# Patient Record
Sex: MALE | Race: WHITE | NOT HISPANIC OR LATINO | Employment: OTHER | URBAN - METROPOLITAN AREA
[De-identification: names, ages, dates, MRNs, and addresses within clinical notes are randomized per-mention and may not be internally consistent; named-entity substitution may affect disease eponyms.]

---

## 2017-01-02 DIAGNOSIS — M51.16 LUMBAR DISC DISEASE WITH RADICULOPATHY: ICD-10-CM

## 2017-01-02 RX ORDER — HYDROCODONE BITARTRATE AND ACETAMINOPHEN 10; 325 MG/1; MG/1
TABLET ORAL
Qty: 50 TABLET | Refills: 0 | Status: SHIPPED | OUTPATIENT
Start: 2017-01-02 | End: 2017-03-07 | Stop reason: SDUPTHER

## 2017-02-07 DIAGNOSIS — M51.16 LUMBAR DISC DISEASE WITH RADICULOPATHY: ICD-10-CM

## 2017-02-08 ENCOUNTER — TELEPHONE (OUTPATIENT)
Dept: FAMILY MEDICINE | Facility: CLINIC | Age: 67
End: 2017-02-08

## 2017-02-08 RX ORDER — HYDROCODONE BITARTRATE AND ACETAMINOPHEN 10; 325 MG/1; MG/1
1 TABLET ORAL EVERY 6 HOURS PRN
Qty: 50 TABLET | Refills: 0 | Status: SHIPPED | OUTPATIENT
Start: 2017-02-08 | End: 2017-02-21

## 2017-02-08 NOTE — TELEPHONE ENCOUNTER
----- Message from Manpreet Milan sent at 2017 12:44 PM CST -----  Contact: SELF  Wesley Bernal  MRN: [id number]  : 1950  PCP: Lalo Sweet  Home Phone      [phone]  Work Phone      Not on file.  Mobile          Not on file.  Home Phone      [phone]      MESSAGE: NEEDS REFILL ON [place]    PHONE: [phone]    PHARMACY: [facility]

## 2017-02-21 ENCOUNTER — OFFICE VISIT (OUTPATIENT)
Dept: FAMILY MEDICINE | Facility: CLINIC | Age: 67
End: 2017-02-21
Payer: MEDICARE

## 2017-02-21 VITALS
BODY MASS INDEX: 27.03 KG/M2 | RESPIRATION RATE: 18 BRPM | HEART RATE: 60 BPM | DIASTOLIC BLOOD PRESSURE: 60 MMHG | HEIGHT: 73 IN | WEIGHT: 203.94 LBS | SYSTOLIC BLOOD PRESSURE: 148 MMHG

## 2017-02-21 DIAGNOSIS — N18.30 CRI (CHRONIC RENAL INSUFFICIENCY), STAGE 3 (MODERATE): ICD-10-CM

## 2017-02-21 DIAGNOSIS — Z78.9 STATIN INTOLERANCE: ICD-10-CM

## 2017-02-21 DIAGNOSIS — E11.65 TYPE 2 DIABETES MELLITUS WITH HYPERGLYCEMIA, WITHOUT LONG-TERM CURRENT USE OF INSULIN: Primary | ICD-10-CM

## 2017-02-21 DIAGNOSIS — M51.16 LUMBAR DISC DISEASE WITH RADICULOPATHY: ICD-10-CM

## 2017-02-21 DIAGNOSIS — E78.5 HYPERLIPIDEMIA, UNSPECIFIED HYPERLIPIDEMIA TYPE: ICD-10-CM

## 2017-02-21 PROCEDURE — 3060F POS MICROALBUMINURIA REV: CPT | Mod: S$GLB,,, | Performed by: FAMILY MEDICINE

## 2017-02-21 PROCEDURE — 3044F HG A1C LEVEL LT 7.0%: CPT | Mod: S$GLB,,, | Performed by: FAMILY MEDICINE

## 2017-02-21 PROCEDURE — 2022F DILAT RTA XM EVC RTNOPTHY: CPT | Mod: S$GLB,,, | Performed by: FAMILY MEDICINE

## 2017-02-21 PROCEDURE — 3078F DIAST BP <80 MM HG: CPT | Mod: S$GLB,,, | Performed by: FAMILY MEDICINE

## 2017-02-21 PROCEDURE — 99214 OFFICE O/P EST MOD 30 MIN: CPT | Mod: S$GLB,,, | Performed by: FAMILY MEDICINE

## 2017-02-21 PROCEDURE — 99999 PR PBB SHADOW E&M-EST. PATIENT-LVL III: CPT | Mod: PBBFAC,,, | Performed by: FAMILY MEDICINE

## 2017-02-21 PROCEDURE — 99499 UNLISTED E&M SERVICE: CPT | Mod: S$GLB,,, | Performed by: FAMILY MEDICINE

## 2017-02-21 PROCEDURE — 3077F SYST BP >= 140 MM HG: CPT | Mod: S$GLB,,, | Performed by: FAMILY MEDICINE

## 2017-02-21 PROCEDURE — 1160F RVW MEDS BY RX/DR IN RCRD: CPT | Mod: S$GLB,,, | Performed by: FAMILY MEDICINE

## 2017-02-21 RX ORDER — OMEGA-3-ACID ETHYL ESTERS 1 G/1
2 CAPSULE, LIQUID FILLED ORAL 2 TIMES DAILY
Qty: 120 CAPSULE | Refills: 5 | Status: SHIPPED | OUTPATIENT
Start: 2017-02-21 | End: 2017-08-30 | Stop reason: SDUPTHER

## 2017-02-21 NOTE — PROGRESS NOTES
CC: Checkup for type 2 diabetes, hypertension    HPI: Wesley Bernal is a 66 y.o. male here for a checkup.  He developed severe myalgias since starting generic Lipitor.  He lost muscle mass.  Stopped it last week and now muscles are better.  His cholesterol increased so cardiology now has him on Zetia and Crestor 10 mg daily.  He had to stop Crestor due to muscle pain.  Patient's last blood work revealed a creatinine of 2.0.  A referral to nephrology was made.  He saw him last month and had complete  workup.  His metformin was stopped because of this.  Renal ultrasound showed chronic kidney disease.  He has not seen a nephrologist for over one year  He is now on Januvia.  He tolerates it well.  His blood sugars are controlled decently.  Patient also has a history of mitral valve replacement, atrial fib, hypertension.  He has a pacemaker and defibrillator in place.  This has not been a problem for him.  He is now seeing Dr. Leigh.  His cardiologist was in [place].  That was his brother-in-law.  Last year patient had bilateral stents placed in his legs and is doing better.  He denies signs or symptoms of claudications.  He also has a history of type 2 diabetes.  He lost over 100 pounds and only takes Januvia  for it.  He continues to lose weight but states he is not trying.  I think he is just following a good diet and the weight is coming off naturally.  He tolerates his blood pressure medication well and is not having side effects such as chronic cough or dizziness.    Clinically his back pain has been stable.  He takes 1 or 2 Norco's per day which helps.  Saw neurology and was started on Gabapentin and is doing better.    ROS:   Gen.:  No fever, chills, weight loss, weight gain  HEENT: No headaches, sinus congestion, sore throat, change in vision  CV: No chest pain  RESP: No shortness of breath, wheezing, cough  GI: No change in bowel habits, no diarrhea, no abdominal pain, no hematochezia  : No dysuria,  frequency  MSK: No muscle pain, joint pain, back pain  NEURO: No numbness, weakness  ENDO: No polyuria, polydipsia, heat or cold intolerance    PMH, PSH, ALLERGIES, SH, FH reviewed in today's note    PHYSICAL EXAM;   Vitals:    02/21/17 0842   BP: (!) 148/60   Pulse: 60   Resp: 18     APPEARANCE: Well nourished, well developed, in no acute distress.    HEAD: Normocephalic, atraumatic.  EYES: PERRL. EOMI.      EARS: TM's intact. Light reflex normal. No retraction or perforation.    NOSE: Mucosa pink. Airway clear.  MOUTH & THROAT: No tonsillar enlargement. No pharyngeal erythema or exudate. No stridor.  NECK: Supple.  Bilateral carotid endarterectomy scars  NODES: No cervical, axillary or inguinal lymph node enlargement.  CHEST: Lungs clear to auscultation.  CARDIOVASCULAR: Normal S1, S2. No rubs, murmurs or gallops.  ABDOMEN: Bowel sounds normal. Not distended. Soft. No tenderness or masses.  MUSCULOSKELETAL:  No CCE.  Very poor distal pulses  SKIN: No rashes or pigmented moles.  NEUROLOGIC:       Cranial Nerves: II-XII grossly intact.      Motor: 5/5 strength major flexors/extensors.      DTR's: Knees, Ankles 2+ and equal bilaterally; downgoing toes.      Sensory: Intact to light touch distally.      Gait & Posture: Normal gait and fine motion. No cerebellar signs.  MENTAL STATUS: Normal orientation to person, place and time, normal affect, normal flow thought, normal memory.    Protective Sensation (w/ 10 gram monofilament):  Right: Decreased  Left: Decreased    Visual Inspection:  Normal -  Bilateral    Pedal Pulses:   Right: Diminshed  Left: Diminshed    Posterior tibialis:   Right:Diminshed  Left: Diminshed    Lab Results   Component Value Date    HGBA1C 6.6 (H) 11/29/2016     Lab Results   Component Value Date    LDLCALC 168.4 (H) 11/29/2016     Lab Results   Component Value Date    CREATININE 2.0 (H) 11/29/2016     ASSESSMENT/PLAN:    Atrial fibrillation/Mitral valve replaced  - amiodarone (PACERONE) 200 MG  Tab; Take 1 tablet (200 mg total) by mouth once daily.  Dispense: 30 tablet; Refill: 5  - Keep appointment with cardiology     Type 2 diabetes mellitus  I have recommended the following steps for improving diabetic care and outcome to patient::   Referral to Diabetic Education department if necessary.  Diabetic diet discussed in detail, written exchange diet given, low cholesterol diet, weight control and daily exercise discussed.    All medications, side effects and compliance issues discussed.    Long term complications of diabetes discussed.  Home glucose monitoring emphasized. Fasting morning glucose goals should be less than 140.  2 hour postprandial goal should be less than 180.  Annual eye examinations at Ophthalmology discussed,   Glycohemoglobin and other lab monitoring discussed.  Medications for this patient will be:  - sitagliptin (JANUVIA) 100 MG Tab; Take 1 tablet (100 mg total) by mouth once daily.  Dispense: 30 tablet; Refill: 5    HTN (hypertension)/PVD  Two gram sodium diet.    Weight loss discussed.    Try to walk 2 miles per day.    Quit smoking.    Current medications will be:  - atenolol (TENORMIN) 25 MG tablet; Take 1 tablet (25 mg total) by mouth once daily.  Dispense: 30 tablet; Refill: 5  - furosemide (LASIX) 20 MG tablet; Take 1 tablet (20 mg total) by mouth 2 (two) times daily.  Dispense: 60 tablet; Refill: 5  - lisinopril 10 MG tablet; Take 1 tablet (10 mg total) by mouth twice daily.  Dispense: 30 tablet; Refill: 5              ASA 81 mg po daily  Patient told to take 20 mg of Lasix if he does not have edema.  He can increase to 40 mg daily when he does have some swelling.    CRI (chronic renal insufficiency), stage 3 (moderate)  Continue seeing Nephrology  Adjust Lasix according to edema    Hyperlipidemia-statin intolerance  Low fat diet.  Avoid sweets.  A 10 pound weight loss by the next visit as a  good goal.  Increase consumption of fruits and vegetables, fish and  chicken.  Use medications below:  Statin intolerant  Continue Lovaza  Continues Zetia    Type 2 diabetes mellitus with hyperglycemia, without long-term current use of insulin  -     Lipid panel; Future  -     ALT (SGPT); Future  -     Hemoglobin A1c; Future    Hyperlipidemia, unspecified hyperlipidemia type  -     omega-3 acid ethyl esters (LOVAZA) 1 gram capsule; Take 2 capsules (2 g total) by mouth 2 (two) times daily. Please use generic  Dispense: 120 capsule; Refill: 5    CRI (chronic renal insufficiency), stage 3 (moderate)  -     Basic metabolic panel; Future    Statin intolerance  -     Lipid panel; Future    Lumbar disc disease with radiculopathy  Next appointment will be in 6 months.

## 2017-02-21 NOTE — MR AVS SNAPSHOT
[facility]  [address]  [facility] [address]  Phone: [phone]  Fax: [phone]                  Wesley Bernal   2017 8:45 AM   Office Visit    Description:  Male : 1950   Provider:  Lalo Sweet MD   Department:  [facility]           Reason for Visit     3 month checkup           Diagnoses this Visit        Comments    Type 2 diabetes mellitus with hyperglycemia, without long-term current use of insulin    -  Primary     Hyperlipidemia, unspecified hyperlipidemia type         CRI (chronic renal insufficiency), stage 3 (moderate)         Statin intolerance         Lumbar disc disease with radiculopathy                To Do List           Future Appointments        Provider Department Dept Phone    2017 8:30 AM LAM SPANN [facility] [phone]    2017 8:45 AM Lalo Sweet MD [facility] [phone]      Goals (5 Years of Data)     None      Follow-Up and Disposition     Return in about 3 months (around 2017).       These Medications        Disp Refills Start End    omega-3 acid ethyl esters (LOVAZA) 1 gram capsule 120 capsule 5 2017 3/23/2017    Take 2 capsules (2 g total) by mouth 2 (two) times daily. Please use generic - Oral    Pharmacy: [facility], [address] Ph #: [phone]         [facility] On Call     [facility] On Call Nurse Care Line -  Assistance  Registered nurses in the [facility] provide clinical advisement, health education, appointment booking, and other advisory services.  Call for this free service at [phone].             Medications           Message regarding Medications     Verify the changes and/or additions to your medication regime listed below are the same as discussed with your clinician today.  If any of these changes or additions are incorrect, please notify your  healthcare provider.        START taking these NEW medications        Refills    omega-3 acid ethyl esters (LOVAZA) 1 gram capsule 5    Sig: Take 2 capsules (2 g total) by mouth 2 (two) times daily. Please use generic    Class: Normal    Route: Oral      STOP taking these medications     bisacodyl (DULCOLAX) 5 mg EC tablet Take 5 mg by mouth daily as needed for Constipation.    CRESTOR 10 mg tablet Take 1 tablet (10 mg total) by mouth once daily.           Verify that the below list of medications is an accurate representation of the medications you are currently taking.  If none reported, the list may be blank. If incorrect, please contact your healthcare provider. Carry this list with you in case of emergency.           Current Medications     alprazolam (XANAX) 0.25 MG tablet Take 1 tablet (0.25 mg total) by mouth 2 (two) times daily as needed.    amiodarone (PACERONE) 200 MG Tab Take 0.5 tablets (100 mg total) by mouth once daily.    aspirin (ECOTRIN) 81 MG EC tablet Take by mouth once daily.    atenolol (TENORMIN) 25 MG tablet Take 1 tablet (25 mg total) by mouth once daily.    ergocalciferol (ERGOCALCIFEROL) 50,000 unit Cap Take 1 capsule (50,000 Units total) by mouth every 7 days.    ezetimibe (ZETIA) 10 mg tablet Take 1 tablet (10 mg total) by mouth once daily.    furosemide (LASIX) 20 MG tablet TAKE ONE TABLET BY MOUTH TWICE DAILY    gabapentin (NEURONTIN) 100 MG capsule TAKE ONE CAPSULE BY MOUTH THREE TIMES DAILY    hydrocodone-acetaminophen 10-325mg (NORCO)  mg Tab TAKE ONE TABLET BY MOUTH EVERY 6 HOURS AS NEEDED    icosapent ethyl (VASCEPA) 1 gram Cap Take 2 g by mouth 2 (two) times daily.    pioglitazone (ACTOS) 15 MG tablet Take 1 tablet (15 mg total) by mouth once daily.    SITagliptan (JANUVIA) 100 MG Tab Take 1 tablet (100 mg total) by mouth once daily.    omega-3 acid ethyl esters (LOVAZA) 1 gram capsule Take 2 capsules (2 g total) by mouth 2 (two) times daily. Please use generic     "sildenafil (VIAGRA) 100 MG tablet Take 1 tablet (100 mg total) by mouth daily as needed for Erectile Dysfunction.           Clinical Reference Information           Your Vitals Were     BP Pulse Resp Height Weight BMI    148/60 (BP Location: Right arm, Patient Position: Sitting, BP Method: Manual) 60 18 6' 1" (1.854 m) 92.5 kg (203 lb 14.8 oz) 26.9 kg/m2      Blood Pressure          Most Recent Value    BP  (!)  148/60      Allergies as of 2/21/2017     Atorvastatin      Immunizations Administered on Date of Encounter - 2/21/2017     None      Orders Placed During Today's Visit     Future Labs/Procedures Expected by Expires    ALT (SGPT)  4/21/2017 5/21/2017    Basic metabolic panel  4/21/2017 5/21/2017    Hemoglobin A1c  4/21/2017 5/21/2017    Lipid panel  4/21/2017 5/21/2017      [facility] Sign-Up     Activating your [facility] account is as easy as 1-2-3!     1) Visit [email or web address], select Sign Up Now, enter this activation code and your date of birth, then select Next.  [email or web address]  Expires: 4/7/2017  7:52 AM      2) Create a username and password to use when you visit [facility] in the future and select a security question in case you lose your password and select Next.    3) Enter your e-mail address and click Sign Up!    Additional Information  If you have questions, please e-mail [email or web address] or call [phone] to talk to our [facility] staff. Remember, [facility] is NOT to be used for urgent needs. For medical emergencies, dial 911.         Language Assistance Services     ATTENTION: Language assistance services are available, free of charge. Please call [phone].      ATENCIÓN: Si habla aryan, tiene a lee disposición servicios gratuitos de asistencia lingüística. Llame al [phone].     CHÚ Ý: N?u b?n nói Ti?ng Vi?t, có các d?ch v? h? tr? ngôn ng? mi?n phí dành cho b?n. G?i s? [phone].         [facility] [place] complies with applicable Federal civil rights laws and " does not discriminate on the basis of race, color, national origin, age, disability, or sex.

## 2017-03-01 ENCOUNTER — TELEPHONE (OUTPATIENT)
Dept: FAMILY MEDICINE | Facility: CLINIC | Age: 67
End: 2017-03-01

## 2017-03-07 DIAGNOSIS — M51.16 LUMBAR DISC DISEASE WITH RADICULOPATHY: ICD-10-CM

## 2017-03-07 RX ORDER — HYDROCODONE BITARTRATE AND ACETAMINOPHEN 10; 325 MG/1; MG/1
1 TABLET ORAL EVERY 6 HOURS PRN
Qty: 50 TABLET | Refills: 0 | Status: SHIPPED | OUTPATIENT
Start: 2017-03-07 | End: 2017-04-04 | Stop reason: SDUPTHER

## 2017-04-04 DIAGNOSIS — M51.16 LUMBAR DISC DISEASE WITH RADICULOPATHY: ICD-10-CM

## 2017-04-04 NOTE — TELEPHONE ENCOUNTER
----- Message from Summer Sea sent at 2017  2:21 PM CDT -----  Contact: self  Wesley Bernal  MRN: [id number]  : 1950  PCP: Lalo Sweet  Home Phone      [phone]  Work Phone      Not on file.  Mobile          [phone]  Home Phone      [phone]      MESSAGE: needs refill on [place]    Pharmacy: [facility]    Phone: [phone]

## 2017-04-05 RX ORDER — HYDROCODONE BITARTRATE AND ACETAMINOPHEN 10; 325 MG/1; MG/1
1 TABLET ORAL EVERY 6 HOURS PRN
Qty: 50 TABLET | Refills: 0 | Status: SHIPPED | OUTPATIENT
Start: 2017-04-05 | End: 2017-05-04 | Stop reason: SDUPTHER

## 2017-05-03 DIAGNOSIS — F41.1 GENERALIZED ANXIETY DISORDER: ICD-10-CM

## 2017-05-03 DIAGNOSIS — M51.16 LUMBAR DISC DISEASE WITH RADICULOPATHY: ICD-10-CM

## 2017-05-04 ENCOUNTER — CLINICAL SUPPORT (OUTPATIENT)
Dept: FAMILY MEDICINE | Facility: CLINIC | Age: 67
End: 2017-05-04
Payer: MEDICARE

## 2017-05-04 DIAGNOSIS — N18.30 CRI (CHRONIC RENAL INSUFFICIENCY), STAGE 3 (MODERATE): ICD-10-CM

## 2017-05-04 DIAGNOSIS — Z78.9 STATIN INTOLERANCE: ICD-10-CM

## 2017-05-04 DIAGNOSIS — Z12.5 SCREENING FOR PROSTATE CANCER: Primary | ICD-10-CM

## 2017-05-04 DIAGNOSIS — M51.16 LUMBAR DISC DISEASE WITH RADICULOPATHY: ICD-10-CM

## 2017-05-04 DIAGNOSIS — E11.65 TYPE 2 DIABETES MELLITUS WITH HYPERGLYCEMIA, WITHOUT LONG-TERM CURRENT USE OF INSULIN: ICD-10-CM

## 2017-05-04 LAB
ALT SERPL W/O P-5'-P-CCNC: 14 U/L
ANION GAP SERPL CALC-SCNC: 11 MMOL/L
BUN SERPL-MCNC: 25 MG/DL
CALCIUM SERPL-MCNC: 8.8 MG/DL
CHLORIDE SERPL-SCNC: 104 MMOL/L
CHOLEST/HDLC SERPL: 6.7 {RATIO}
CO2 SERPL-SCNC: 26 MMOL/L
COMPLEXED PSA SERPL-MCNC: 2.1 NG/ML
CREAT SERPL-MCNC: 1.9 MG/DL
EST. GFR  (AFRICAN AMERICAN): 42 ML/MIN/1.73 M^2
EST. GFR  (NON AFRICAN AMERICAN): 36 ML/MIN/1.73 M^2
GLUCOSE SERPL-MCNC: 278 MG/DL
HDL/CHOLESTEROL RATIO: 15 %
HDLC SERPL-MCNC: 233 MG/DL
HDLC SERPL-MCNC: 35 MG/DL
LDLC SERPL CALC-MCNC: 172.6 MG/DL
NONHDLC SERPL-MCNC: 198 MG/DL
POTASSIUM SERPL-SCNC: 4.1 MMOL/L
SODIUM SERPL-SCNC: 141 MMOL/L
TRIGL SERPL-MCNC: 127 MG/DL

## 2017-05-04 PROCEDURE — 80048 BASIC METABOLIC PNL TOTAL CA: CPT

## 2017-05-04 PROCEDURE — 84460 ALANINE AMINO (ALT) (SGPT): CPT

## 2017-05-04 PROCEDURE — 83036 HEMOGLOBIN GLYCOSYLATED A1C: CPT

## 2017-05-04 PROCEDURE — 36415 COLL VENOUS BLD VENIPUNCTURE: CPT | Mod: ,,, | Performed by: FAMILY MEDICINE

## 2017-05-04 PROCEDURE — 84153 ASSAY OF PSA TOTAL: CPT

## 2017-05-04 PROCEDURE — 80061 LIPID PANEL: CPT

## 2017-05-04 RX ORDER — GABAPENTIN 100 MG/1
CAPSULE ORAL
Qty: 90 CAPSULE | Refills: 5 | Status: SHIPPED | OUTPATIENT
Start: 2017-05-04 | End: 2017-11-05 | Stop reason: SDUPTHER

## 2017-05-04 RX ORDER — ALPRAZOLAM 0.25 MG/1
TABLET ORAL
Qty: 60 TABLET | Refills: 5 | Status: SHIPPED | OUTPATIENT
Start: 2017-05-04 | End: 2017-11-05 | Stop reason: SDUPTHER

## 2017-05-04 NOTE — TELEPHONE ENCOUNTER
----- Message from Manpreet Milan sent at 2017 11:18 AM CDT -----  Contact: SELF  Wesley Bernal  MRN: [id number]  : 1950  PCP: Lalo Sweet  Home Phone      [phone]  Work Phone      Not on file.  Mobile          [phone]  Home Phone      [phone]      MESSAGE: NEEDS REFILL ON [place]    PHONE: [phone]    PHARMACY: [facility]

## 2017-05-05 LAB
ESTIMATED AVG GLUCOSE: 154 MG/DL
HBA1C MFR BLD HPLC: 7 %

## 2017-05-05 RX ORDER — HYDROCODONE BITARTRATE AND ACETAMINOPHEN 10; 325 MG/1; MG/1
1 TABLET ORAL EVERY 6 HOURS PRN
Qty: 50 TABLET | Refills: 0 | Status: SHIPPED | OUTPATIENT
Start: 2017-05-05 | End: 2017-05-29 | Stop reason: SDUPTHER

## 2017-05-10 ENCOUNTER — PATIENT OUTREACH (OUTPATIENT)
Dept: ADMINISTRATIVE | Facility: HOSPITAL | Age: 67
End: 2017-05-10

## 2017-05-10 NOTE — LETTER
May 10, 2017    Wesley Bernal  [address] [place] [address]             [facility]  [address]  [place] [address]  Phone: [phone] Dear  Mikedeonnaismael:    [facility] is committed to your overall health.  To help you get the most out of each of your visits, we will review your information to make sure you are up to date on all of your recommended tests and/or procedures.      Dr. Sweet has found that you may be due for a pneumonia vaccine, an abdominal aortic aneurysm screening, and annual diabetic foot exam, a colonoscopy, and a Hepatitis C screening.     If you have had any of the above done at another facility, please bring the records or information with you so that your record at [facility] will be complete.  If you would like to schedule any of these, please contact me.    If you are currently taking medication, please bring it with you to your appointment for review.    Also, if you have any type of Advanced Directives, please bring them with you to your office visit so we may scan them into your chart.        If you have any questions or concerns, please don't hesitate to call.    Sincerely,        Nathalie Cavanaugh LPN Clinical Care Coordinator  [facility]  [phone]

## 2017-05-29 ENCOUNTER — OFFICE VISIT (OUTPATIENT)
Dept: FAMILY MEDICINE | Facility: CLINIC | Age: 67
End: 2017-05-29
Payer: MEDICARE

## 2017-05-29 VITALS
DIASTOLIC BLOOD PRESSURE: 62 MMHG | WEIGHT: 209.38 LBS | HEART RATE: 72 BPM | RESPIRATION RATE: 17 BRPM | HEIGHT: 73 IN | BODY MASS INDEX: 27.75 KG/M2 | SYSTOLIC BLOOD PRESSURE: 126 MMHG

## 2017-05-29 DIAGNOSIS — E11.65 TYPE 2 DIABETES MELLITUS WITH HYPERGLYCEMIA, WITHOUT LONG-TERM CURRENT USE OF INSULIN: ICD-10-CM

## 2017-05-29 DIAGNOSIS — M51.16 LUMBAR DISC DISEASE WITH RADICULOPATHY: ICD-10-CM

## 2017-05-29 DIAGNOSIS — Z78.9 STATIN INTOLERANCE: ICD-10-CM

## 2017-05-29 DIAGNOSIS — N18.30 CRI (CHRONIC RENAL INSUFFICIENCY), STAGE 3 (MODERATE): ICD-10-CM

## 2017-05-29 DIAGNOSIS — E55.9 VITAMIN D DEFICIENCY DISEASE: Primary | ICD-10-CM

## 2017-05-29 DIAGNOSIS — E78.5 HYPERLIPIDEMIA, UNSPECIFIED HYPERLIPIDEMIA TYPE: ICD-10-CM

## 2017-05-29 PROCEDURE — 99214 OFFICE O/P EST MOD 30 MIN: CPT | Mod: S$GLB,,, | Performed by: FAMILY MEDICINE

## 2017-05-29 PROCEDURE — 99499 UNLISTED E&M SERVICE: CPT | Mod: S$GLB,,, | Performed by: FAMILY MEDICINE

## 2017-05-29 PROCEDURE — 3045F PR MOST RECENT HEMOGLOBIN A1C LEVEL 7.0-9.0%: CPT | Mod: S$GLB,,, | Performed by: FAMILY MEDICINE

## 2017-05-29 PROCEDURE — 99999 PR PBB SHADOW E&M-EST. PATIENT-LVL III: CPT | Mod: PBBFAC,,, | Performed by: FAMILY MEDICINE

## 2017-05-29 RX ORDER — ICOSAPENT ETHYL 1000 MG/1
2 CAPSULE ORAL 2 TIMES DAILY
Qty: 120 CAPSULE | Refills: 5 | Status: SHIPPED | OUTPATIENT
Start: 2017-05-29 | End: 2017-11-27 | Stop reason: SDUPTHER

## 2017-05-29 RX ORDER — ERGOCALCIFEROL 1.25 MG/1
50000 CAPSULE ORAL
Qty: 12 CAPSULE | Refills: 2 | Status: SHIPPED | OUTPATIENT
Start: 2017-05-29 | End: 2017-11-27 | Stop reason: SDUPTHER

## 2017-05-29 RX ORDER — PIOGLITAZONEHYDROCHLORIDE 15 MG/1
15 TABLET ORAL DAILY
Qty: 30 TABLET | Refills: 5 | Status: SHIPPED | OUTPATIENT
Start: 2017-05-29 | End: 2017-10-30 | Stop reason: SDUPTHER

## 2017-05-29 RX ORDER — HYDROCODONE BITARTRATE AND ACETAMINOPHEN 10; 325 MG/1; MG/1
1 TABLET ORAL EVERY 6 HOURS PRN
Qty: 50 TABLET | Refills: 0 | Status: SHIPPED | OUTPATIENT
Start: 2017-05-29 | End: 2017-06-30 | Stop reason: SDUPTHER

## 2017-05-29 RX ORDER — UBIDECARENONE 30 MG
100 CAPSULE ORAL DAILY
COMMUNITY
End: 2017-11-27

## 2017-05-29 NOTE — PROGRESS NOTES
CC: Checkup for type 2 diabetes, hypertension    HPI: Wesley Bernal is a 66 y.o. male here for a checkup.  He developed severe myalgias since starting generic Lipitor.  He lost muscle mass.  Stopped it last week and now muscles are better.  His cholesterol increased so cardiology now has him on Zetia and Crestor 10 mg daily.  He had to stop Crestor due to muscle pain.  Patient's last blood work revealed a creatinine of 2.0.  A referral to nephrology was made.  He saw him last month and had complete  workup.  His metformin was stopped because of this.  Renal ultrasound showed chronic kidney disease.  He has not seen a nephrologist for over one year  He is now on Januvia.  He tolerates it well.  His blood sugars are controlled decently.  Patient also has a history of mitral valve replacement, atrial fib, hypertension.  He has a pacemaker and defibrillator in place.  This has not been a problem for him.  He is now seeing Dr. Leigh.  His cardiologist was in [place].  That was his brother-in-law.  Last year patient had bilateral stents placed in his legs and is doing better.  He denies signs or symptoms of claudications.  He also has a history of type 2 diabetes.  He lost over 100 pounds and only takes Januvia  for it.  He continues to lose weight but states he is not trying.  I think he is just following a good diet and the weight is coming off naturally.  He tolerates his blood pressure medication well and is not having side effects such as chronic cough or dizziness.    Clinically his back pain has been stable.  He takes 1 or 2 Norco's per day which helps.  Saw neurology and was started on Gabapentin and is doing better.    ROS:   Gen.:  No fever, chills, weight loss, weight gain  HEENT: No headaches, sinus congestion, sore throat, change in vision  CV: No chest pain  RESP: No shortness of breath, wheezing, cough  GI: No change in bowel habits, no diarrhea, no abdominal pain, no hematochezia  : No dysuria,  frequency  MSK: No muscle pain, joint pain, back pain  NEURO: No numbness, weakness  ENDO: No polyuria, polydipsia, heat or cold intolerance    PMH, PSH, ALLERGIES, SH, FH reviewed in today's note    PHYSICAL EXAM;   Vitals:    05/29/17 1402   BP: 126/62   Pulse: 72   Resp: 17     APPEARANCE: Well nourished, well developed, in no acute distress.    HEAD: Normocephalic, atraumatic.  EYES: PERRL. EOMI.      EARS: TM's intact. Light reflex normal. No retraction or perforation.    NOSE: Mucosa pink. Airway clear.  MOUTH & THROAT: No tonsillar enlargement. No pharyngeal erythema or exudate. No stridor.  NECK: Supple.  Bilateral carotid endarterectomy scars  NODES: No cervical, axillary or inguinal lymph node enlargement.  CHEST: Lungs clear to auscultation.  CARDIOVASCULAR: Normal S1, S2. No rubs, murmurs or gallops.  ABDOMEN: Bowel sounds normal. Not distended. Soft. No tenderness or masses.  MUSCULOSKELETAL:  No CCE.  Very poor distal pulses  SKIN: No rashes or pigmented moles.  NEUROLOGIC:       Cranial Nerves: II-XII grossly intact.      Motor: 5/5 strength major flexors/extensors.      DTR's: Knees, Ankles 2+ and equal bilaterally; downgoing toes.      Sensory: Intact to light touch distally.      Gait & Posture: Normal gait and fine motion. No cerebellar signs.  MENTAL STATUS: Normal orientation to person, place and time, normal affect, normal flow thought, normal memory.    Protective Sensation (w/ 10 gram monofilament):  Right: Decreased  Left: Decreased    Visual Inspection:  Normal -  Bilateral    Pedal Pulses:   Right: Diminshed  Left: Diminshed    Posterior tibialis:   Right:Diminshed  Left: Diminshed    Lab Results   Component Value Date    HGBA1C 7.0 (H) 05/04/2017     Lab Results   Component Value Date    LDLCALC 172.6 (H) 05/04/2017     Lab Results   Component Value Date    CREATININE 1.9 (H) 05/04/2017     Lab Results   Component Value Date    PSA 2.1 05/04/2017    PSA 2.0 09/03/2014        ASSESSMENT/PLAN:    Atrial fibrillation/Mitral valve replaced  - amiodarone (PACERONE) 200 MG Tab; Take 1 tablet (200 mg total) by mouth once daily.  Dispense: 30 tablet; Refill: 5  - Keep appointment with cardiology     Type 2 diabetes mellitus  I have recommended the following steps for improving diabetic care and outcome to patient::   Referral to Diabetic Education department if necessary.  Diabetic diet discussed in detail, written exchange diet given, low cholesterol diet, weight control and daily exercise discussed.    All medications, side effects and compliance issues discussed.    Long term complications of diabetes discussed.  Home glucose monitoring emphasized. Fasting morning glucose goals should be less than 140.  2 hour postprandial goal should be less than 180.  Annual eye examinations at Ophthalmology discussed,   Glycohemoglobin and other lab monitoring discussed.  Medications for this patient will be:  - sitagliptin (JANUVIA) 100 MG Tab; Take 1 tablet (100 mg total) by mouth once daily.  Dispense: 30 tablet; Refill: 5              Actos 15 mg by mouth daily    HTN (hypertension)/PVD  Two gram sodium diet.    Weight loss discussed.    Try to walk 2 miles per day.    Quit smoking.    Current medications will be:  - atenolol (TENORMIN) 25 MG tablet; Take 1 tablet (25 mg total) by mouth once daily.  Dispense: 30 tablet; Refill: 5  - furosemide (LASIX) 20 MG tablet; Take 1 tablet (20 mg total) by mouth 2 (two) times daily.  Dispense: 60 tablet; Refill: 5  - lisinopril 10 MG tablet; Take 1 tablet (10 mg total) by mouth twice daily.  Dispense: 30 tablet; Refill: 5              ASA 81 mg po daily  Patient told to take 20 mg of Lasix if he does not have edema.  He can increase to 40 mg daily when he does have some swelling.    CRI (chronic renal insufficiency), stage 3 (moderate)  Continue seeing Nephrology  Adjust Lasix according to edema    Hyperlipidemia-statin intolerance  Low fat diet.   Avoid sweets.  A 10 pound weight loss by the next visit as a  good goal.  Increase consumption of fruits and vegetables, fish and chicken.  Use medications below:  Statin intolerant  Continue Lovaza  Continues Zetia    Vitamin D deficiency disease  -     Vitamin D; Future    Hyperlipidemia, unspecified hyperlipidemia type  -     icosapent ethyl (VASCEPA) 1 gram Cap; Take 2 g by mouth 2 (two) times daily.  Dispense: 120 capsule; Refill: 5  -     Lipid panel; Future    Lumbar disc disease with radiculopathy  -     ergocalciferol (ERGOCALCIFEROL) 50,000 unit Cap; Take 1 capsule (50,000 Units total) by mouth every 7 days.  Dispense: 12 capsule; Refill: 2  -     hydrocodone-acetaminophen 10-325mg (NORCO)  mg Tab; Take 1 tablet by mouth every 6 (six) hours as needed.  Dispense: 50 tablet; Refill: 0    Type 2 diabetes mellitus with hyperglycemia, without long-term current use of insulin  -     pioglitazone (ACTOS) 15 MG tablet; Take 1 tablet (15 mg total) by mouth once daily.  Dispense: 30 tablet; Refill: 5  -     SITagliptan (JANUVIA) 100 MG Tab; Take 1 tablet (100 mg total) by mouth once daily.  Dispense: 30 tablet; Refill: 5  -     ALT (SGPT); Future  -     Basic metabolic panel; Future  -     Hemoglobin A1c; Future    CRI (chronic renal insufficiency), stage 3 (moderate)    Statin intolerance    Next appointment will be in 6 months.

## 2017-05-31 DIAGNOSIS — I10 ESSENTIAL HYPERTENSION: ICD-10-CM

## 2017-05-31 DIAGNOSIS — E78.5 HYPERLIPIDEMIA, UNSPECIFIED HYPERLIPIDEMIA TYPE: ICD-10-CM

## 2017-05-31 RX ORDER — ATENOLOL 25 MG/1
TABLET ORAL
Qty: 30 TABLET | Refills: 5 | Status: SHIPPED | OUTPATIENT
Start: 2017-05-31 | End: 2017-11-26 | Stop reason: SDUPTHER

## 2017-05-31 RX ORDER — EZETIMIBE 10 MG
TABLET ORAL
Qty: 30 TABLET | Refills: 5 | Status: SHIPPED | OUTPATIENT
Start: 2017-05-31 | End: 2017-11-26 | Stop reason: SDUPTHER

## 2017-06-30 DIAGNOSIS — M51.16 LUMBAR DISC DISEASE WITH RADICULOPATHY: ICD-10-CM

## 2017-06-30 RX ORDER — HYDROCODONE BITARTRATE AND ACETAMINOPHEN 10; 325 MG/1; MG/1
TABLET ORAL
Qty: 50 TABLET | Refills: 0 | Status: SHIPPED | OUTPATIENT
Start: 2017-06-30 | End: 2017-08-02 | Stop reason: SDUPTHER

## 2017-08-02 DIAGNOSIS — M51.16 LUMBAR DISC DISEASE WITH RADICULOPATHY: ICD-10-CM

## 2017-08-02 DIAGNOSIS — E78.5 HYPERLIPIDEMIA, UNSPECIFIED HYPERLIPIDEMIA TYPE: ICD-10-CM

## 2017-08-02 RX ORDER — HYDROCODONE BITARTRATE AND ACETAMINOPHEN 10; 325 MG/1; MG/1
1 TABLET ORAL EVERY 6 HOURS PRN
Qty: 50 TABLET | Refills: 0 | Status: SHIPPED | OUTPATIENT
Start: 2017-08-02 | End: 2017-09-05 | Stop reason: SDUPTHER

## 2017-08-02 NOTE — TELEPHONE ENCOUNTER
----- Message from Dorcas Nice sent at 2017 11:02 AM CDT -----  Contact: Self  Wesley Bernal  MRN: [id number]  : 1950  PCP: Lalo Sweet  Home Phone      [phone]  Work Phone      Not on file.  Mobile          [phone]  Home Phone      [phone]    MESSAGE:  Would like Rx Hydrocodone refilled    Pharmacy:  [facility]    Phone:  [phone]

## 2017-08-07 DIAGNOSIS — M51.16 LUMBAR DISC DISEASE WITH RADICULOPATHY: ICD-10-CM

## 2017-08-07 RX ORDER — HYDROCODONE BITARTRATE AND ACETAMINOPHEN 10; 325 MG/1; MG/1
1 TABLET ORAL EVERY 6 HOURS PRN
Qty: 50 TABLET | Refills: 0 | OUTPATIENT
Start: 2017-08-07

## 2017-08-30 DIAGNOSIS — E78.5 HYPERLIPIDEMIA, UNSPECIFIED HYPERLIPIDEMIA TYPE: ICD-10-CM

## 2017-08-30 RX ORDER — OMEGA-3-ACID ETHYL ESTERS 1 G/1
CAPSULE, LIQUID FILLED ORAL
Qty: 120 CAPSULE | Refills: 5 | Status: SHIPPED | OUTPATIENT
Start: 2017-08-30 | End: 2018-05-28 | Stop reason: SDUPTHER

## 2017-09-05 DIAGNOSIS — M51.16 LUMBAR DISC DISEASE WITH RADICULOPATHY: ICD-10-CM

## 2017-09-06 RX ORDER — HYDROCODONE BITARTRATE AND ACETAMINOPHEN 10; 325 MG/1; MG/1
1 TABLET ORAL EVERY 6 HOURS PRN
Qty: 50 TABLET | Refills: 0 | Status: SHIPPED | OUTPATIENT
Start: 2017-09-06 | End: 2017-10-05 | Stop reason: SDUPTHER

## 2017-10-05 DIAGNOSIS — M51.16 LUMBAR DISC DISEASE WITH RADICULOPATHY: ICD-10-CM

## 2017-10-05 NOTE — TELEPHONE ENCOUNTER
----- Message from Simone Valadez sent at 10/5/2017  8:41 AM CDT -----  Contact: ZOË Bernal  MRN: [id number]  : 1950  PCP: Lalo Sweet  Home Phone      [phone]  Work Phone      Not on file.  Mobile          [phone]  Home Phone      [phone]      MESSAGE: requesting Rx for [place] -- uses [facility]    call [phone]    PCP: Micki

## 2017-10-09 ENCOUNTER — TELEPHONE (OUTPATIENT)
Dept: FAMILY MEDICINE | Facility: CLINIC | Age: 67
End: 2017-10-09

## 2017-10-09 RX ORDER — HYDROCODONE BITARTRATE AND ACETAMINOPHEN 10; 325 MG/1; MG/1
TABLET ORAL
Qty: 50 TABLET | Refills: 0 | Status: SHIPPED | OUTPATIENT
Start: 2017-10-09 | End: 2017-10-09 | Stop reason: SDUPTHER

## 2017-10-09 RX ORDER — HYDROCODONE BITARTRATE AND ACETAMINOPHEN 10; 325 MG/1; MG/1
1 TABLET ORAL EVERY 6 HOURS PRN
Qty: 60 TABLET | Refills: 0 | Status: SHIPPED | OUTPATIENT
Start: 2017-10-09 | End: 2017-11-27 | Stop reason: SDUPTHER

## 2017-10-09 NOTE — TELEPHONE ENCOUNTER
Spoke to wife and she said the pt is out of medication and is in pain. 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  Informed wife that I will send this to Dr. Jackson that is in clinic today and see if he will fill medication but can not guarantee . Verbal understanding

## 2017-10-09 NOTE — TELEPHONE ENCOUNTER
Prescription refill was sent in on Thursday to Dr. Rizvi for [place].  Dr. Rizvi was out of the office Thursday , Friday and is out today.  Pt is out of medication and is in pain. 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   Pts next appt with [facility] is 11/27/2017 for his 6m check up. Please advise,thank you     Lst refill : 9/6/2017

## 2017-10-09 NOTE — TELEPHONE ENCOUNTER
----- Message from Carla Guzman MA sent at 10/9/2017 10:03 AM CDT -----  Contact: self  Wesley Bernal  MRN: [id number]  : 1950  PCP: Lalo Sweet  Home Phone      [phone]  Work Phone      Not on file.  Mobile          [phone]  Home Phone      [phone]      MESSAGE: Patient called very upset about Norco Rx. He states that he called the Pharmacy and the Rx is not there. Please call back  Phone: [phone]

## 2017-10-30 DIAGNOSIS — E11.65 TYPE 2 DIABETES MELLITUS WITH HYPERGLYCEMIA, WITHOUT LONG-TERM CURRENT USE OF INSULIN: ICD-10-CM

## 2017-10-30 RX ORDER — PIOGLITAZONEHYDROCHLORIDE 15 MG/1
TABLET ORAL
Qty: 30 TABLET | Refills: 0 | Status: SHIPPED | OUTPATIENT
Start: 2017-10-30 | End: 2017-11-27 | Stop reason: SDUPTHER

## 2017-10-30 RX ORDER — SITAGLIPTIN 100 MG/1
TABLET, FILM COATED ORAL
Qty: 30 TABLET | Refills: 5 | Status: SHIPPED | OUTPATIENT
Start: 2017-10-30 | End: 2017-11-27 | Stop reason: SDUPTHER

## 2017-11-05 DIAGNOSIS — M51.16 LUMBAR DISC DISEASE WITH RADICULOPATHY: ICD-10-CM

## 2017-11-05 DIAGNOSIS — F41.1 GENERALIZED ANXIETY DISORDER: ICD-10-CM

## 2017-11-06 RX ORDER — GABAPENTIN 100 MG/1
CAPSULE ORAL
Qty: 90 CAPSULE | Refills: 5 | Status: SHIPPED | OUTPATIENT
Start: 2017-11-06 | End: 2017-11-27 | Stop reason: SDUPTHER

## 2017-11-06 RX ORDER — ALPRAZOLAM 0.25 MG/1
TABLET ORAL
Qty: 60 TABLET | Refills: 2 | Status: SHIPPED | OUTPATIENT
Start: 2017-11-06 | End: 2017-11-27 | Stop reason: SDUPTHER

## 2017-11-20 ENCOUNTER — CLINICAL SUPPORT (OUTPATIENT)
Dept: FAMILY MEDICINE | Facility: CLINIC | Age: 67
End: 2017-11-20
Payer: MEDICARE

## 2017-11-20 DIAGNOSIS — E78.5 HYPERLIPIDEMIA, UNSPECIFIED HYPERLIPIDEMIA TYPE: ICD-10-CM

## 2017-11-20 DIAGNOSIS — E11.65 TYPE 2 DIABETES MELLITUS WITH HYPERGLYCEMIA, WITHOUT LONG-TERM CURRENT USE OF INSULIN: ICD-10-CM

## 2017-11-20 DIAGNOSIS — E55.9 VITAMIN D DEFICIENCY DISEASE: ICD-10-CM

## 2017-11-20 LAB
ALT SERPL W/O P-5'-P-CCNC: 17 U/L
ANION GAP SERPL CALC-SCNC: 8 MMOL/L
BUN SERPL-MCNC: 31 MG/DL
CALCIUM SERPL-MCNC: 9.4 MG/DL
CHLORIDE SERPL-SCNC: 102 MMOL/L
CHOLEST SERPL-MCNC: 288 MG/DL
CHOLEST/HDLC SERPL: 8 {RATIO}
CO2 SERPL-SCNC: 29 MMOL/L
CREAT SERPL-MCNC: 2 MG/DL
EST. GFR  (AFRICAN AMERICAN): 39 ML/MIN/1.73 M^2
EST. GFR  (NON AFRICAN AMERICAN): 34 ML/MIN/1.73 M^2
GLUCOSE SERPL-MCNC: 202 MG/DL
HDLC SERPL-MCNC: 36 MG/DL
HDLC SERPL: 12.5 %
LDLC SERPL CALC-MCNC: 232 MG/DL
NONHDLC SERPL-MCNC: 252 MG/DL
POTASSIUM SERPL-SCNC: 4.5 MMOL/L
SODIUM SERPL-SCNC: 139 MMOL/L
TRIGL SERPL-MCNC: 100 MG/DL

## 2017-11-20 PROCEDURE — 80048 BASIC METABOLIC PNL TOTAL CA: CPT

## 2017-11-20 PROCEDURE — 82306 VITAMIN D 25 HYDROXY: CPT

## 2017-11-20 PROCEDURE — 80061 LIPID PANEL: CPT

## 2017-11-20 PROCEDURE — 84460 ALANINE AMINO (ALT) (SGPT): CPT

## 2017-11-20 PROCEDURE — 36415 COLL VENOUS BLD VENIPUNCTURE: CPT | Mod: S$GLB,,, | Performed by: FAMILY MEDICINE

## 2017-11-20 PROCEDURE — 99999 PR PBB SHADOW E&M-EST. PATIENT-LVL I: CPT | Mod: PBBFAC,,,

## 2017-11-20 PROCEDURE — 83036 HEMOGLOBIN GLYCOSYLATED A1C: CPT

## 2017-11-21 LAB
25(OH)D3+25(OH)D2 SERPL-MCNC: 29 NG/ML
ESTIMATED AVG GLUCOSE: 137 MG/DL
HBA1C MFR BLD HPLC: 6.4 %

## 2017-11-26 DIAGNOSIS — I10 ESSENTIAL HYPERTENSION: ICD-10-CM

## 2017-11-26 DIAGNOSIS — E78.5 HYPERLIPIDEMIA, UNSPECIFIED HYPERLIPIDEMIA TYPE: ICD-10-CM

## 2017-11-27 ENCOUNTER — OFFICE VISIT (OUTPATIENT)
Dept: FAMILY MEDICINE | Facility: CLINIC | Age: 67
End: 2017-11-27
Payer: MEDICARE

## 2017-11-27 VITALS
HEIGHT: 73 IN | HEART RATE: 60 BPM | BODY MASS INDEX: 27.19 KG/M2 | SYSTOLIC BLOOD PRESSURE: 98 MMHG | DIASTOLIC BLOOD PRESSURE: 64 MMHG | WEIGHT: 205.19 LBS

## 2017-11-27 DIAGNOSIS — E11.65 TYPE 2 DIABETES MELLITUS WITH HYPERGLYCEMIA, WITHOUT LONG-TERM CURRENT USE OF INSULIN: ICD-10-CM

## 2017-11-27 DIAGNOSIS — E55.9 VITAMIN D DEFICIENCY DISEASE: Primary | ICD-10-CM

## 2017-11-27 DIAGNOSIS — I10 ESSENTIAL HYPERTENSION: ICD-10-CM

## 2017-11-27 DIAGNOSIS — E78.5 HYPERLIPIDEMIA, UNSPECIFIED HYPERLIPIDEMIA TYPE: ICD-10-CM

## 2017-11-27 DIAGNOSIS — M51.16 LUMBAR DISC DISEASE WITH RADICULOPATHY: ICD-10-CM

## 2017-11-27 DIAGNOSIS — F41.1 GENERALIZED ANXIETY DISORDER: ICD-10-CM

## 2017-11-27 PROCEDURE — 99999 PR PBB SHADOW E&M-EST. PATIENT-LVL III: CPT | Mod: PBBFAC,,, | Performed by: FAMILY MEDICINE

## 2017-11-27 PROCEDURE — 90662 IIV NO PRSV INCREASED AG IM: CPT | Mod: S$GLB,,, | Performed by: FAMILY MEDICINE

## 2017-11-27 PROCEDURE — 99214 OFFICE O/P EST MOD 30 MIN: CPT | Mod: S$GLB,,, | Performed by: FAMILY MEDICINE

## 2017-11-27 PROCEDURE — G0008 ADMIN INFLUENZA VIRUS VAC: HCPCS | Mod: S$GLB,,, | Performed by: FAMILY MEDICINE

## 2017-11-27 PROCEDURE — 99499 UNLISTED E&M SERVICE: CPT | Mod: S$GLB,,, | Performed by: FAMILY MEDICINE

## 2017-11-27 RX ORDER — ERGOCALCIFEROL 1.25 MG/1
50000 CAPSULE ORAL
Qty: 12 CAPSULE | Refills: 3 | Status: SHIPPED | OUTPATIENT
Start: 2017-11-27 | End: 2018-12-03

## 2017-11-27 RX ORDER — ALPRAZOLAM 0.25 MG/1
0.25 TABLET ORAL 2 TIMES DAILY PRN
Qty: 60 TABLET | Refills: 2 | Status: SHIPPED | OUTPATIENT
Start: 2017-11-27 | End: 2018-05-06 | Stop reason: SDUPTHER

## 2017-11-27 RX ORDER — ATENOLOL 25 MG/1
TABLET ORAL
Qty: 30 TABLET | Refills: 5 | Status: SHIPPED | OUTPATIENT
Start: 2017-11-27 | End: 2017-11-27 | Stop reason: SDUPTHER

## 2017-11-27 RX ORDER — EZETIMIBE 10 MG/1
10 TABLET ORAL DAILY
COMMUNITY
End: 2017-11-27 | Stop reason: SDUPTHER

## 2017-11-27 RX ORDER — HYDROCODONE BITARTRATE AND ACETAMINOPHEN 10; 325 MG/1; MG/1
1 TABLET ORAL EVERY 6 HOURS PRN
Qty: 60 TABLET | Refills: 0 | Status: SHIPPED | OUTPATIENT
Start: 2017-11-27 | End: 2018-02-09 | Stop reason: SDUPTHER

## 2017-11-27 RX ORDER — GABAPENTIN 100 MG/1
100 CAPSULE ORAL 3 TIMES DAILY
Qty: 90 CAPSULE | Refills: 5 | Status: SHIPPED | OUTPATIENT
Start: 2017-11-27 | End: 2018-05-28 | Stop reason: SDUPTHER

## 2017-11-27 RX ORDER — EZETIMIBE 10 MG/1
10 TABLET ORAL DAILY
Qty: 30 TABLET | Refills: 5 | Status: SHIPPED | OUTPATIENT
Start: 2017-11-27 | End: 2018-05-28 | Stop reason: SDUPTHER

## 2017-11-27 RX ORDER — FUROSEMIDE 20 MG/1
TABLET ORAL
Qty: 60 TABLET | Refills: 5 | Status: SHIPPED | OUTPATIENT
Start: 2017-11-27 | End: 2018-05-28 | Stop reason: SDUPTHER

## 2017-11-27 RX ORDER — ICOSAPENT ETHYL 1000 MG/1
2 CAPSULE ORAL 2 TIMES DAILY
Qty: 120 CAPSULE | Refills: 5 | Status: SHIPPED | OUTPATIENT
Start: 2017-11-27 | End: 2019-06-10

## 2017-11-27 RX ORDER — ATENOLOL 25 MG/1
25 TABLET ORAL DAILY
Qty: 30 TABLET | Refills: 5 | Status: SHIPPED | OUTPATIENT
Start: 2017-11-27 | End: 2018-05-28 | Stop reason: SDUPTHER

## 2017-11-27 RX ORDER — EZETIMIBE 10 MG
TABLET ORAL
Qty: 30 TABLET | Refills: 5 | Status: SHIPPED | OUTPATIENT
Start: 2017-11-27 | End: 2017-11-27 | Stop reason: SDUPTHER

## 2017-11-27 RX ORDER — PIOGLITAZONEHYDROCHLORIDE 15 MG/1
15 TABLET ORAL DAILY
Qty: 30 TABLET | Refills: 5 | Status: SHIPPED | OUTPATIENT
Start: 2017-11-27 | End: 2018-05-28 | Stop reason: SDUPTHER

## 2017-11-27 NOTE — PROGRESS NOTES
CC: Checkup for type 2 diabetes, hypertension    HPI: Wesley Bernal is a 67 y.o. male here for a checkup.  Patient has a history of peripheral vascular disease and lumbar spinal stenosis.  Lately his pain in his back has been getting worse.  When he walks a short distance the pain the back intensifies and it radiates into his lower extremities.  He has to stop and bend over, the pain resolves, then he's able start walking again.  Patient has iliac artery and femoral artery stents.  He developed severe myalgias since starting generic Lipitor.  He lost muscle mass.  Stopped it last week and now muscles are better.  His cholesterol increased so cardiology now has him on Zetia and Crestor 10 mg daily.  He had to stop Crestor due to muscle pain.  Patient's last blood work revealed a creatinine of 2.0.  A referral to nephrology was made.  He saw him last month and had complete  workup.  His metformin was stopped because of this.  Renal ultrasound showed chronic kidney disease.  He has not seen a nephrologist for over one year  He is now on Januvia.  He tolerates it well.  His blood sugars are controlled decently.  Patient also has a history of mitral valve replacement, atrial fib, hypertension.  He has a pacemaker and defibrillator in place.  This has not been a problem for him.  He is now seeing Dr. Leigh.  His cardiologist was in [place].  That was his brother-in-law.  Last year patient had bilateral stents placed in his legs and is doing better.  He denies signs or symptoms of claudications.  He also has a history of type 2 diabetes.  He lost over 100 pounds and only takes Januvia  for it.  He continues to lose weight but states he is not trying.  I think he is just following a good diet and the weight is coming off naturally.  He tolerates his blood pressure medication well and is not having side effects such as chronic cough or dizziness.    Clinically his back pain has been stable.  He takes 1 or 2 Norco's  per day which helps.  Saw neurology and was started on Gabapentin and is doing better.    ROS:   Gen.:  No fever, chills, weight loss, weight gain  HEENT: No headaches, sinus congestion, sore throat, change in vision  CV: No chest pain  RESP: No shortness of breath, wheezing, cough  GI: No change in bowel habits, no diarrhea, no abdominal pain, no hematochezia  : No dysuria, frequency  MSK: No muscle pain, joint pain, back pain  NEURO: No numbness, weakness  ENDO: No polyuria, polydipsia, heat or cold intolerance    PMH, PSH, ALLERGIES, SH, FH reviewed in today's note    PHYSICAL EXAM;   Vitals:    11/27/17 0956   BP: 98/64   Pulse: 60     APPEARANCE: Well nourished, well developed, in no acute distress.    HEAD: Normocephalic, atraumatic.  EYES: PERRL. EOMI.      EARS: TM's intact. Light reflex normal. No retraction or perforation.    NOSE: Mucosa pink. Airway clear.  MOUTH & THROAT: No tonsillar enlargement. No pharyngeal erythema or exudate. No stridor.  NECK: Supple.  Bilateral carotid endarterectomy scars  NODES: No cervical, axillary or inguinal lymph node enlargement.  CHEST: Lungs clear to auscultation.  CARDIOVASCULAR: Normal S1, S2. No rubs, murmurs or gallops.  ABDOMEN: Bowel sounds normal. Not distended. Soft. No tenderness or masses.  Abdominal bruits are present  MUSCULOSKELETAL:  No CCE.  Very poor distal pulses in the left leg.  Palpable pulses in the right leg  SKIN: No rashes or pigmented moles.  NEUROLOGIC:       Cranial Nerves: II-XII grossly intact.      Motor: 5/5 strength major flexors/extensors.      DTR's: Knees, Ankles 2+ and equal bilaterally; downgoing toes.      Sensory: Intact to light touch distally.      Gait & Posture: Normal gait and fine motion. No cerebellar signs.  MENTAL STATUS: Normal orientation to person, place and time, normal affect, normal flow thought, normal memory.    Protective Sensation (w/ 10 gram monofilament):  Right: Decreased  Left: Decreased    Visual  Inspection:  Normal -  Bilateral    Pedal Pulses:   Right: Diminshed  Left: Diminshed    Posterior tibialis:   Right:Diminshed  Left: Diminshed    Lab Results   Component Value Date    HGBA1C 6.4 (H) 11/20/2017     Lab Results   Component Value Date    LDLCALC 232.0 (H) 11/20/2017     Lab Results   Component Value Date    CREATININE 2.0 (H) 11/20/2017     Lab Results   Component Value Date    PSA 2.1 05/04/2017    PSA 2.0 09/03/2014       ASSESSMENT/PLAN:    Atrial fibrillation/Mitral valve replaced  - amiodarone (PACERONE) 200 MG Tab; Take 1 tablet (200 mg total) by mouth once daily.  Dispense: 30 tablet; Refill: 5  - Keep appointment with cardiology     Spinal Stenosis of the Lumbar spine  Consider ortho consult  His lower extremity pain sounds like a mixture of PVD and spinal stenosis.  I'll wait and see what cardiology thinks.  He may need to consider lumbar surgery versus lumbar steroid injections.    PVD  Has abdominal stent  He will see Dr. Leigh for evaluation    Type 2 diabetes mellitus  I have recommended the following steps for improving diabetic care and outcome to patient::   Referral to Diabetic Education department if necessary.  Diabetic diet discussed in detail, written exchange diet given, low cholesterol diet, weight control and daily exercise discussed.    All medications, side effects and compliance issues discussed.    Long term complications of diabetes discussed.  Home glucose monitoring emphasized. Fasting morning glucose goals should be less than 140.  2 hour postprandial goal should be less than 180.  Annual eye examinations at Ophthalmology discussed,   Glycohemoglobin and other lab monitoring discussed.  Medications for this patient will be:  - sitagliptin (JANUVIA) 100 MG Tab; Take 1 tablet (100 mg total) by mouth once daily.  Dispense: 30 tablet; Refill: 5              Actos 15 mg by mouth daily    HTN (hypertension)/PVD  Two gram sodium diet.    Weight loss discussed.    Try to  walk 2 miles per day.    Quit smoking.    Current medications will be:  - atenolol (TENORMIN) 25 MG tablet; Take 1 tablet (25 mg total) by mouth once daily.  Dispense: 30 tablet; Refill: 5  - furosemide (LASIX) 20 MG tablet; Take 1 tablet (20 mg total) by mouth 2 (two) times daily.  Dispense: 60 tablet; Refill: 5  - lisinopril 10 MG tablet; Take 1 tablet (10 mg total) by mouth twice daily.  Dispense: 30 tablet; Refill: 5              ASA 81 mg po daily  Patient told to take 20 mg of Lasix if he does not have edema.  He can increase to 40 mg daily when he does have some swelling.    CRI (chronic renal insufficiency), stage 3 (moderate)  Continue seeing Nephrology  Adjust Lasix according to edema    Hyperlipidemia-statin intolerance  Low fat diet.  Avoid sweets.  A 10 pound weight loss by the next visit as a  good goal.  Increase consumption of fruits and vegetables, fish and chicken.  Use medications below:  Statin intolerant  Continue Lovaza  Continues Zetia  Consider using Repatha    Vitamin D deficiency disease  -     Vitamin D; Future    Type 2 diabetes mellitus with hyperglycemia, without long-term current use of insulin  -     pioglitazone (ACTOS) 15 MG tablet; Take 1 tablet (15 mg total) by mouth once daily.  Dispense: 30 tablet; Refill: 5  -     SITagliptin (JANUVIA) 100 MG Tab; Take 1 tablet (100 mg total) by mouth once daily.  Dispense: 30 tablet; Refill: 5  -     Hemoglobin A1c; Future    Lumbar disc disease with radiculopathy  -     hydrocodone-acetaminophen 10-325mg (NORCO)  mg Tab; Take 1 tablet by mouth every 6 (six) hours as needed.  Dispense: 60 tablet; Refill: 0  -     gabapentin (NEURONTIN) 100 MG capsule; Take 1 capsule (100 mg total) by mouth 3 (three) times daily.  Dispense: 90 capsule; Refill: 5  -     ergocalciferol (ERGOCALCIFEROL) 50,000 unit Cap; Take 1 capsule (50,000 Units total) by mouth every 7 days.  Dispense: 12 capsule; Refill: 3    Essential hypertension  -     atenolol  (TENORMIN) 25 MG tablet; Take 1 tablet (25 mg total) by mouth once daily.  Dispense: 30 tablet; Refill: 5  -     ALT (SGPT); Future  -     Basic metabolic panel; Future    Hyperlipidemia, unspecified hyperlipidemia type  -     ezetimibe (ZETIA) 10 mg tablet; Take 1 tablet (10 mg total) by mouth once daily.  Dispense: 30 tablet; Refill: 5  -     icosapent ethyl (VASCEPA) 1 gram Cap; Take 2 g by mouth 2 (two) times daily.  Dispense: 120 capsule; Refill: 5  -     Lipid panel; Future    Generalized anxiety disorder  -     ALPRAZolam (XANAX) 0.25 MG tablet; Take 1 tablet (0.25 mg total) by mouth 2 (two) times daily as needed.  Dispense: 60 tablet; Refill: 2    Next appointment will be in 6 months.

## 2018-01-02 DIAGNOSIS — I48.0 PAROXYSMAL ATRIAL FIBRILLATION: ICD-10-CM

## 2018-01-02 RX ORDER — AMIODARONE HYDROCHLORIDE 200 MG/1
100 TABLET ORAL DAILY
Qty: 30 TABLET | Refills: 5 | Status: SHIPPED | OUTPATIENT
Start: 2018-01-02 | End: 2018-05-28 | Stop reason: SDUPTHER

## 2018-01-02 NOTE — TELEPHONE ENCOUNTER
Medication refill on amiodarone (PACERONE) 200 MG Tab sent to [facility]. Last office visit on 11/27/2017. Please advise.

## 2018-01-15 DIAGNOSIS — M51.16 LUMBAR DISC DISEASE WITH RADICULOPATHY: ICD-10-CM

## 2018-01-15 RX ORDER — HYDROCODONE BITARTRATE AND ACETAMINOPHEN 10; 325 MG/1; MG/1
TABLET ORAL
Qty: 50 TABLET | Refills: 0 | Status: SHIPPED | OUTPATIENT
Start: 2018-01-15 | End: 2018-05-28 | Stop reason: SDUPTHER

## 2018-01-17 ENCOUNTER — TELEPHONE (OUTPATIENT)
Dept: ADMINISTRATIVE | Facility: HOSPITAL | Age: 68
End: 2018-01-17

## 2018-02-09 DIAGNOSIS — M51.16 LUMBAR DISC DISEASE WITH RADICULOPATHY: ICD-10-CM

## 2018-02-09 RX ORDER — HYDROCODONE BITARTRATE AND ACETAMINOPHEN 10; 325 MG/1; MG/1
1 TABLET ORAL EVERY 6 HOURS PRN
Qty: 60 TABLET | Refills: 0 | Status: SHIPPED | OUTPATIENT
Start: 2018-02-09 | End: 2018-02-28 | Stop reason: SDUPTHER

## 2018-02-28 DIAGNOSIS — M51.16 LUMBAR DISC DISEASE WITH RADICULOPATHY: ICD-10-CM

## 2018-02-28 RX ORDER — HYDROCODONE BITARTRATE AND ACETAMINOPHEN 10; 325 MG/1; MG/1
TABLET ORAL
Qty: 60 TABLET | Refills: 0 | Status: SHIPPED | OUTPATIENT
Start: 2018-02-28 | End: 2018-03-13 | Stop reason: SDUPTHER

## 2018-03-13 DIAGNOSIS — M51.16 LUMBAR DISC DISEASE WITH RADICULOPATHY: ICD-10-CM

## 2018-03-13 RX ORDER — HYDROCODONE BITARTRATE AND ACETAMINOPHEN 10; 325 MG/1; MG/1
TABLET ORAL
Qty: 60 TABLET | Refills: 0 | Status: SHIPPED | OUTPATIENT
Start: 2018-03-13 | End: 2018-04-18 | Stop reason: SDUPTHER

## 2018-04-18 DIAGNOSIS — M51.16 LUMBAR DISC DISEASE WITH RADICULOPATHY: ICD-10-CM

## 2018-04-18 RX ORDER — HYDROCODONE BITARTRATE AND ACETAMINOPHEN 10; 325 MG/1; MG/1
TABLET ORAL
Qty: 60 TABLET | Refills: 0 | Status: SHIPPED | OUTPATIENT
Start: 2018-04-18 | End: 2018-05-28 | Stop reason: SDUPTHER

## 2018-04-23 ENCOUNTER — OFFICE VISIT (OUTPATIENT)
Dept: FAMILY MEDICINE | Facility: CLINIC | Age: 68
End: 2018-04-23
Payer: MEDICARE

## 2018-04-23 VITALS
HEART RATE: 74 BPM | WEIGHT: 209 LBS | BODY MASS INDEX: 27.7 KG/M2 | SYSTOLIC BLOOD PRESSURE: 110 MMHG | RESPIRATION RATE: 18 BRPM | DIASTOLIC BLOOD PRESSURE: 70 MMHG | HEIGHT: 73 IN

## 2018-04-23 DIAGNOSIS — E78.5 HYPERLIPIDEMIA, UNSPECIFIED HYPERLIPIDEMIA TYPE: ICD-10-CM

## 2018-04-23 DIAGNOSIS — E11.65 TYPE 2 DIABETES MELLITUS WITH HYPERGLYCEMIA, WITHOUT LONG-TERM CURRENT USE OF INSULIN: ICD-10-CM

## 2018-04-23 DIAGNOSIS — Z95.2 MITRAL VALVE REPLACED: ICD-10-CM

## 2018-04-23 DIAGNOSIS — Z01.818 PREOPERATIVE EVALUATION TO RULE OUT SURGICAL CONTRAINDICATION: Primary | ICD-10-CM

## 2018-04-23 DIAGNOSIS — H26.9 CATARACT, UNSPECIFIED CATARACT TYPE, UNSPECIFIED LATERALITY: ICD-10-CM

## 2018-04-23 DIAGNOSIS — I10 ESSENTIAL HYPERTENSION: ICD-10-CM

## 2018-04-23 PROCEDURE — 3074F SYST BP LT 130 MM HG: CPT | Mod: CPTII,S$GLB,, | Performed by: FAMILY MEDICINE

## 2018-04-23 PROCEDURE — 99499 UNLISTED E&M SERVICE: CPT | Mod: S$GLB,,, | Performed by: FAMILY MEDICINE

## 2018-04-23 PROCEDURE — 99999 PR PBB SHADOW E&M-EST. PATIENT-LVL II: CPT | Mod: PBBFAC,,, | Performed by: FAMILY MEDICINE

## 2018-04-23 PROCEDURE — 99213 OFFICE O/P EST LOW 20 MIN: CPT | Mod: S$GLB,,, | Performed by: FAMILY MEDICINE

## 2018-04-23 PROCEDURE — 3044F HG A1C LEVEL LT 7.0%: CPT | Mod: CPTII,S$GLB,, | Performed by: FAMILY MEDICINE

## 2018-04-23 PROCEDURE — 3078F DIAST BP <80 MM HG: CPT | Mod: CPTII,S$GLB,, | Performed by: FAMILY MEDICINE

## 2018-04-23 NOTE — PROGRESS NOTES
Subjective:       Patient ID: Wesley Bernal is a 67 y.o. male.    Chief Complaint: Pre-op Exam (eye surgery Dr Farley- on 4/25/18)    Patient needs surgical clearance for cataract surgery.  He has no complaints today.      Review of Systems   Constitutional: Negative for activity change, chills, fatigue, fever and unexpected weight change.   HENT: Negative for sore throat and trouble swallowing.    Respiratory: Negative for cough, chest tightness and shortness of breath.    Cardiovascular: Negative for chest pain and leg swelling.   Gastrointestinal: Negative for abdominal pain.   Endocrine: Negative for cold intolerance and heat intolerance.   Genitourinary: Negative for difficulty urinating.   Musculoskeletal: Negative for back pain and joint swelling.   Skin: Negative for rash.   Neurological: Negative for numbness.   Hematological: Negative for adenopathy.   Psychiatric/Behavioral: Negative for decreased concentration.       Objective:      Vitals:    04/23/18 1548   BP: 110/70   Pulse: 74   Resp: 18     Physical Exam   Constitutional: He is oriented to person, place, and time. He appears well-developed and well-nourished.   HENT:   Head: Normocephalic and atraumatic.   Eyes: EOM are normal. Pupils are equal, round, and reactive to light.   Neck: Normal range of motion. Neck supple. No JVD present.   Cardiovascular: Normal rate, normal heart sounds and intact distal pulses.  Exam reveals no gallop and no friction rub.    No murmur heard.  Pulmonary/Chest: Effort normal and breath sounds normal.   Abdominal: Soft. There is no tenderness.   Musculoskeletal: He exhibits no edema or tenderness.   Neurological: He is alert and oriented to person, place, and time. No cranial nerve deficit.   Skin: No rash noted.   Psychiatric: He has a normal mood and affect. His behavior is normal. Judgment and thought content normal.       Lab Results   Component Value Date    HGBA1C 6.4 (H) 11/20/2017       Assessment:        1. Preoperative evaluation to rule out surgical contraindication    2. Cataract, unspecified cataract type, unspecified laterality    3. Type 2 diabetes mellitus with hyperglycemia, without long-term current use of insulin    4. Hyperlipidemia, unspecified hyperlipidemia type    5. Essential hypertension    6. Mitral valve replaced        Plan:   Wesley was seen today for pre-op exam.    Diagnoses and all orders for this visit:    Preoperative evaluation to rule out surgical contraindication    Cataract, unspecified cataract type, unspecified laterality    Type 2 diabetes mellitus with hyperglycemia, without long-term current use of insulin    Hyperlipidemia, unspecified hyperlipidemia type    Essential hypertension    Mitral valve replaced      Medically cleared for cataract surgery

## 2018-04-27 DIAGNOSIS — E11.9 TYPE 2 DIABETES MELLITUS WITHOUT COMPLICATION: ICD-10-CM

## 2018-05-06 DIAGNOSIS — F41.1 GENERALIZED ANXIETY DISORDER: ICD-10-CM

## 2018-05-07 RX ORDER — ALPRAZOLAM 0.25 MG/1
TABLET ORAL
Qty: 60 TABLET | Refills: 2 | Status: SHIPPED | OUTPATIENT
Start: 2018-05-07 | End: 2018-05-28 | Stop reason: SDUPTHER

## 2018-05-26 DIAGNOSIS — E78.5 HYPERLIPIDEMIA, UNSPECIFIED HYPERLIPIDEMIA TYPE: ICD-10-CM

## 2018-05-26 RX ORDER — OMEGA-3-ACID ETHYL ESTERS 1 G/1
CAPSULE, LIQUID FILLED ORAL
Qty: 120 CAPSULE | Status: CANCELLED | OUTPATIENT
Start: 2018-05-26

## 2018-05-28 ENCOUNTER — TELEPHONE (OUTPATIENT)
Dept: PHARMACY | Facility: CLINIC | Age: 68
End: 2018-05-28

## 2018-05-28 ENCOUNTER — OFFICE VISIT (OUTPATIENT)
Dept: FAMILY MEDICINE | Facility: CLINIC | Age: 68
End: 2018-05-28
Payer: MEDICARE

## 2018-05-28 VITALS
HEIGHT: 73 IN | RESPIRATION RATE: 18 BRPM | DIASTOLIC BLOOD PRESSURE: 50 MMHG | WEIGHT: 209.81 LBS | BODY MASS INDEX: 27.81 KG/M2 | SYSTOLIC BLOOD PRESSURE: 105 MMHG | HEART RATE: 60 BPM

## 2018-05-28 DIAGNOSIS — F41.1 GENERALIZED ANXIETY DISORDER: ICD-10-CM

## 2018-05-28 DIAGNOSIS — N18.30 CKD (CHRONIC KIDNEY DISEASE), STAGE III: ICD-10-CM

## 2018-05-28 DIAGNOSIS — E11.65 TYPE 2 DIABETES MELLITUS WITH HYPERGLYCEMIA, WITHOUT LONG-TERM CURRENT USE OF INSULIN: ICD-10-CM

## 2018-05-28 DIAGNOSIS — Z78.9 STATIN INTOLERANCE: ICD-10-CM

## 2018-05-28 DIAGNOSIS — I70.0 AORTIC ATHEROSCLEROSIS: ICD-10-CM

## 2018-05-28 DIAGNOSIS — E78.5 HYPERLIPIDEMIA, UNSPECIFIED HYPERLIPIDEMIA TYPE: Primary | ICD-10-CM

## 2018-05-28 DIAGNOSIS — I10 ESSENTIAL HYPERTENSION: ICD-10-CM

## 2018-05-28 DIAGNOSIS — I48.0 PAROXYSMAL ATRIAL FIBRILLATION: ICD-10-CM

## 2018-05-28 DIAGNOSIS — M51.16 LUMBAR DISC DISEASE WITH RADICULOPATHY: ICD-10-CM

## 2018-05-28 DIAGNOSIS — I25.10 ASCVD (ARTERIOSCLEROTIC CARDIOVASCULAR DISEASE): ICD-10-CM

## 2018-05-28 LAB
ALT SERPL W/O P-5'-P-CCNC: 13 U/L
ANION GAP SERPL CALC-SCNC: 11 MMOL/L
BUN SERPL-MCNC: 32 MG/DL
CALCIUM SERPL-MCNC: 9.2 MG/DL
CHLORIDE SERPL-SCNC: 105 MMOL/L
CHOLEST SERPL-MCNC: 261 MG/DL
CHOLEST/HDLC SERPL: 7.7 {RATIO}
CO2 SERPL-SCNC: 26 MMOL/L
CREAT SERPL-MCNC: 2 MG/DL
EST. GFR  (AFRICAN AMERICAN): 39 ML/MIN/1.73 M^2
EST. GFR  (NON AFRICAN AMERICAN): 34 ML/MIN/1.73 M^2
ESTIMATED AVG GLUCOSE: 140 MG/DL
GLUCOSE SERPL-MCNC: 166 MG/DL
HBA1C MFR BLD HPLC: 6.5 %
HDLC SERPL-MCNC: 34 MG/DL
HDLC SERPL: 13 %
LDLC SERPL CALC-MCNC: 201.8 MG/DL
NONHDLC SERPL-MCNC: 227 MG/DL
POTASSIUM SERPL-SCNC: 4.5 MMOL/L
SODIUM SERPL-SCNC: 142 MMOL/L
TRIGL SERPL-MCNC: 126 MG/DL

## 2018-05-28 PROCEDURE — 80061 LIPID PANEL: CPT

## 2018-05-28 PROCEDURE — 36415 COLL VENOUS BLD VENIPUNCTURE: CPT | Mod: S$GLB,,, | Performed by: FAMILY MEDICINE

## 2018-05-28 PROCEDURE — 3078F DIAST BP <80 MM HG: CPT | Mod: CPTII,S$GLB,, | Performed by: FAMILY MEDICINE

## 2018-05-28 PROCEDURE — 99214 OFFICE O/P EST MOD 30 MIN: CPT | Mod: S$GLB,,, | Performed by: FAMILY MEDICINE

## 2018-05-28 PROCEDURE — 99499 UNLISTED E&M SERVICE: CPT | Mod: S$GLB,,, | Performed by: FAMILY MEDICINE

## 2018-05-28 PROCEDURE — 3074F SYST BP LT 130 MM HG: CPT | Mod: CPTII,S$GLB,, | Performed by: FAMILY MEDICINE

## 2018-05-28 PROCEDURE — 99999 PR PBB SHADOW E&M-EST. PATIENT-LVL IV: CPT | Mod: PBBFAC,,, | Performed by: FAMILY MEDICINE

## 2018-05-28 PROCEDURE — 3044F HG A1C LEVEL LT 7.0%: CPT | Mod: CPTII,S$GLB,, | Performed by: FAMILY MEDICINE

## 2018-05-28 PROCEDURE — 83036 HEMOGLOBIN GLYCOSYLATED A1C: CPT

## 2018-05-28 PROCEDURE — 84460 ALANINE AMINO (ALT) (SGPT): CPT

## 2018-05-28 PROCEDURE — 80048 BASIC METABOLIC PNL TOTAL CA: CPT

## 2018-05-28 RX ORDER — FUROSEMIDE 20 MG/1
20 TABLET ORAL 2 TIMES DAILY
Qty: 60 TABLET | Refills: 5 | Status: SHIPPED | OUTPATIENT
Start: 2018-05-28 | End: 2018-12-03 | Stop reason: SDUPTHER

## 2018-05-28 RX ORDER — GABAPENTIN 100 MG/1
100 CAPSULE ORAL 3 TIMES DAILY
Qty: 90 CAPSULE | Refills: 5 | Status: SHIPPED | OUTPATIENT
Start: 2018-05-28 | End: 2018-11-01 | Stop reason: SDUPTHER

## 2018-05-28 RX ORDER — ATENOLOL 25 MG/1
25 TABLET ORAL DAILY
Qty: 30 TABLET | Refills: 5 | Status: SHIPPED | OUTPATIENT
Start: 2018-05-28 | End: 2018-12-03 | Stop reason: SDUPTHER

## 2018-05-28 RX ORDER — EZETIMIBE 10 MG/1
10 TABLET ORAL DAILY
Qty: 30 TABLET | Refills: 5 | Status: SHIPPED | OUTPATIENT
Start: 2018-05-28 | End: 2018-12-03 | Stop reason: SDUPTHER

## 2018-05-28 RX ORDER — PIOGLITAZONEHYDROCHLORIDE 15 MG/1
15 TABLET ORAL DAILY
Qty: 30 TABLET | Refills: 5 | Status: SHIPPED | OUTPATIENT
Start: 2018-05-28 | End: 2018-12-03 | Stop reason: SDUPTHER

## 2018-05-28 RX ORDER — HYDROCODONE BITARTRATE AND ACETAMINOPHEN 10; 325 MG/1; MG/1
1 TABLET ORAL EVERY 6 HOURS PRN
Qty: 50 TABLET | Refills: 0 | Status: SHIPPED | OUTPATIENT
Start: 2018-05-28 | End: 2018-07-02 | Stop reason: SDUPTHER

## 2018-05-28 RX ORDER — OMEGA-3-ACID ETHYL ESTERS 1 G/1
2 CAPSULE, LIQUID FILLED ORAL 2 TIMES DAILY
Qty: 120 CAPSULE | Refills: 5 | Status: SHIPPED | OUTPATIENT
Start: 2018-05-28 | End: 2018-11-21 | Stop reason: SDUPTHER

## 2018-05-28 RX ORDER — ALPRAZOLAM 0.25 MG/1
0.25 TABLET ORAL 2 TIMES DAILY PRN
Qty: 60 TABLET | Refills: 2 | Status: SHIPPED | OUTPATIENT
Start: 2018-05-28 | End: 2018-11-06 | Stop reason: SDUPTHER

## 2018-05-28 RX ORDER — AMIODARONE HYDROCHLORIDE 200 MG/1
100 TABLET ORAL DAILY
Qty: 30 TABLET | Refills: 5 | Status: SHIPPED | OUTPATIENT
Start: 2018-05-28 | End: 2018-12-03 | Stop reason: SDUPTHER

## 2018-05-28 NOTE — PROGRESS NOTES
CC: Checkup for type 2 diabetes, hypertension    HPI: Wesley Bernal is a 67 y.o. male here for a checkup.  Patient has a history of peripheral vascular disease and lumbar spinal stenosis.  Lately his pain in his back has been getting worse.  When he walks a short distance the pain the back intensifies and it radiates into his lower extremities.  He has to stop and bend over, the pain resolves, then he's able start walking again.  Patient has iliac artery and femoral artery stents.  He developed severe myalgias from Crestor and Lipitor.  He lost muscle mass.  Stopped it last week and now muscles are better.  His cholesterol increased so cardiology now has him on Zetia and Fish oil.  He had to stop Crestor due to muscle pain.  He needs Repatha.  He has cardiac stents, peripheral stents.  He probably has familial hypercholesterolemia.  Patient's last blood work revealed a creatinine of 2.0.  A referral to nephrology was made.  He saw him last month and had complete  workup.  His metformin was stopped because of this.  Renal ultrasound showed chronic kidney disease.  He has not seen a nephrologist for over one year  He is now on Januvia, Actos.  He tolerates it well.  His blood sugars are controlled decently.  Patient also has a history of mitral valve replacement, atrial fib, hypertension.  He has a pacemaker and defibrillator in place.  This has not been a problem for him.  He is now seeing Dr. Leigh.  His cardiologist was in [place].  That was his brother-in-law.  Last year patient had bilateral stents placed in his legs and is doing better.  He denies signs or symptoms of claudications.  He tolerates his blood pressure medication well and is not having side effects such as chronic cough or dizziness.    Clinically his back pain has been stable.  He takes 1 or 2 Norco's per day which helps.  Saw neurology and was started on Gabapentin and is doing better.    ROS:   Gen.:  No fever, chills, weight loss,  weight gain  HEENT: No headaches, sinus congestion, sore throat, change in vision  CV: No chest pain  RESP: No shortness of breath, wheezing, cough  GI: No change in bowel habits, no diarrhea, no abdominal pain, no hematochezia  : No dysuria, frequency  MSK: No muscle pain, joint pain, back pain  NEURO: No numbness, weakness  ENDO: No polyuria, polydipsia, heat or cold intolerance    PMH, PSH, ALLERGIES, SH, FH reviewed in today's note    PHYSICAL EXAM;   Vitals:    05/28/18 1036   BP: (!) 105/50   Pulse: 60   Resp:      APPEARANCE: Well nourished, well developed, in no acute distress.    HEAD: Normocephalic, atraumatic.  EYES: PERRL. EOMI.      EARS: TM's intact. Light reflex normal. No retraction or perforation.    NOSE: Mucosa pink. Airway clear.  MOUTH & THROAT: No tonsillar enlargement. No pharyngeal erythema or exudate. No stridor.  NECK: Supple.  Bilateral carotid endarterectomy scars  NODES: No cervical, axillary or inguinal lymph node enlargement.  CHEST: Lungs clear to auscultation.  CARDIOVASCULAR: Normal S1, S2. No rubs, murmurs or gallops.  ABDOMEN: Bowel sounds normal. Not distended. Soft. No tenderness or masses.  Abdominal bruits are present  MUSCULOSKELETAL:  No CCE.  Very poor distal pulses in the left leg.  Palpable pulses in the right leg  SKIN: No rashes or pigmented moles.  NEUROLOGIC:       Cranial Nerves: II-XII grossly intact.      Motor: 5/5 strength major flexors/extensors.      DTR's: Knees, Ankles 2+ and equal bilaterally; downgoing toes.      Sensory: Intact to light touch distally.      Gait & Posture: Normal gait and fine motion. No cerebellar signs.  MENTAL STATUS: Normal orientation to person, place and time, normal affect, normal flow thought, normal memory.    Protective Sensation (w/ 10 gram monofilament):  Right: Decreased  Left: Decreased    Visual Inspection:  Normal -  Bilateral    Pedal Pulses:   Right: Diminshed  Left: Diminshed    Posterior tibialis:    Right:Diminshed  Left: Diminshed    Lab Results   Component Value Date    HGBA1C 6.4 (H) 11/20/2017     Lab Results   Component Value Date    LDLCALC 232.0 (H) 11/20/2017     Lab Results   Component Value Date    CREATININE 2.0 (H) 11/20/2017     Lab Results   Component Value Date    PSA 2.1 05/04/2017    PSA 2.0 09/03/2014       ASSESSMENT/PLAN:    Atrial fibrillation/Mitral valve replaced  - amiodarone (PACERONE) 200 MG Tab; Take 1 tablet (200 mg total) by mouth once daily.  Dispense: 30 tablet; Refill: 5  - Keep appointment with cardiology     Spinal Stenosis of the Lumbar spine  Consider ortho consult  His lower extremity pain sounds like a mixture of PVD and spinal stenosis.  I'll wait and see what cardiology thinks.  He may need to consider lumbar surgery versus lumbar steroid injections.    PVD  Has abdominal, carotid, femoral artery stent  He sees Dr. Leigh for evaluation    Type 2 diabetes mellitus  I have recommended the following steps for improving diabetic care and outcome to patient::   Referral to Diabetic Education department if necessary.  Diabetic diet discussed in detail, written exchange diet given, low cholesterol diet, weight control and daily exercise discussed.    All medications, side effects and compliance issues discussed.    Long term complications of diabetes discussed.  Home glucose monitoring emphasized. Fasting morning glucose goals should be less than 140.  2 hour postprandial goal should be less than 180.  Annual eye examinations at Ophthalmology discussed,   Glycohemoglobin and other lab monitoring discussed.  Medications for this patient will be:  - sitagliptin (JANUVIA) 100 MG Tab; Take 1 tablet (100 mg total) by mouth once daily.  Dispense: 30 tablet; Refill: 5              Actos 15 mg by mouth daily    HTN (hypertension)/PVD  Two gram sodium diet.    Weight loss discussed.    Try to walk 2 miles per day.    Quit smoking.    Current medications will be:  - atenolol  (TENORMIN) 25 MG tablet; Take 1 tablet (25 mg total) by mouth once daily.  Dispense: 30 tablet; Refill: 5  - furosemide (LASIX) 20 MG tablet; Take 1 tablet (20 mg total) by mouth 2 (two) times daily.  Dispense: 60 tablet; Refill: 5  - lisinopril 10 MG tablet; Take 1 tablet (10 mg total) by mouth twice daily.  Dispense: 30 tablet; Refill: 5              ASA 81 mg po daily  Patient told to take 20 mg of Lasix if he does not have edema.  He can increase to 40 mg daily when he does have some swelling.    CRI (chronic renal insufficiency), stage 3 (moderate)  Continue seeing Nephrology  Adjust Lasix according to edema    Hyperlipidemia-statin intolerance/Suspect familial hypercholesterolemia.    Low fat diet.  Avoid sweets.  A 10 pound weight loss by the next visit as a  good goal.  Increase consumption of fruits and vegetables, fish and chicken.  Use medications below:  Statin intolerant  Continue Lovaza  Continues Zetia  He needs Repatha.  Rx sent to [facility] for help with authorization.    Hyperlipidemia, unspecified hyperlipidemia type  -     ezetimibe (ZETIA) 10 mg tablet; Take 1 tablet (10 mg total) by mouth once daily.  Dispense: 30 tablet; Refill: 5  -     omega-3 acid ethyl esters (LOVAZA) 1 gram capsule; Take 2 capsules (2 g total) by mouth 2 (two) times daily.  Dispense: 120 capsule; Refill: 5  -     evolocumab (REPATHA PUSHTRONEX) 420 mg/3.5 mL Injt; Inject 420 mg into the skin every 30 days.  Dispense: 1 Cartridge; Refill: 5  -     ALT (SGPT); Future  -     Basic metabolic panel; Future  -     Lipid panel; Future    Generalized anxiety disorder  -     ALPRAZolam (XANAX) 0.25 MG tablet; Take 1 tablet (0.25 mg total) by mouth 2 (two) times daily as needed.  Dispense: 60 tablet; Refill: 2    Paroxysmal atrial fibrillation  -     amiodarone (PACERONE) 200 MG Tab; Take 0.5 tablets (100 mg total) by mouth once daily.  Dispense: 30 tablet; Refill: 5    Essential hypertension  -     atenolol (TENORMIN) 25 MG  tablet; Take 1 tablet (25 mg total) by mouth once daily.  Dispense: 30 tablet; Refill: 5  -     furosemide (LASIX) 20 MG tablet; Take 1 tablet (20 mg total) by mouth 2 (two) times daily.  Dispense: 60 tablet; Refill: 5    Lumbar disc disease with radiculopathy  -     gabapentin (NEURONTIN) 100 MG capsule; Take 1 capsule (100 mg total) by mouth 3 (three) times daily.  Dispense: 90 capsule; Refill: 5  -     HYDROcodone-acetaminophen (NORCO)  mg per tablet; Take 1 tablet by mouth every 6 (six) hours as needed.  Dispense: 50 tablet; Refill: 0    Type 2 diabetes mellitus with hyperglycemia, without long-term current use of insulin  -     pioglitazone (ACTOS) 15 MG tablet; Take 1 tablet (15 mg total) by mouth once daily.  Dispense: 30 tablet; Refill: 5  -     SITagliptin (JANUVIA) 100 MG Tab; Take 1 tablet (100 mg total) by mouth once daily.  Dispense: 30 tablet; Refill: 5  -     Hemoglobin A1c; Future    Statin intolerance  -     evolocumab (REPATHA PUSHTRONEX) 420 mg/3.5 mL Injt; Inject 420 mg into the skin every 30 days.  Dispense: 1 Cartridge; Refill: 5    Aortic atherosclerosis    CKD (chronic kidney disease), stage III    ASCVD (arteriosclerotic cardiovascular disease)  -     evolocumab (REPATHA PUSHTRONEX) 420 mg/3.5 mL Injt; Inject 420 mg into the skin every 30 days.  Dispense: 1 Cartridge; Refill: 5    Next appointment will be in 6 months.

## 2018-05-29 NOTE — TELEPHONE ENCOUNTER
Documentation Only:    Faxed Prior Authorization form and supporting documentation for Repatha to insurance on 05/29/2018.

## 2018-05-30 NOTE — TELEPHONE ENCOUNTER
Documentation Only:    Prior Authorization for Repatha has been approved for 6 months.    Approval dates:  05/30/2018 through 11/30/2018    Patient co-pay:  $8.35

## 2018-05-31 ENCOUNTER — TELEPHONE (OUTPATIENT)
Dept: PHARMACY | Facility: CLINIC | Age: 68
End: 2018-05-31

## 2018-05-31 NOTE — TELEPHONE ENCOUNTER
Efrain Coto initial consultation scheduled for 6/1/18 @10am.  Patient and his wife would like to review instructional video first.  Repatha video and instructions for use sent to patient via [facility] [employer].  $8.35 copay in 004.

## 2018-06-01 NOTE — TELEPHONE ENCOUNTER
Initial Repatha Pushtronex 420mg/3.5ml consult completed on 18 . Repatha Pushtronex will be shipped on 18 to arrive at patient's home on 18 via [employer]. $ 8.35 copay. Patient will start Repatha Pushtronex on  18. Address confirmed. Confirmed 2 patient identifiers - name and . Therapy Appropriate.    --Injection experience: None.  Patient's wife, Tierra, administers same medication to her friend monthly and will be assisting patient with his injection.  Patient declined injection training and stated online video demonstration is suffice. He was advised to call [facility] is any issues arise.    Counseled patient on Repatha Pushtronex:   - Inject 420mg (1 cartridge) into the skin every 30 days.   - Monthly RX will come with gauze, bandaids, and alcohol swabs.  - Throw away the used on-body infusor in a sharps container.   - Patient will use sharps container; once full, per [place] law, she/ he may lock the sharps container and place in her trash. She/ he can then contact the Pharmacy and we will replace the sharps at no additional charge.    Patient was counseled on possible side effects:  - Injection site reaction: redness, soreness, itching, bruising, which should resolve within 3-5 days.  - flu-like symptoms    DDI: none. Medication list and allergies reviewed.  Continue Zetia and Lovaza.     Patient did not have any further questions. She was advised to keep a calendar to stay compliant. Patient will start Repatha Pushtronex on 18. Consultation included: indication; goals of treatment; administration; storage and handling; side effects; how to handle side effects; the importance of compliance; how to handle missed doses; the importance of laboratory monitoring; the importance of keeping all follow up appointments.  Patient understands to report any medication changes to [facility] and provider. All questions answered and addressed to patients satisfaction. I will f/u with her in 1 week from start, [facility] to  contact patient in 3 weeks for refills.

## 2018-06-11 DIAGNOSIS — I10 ESSENTIAL HYPERTENSION: ICD-10-CM

## 2018-06-11 RX ORDER — FUROSEMIDE 20 MG/1
TABLET ORAL
Qty: 60 TABLET | Refills: 5 | Status: SHIPPED | OUTPATIENT
Start: 2018-06-11 | End: 2018-12-03 | Stop reason: SDUPTHER

## 2018-06-14 NOTE — TELEPHONE ENCOUNTER
Initial clinical follow-up conducted for Repatha Pushtronex.  Spoke with patient.  Name and  confirmed.  Confirmed patient start date on 18.  Wife administered Repatha on patient's right arm with no difficulties.  Patient reports experiencing sore throat that is resolving without any medications since beginning therapy.  He is happy with the injection experience and looks forward to continuing treatment.  [facility] will continue to reach out to patient monthly to arrange refills.  Advised to call [facility] and provider if any issues arise.

## 2018-06-19 ENCOUNTER — PES CALL (OUTPATIENT)
Dept: ADMINISTRATIVE | Facility: CLINIC | Age: 68
End: 2018-06-19

## 2018-06-25 ENCOUNTER — TELEPHONE (OUTPATIENT)
Dept: PHARMACY | Facility: CLINIC | Age: 68
End: 2018-06-25

## 2018-07-02 DIAGNOSIS — M51.16 LUMBAR DISC DISEASE WITH RADICULOPATHY: ICD-10-CM

## 2018-07-02 RX ORDER — HYDROCODONE BITARTRATE AND ACETAMINOPHEN 10; 325 MG/1; MG/1
TABLET ORAL
Qty: 50 TABLET | Refills: 0 | Status: SHIPPED | OUTPATIENT
Start: 2018-07-02 | End: 2018-07-31 | Stop reason: SDUPTHER

## 2018-07-20 DIAGNOSIS — N18.9 CHRONIC KIDNEY DISEASE, UNSPECIFIED CKD STAGE: ICD-10-CM

## 2018-07-30 ENCOUNTER — TELEPHONE (OUTPATIENT)
Dept: PHARMACY | Facility: CLINIC | Age: 68
End: 2018-07-30

## 2018-07-31 DIAGNOSIS — M51.16 LUMBAR DISC DISEASE WITH RADICULOPATHY: ICD-10-CM

## 2018-07-31 NOTE — TELEPHONE ENCOUNTER
Is this a refill or new RX.Refill  RX name and strength:Norco  Last office visit:05/28/2018  Is this a 30-day or 90-day RX:30-day  Pharmacy name and location:[facility]    Comments:

## 2018-08-01 RX ORDER — HYDROCODONE BITARTRATE AND ACETAMINOPHEN 10; 325 MG/1; MG/1
1 TABLET ORAL EVERY 6 HOURS PRN
Qty: 50 TABLET | Refills: 0 | Status: SHIPPED | OUTPATIENT
Start: 2018-08-01 | End: 2018-08-29 | Stop reason: SDUPTHER

## 2018-08-28 NOTE — TELEPHONE ENCOUNTER
Patient reports that last dose (8/6/18) of Repatha PUSHTRONEX was not fully received. He has a nurse that administers the dose and everything went as usual, but after a minute or two of dose being infused, the device malfunctions and it stopped working. The nurse believes that he only received about 25% of his dose from looking at what remained in the cartridge. I advised him that it would be too late for him to make up any doses at this time, and it would be difficult to advise since we are not certain how much medication he received. He is to continue monthly injections, next dose due on 9/6/18, and call [facility] should any other questions/issues arise. He was offered  # to file for a replacement but he does not want a replacement. Routing MD to inform. FE

## 2018-08-29 DIAGNOSIS — M51.16 LUMBAR DISC DISEASE WITH RADICULOPATHY: ICD-10-CM

## 2018-08-29 RX ORDER — HYDROCODONE BITARTRATE AND ACETAMINOPHEN 10; 325 MG/1; MG/1
1 TABLET ORAL EVERY 6 HOURS PRN
Qty: 50 TABLET | Refills: 0 | Status: SHIPPED | OUTPATIENT
Start: 2018-08-29 | End: 2018-09-27 | Stop reason: SDUPTHER

## 2018-08-29 NOTE — TELEPHONE ENCOUNTER
Is this a refill or new RX.Refill  RX name and strength:Norco  mg  Last office visit:05/28/2018  Is this a 30-day or 90-day RX:30-day  Pharmacy name and location:[facility]    Comments:

## 2018-09-20 ENCOUNTER — TELEPHONE (OUTPATIENT)
Dept: PHARMACY | Facility: CLINIC | Age: 68
End: 2018-09-20

## 2018-09-20 NOTE — TELEPHONE ENCOUNTER
Repatha PUSHTRONEX follow up and refill confirmed. We will ship Repatha refill on 18 via fedex to arrive on 18. $0.00 copay- 004. Confirmed 2 patient identifiers - name and .      Patient reports that wife administers Repatha monthly without any difficulties, injects on the 1st of every month.  He did not wish to bother with replacement when device malfunctioned last month.  He has zero doses on hand, next injection due 10/1/18.  No side effects noted.  No missed doses, no new medications, no new allergies or health conditions reported at this time.   Patient counseled on importance of maintaining adherence and follow up appointments which is scheduled in Nov.    Patient questions whether the sunburn feeling on arms and legs are from Repatha.  This is not a reaction from Repatha.  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  Advised to continue using sunblock and moisturizers for his skin.  All questions answered and addressed to patients satisfaction. Pt verbalized understanding.

## 2018-09-27 DIAGNOSIS — M51.16 LUMBAR DISC DISEASE WITH RADICULOPATHY: ICD-10-CM

## 2018-09-27 RX ORDER — HYDROCODONE BITARTRATE AND ACETAMINOPHEN 10; 325 MG/1; MG/1
TABLET ORAL
Qty: 50 TABLET | Refills: 0 | Status: SHIPPED | OUTPATIENT
Start: 2018-09-27 | End: 2018-11-01 | Stop reason: SDUPTHER

## 2018-10-15 ENCOUNTER — TELEPHONE (OUTPATIENT)
Dept: PHARMACY | Facility: CLINIC | Age: 68
End: 2018-10-15

## 2018-10-23 NOTE — TELEPHONE ENCOUNTER
Incoming call for Repatha PUSHTRONEX refill. Confirmed two patient identifiers - name + . Patient denies any side effects or missed doses. He has NO doses on hand; next dose due on 18 - takes on the  of each month now. He confirms no changes to his allergies, health conditions, medications and insurance. He states that all is going well and no difficulties with injections. [facility] to ship out Repatha Pushtronex refill on 10/29/18 to arrive at his home on 10/30/18 via [employer]. Address confirmed, $0 copay. Sharps container needed. All questions answered to patient's satisfaction, [facility] will continue to reach out to patient monthly for refills. TTN

## 2018-11-01 DIAGNOSIS — E11.65 TYPE 2 DIABETES MELLITUS WITH HYPERGLYCEMIA, WITHOUT LONG-TERM CURRENT USE OF INSULIN: ICD-10-CM

## 2018-11-01 DIAGNOSIS — M51.16 LUMBAR DISC DISEASE WITH RADICULOPATHY: ICD-10-CM

## 2018-11-01 RX ORDER — GABAPENTIN 100 MG/1
100 CAPSULE ORAL 3 TIMES DAILY
Qty: 90 CAPSULE | Refills: 5 | Status: SHIPPED | OUTPATIENT
Start: 2018-11-01 | End: 2019-05-17 | Stop reason: SDUPTHER

## 2018-11-01 RX ORDER — HYDROCODONE BITARTRATE AND ACETAMINOPHEN 10; 325 MG/1; MG/1
1 TABLET ORAL EVERY 6 HOURS PRN
Qty: 50 TABLET | Refills: 0 | Status: SHIPPED | OUTPATIENT
Start: 2018-11-01 | End: 2018-12-03 | Stop reason: SDUPTHER

## 2018-11-06 DIAGNOSIS — F41.1 GENERALIZED ANXIETY DISORDER: ICD-10-CM

## 2018-11-06 RX ORDER — ALPRAZOLAM 0.25 MG/1
TABLET ORAL
Qty: 60 TABLET | Refills: 5 | Status: SHIPPED | OUTPATIENT
Start: 2018-11-06 | End: 2019-04-26 | Stop reason: SDUPTHER

## 2018-11-19 ENCOUNTER — TELEPHONE (OUTPATIENT)
Dept: PHARMACY | Facility: CLINIC | Age: 68
End: 2018-11-19

## 2018-11-19 DIAGNOSIS — E78.5 HYPERLIPIDEMIA, UNSPECIFIED HYPERLIPIDEMIA TYPE: ICD-10-CM

## 2018-11-19 DIAGNOSIS — I25.10 ASCVD (ARTERIOSCLEROTIC CARDIOVASCULAR DISEASE): ICD-10-CM

## 2018-11-19 DIAGNOSIS — Z78.9 STATIN INTOLERANCE: ICD-10-CM

## 2018-11-21 DIAGNOSIS — E78.5 HYPERLIPIDEMIA, UNSPECIFIED HYPERLIPIDEMIA TYPE: ICD-10-CM

## 2018-11-21 RX ORDER — OMEGA-3-ACID ETHYL ESTERS 1 G/1
CAPSULE, LIQUID FILLED ORAL
Qty: 120 CAPSULE | Refills: 5 | Status: SHIPPED | OUTPATIENT
Start: 2018-11-21 | End: 2019-05-17 | Stop reason: SDUPTHER

## 2018-11-21 NOTE — TELEPHONE ENCOUNTER
Repatha PUSHTRONEX refill confirmed. We will ship Repatha refill on 18 via fedex to arrive on 18. $0.00 copay- 004. Confirmed 2 patient identifiers - name and .      Patient self injects Repatha on the 1st of every month.  He has zero doses on hand.  No side effects noted except mild stinging during injection which resolves when complete.  No missed doses, no new medications, no new allergies or health conditions reported at this time. All questions answered and addressed to patients satisfaction. Pt verbalized understanding.

## 2018-12-03 ENCOUNTER — OFFICE VISIT (OUTPATIENT)
Dept: FAMILY MEDICINE | Facility: CLINIC | Age: 68
End: 2018-12-03
Payer: MEDICARE

## 2018-12-03 VITALS
WEIGHT: 211.19 LBS | SYSTOLIC BLOOD PRESSURE: 112 MMHG | HEART RATE: 84 BPM | RESPIRATION RATE: 18 BRPM | BODY MASS INDEX: 27.99 KG/M2 | DIASTOLIC BLOOD PRESSURE: 70 MMHG | HEIGHT: 73 IN

## 2018-12-03 DIAGNOSIS — E78.5 HYPERLIPIDEMIA, UNSPECIFIED HYPERLIPIDEMIA TYPE: ICD-10-CM

## 2018-12-03 DIAGNOSIS — Z23 IMMUNIZATION DUE: ICD-10-CM

## 2018-12-03 DIAGNOSIS — I48.0 PAROXYSMAL ATRIAL FIBRILLATION: ICD-10-CM

## 2018-12-03 DIAGNOSIS — M51.16 LUMBAR DISC DISEASE WITH RADICULOPATHY: ICD-10-CM

## 2018-12-03 DIAGNOSIS — G25.9 EXTRAPYRAMIDAL AND MOVEMENT DISORDER: ICD-10-CM

## 2018-12-03 DIAGNOSIS — R25.1 TREMOR: Primary | ICD-10-CM

## 2018-12-03 DIAGNOSIS — I10 ESSENTIAL HYPERTENSION: ICD-10-CM

## 2018-12-03 DIAGNOSIS — E11.65 TYPE 2 DIABETES MELLITUS WITH HYPERGLYCEMIA, WITHOUT LONG-TERM CURRENT USE OF INSULIN: ICD-10-CM

## 2018-12-03 LAB
ALT SERPL W/O P-5'-P-CCNC: 12 U/L
ANION GAP SERPL CALC-SCNC: 9 MMOL/L
BUN SERPL-MCNC: 29 MG/DL
CALCIUM SERPL-MCNC: 8.9 MG/DL
CHLORIDE SERPL-SCNC: 106 MMOL/L
CHOLEST SERPL-MCNC: 125 MG/DL
CHOLEST/HDLC SERPL: 3.5 {RATIO}
CO2 SERPL-SCNC: 29 MMOL/L
CREAT SERPL-MCNC: 1.8 MG/DL
EST. GFR  (AFRICAN AMERICAN): 44 ML/MIN/1.73 M^2
EST. GFR  (NON AFRICAN AMERICAN): 38 ML/MIN/1.73 M^2
GLUCOSE SERPL-MCNC: 122 MG/DL
HDLC SERPL-MCNC: 36 MG/DL
HDLC SERPL: 28.8 %
LDLC SERPL CALC-MCNC: 77.6 MG/DL
NONHDLC SERPL-MCNC: 89 MG/DL
POTASSIUM SERPL-SCNC: 4.8 MMOL/L
SODIUM SERPL-SCNC: 144 MMOL/L
TRIGL SERPL-MCNC: 57 MG/DL
TSH SERPL DL<=0.005 MIU/L-ACNC: 1.55 UIU/ML

## 2018-12-03 PROCEDURE — 82607 VITAMIN B-12: CPT | Mod: HCWC

## 2018-12-03 PROCEDURE — 99999 PR PBB SHADOW E&M-EST. PATIENT-LVL IV: CPT | Mod: PBBFAC,HCWC,, | Performed by: FAMILY MEDICINE

## 2018-12-03 PROCEDURE — 99499 UNLISTED E&M SERVICE: CPT | Mod: S$GLB,,, | Performed by: FAMILY MEDICINE

## 2018-12-03 PROCEDURE — 90662 IIV NO PRSV INCREASED AG IM: CPT | Mod: HCWC,S$GLB,, | Performed by: FAMILY MEDICINE

## 2018-12-03 PROCEDURE — 80061 LIPID PANEL: CPT | Mod: HCWC

## 2018-12-03 PROCEDURE — 99214 OFFICE O/P EST MOD 30 MIN: CPT | Mod: 25,HCWC,S$GLB, | Performed by: FAMILY MEDICINE

## 2018-12-03 PROCEDURE — 80048 BASIC METABOLIC PNL TOTAL CA: CPT | Mod: HCWC

## 2018-12-03 PROCEDURE — G0008 ADMIN INFLUENZA VIRUS VAC: HCPCS | Mod: HCWC,S$GLB,, | Performed by: FAMILY MEDICINE

## 2018-12-03 PROCEDURE — 36415 COLL VENOUS BLD VENIPUNCTURE: CPT | Mod: HCWC,S$GLB,, | Performed by: FAMILY MEDICINE

## 2018-12-03 PROCEDURE — 3074F SYST BP LT 130 MM HG: CPT | Mod: CPTII,HCWC,S$GLB, | Performed by: FAMILY MEDICINE

## 2018-12-03 PROCEDURE — 84460 ALANINE AMINO (ALT) (SGPT): CPT | Mod: HCWC

## 2018-12-03 PROCEDURE — 90670 PCV13 VACCINE IM: CPT | Mod: HCWC,S$GLB,, | Performed by: FAMILY MEDICINE

## 2018-12-03 PROCEDURE — 3078F DIAST BP <80 MM HG: CPT | Mod: CPTII,HCWC,S$GLB, | Performed by: FAMILY MEDICINE

## 2018-12-03 PROCEDURE — G0009 ADMIN PNEUMOCOCCAL VACCINE: HCPCS | Mod: HCWC,S$GLB,, | Performed by: FAMILY MEDICINE

## 2018-12-03 PROCEDURE — 3044F HG A1C LEVEL LT 7.0%: CPT | Mod: CPTII,HCWC,S$GLB, | Performed by: FAMILY MEDICINE

## 2018-12-03 PROCEDURE — 83036 HEMOGLOBIN GLYCOSYLATED A1C: CPT | Mod: HCWC

## 2018-12-03 PROCEDURE — 84443 ASSAY THYROID STIM HORMONE: CPT | Mod: HCWC

## 2018-12-03 PROCEDURE — 1101F PT FALLS ASSESS-DOCD LE1/YR: CPT | Mod: CPTII,HCWC,S$GLB, | Performed by: FAMILY MEDICINE

## 2018-12-03 RX ORDER — PIOGLITAZONEHYDROCHLORIDE 15 MG/1
15 TABLET ORAL DAILY
Qty: 30 TABLET | Refills: 5 | Status: SHIPPED | OUTPATIENT
Start: 2018-12-03 | End: 2019-05-23 | Stop reason: SDUPTHER

## 2018-12-03 RX ORDER — FUROSEMIDE 20 MG/1
20 TABLET ORAL 2 TIMES DAILY
Qty: 60 TABLET | Refills: 5 | Status: SHIPPED | OUTPATIENT
Start: 2018-12-03 | End: 2019-06-10 | Stop reason: SDUPTHER

## 2018-12-03 RX ORDER — AMIODARONE HYDROCHLORIDE 200 MG/1
100 TABLET ORAL DAILY
Qty: 30 TABLET | Refills: 5 | Status: SHIPPED | OUTPATIENT
Start: 2018-12-03 | End: 2019-06-10 | Stop reason: SDUPTHER

## 2018-12-03 RX ORDER — EZETIMIBE 10 MG/1
10 TABLET ORAL DAILY
Qty: 30 TABLET | Refills: 5 | Status: SHIPPED | OUTPATIENT
Start: 2018-12-03 | End: 2019-06-10 | Stop reason: SDUPTHER

## 2018-12-03 RX ORDER — HYDROCODONE BITARTRATE AND ACETAMINOPHEN 10; 325 MG/1; MG/1
1 TABLET ORAL EVERY 6 HOURS PRN
Qty: 50 TABLET | Refills: 0 | Status: SHIPPED | OUTPATIENT
Start: 2018-12-03 | End: 2018-12-27 | Stop reason: SDUPTHER

## 2018-12-03 RX ORDER — DOCUSATE SODIUM 100 MG/1
200 CAPSULE, LIQUID FILLED ORAL EVERY MORNING
COMMUNITY

## 2018-12-03 RX ORDER — ATENOLOL 25 MG/1
25 TABLET ORAL DAILY
Qty: 30 TABLET | Refills: 5 | Status: SHIPPED | OUTPATIENT
Start: 2018-12-03 | End: 2019-06-10 | Stop reason: SDUPTHER

## 2018-12-03 NOTE — PROGRESS NOTES
CC: Checkup for type 2 diabetes, hypertension    HPI: Wesley Bernal is a 68 y.o. male here for a checkup.  His most recent concern is his voice becoming softer and gravelly, and his tremor in his hands is getting worse.  His wife also thinks he is shuffling more when he walks.  Patient has a history of peripheral vascular disease and lumbar spinal stenosis.  Lately his pain in his back has been getting worse.  When he walks a short distance the pain the back intensifies and it radiates into his lower extremities.  He has to stop and bend over, the pain resolves, then he's able start walking again.  Patient has iliac artery and femoral artery stents.  He developed severe myalgias from Crestor and Lipitor.  He lost muscle mass.  Stopped it last week and now muscles are better.  His cholesterol increased so cardiology now has him on Zetia and Fish oil.  He had to stop Crestor due to muscle pain.  He he is now taking and tolerating Repatha.  He has cardiac stents, peripheral stents.  He probably has familial hypercholesterolemia.  Patient's last blood work revealed a creatinine of 2.0.  A referral to nephrology was made.  He saw him last month and had complete  workup.  His metformin was stopped because of this.  Renal ultrasound showed chronic kidney disease.  He has not seen a nephrologist for over one year  He is now on Januvia, Actos.  He tolerates it well.  His blood sugars are controlled decently.  Patient also has a history of mitral valve replacement, atrial fib, hypertension.  He has a pacemaker and defibrillator in place.  This has not been a problem for him.  He is now seeing Dr. Leigh.  His cardiologist was in [place].  That was his brother-in-law.  Last year patient had bilateral stents placed in his legs and is doing better.  He denies signs or symptoms of claudications.  He tolerates his blood pressure medication well and is not having side effects such as chronic cough or dizziness.     Clinically his back pain has been stable.  He takes 1 or 2 Norco's per day which helps.  Saw neurology and was started on Gabapentin and is doing better.    ROS:   Gen.:  No fever, chills, weight loss, weight gain  HEENT: No headaches, sinus congestion, sore throat, change in vision.  Voice getting shakey  CV: No chest pain  RESP: No shortness of breath, wheezing, cough  GI: No change in bowel habits, no diarrhea, no abdominal pain, no hematochezia  : No dysuria, frequency  MSK: No muscle pain, joint pain, back pain  NEURO: No numbness, weakness.  Worsening tremor.   ENDO: No polyuria, polydipsia, heat or cold intolerance    PMH, PSH, ALLERGIES, SH, FH reviewed in today's note    PHYSICAL EXAM;   Vitals:    12/03/18 1002   BP: 112/70   Pulse: 84   Resp: 18     APPEARANCE: Well nourished, well developed, in no acute distress.    HEAD: Normocephalic, atraumatic.  EYES: PERRL. EOMI.      EARS: TM's intact. Light reflex normal. No retraction or perforation.    NOSE: Mucosa pink. Airway clear.  MOUTH & THROAT: No tonsillar enlargement. No pharyngeal erythema or exudate. No stridor.  NECK: Supple.  Bilateral carotid endarterectomy scars  NODES: No cervical, axillary or inguinal lymph node enlargement.  CHEST: Lungs clear to auscultation.  CARDIOVASCULAR: Normal S1, S2. No rubs, murmurs or gallops.  ABDOMEN: Bowel sounds normal. Not distended. Soft. No tenderness or masses.  Abdominal bruits are present  MUSCULOSKELETAL:  No CCE.  Very poor distal pulses in the left leg.  Palpable pulses in the right leg  SKIN: No rashes or pigmented moles.  NEUROLOGIC:       Cranial Nerves: II-XII grossly intact.      Motor: 5/5 strength major flexors/extensors.  Cogwheel rigidity.        DTR's: Knees, Ankles 2+ and equal bilaterally; downgoing toes.      Sensory: Intact to light touch distally.      Gait & Posture: Shuffling gait.  Pill rolling tremor  MENTAL STATUS: Normal orientation to person, place and time, normal affect, normal  flow thought, normal memory.    Protective Sensation (w/ 10 gram monofilament):  Right: Intact  Left: Intact    Visual Inspection:  Normal -  Bilateral    Pedal Pulses:   Right: Present  Left: Present    Posterior tibialis:   Right:Present  Left: Present    Lab Results   Component Value Date    HGBA1C 6.5 (H) 05/28/2018     Lab Results   Component Value Date    LDLCALC 201.8 (H) 05/28/2018     Lab Results   Component Value Date    CREATININE 2.0 (H) 05/28/2018     Lab Results   Component Value Date    PSA 2.1 05/04/2017    PSA 2.0 09/03/2014       ASSESSMENT/PLAN:    Atrial fibrillation/Mitral valve replaced  - amiodarone (PACERONE) 200 MG Tab; Take 1 tablet (200 mg total) by mouth once daily.  Dispense: 30 tablet; Refill: 5  - Keep appointment with cardiology     Spinal Stenosis of the Lumbar spine  Consider ortho consult  His lower extremity pain sounds like a mixture of PVD and spinal stenosis.  I'll wait and see what cardiology thinks.  He may need to consider lumbar surgery versus lumbar steroid injections.    PVD  Has abdominal, carotid, femoral artery stent  He sees Dr. Leigh for evaluation    Type 2 diabetes mellitus  I have recommended the following steps for improving diabetic care and outcome to patient::   Referral to Diabetic Education department if necessary.  Diabetic diet discussed in detail, written exchange diet given, low cholesterol diet, weight control and daily exercise discussed.    All medications, side effects and compliance issues discussed.    Long term complications of diabetes discussed.  Home glucose monitoring emphasized. Fasting morning glucose goals should be less than 140.  2 hour postprandial goal should be less than 180.  Annual eye examinations at Ophthalmology discussed,   Glycohemoglobin and other lab monitoring discussed.  Medications for this patient will be:  - sitagliptin (JANUVIA) 100 MG Tab; Take 1 tablet (100 mg total) by mouth once daily.  Dispense: 30 tablet;  Refill: 5              Actos 15 mg by mouth daily    HTN (hypertension)/PVD  Two gram sodium diet.    Weight loss discussed.    Try to walk 2 miles per day.    Quit smoking.    Current medications will be:  - atenolol (TENORMIN) 25 MG tablet; Take 1 tablet (25 mg total) by mouth once daily.  Dispense: 30 tablet; Refill: 5  - furosemide (LASIX) 20 MG tablet; Take 1 tablet (20 mg total) by mouth 2 (two) times daily.  Dispense: 60 tablet; Refill: 5  - lisinopril 10 MG tablet; Take 1 tablet (10 mg total) by mouth twice daily.  Dispense: 30 tablet; Refill: 5              ASA 81 mg po daily  Patient told to take 20 mg of Lasix if he does not have edema.  He can increase to 40 mg daily when he does have some swelling.    CRI (chronic renal insufficiency), stage 3 (moderate)  Continue seeing Nephrology  Adjust Lasix according to edema    Hyperlipidemia-statin intolerance/Suspect familial hypercholesterolemia.    Low fat diet.  Avoid sweets.  A 10 pound weight loss by the next visit as a  good goal.  Increase consumption of fruits and vegetables, fish and chicken.  Use medications below:  Statin intolerant  Continue Lovaza  Continues Zetia  Continue Repatha.      Tremor  -     Ambulatory referral to Neurology  -     Vitamin B12; Future  This is probably going to be Parkinson's disease.    Extrapyramidal and movement disorder   -     Vitamin B12; Future    Next appointment will be in 6 months.

## 2018-12-04 ENCOUNTER — PATIENT MESSAGE (OUTPATIENT)
Dept: FAMILY MEDICINE | Facility: CLINIC | Age: 68
End: 2018-12-04

## 2018-12-04 LAB
ESTIMATED AVG GLUCOSE: 143 MG/DL
HBA1C MFR BLD HPLC: 6.6 %
VIT B12 SERPL-MCNC: 245 PG/ML

## 2018-12-07 DIAGNOSIS — Z12.11 COLON CANCER SCREENING: ICD-10-CM

## 2018-12-27 DIAGNOSIS — M51.16 LUMBAR DISC DISEASE WITH RADICULOPATHY: ICD-10-CM

## 2018-12-27 RX ORDER — HYDROCODONE BITARTRATE AND ACETAMINOPHEN 10; 325 MG/1; MG/1
1 TABLET ORAL EVERY 6 HOURS PRN
Qty: 50 TABLET | Refills: 0 | Status: SHIPPED | OUTPATIENT
Start: 2018-12-27 | End: 2019-01-30 | Stop reason: SDUPTHER

## 2018-12-27 NOTE — TELEPHONE ENCOUNTER
Is this a refill or new RX.Refill  RX name and strength:Norco  mg  Last office visit:12/03/2018  Is this a 30-day or 90-day RX:30-day  Pharmacy name and location:[facility]    Comments:

## 2019-01-18 ENCOUNTER — TELEPHONE (OUTPATIENT)
Dept: PHARMACY | Facility: CLINIC | Age: 69
End: 2019-01-18

## 2019-01-30 DIAGNOSIS — M51.16 LUMBAR DISC DISEASE WITH RADICULOPATHY: ICD-10-CM

## 2019-02-01 RX ORDER — HYDROCODONE BITARTRATE AND ACETAMINOPHEN 10; 325 MG/1; MG/1
1 TABLET ORAL EVERY 6 HOURS PRN
Qty: 50 TABLET | Refills: 0 | Status: SHIPPED | OUTPATIENT
Start: 2019-02-01 | End: 2019-02-28 | Stop reason: SDUPTHER

## 2019-02-19 ENCOUNTER — TELEPHONE (OUTPATIENT)
Dept: PHARMACY | Facility: CLINIC | Age: 69
End: 2019-02-19

## 2019-02-19 NOTE — TELEPHONE ENCOUNTER
Patient reports that last Repatha dose 2/1/19 had malfunctioned.  Right after the both tabs of adhesive was pulled off, the red light turned on and started beeping.  Patient did not receive his February dose.  Patient did not report malfunctioned device; he did not want to bother with replacement device.  Malfunctioned device was already disposed of in the sharps container.  He was counseled on importance of adherence and to report malfunctioned devices to [facility] or [facility].  [facility] will reach out to patient when refill is approved.  MD informed of missed dose.

## 2019-02-21 ENCOUNTER — TELEPHONE (OUTPATIENT)
Dept: FAMILY MEDICINE | Facility: CLINIC | Age: 69
End: 2019-02-21

## 2019-02-21 ENCOUNTER — TELEPHONE (OUTPATIENT)
Dept: PHARMACY | Facility: CLINIC | Age: 69
End: 2019-02-21

## 2019-02-21 NOTE — TELEPHONE ENCOUNTER
----- Message from Digna Villalobos PharmD sent at 2/19/2019  1:22 PM CST -----  Regarding: Repatha missed dose  Hi Dr. Sweet and Staff,    Just a FYI - Mr. Bernal missed Feb 1st dose of Repatha Pushtronex due to device malfunctioning.  He was counseled on importance of adherence and to report malfunctioned devices to pharmacy.    Thanks,  Digna Villalobos PharmD, [facility]   Specialty Clinical Pharmacist  [facility]  [phone]

## 2019-02-28 DIAGNOSIS — M51.16 LUMBAR DISC DISEASE WITH RADICULOPATHY: ICD-10-CM

## 2019-02-28 RX ORDER — HYDROCODONE BITARTRATE AND ACETAMINOPHEN 10; 325 MG/1; MG/1
1 TABLET ORAL EVERY 6 HOURS PRN
Qty: 50 TABLET | Refills: 0 | Status: SHIPPED | OUTPATIENT
Start: 2019-02-28 | End: 2019-04-01 | Stop reason: SDUPTHER

## 2019-03-12 NOTE — TELEPHONE ENCOUNTER
Repatha PUSHTRONEX follow up attempted.  His last Repatha dose should be 3/1/19 - F/U to make sure device did not malfunction.  Patient did not receive February's dose due to faulty device.  No answer.  M for call back.

## 2019-03-18 ENCOUNTER — TELEPHONE (OUTPATIENT)
Dept: PHARMACY | Facility: CLINIC | Age: 69
End: 2019-03-18

## 2019-03-21 NOTE — TELEPHONE ENCOUNTER
Efrain PUSHTRONEX follow up and refill. Confirmed two patient identifiers - name + . Patient denies any side effects or missed doses. He states that last dose went well, no malfunctioning of the device. His next dose is due on 19 and he confirms he has 0 doses on hand. He confirms no changes to allergies, health conditions, medications or insurance. He declines further injection training  and denies any visits to ER/urgent care. He reports improvement in lipids; he sees his PCP every 6 months. He mentions calling [facility] back but unsure if he LVM correctly - never received. Advised that [facility] calls every other day 3-4 times to ensure we get him on the phone. He was advised to try to make an effort to call [facility] if </= 5 days prior to next dose. Pt verbalized understanding and had no further questions. [facility] to ship out Repatha on 3/27/19 to arrive at patient's home on 3/28/19 via [employer]. Address confirmed, $8.50 copay (CC[facility]), SHARPS needed. TTN

## 2019-04-01 DIAGNOSIS — M51.16 LUMBAR DISC DISEASE WITH RADICULOPATHY: ICD-10-CM

## 2019-04-01 RX ORDER — HYDROCODONE BITARTRATE AND ACETAMINOPHEN 10; 325 MG/1; MG/1
TABLET ORAL
Qty: 50 TABLET | Refills: 0 | Status: SHIPPED | OUTPATIENT
Start: 2019-04-01 | End: 2019-04-20 | Stop reason: SDUPTHER

## 2019-04-10 DIAGNOSIS — N18.9 CHRONIC KIDNEY DISEASE, UNSPECIFIED CKD STAGE: ICD-10-CM

## 2019-04-18 ENCOUNTER — TELEPHONE (OUTPATIENT)
Dept: PHARMACY | Facility: CLINIC | Age: 69
End: 2019-04-18

## 2019-04-20 DIAGNOSIS — M51.16 LUMBAR DISC DISEASE WITH RADICULOPATHY: ICD-10-CM

## 2019-04-22 RX ORDER — HYDROCODONE BITARTRATE AND ACETAMINOPHEN 10; 325 MG/1; MG/1
TABLET ORAL
Qty: 50 TABLET | Refills: 0 | Status: SHIPPED | OUTPATIENT
Start: 2019-04-22 | End: 2019-05-29 | Stop reason: SDUPTHER

## 2019-04-26 DIAGNOSIS — F41.1 GENERALIZED ANXIETY DISORDER: ICD-10-CM

## 2019-04-26 RX ORDER — ALPRAZOLAM 0.25 MG/1
TABLET ORAL
Qty: 60 TABLET | Refills: 2 | Status: SHIPPED | OUTPATIENT
Start: 2019-04-26 | End: 2019-06-10 | Stop reason: SDUPTHER

## 2019-05-10 DIAGNOSIS — E11.9 TYPE 2 DIABETES MELLITUS WITHOUT COMPLICATION, UNSPECIFIED WHETHER LONG TERM INSULIN USE: ICD-10-CM

## 2019-05-17 DIAGNOSIS — E11.65 TYPE 2 DIABETES MELLITUS WITH HYPERGLYCEMIA, WITHOUT LONG-TERM CURRENT USE OF INSULIN: ICD-10-CM

## 2019-05-17 DIAGNOSIS — M51.16 LUMBAR DISC DISEASE WITH RADICULOPATHY: ICD-10-CM

## 2019-05-17 DIAGNOSIS — E78.5 HYPERLIPIDEMIA, UNSPECIFIED HYPERLIPIDEMIA TYPE: ICD-10-CM

## 2019-05-17 RX ORDER — SITAGLIPTIN 100 MG/1
TABLET, FILM COATED ORAL
Qty: 30 TABLET | Refills: 5 | Status: SHIPPED | OUTPATIENT
Start: 2019-05-17 | End: 2019-11-15 | Stop reason: SDUPTHER

## 2019-05-17 RX ORDER — OMEGA-3-ACID ETHYL ESTERS 1 G/1
CAPSULE, LIQUID FILLED ORAL
Qty: 120 CAPSULE | Refills: 5 | Status: SHIPPED | OUTPATIENT
Start: 2019-05-17 | End: 2019-11-15 | Stop reason: SDUPTHER

## 2019-05-17 RX ORDER — GABAPENTIN 100 MG/1
CAPSULE ORAL
Qty: 90 CAPSULE | Refills: 5 | Status: SHIPPED | OUTPATIENT
Start: 2019-05-17 | End: 2019-11-15 | Stop reason: SDUPTHER

## 2019-05-20 ENCOUNTER — PATIENT OUTREACH (OUTPATIENT)
Dept: ADMINISTRATIVE | Facility: HOSPITAL | Age: 69
End: 2019-05-20

## 2019-05-20 ENCOUNTER — TELEPHONE (OUTPATIENT)
Dept: PHARMACY | Facility: CLINIC | Age: 69
End: 2019-05-20

## 2019-05-20 DIAGNOSIS — E78.5 HYPERLIPIDEMIA, UNSPECIFIED HYPERLIPIDEMIA TYPE: ICD-10-CM

## 2019-05-20 DIAGNOSIS — Z11.59 NEED FOR HEPATITIS C SCREENING TEST: Primary | ICD-10-CM

## 2019-05-20 DIAGNOSIS — I25.10 ASCVD (ARTERIOSCLEROTIC CARDIOVASCULAR DISEASE): ICD-10-CM

## 2019-05-20 DIAGNOSIS — Z78.9 STATIN INTOLERANCE: ICD-10-CM

## 2019-05-20 NOTE — TELEPHONE ENCOUNTER
Refill on Repatha. Patient confirmed he needed the refill. Unable to complete the refill  due to needing a new rx. [facility]O was sent a request. Will follow up next business day. To ship out 5/27 to arrive 5/28 for injection on 6/1.

## 2019-05-22 DIAGNOSIS — I25.10 ASCVD (ARTERIOSCLEROTIC CARDIOVASCULAR DISEASE): ICD-10-CM

## 2019-05-22 DIAGNOSIS — Z78.9 STATIN INTOLERANCE: ICD-10-CM

## 2019-05-22 DIAGNOSIS — E78.5 HYPERLIPIDEMIA, UNSPECIFIED HYPERLIPIDEMIA TYPE: ICD-10-CM

## 2019-05-22 NOTE — TELEPHONE ENCOUNTER
----- Message from Carla Smith sent at 2019  1:20 PM CDT -----  Contact: [facility] pharmacy   Wesley Bernal  MRN: [id number]  : 1950  PCP: Lalo Sweet  Home Phone      [phone]  Work Phone      Not on file.  Mobile          [phone]  Home Phone      [phone]      MESSAGE:     calling to check on status of refill request sent over Monday for evolocumab (REPATHA PUSHTRONEX) 420 mg/3.5 mL Injt.     [phone]

## 2019-05-23 DIAGNOSIS — E11.65 TYPE 2 DIABETES MELLITUS WITH HYPERGLYCEMIA, WITHOUT LONG-TERM CURRENT USE OF INSULIN: ICD-10-CM

## 2019-05-23 RX ORDER — PIOGLITAZONEHYDROCHLORIDE 15 MG/1
15 TABLET ORAL DAILY
Qty: 30 TABLET | Refills: 5 | Status: SHIPPED | OUTPATIENT
Start: 2019-05-23 | End: 2019-06-10 | Stop reason: SDUPTHER

## 2019-05-29 DIAGNOSIS — M51.16 LUMBAR DISC DISEASE WITH RADICULOPATHY: ICD-10-CM

## 2019-05-29 RX ORDER — HYDROCODONE BITARTRATE AND ACETAMINOPHEN 10; 325 MG/1; MG/1
TABLET ORAL
Qty: 50 TABLET | Refills: 0 | Status: SHIPPED | OUTPATIENT
Start: 2019-05-29 | End: 2019-07-01 | Stop reason: SDUPTHER

## 2019-06-03 ENCOUNTER — PES CALL (OUTPATIENT)
Dept: ADMINISTRATIVE | Facility: CLINIC | Age: 69
End: 2019-06-03

## 2019-06-10 ENCOUNTER — OFFICE VISIT (OUTPATIENT)
Dept: FAMILY MEDICINE | Facility: CLINIC | Age: 69
End: 2019-06-10
Payer: MEDICARE

## 2019-06-10 VITALS
WEIGHT: 201.63 LBS | DIASTOLIC BLOOD PRESSURE: 66 MMHG | HEIGHT: 73 IN | SYSTOLIC BLOOD PRESSURE: 132 MMHG | BODY MASS INDEX: 26.72 KG/M2 | RESPIRATION RATE: 16 BRPM | HEART RATE: 50 BPM

## 2019-06-10 DIAGNOSIS — E78.5 HYPERLIPIDEMIA, UNSPECIFIED HYPERLIPIDEMIA TYPE: ICD-10-CM

## 2019-06-10 DIAGNOSIS — R19.09 LEFT GROIN MASS: Primary | ICD-10-CM

## 2019-06-10 DIAGNOSIS — I10 ESSENTIAL HYPERTENSION: ICD-10-CM

## 2019-06-10 DIAGNOSIS — R25.1 TREMOR: ICD-10-CM

## 2019-06-10 DIAGNOSIS — N18.30 CHRONIC RENAL IMPAIRMENT, STAGE 3 (MODERATE): ICD-10-CM

## 2019-06-10 DIAGNOSIS — M51.16 LUMBAR DISC DISEASE WITH RADICULOPATHY: ICD-10-CM

## 2019-06-10 DIAGNOSIS — Z78.9 STATIN INTOLERANCE: ICD-10-CM

## 2019-06-10 DIAGNOSIS — I48.0 PAROXYSMAL ATRIAL FIBRILLATION: ICD-10-CM

## 2019-06-10 DIAGNOSIS — E11.65 TYPE 2 DIABETES MELLITUS WITH HYPERGLYCEMIA, WITHOUT LONG-TERM CURRENT USE OF INSULIN: ICD-10-CM

## 2019-06-10 DIAGNOSIS — Z11.59 NEED FOR HEPATITIS C SCREENING TEST: ICD-10-CM

## 2019-06-10 DIAGNOSIS — F41.1 GENERALIZED ANXIETY DISORDER: ICD-10-CM

## 2019-06-10 LAB
ALBUMIN SERPL BCP-MCNC: 4 G/DL (ref 3.5–5.2)
ALP SERPL-CCNC: 79 U/L (ref 55–135)
ALT SERPL W/O P-5'-P-CCNC: 28 U/L (ref 10–44)
ANION GAP SERPL CALC-SCNC: 12 MMOL/L (ref 8–16)
AST SERPL-CCNC: 19 U/L (ref 10–40)
BASOPHILS # BLD AUTO: 0.06 K/UL (ref 0–0.2)
BASOPHILS NFR BLD: 0.6 % (ref 0–1.9)
BILIRUB SERPL-MCNC: 0.4 MG/DL (ref 0.1–1)
BUN SERPL-MCNC: 16 MG/DL (ref 8–23)
CALCIUM SERPL-MCNC: 9.6 MG/DL (ref 8.7–10.5)
CHLORIDE SERPL-SCNC: 102 MMOL/L (ref 95–110)
CHOLEST SERPL-MCNC: 315 MG/DL (ref 120–199)
CHOLEST/HDLC SERPL: 8.3 {RATIO} (ref 2–5)
CO2 SERPL-SCNC: 22 MMOL/L (ref 23–29)
CREAT SERPL-MCNC: 1.1 MG/DL (ref 0.5–1.4)
DIFFERENTIAL METHOD: ABNORMAL
EOSINOPHIL # BLD AUTO: 0.4 K/UL (ref 0–0.5)
EOSINOPHIL NFR BLD: 3.6 % (ref 0–8)
ERYTHROCYTE [DISTWIDTH] IN BLOOD BY AUTOMATED COUNT: 13.4 % (ref 11.5–14.5)
EST. GFR  (AFRICAN AMERICAN): >60 ML/MIN/1.73 M^2
EST. GFR  (NON AFRICAN AMERICAN): >60 ML/MIN/1.73 M^2
GLUCOSE SERPL-MCNC: 274 MG/DL (ref 70–110)
HCT VFR BLD AUTO: 37.9 % (ref 40–54)
HDLC SERPL-MCNC: 38 MG/DL (ref 40–75)
HDLC SERPL: 12.1 % (ref 20–50)
HGB BLD-MCNC: 12.2 G/DL (ref 14–18)
LDLC SERPL CALC-MCNC: ABNORMAL MG/DL (ref 63–159)
LYMPHOCYTES # BLD AUTO: 2.4 K/UL (ref 1–4.8)
LYMPHOCYTES NFR BLD: 24.7 % (ref 18–48)
MCH RBC QN AUTO: 31.7 PG (ref 27–31)
MCHC RBC AUTO-ENTMCNC: 32.2 G/DL (ref 32–36)
MCV RBC AUTO: 98 FL (ref 82–98)
MONOCYTES # BLD AUTO: 0.8 K/UL (ref 0.3–1)
MONOCYTES NFR BLD: 8.2 % (ref 4–15)
NEUTROPHILS # BLD AUTO: 6.1 K/UL (ref 1.8–7.7)
NEUTROPHILS NFR BLD: 62.9 % (ref 38–73)
NONHDLC SERPL-MCNC: 277 MG/DL
PLATELET # BLD AUTO: 200 K/UL (ref 150–350)
PMV BLD AUTO: 10.3 FL (ref 9.2–12.9)
POTASSIUM SERPL-SCNC: 3.9 MMOL/L (ref 3.5–5.1)
PROT SERPL-MCNC: 7.8 G/DL (ref 6–8.4)
RBC # BLD AUTO: 3.85 M/UL (ref 4.6–6.2)
SODIUM SERPL-SCNC: 136 MMOL/L (ref 136–145)
TRIGL SERPL-MCNC: 609 MG/DL (ref 30–150)
WBC # BLD AUTO: 9.75 K/UL (ref 3.9–12.7)

## 2019-06-10 PROCEDURE — 99999 PR PBB SHADOW E&M-EST. PATIENT-LVL IV: ICD-10-PCS | Mod: PBBFAC,HCNC,, | Performed by: FAMILY MEDICINE

## 2019-06-10 PROCEDURE — 99999 PR PBB SHADOW E&M-EST. PATIENT-LVL IV: CPT | Mod: PBBFAC,HCNC,, | Performed by: FAMILY MEDICINE

## 2019-06-10 PROCEDURE — 80061 LIPID PANEL: CPT | Mod: HCNC

## 2019-06-10 PROCEDURE — 99499 RISK ADDL DX/OHS AUDIT: ICD-10-PCS | Mod: HCNC,S$GLB,, | Performed by: FAMILY MEDICINE

## 2019-06-10 PROCEDURE — 3044F PR MOST RECENT HEMOGLOBIN A1C LEVEL <7.0%: ICD-10-PCS | Mod: HCNC,CPTII,S$GLB, | Performed by: FAMILY MEDICINE

## 2019-06-10 PROCEDURE — 80053 COMPREHEN METABOLIC PANEL: CPT | Mod: HCNC

## 2019-06-10 PROCEDURE — 86803 HEPATITIS C AB TEST: CPT | Mod: HCNC

## 2019-06-10 PROCEDURE — 3075F SYST BP GE 130 - 139MM HG: CPT | Mod: HCNC,CPTII,S$GLB, | Performed by: FAMILY MEDICINE

## 2019-06-10 PROCEDURE — 85025 COMPLETE CBC W/AUTO DIFF WBC: CPT | Mod: HCNC

## 2019-06-10 PROCEDURE — 3078F PR MOST RECENT DIASTOLIC BLOOD PRESSURE < 80 MM HG: ICD-10-PCS | Mod: HCNC,CPTII,S$GLB, | Performed by: FAMILY MEDICINE

## 2019-06-10 PROCEDURE — 99214 PR OFFICE/OUTPT VISIT, EST, LEVL IV, 30-39 MIN: ICD-10-PCS | Mod: HCNC,S$GLB,, | Performed by: FAMILY MEDICINE

## 2019-06-10 PROCEDURE — 99499 UNLISTED E&M SERVICE: CPT | Mod: HCNC,S$GLB,, | Performed by: FAMILY MEDICINE

## 2019-06-10 PROCEDURE — 36415 COLL VENOUS BLD VENIPUNCTURE: CPT | Mod: HCNC,S$GLB,, | Performed by: FAMILY MEDICINE

## 2019-06-10 PROCEDURE — 1101F PR PT FALLS ASSESS DOC 0-1 FALLS W/OUT INJ PAST YR: ICD-10-PCS | Mod: HCNC,CPTII,S$GLB, | Performed by: FAMILY MEDICINE

## 2019-06-10 PROCEDURE — 1101F PT FALLS ASSESS-DOCD LE1/YR: CPT | Mod: HCNC,CPTII,S$GLB, | Performed by: FAMILY MEDICINE

## 2019-06-10 PROCEDURE — 83036 HEMOGLOBIN GLYCOSYLATED A1C: CPT | Mod: HCNC

## 2019-06-10 PROCEDURE — 3044F HG A1C LEVEL LT 7.0%: CPT | Mod: HCNC,CPTII,S$GLB, | Performed by: FAMILY MEDICINE

## 2019-06-10 PROCEDURE — 3075F PR MOST RECENT SYSTOLIC BLOOD PRESS GE 130-139MM HG: ICD-10-PCS | Mod: HCNC,CPTII,S$GLB, | Performed by: FAMILY MEDICINE

## 2019-06-10 PROCEDURE — 36415 PR COLLECTION VENOUS BLOOD,VENIPUNCTURE: ICD-10-PCS | Mod: HCNC,S$GLB,, | Performed by: FAMILY MEDICINE

## 2019-06-10 PROCEDURE — 99214 OFFICE O/P EST MOD 30 MIN: CPT | Mod: HCNC,S$GLB,, | Performed by: FAMILY MEDICINE

## 2019-06-10 PROCEDURE — 3078F DIAST BP <80 MM HG: CPT | Mod: HCNC,CPTII,S$GLB, | Performed by: FAMILY MEDICINE

## 2019-06-10 RX ORDER — FUROSEMIDE 20 MG/1
20 TABLET ORAL 2 TIMES DAILY
Qty: 60 TABLET | Refills: 5 | Status: SHIPPED | OUTPATIENT
Start: 2019-06-10 | End: 2020-01-10

## 2019-06-10 RX ORDER — PIOGLITAZONEHYDROCHLORIDE 15 MG/1
15 TABLET ORAL DAILY
Qty: 30 TABLET | Refills: 5 | Status: SHIPPED | OUTPATIENT
Start: 2019-06-10 | End: 2020-05-11

## 2019-06-10 RX ORDER — AMIODARONE HYDROCHLORIDE 200 MG/1
100 TABLET ORAL DAILY
Qty: 30 TABLET | Refills: 5 | Status: SHIPPED | OUTPATIENT
Start: 2019-06-10 | End: 2020-05-11

## 2019-06-10 RX ORDER — ATENOLOL 25 MG/1
25 TABLET ORAL DAILY
Qty: 30 TABLET | Refills: 5 | Status: SHIPPED | OUTPATIENT
Start: 2019-06-10 | End: 2020-01-10

## 2019-06-10 RX ORDER — ALPRAZOLAM 0.25 MG/1
0.25 TABLET ORAL 2 TIMES DAILY PRN
Qty: 60 TABLET | Refills: 2 | Status: SHIPPED | OUTPATIENT
Start: 2019-06-10 | End: 2019-11-05 | Stop reason: SDUPTHER

## 2019-06-10 RX ORDER — EZETIMIBE 10 MG/1
10 TABLET ORAL DAILY
Qty: 30 TABLET | Refills: 5 | Status: SHIPPED | OUTPATIENT
Start: 2019-06-10 | End: 2019-12-14 | Stop reason: SDUPTHER

## 2019-06-10 NOTE — PROGRESS NOTES
CC: Checkup for type 2 diabetes, hypertension    HPI: Wesley Bernal is a 68 y.o. male here for a checkup.  His most recent concern is his voice becoming softer and gravelly, and his tremor in his hands is getting worse.  His wife also thinks he is shuffling more when he walks.  His tremor is worse.   Has an enlarged lump in left groin.  No night sweats, weight loss.    Patient has a history of peripheral vascular disease and lumbar spinal stenosis.  Lately his pain in his back has been getting worse.  When he walks a short distance the pain the back intensifies and it radiates into his lower extremities.  He has to stop and bend over, the pain resolves, then he's able start walking again.  Patient has iliac artery and femoral artery stents.  He developed severe myalgias from Crestor and Lipitor.  He lost muscle mass.  Stopped it last week and now muscles are better.  His cholesterol increased so cardiology now has him on Zetia and Fish oil.  He had to stop Crestor due to muscle pain.  He he is now taking and tolerating Repatha.  He has cardiac stents, peripheral stents.  He probably has familial hypercholesterolemia.  Patient has a history of CRI 3, but creatinine is much better.   A referral to nephrology was made.  He saw him last month and had complete  workup.  His metformin was stopped because of this.  Renal ultrasound showed chronic kidney disease.  He no longer sees nephrology.  He is now on Januvia, Actos.  He tolerates it well.  His blood sugars are controlled decently.  Patient also has a history of mitral valve replacement, atrial fib, hypertension.  He has a pacemaker and defibrillator in place.  This has not been a problem for him.  He is now seeing Dr. Leigh.  His cardiologist was in [place].  That was his brother-in-law.  Last year patient had bilateral stents placed in his legs and is doing better.  He denies signs or symptoms of claudications.  He tolerates his blood pressure medication  well and is not having side effects such as chronic cough or dizziness.    Clinically his back pain has been stable.  He takes 1 or 2 Norco's per day which helps.  Saw neurology and was started on Gabapentin and is doing better.    ROS:   Gen.:  No fever, chills, weight loss, weight gain  HEENT: No headaches, sinus congestion, sore throat, change in vision.  Voice getting shakey  CV: No chest pain  RESP: No shortness of breath, wheezing, cough  GI: No change in bowel habits, no diarrhea, no abdominal pain, no hematochezia  : No dysuria, frequency  MSK: Significant muscle pain, joint pain, back pain  NEURO: No numbness, weakness.  Worsening tremor, shuffling gait from back pain  ENDO: No polyuria, polydipsia, heat or cold intolerance    PMH, PSH, ALLERGIES, SH, FH reviewed in today's note    PHYSICAL EXAM;   Vitals:    06/10/19 1431   BP: 132/66   Pulse: (!) 50   Resp: 16     APPEARANCE: Well nourished, well developed, in no acute distress.    HEAD: Normocephalic, atraumatic.  EYES: PERRL. EOMI.      EARS: TM's intact. Light reflex normal. No retraction or perforation.    NOSE: Mucosa pink. Airway clear.  MOUTH & THROAT: No tonsillar enlargement. No pharyngeal erythema or exudate. No stridor.  NECK: Supple.  Bilateral carotid endarterectomy scars  NODES: No cervical, axillary or inguinal lymph node enlargement.  CHEST: Lungs clear to auscultation.  CARDIOVASCULAR: Normal S1, S2. No rubs, murmurs or gallops.  ABDOMEN: Bowel sounds normal. Not distended. Soft. No tenderness or masses.  Abdominal bruits are present  :  Left groin with 3x2 cm smooth mobile mass.  ?LN?  MUSCULOSKELETAL:  No CCE.  Very poor distal pulses in the left leg.  Palpable pulses in the right leg  SKIN: No rashes or pigmented moles.  NEUROLOGIC:       Cranial Nerves: II-XII grossly intact.      Motor: 5/5 strength major flexors/extensors.  Cogwheel rigidity.        DTR's: Knees, Ankles 2+ and equal bilaterally; downgoing toes.      Sensory:  Intact to light touch distally.      Gait & Posture: Shuffling gait.  Pill rolling tremor?  No cogwheeling  MENTAL STATUS: Normal orientation to person, place and time, normal affect, normal flow thought, normal memory.    Protective Sensation (w/ 10 gram monofilament):  Right: Intact  Left: Intact    Visual Inspection:  Normal -  Bilateral    Pedal Pulses:   Right: Present  Left: Present    Posterior tibialis:   Right:Present  Left: Present    Lab Results   Component Value Date    HGBA1C 6.5 (H) 06/10/2019     Lab Results   Component Value Date    LDLCALC Invalid, Trig>400.0 06/10/2019     Lab Results   Component Value Date    CREATININE 1.1 06/10/2019     Lab Results   Component Value Date    PSA 2.1 05/04/2017    PSA 2.0 09/03/2014     Lab Results   Component Value Date    WBC 9.75 06/10/2019    HGB 12.2 (L) 06/10/2019    HCT 37.9 (L) 06/10/2019    MCV 98 06/10/2019     06/10/2019         ASSESSMENT/PLAN:    Atrial fibrillation/Mitral valve replaced  - amiodarone (PACERONE) 200 MG Tab; Take 1 tablet (200 mg total) by mouth once daily.  Dispense: 30 tablet; Refill: 5  - Keep appointment with cardiology     Spinal Stenosis of the Lumbar spine  Consider ortho consult  His lower extremity pain sounds like a mixture of PVD and spinal stenosis.  I'll wait and see what cardiology thinks.  He may need to consider lumbar surgery versus lumbar steroid injections.  Continue [place]    PVD  Has abdominal, carotid, femoral artery stent  He sees Dr. Leigh for evaluation    Type 2 diabetes mellitus  I have recommended the following steps for improving diabetic care and outcome to patient::   Referral to Diabetic Education department if necessary.  Diabetic diet discussed in detail, written exchange diet given, low cholesterol diet, weight control and daily exercise discussed.    All medications, side effects and compliance issues discussed.    Long term complications of diabetes discussed.  Home glucose monitoring  emphasized. Fasting morning glucose goals should be less than 140.  2 hour postprandial goal should be less than 180.  Annual eye examinations at Ophthalmology discussed,   Glycohemoglobin and other lab monitoring discussed.  Medications for this patient will be:  - sitagliptin (JANUVIA) 100 MG Tab; Take 1 tablet (100 mg total) by mouth once daily.  Dispense: 30 tablet; Refill: 5              Actos 15 mg by mouth daily    HTN (hypertension)/PVD  Two gram sodium diet.    Weight loss discussed.    Try to walk 2 miles per day.    Quit smoking.    Current medications will be:  - atenolol (TENORMIN) 25 MG tablet; Take 1 tablet (25 mg total) by mouth once daily.  Dispense: 30 tablet; Refill: 5  - furosemide (LASIX) 20 MG tablet; Take 1 tablet (20 mg total) by mouth 2 (two) times daily.  Dispense: 60 tablet; Refill: 5  - lisinopril 10 MG tablet; Take 1 tablet (10 mg total) by mouth twice daily.  Dispense: 30 tablet; Refill: 5              ASA 81 mg po daily  Patient told to take 20 mg of Lasix if he does not have edema.  He can increase to 40 mg daily when he does have some swelling.    CRI (chronic renal insufficiency), stage 3 (moderate)  Currently has normal renal fxn.  Monitor renal fxn every 6 months.  Adjust Lasix according to edema    Hyperlipidemia-statin intolerance/Suspect familial hypercholesterolemia.    Low fat diet.  Avoid sweets.  A 10 pound weight loss by the next visit as a  good goal.  Increase consumption of fruits and vegetables, fish and chicken.  Use medications below:  Statin intolerant  Continue Lovaza  Continues Zetia  Continue Repatha.      Tremor  -     Ambulatory referral to Neurology.  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  His dad had this.    Next appointment will be in 6 months.

## 2019-06-11 LAB
ESTIMATED AVG GLUCOSE: 140 MG/DL (ref 68–131)
HBA1C MFR BLD HPLC: 6.5 % (ref 4–5.6)
HCV AB SERPL QL IA: NEGATIVE

## 2019-06-12 ENCOUNTER — PATIENT MESSAGE (OUTPATIENT)
Dept: FAMILY MEDICINE | Facility: CLINIC | Age: 69
End: 2019-06-12

## 2019-06-13 ENCOUNTER — HOSPITAL ENCOUNTER (OUTPATIENT)
Dept: PREADMISSION TESTING | Facility: HOSPITAL | Age: 69
Discharge: HOME | End: 2019-06-13
Attending: SURGERY
Payer: MEDICARE

## 2019-06-13 ENCOUNTER — TELEPHONE (OUTPATIENT)
Dept: FAMILY MEDICINE | Facility: CLINIC | Age: 69
End: 2019-06-13

## 2019-06-13 VITALS — BODY MASS INDEX: 26.37 KG/M2 | WEIGHT: 199 LBS | HEIGHT: 73 IN

## 2019-06-13 NOTE — DISCHARGE INSTRUCTIONS
Outpatient procedure instructions    Prep Review  Nothing to eat or drink after midnight unless your doctor tells you differently.    Bring your medication in the original containers.   Take medications as instructed by your doctor.    Wear something comfortable that is easy for you to take off and put on.   Do not wear any makeup, jewelry, or body piercings. Leave valuables at home or let your family member keep them for you. Do not bring them to the Surgery area.     Date/Day of Procedure: Monday 6/17/19  Arrival time: 1pm    Arrival time: [facility] will call you between 1 p.m. and 5 p.m. the workday before the procedure to give you an arrival time.   Report to the Emergency Room if asked to arrive at the hospital before 7:00 a.m.   Report to Patient Registration if asked to arrive after 7:00 a.m.   It is not necessary to report earlier than the time you are told.   Ignore any automated/computer generated calls telling to what time to report to the hospital.   Plan to be at the hospital for about 4 hours, however, it could be longer.       Diabetics  If you are diabetic do not take your diabetes medication the morning of the procedure unless otherwise instructed by you doctor.

## 2019-06-13 NOTE — TELEPHONE ENCOUNTER
Pt here for pre-admit. Would like a call back regarding cholesterol. He had missed one dose of repatha but it was 3 months ago. Also pt has some unintentional weight loss. Please advise.

## 2019-06-14 NOTE — TELEPHONE ENCOUNTER
These issues have nothing to do with his preop for surgery.  He needs to get the groin mass biopsy.  Tell him to take the rib path the as directed.  The weight loss may be from the groin mass.  Proceed with biopsy.

## 2019-06-17 ENCOUNTER — HOSPITAL ENCOUNTER (OUTPATIENT)
Facility: HOSPITAL | Age: 69
Discharge: HOME | End: 2019-06-17
Attending: SURGERY | Admitting: SURGERY
Payer: MEDICARE

## 2019-06-17 ENCOUNTER — OFFICE VISIT (OUTPATIENT)
Dept: NEUROLOGY | Facility: CLINIC | Age: 69
End: 2019-06-17
Payer: MEDICARE

## 2019-06-17 VITALS
SYSTOLIC BLOOD PRESSURE: 149 MMHG | OXYGEN SATURATION: 95 % | TEMPERATURE: 97 F | HEART RATE: 50 BPM | DIASTOLIC BLOOD PRESSURE: 65 MMHG | RESPIRATION RATE: 18 BRPM

## 2019-06-17 VITALS
SYSTOLIC BLOOD PRESSURE: 138 MMHG | WEIGHT: 207.44 LBS | BODY MASS INDEX: 27.49 KG/M2 | HEIGHT: 73 IN | DIASTOLIC BLOOD PRESSURE: 62 MMHG | HEART RATE: 56 BPM | RESPIRATION RATE: 16 BRPM

## 2019-06-17 DIAGNOSIS — R19.09 LEFT GROIN MASS: Primary | ICD-10-CM

## 2019-06-17 DIAGNOSIS — M51.16 LUMBAR DISC DISEASE WITH RADICULOPATHY: ICD-10-CM

## 2019-06-17 DIAGNOSIS — G25.0 BENIGN ESSENTIAL TREMOR: Primary | ICD-10-CM

## 2019-06-17 DIAGNOSIS — I48.0 PAROXYSMAL ATRIAL FIBRILLATION: ICD-10-CM

## 2019-06-17 PROCEDURE — 88333 PATH CONSLTJ SURG CYTO XM 1: CPT | Mod: 26,HCNC,, | Performed by: PATHOLOGY

## 2019-06-17 PROCEDURE — 88305 TISSUE EXAM BY PATHOLOGIST: CPT | Mod: 26,HCNC,, | Performed by: PATHOLOGY

## 2019-06-17 PROCEDURE — 36000707: Mod: HCNC | Performed by: SURGERY

## 2019-06-17 PROCEDURE — 88189 FLOWCYTOMETRY/READ 16 & >: CPT | Mod: HCNC,,, | Performed by: PATHOLOGY

## 2019-06-17 PROCEDURE — 88305 TISSUE EXAM BY PATHOLOGIST: CPT | Mod: HCNC | Performed by: PATHOLOGY

## 2019-06-17 PROCEDURE — 88184 FLOWCYTOMETRY/ TC 1 MARKER: CPT | Mod: HCNC | Performed by: PATHOLOGY

## 2019-06-17 PROCEDURE — 1101F PT FALLS ASSESS-DOCD LE1/YR: CPT | Mod: HCNC,CPTII,S$GLB, | Performed by: PSYCHIATRY & NEUROLOGY

## 2019-06-17 PROCEDURE — 99999 PR PBB SHADOW E&M-EST. PATIENT-LVL III: ICD-10-PCS | Mod: PBBFAC,HCNC,, | Performed by: PSYCHIATRY & NEUROLOGY

## 2019-06-17 PROCEDURE — 3075F PR MOST RECENT SYSTOLIC BLOOD PRESS GE 130-139MM HG: ICD-10-PCS | Mod: HCNC,CPTII,S$GLB, | Performed by: PSYCHIATRY & NEUROLOGY

## 2019-06-17 PROCEDURE — 88305 TISSUE SPECIMEN TO PATHOLOGY - SURGERY: ICD-10-PCS | Mod: 26,HCNC,, | Performed by: PATHOLOGY

## 2019-06-17 PROCEDURE — 3075F SYST BP GE 130 - 139MM HG: CPT | Mod: HCNC,CPTII,S$GLB, | Performed by: PSYCHIATRY & NEUROLOGY

## 2019-06-17 PROCEDURE — 3078F DIAST BP <80 MM HG: CPT | Mod: HCNC,CPTII,S$GLB, | Performed by: PSYCHIATRY & NEUROLOGY

## 2019-06-17 PROCEDURE — 25000003 PHARM REV CODE 250: Mod: HCNC | Performed by: SURGERY

## 2019-06-17 PROCEDURE — 38531 PR BIOPSY/EXCISION, INGUINOFEMORAL NODE(S), OPEN: ICD-10-PCS | Mod: HCNC,LT,, | Performed by: SURGERY

## 2019-06-17 PROCEDURE — 3078F PR MOST RECENT DIASTOLIC BLOOD PRESSURE < 80 MM HG: ICD-10-PCS | Mod: HCNC,CPTII,S$GLB, | Performed by: PSYCHIATRY & NEUROLOGY

## 2019-06-17 PROCEDURE — 88185 FLOWCYTOMETRY/TC ADD-ON: CPT | Mod: 59,HCNC | Performed by: PATHOLOGY

## 2019-06-17 PROCEDURE — 1101F PR PT FALLS ASSESS DOC 0-1 FALLS W/OUT INJ PAST YR: ICD-10-PCS | Mod: HCNC,CPTII,S$GLB, | Performed by: PSYCHIATRY & NEUROLOGY

## 2019-06-17 PROCEDURE — 99204 OFFICE O/P NEW MOD 45 MIN: CPT | Mod: HCNC,S$GLB,, | Performed by: PSYCHIATRY & NEUROLOGY

## 2019-06-17 PROCEDURE — 99999 PR PBB SHADOW E&M-EST. PATIENT-LVL III: CPT | Mod: PBBFAC,HCNC,, | Performed by: PSYCHIATRY & NEUROLOGY

## 2019-06-17 PROCEDURE — 38531 OPEN BX/EXC INGUINOFEM NODES: CPT | Mod: HCNC,LT,, | Performed by: SURGERY

## 2019-06-17 PROCEDURE — 36000706: Mod: HCNC | Performed by: SURGERY

## 2019-06-17 PROCEDURE — 88333 TISSUE SPECIMEN TO PATHOLOGY - SURGERY: ICD-10-PCS | Mod: 26,HCNC,, | Performed by: PATHOLOGY

## 2019-06-17 PROCEDURE — 88189 PR  FLOWCYTOMETRY/READ, 16 & > MARKERS: ICD-10-PCS | Mod: HCNC,,, | Performed by: PATHOLOGY

## 2019-06-17 PROCEDURE — 99204 PR OFFICE/OUTPT VISIT, NEW, LEVL IV, 45-59 MIN: ICD-10-PCS | Mod: HCNC,S$GLB,, | Performed by: PSYCHIATRY & NEUROLOGY

## 2019-06-17 RX ORDER — PRIMIDONE 50 MG/1
TABLET ORAL
Qty: 60 TABLET | Refills: 5 | Status: SHIPPED | OUTPATIENT
Start: 2019-06-17 | End: 2019-12-14 | Stop reason: SDUPTHER

## 2019-06-17 RX ORDER — BUPIVACAINE HYDROCHLORIDE AND EPINEPHRINE 5; 5 MG/ML; UG/ML
INJECTION, SOLUTION EPIDURAL; INTRACAUDAL; PERINEURAL
Status: DISCONTINUED | OUTPATIENT
Start: 2019-06-17 | End: 2019-06-17 | Stop reason: HOSPADM

## 2019-06-17 NOTE — LETTER
June 17, 2019      Lalo Sweet MD  [address]  [facility] [address]           [facility]  [address] [facility] [address]  Phone: [phone]  Fax: [phone]          Patient: Wesley Bernal   MR Number: [id number]   YOB: 1950   Date of Visit: 6/17/2019       Dear Dr. Lalo Sweet:    Thank you for referring Wesley Bernal to me for evaluation. Attached you will find relevant portions of my assessment and plan of care.    If you have questions, please do not hesitate to call me. I look forward to following Wesley Bernal along with you.    Sincerely,    Walter Baker MD    Enclosure  CC:  No Recipients    If you would like to receive this communication electronically, please contact [email or web address] or [phone] to request more information on [employer] Link access.    For providers and/or their staff who would like to refer a patient to [facility], please contact us through our one-stop-shop provider referral line, [facility], at [phone].    If you feel you have received this communication in error or would no longer like to receive these types of communications, please e-mail [email or web address]

## 2019-06-17 NOTE — PLAN OF CARE
Called to surgery pre-op area because patient wants to fill out medical decision making form. Form presented and read to patient. He states he would like Tierra Jacobson to be his 1st and only choice. Form filled out, signed by patient and 2 witnesses. Form given to YARIEL Calhoun to place in chart.      Jessica Boggs RN, BSN  [facility]   Case Management/Utilization Review  [phone] (Phone)  [phone] (Fax)

## 2019-06-17 NOTE — DISCHARGE INSTRUCTIONS
Discharge Instructions: Caring for Your Wound  Taking proper care of your wound will help it heal. Your doctor or nurse may show you specifically how to clean and dress the wound and how to tell if the wound is healing normally. This sheet will help you remember those guidelines when you are at home.  Getting Ready  · Put pets and children in another room, away from your work area.  · Wash your hands before touching any of your supplies:  ¨ Turn on the water.  ¨ Wet your hands and wrists.  ¨ Use liquid soap from a pump dispenser. Work up a lather.  ¨ Scrub your hands thoroughly.  ¨ Rinse your hands with your fingers pointing toward the drain.  ¨ Dry your hands with a clean cloth or paper towel. Use this towel to turn off the faucet.  ¨ Remember, once you have washed your hands, dont touch anything other than your supplies. Wash your hands again if you touch anything, such as furniture or your clothes.  · Clean your work area:  ¨ Clean washable surfaces with soap and water, and dry with a clean cloth or paper towel.  ¨ Wipe surfaces that are not washable (such as fabric or wood) so that they are free of dust. Spread a clean cloth or paper towel over your work surface.  ¨ Move away from the clean surface, if you need to cough or sneeze.  · Gather your bandage supplies:  ¨ Gauze dressing or other bandage material  ¨ Medical tape  ¨ Disposable gloves  · Wash your hands again.        Dressing the Wound  · Remove the old dressing:  ¨ Put on disposable gloves if youre dressing a wound for someone else or if your wound is infected.  ¨ Pull gently toward the wound to loosen the tape.  ¨ One layer at a time, gently remove the dressing.  ¨ If you have a drain or tube, be careful not to pull on it.  ¨ Look at the dressing. Make sure that you are seeing a decreasing amount of blood, and that the blood is turning into a clear, larry fluid.  ¨ Look for loose sutures.  ¨ Put the dressing in a plastic bag. Then remove your  gloves.  · Inspect the wound. Look for signs that it isn't healing normally. A wound that isnt healing properly may be dark in color or have white streaks.  · Dress the wound:  ¨ Wash your hands again.  ¨ Clean and dress the wound as you were shown by your doctor or nurse.  ¨ If youre dressing a wound for someone else or if your wound is infected, put on a new pair of disposable gloves .  ¨ If you have a drain or tube, be careful not to pull on it.  · Discard any used materials or trash in a plastic bag before placing in a trash can.  Follow-Up  Make a follow-up appointment as directed by our staff.  When to Call Your Doctor  Call your doctor right away if you have any of the following:  · Shaking chills or fever above 100.4°F (38°C)  · Bleeding that soaks the dressing  · Sutures that are pulling away from the wound or pulling apart  · Pink fluid weeping from the wound  · Increased drainage from the wound or drainage that is yellow, yellow-green, or smelly  · Increased swelling, pain, or redness in the skin around the wound  · A change in the color or size of the wound  · Increased fatigue  · Loss of appetite   © 7739-8878 [employer], [address] [place] [address], [place] [address]. All rights reserved. This information is not intended as a substitute for professional medical care. Always follow your healthcare professional's instructions.

## 2019-06-17 NOTE — OP NOTE
[facility]  General Surgery  Operative Note    SUMMARY     Date of Procedure: 6/17/2019     Procedure: Procedure(s) (LRB):  BIOPSY, LYMPH NODE (Left)       Surgeon(s) and Role:     * Adelfo Yin MD - Primary    Assisting Surgeon: None    Pre-Operative Diagnosis: Left groin mass [R19.09]    Post-Operative Diagnosis: Post-Op Diagnosis Codes:     * Left groin mass [R19.09]    Anesthesia: Local    Technical Procedures Used:  Excisional lymph node biopsy left groin    Description of the Findings of the Procedure:  Patient brought to the operating room to the left groin was prepped and draped in a sterile fashion.  The mass was palpated and then marked.  Area was then infiltrated with lidocaine.  Incision was made with a scalpel weight and carried to the subcutaneous tissue.  Continued dissection down to the mass with electrocautery.  The lymph node was completely excised using Metzenbaum scissors.  The wound was then copiously irrigated and hemostasis obtained with electrocautery.  The deep layers were closed with interrupted Vicryl suture.  The skin was then closed Monocryl and Dermabond patient tolerated procedure was discharged home    Significant Surgical Tasks Conducted by the Assistant(s), if Applicable:     Complications: No    Estimated Blood Loss (EBL): * No values recorded between 6/17/2019 12:50 PM and 6/17/2019  1:08 PM *           Implants: * No implants in log *    Specimens:   Specimen (12h ago, onward)    Start     Ordered    Signed and Held  Specimen to Pathology - Surgery  Once     Comments:  Pre-op: left groin massPost-op: sameSpecimen: left groin lymph nodeProcedure: biopsy left groin lymph node Physician: KRISHAN Yin      Signed and Held                  Condition: Good    Disposition: PACU - hemodynamically stable.    Attestation: I was present and scrubbed for the entire procedure.

## 2019-06-17 NOTE — PROGRESS NOTES
Consult from Dr Sweet    HPI: Wesley Bernal is a 68 y.o. male here for evaluation of tremor.  He saw me for this remotely. He states his hands still have shaking and he notes voice tremor.  This seems to be worsening a bit in the past few years.  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     Note his family history of tremor in his dad  Takes gabapentin for pain from DM and known lumbar spinal stenosis  Note he saw me in 2015 regarding his lumbar back pain and numb feet. He failed to follow up after that point for EMG. I felt he had benign essential tremor at that time.  I had also placed him on gabapentin at that time.   He has declined seeing a back surgeon.   He does feel gabapentin helps with pain.   PCP give pain pill for back pain which also improves with rest    Pending groin lymph node excision        Review of Systems   Constitutional: Negative for fever.   HENT: Negative for nosebleeds.    Eyes: Negative for double vision.   Respiratory: Negative for hemoptysis.    Cardiovascular: Negative for leg swelling.   Gastrointestinal: Positive for blood in stool.        Patient has scar from prior CA and has some bleeding from this rarely   Genitourinary: Negative for hematuria.   Musculoskeletal: Positive for back pain.   Skin: Negative for rash.   Neurological: Positive for tremors.   Psychiatric/Behavioral: The patient does not have insomnia.          I have reviewed all of this patient's past medical and surgical histories as well as family and social histories and active allergies and medications as documented in the electronic medical record.        Exam:  Gen Appearance, well developed/nourished in no apparent distress  CV: 2+ distal pulses with no edema or swelling  Neuro:  MS: Awake, alert, oriented to place, person, time, situation. Sustains attention. Recent/remote memory intact, Language is full to spontaneous speech/repetition/naming/comprehension. Fund of  Knowledge is full  CN: Optic discs are flat with normal vasculature, PERRL, Extraoccular movements and visual fields are full. Normal facial sensation and strength, Hearing symmetric, Tongue and Palate are midline and strong. Shoulder Shrug symmetric and strong.  Motor: Normal bulk, tone, no abnormal movements at rest and tremor is noted moderately with outstretched hands. 5/5 strength bilateral upper/lower extremities with 1+ reflexes and bilateral plantar response  Sensory: symmetric to light touch, pain, temp, and vibration, Romberg negative  Cerebellar: Finger-nose,Heal-shin, Rapid alternating movements intact  Gait: Normal stance, no ataxia- but arthralgic gait for which he uses a cane      Labs: 2019 CMP, CBC reviewed. TSH normal 12/2018  Imaging: CT L spine 2015: Moderate spinal stenosis.     Assessment/Plan: Wesley Bernal is a 68 y.o. male with benign essential tremor, long standing but worsening lately  I recommend:   1. No neuroimaging is needed as symptoms are long standing and consistent with benign essential tremor as I noted in 2015.   -Note his family history of tremor in his dad  -Desires treatment: Primidone per orders Unless sedation, mood changes or other side effects  -Note his use of atenolol for rate with his afib, which may also help his tremor  2. Note: He takes gabapentin for pain from DM and known lumbar spinal stenosis (moderate by 2015 MRI L spine). Note I saw him for this in 2015, but he failed to comply with follow up and EMG at that time. He has relief with gabapentin and does not desire spine surgery consultation    RTC 6 months  CC:Dr Sweet

## 2019-06-18 ENCOUNTER — TELEPHONE (OUTPATIENT)
Dept: PHARMACY | Facility: CLINIC | Age: 69
End: 2019-06-18

## 2019-06-19 DIAGNOSIS — C85.90 LYMPHOMA, UNSPECIFIED BODY REGION, UNSPECIFIED LYMPHOMA TYPE: Primary | ICD-10-CM

## 2019-06-19 LAB
FLOW CYTOMETRY ANTIBODIES ANALYZED - LYMPH NODE: NORMAL
FLOW CYTOMETRY COMMENT - LYMPH NODE: NORMAL
FLOW CYTOMETRY INTERPRETATION - LYMPH NODE: NORMAL

## 2019-06-20 ENCOUNTER — TELEPHONE (OUTPATIENT)
Dept: FAMILY MEDICINE | Facility: CLINIC | Age: 69
End: 2019-06-20

## 2019-06-20 NOTE — TELEPHONE ENCOUNTER
Spoke with Bambi with Dr Yin office, appt scheduled for tomorrow at 11. She will notify pt of appt

## 2019-06-20 NOTE — TELEPHONE ENCOUNTER
----- Message from Simone Valadez sent at 2019  1:10 PM CDT -----  Contact: Bambi @ Dr Adelfo Yin's office  Wesley Bernal  MRN: [id number]  : 1950  PCP: Lalo Sweet  Home Phone      [phone]  Work Phone      Not on file.  Mobile          [phone]  Home Phone      [phone]      MESSAGE: Biopsy was done -- path report received -- Dr Yin states appt needed ASAP with Dr May -- please call Bambi with appt time & date    Call Bambi @ [phone]    PCP: Micki

## 2019-06-21 ENCOUNTER — TELEPHONE (OUTPATIENT)
Dept: SURGERY | Facility: CLINIC | Age: 69
End: 2019-06-21

## 2019-06-21 ENCOUNTER — OFFICE VISIT (OUTPATIENT)
Dept: FAMILY MEDICINE | Facility: CLINIC | Age: 69
End: 2019-06-21
Payer: MEDICARE

## 2019-06-21 VITALS
DIASTOLIC BLOOD PRESSURE: 54 MMHG | HEART RATE: 54 BPM | SYSTOLIC BLOOD PRESSURE: 110 MMHG | RESPIRATION RATE: 16 BRPM | BODY MASS INDEX: 26.64 KG/M2 | HEIGHT: 73 IN | WEIGHT: 201 LBS

## 2019-06-21 DIAGNOSIS — C77.5 METASTATIC CANCER TO INTRAPELVIC LYMPH NODES: ICD-10-CM

## 2019-06-21 DIAGNOSIS — C21.0 SQUAMOUS CELL CANCER, ANUS: Primary | ICD-10-CM

## 2019-06-21 PROCEDURE — 99999 PR PBB SHADOW E&M-EST. PATIENT-LVL IV: CPT | Mod: PBBFAC,HCNC,, | Performed by: FAMILY MEDICINE

## 2019-06-21 PROCEDURE — 3078F PR MOST RECENT DIASTOLIC BLOOD PRESSURE < 80 MM HG: ICD-10-PCS | Mod: HCNC,CPTII,S$GLB, | Performed by: FAMILY MEDICINE

## 2019-06-21 PROCEDURE — 99213 PR OFFICE/OUTPT VISIT, EST, LEVL III, 20-29 MIN: ICD-10-PCS | Mod: HCNC,S$GLB,, | Performed by: FAMILY MEDICINE

## 2019-06-21 PROCEDURE — 99999 PR PBB SHADOW E&M-EST. PATIENT-LVL IV: ICD-10-PCS | Mod: PBBFAC,HCNC,, | Performed by: FAMILY MEDICINE

## 2019-06-21 PROCEDURE — 99499 UNLISTED E&M SERVICE: CPT | Mod: S$GLB,,, | Performed by: FAMILY MEDICINE

## 2019-06-21 PROCEDURE — 1100F PTFALLS ASSESS-DOCD GE2>/YR: CPT | Mod: HCNC,CPTII,S$GLB, | Performed by: FAMILY MEDICINE

## 2019-06-21 PROCEDURE — 3288F PR FALLS RISK ASSESSMENT DOCUMENTED: ICD-10-PCS | Mod: HCNC,CPTII,S$GLB, | Performed by: FAMILY MEDICINE

## 2019-06-21 PROCEDURE — 99213 OFFICE O/P EST LOW 20 MIN: CPT | Mod: HCNC,S$GLB,, | Performed by: FAMILY MEDICINE

## 2019-06-21 PROCEDURE — 99499 RISK ADDL DX/OHS AUDIT: ICD-10-PCS | Mod: S$GLB,,, | Performed by: FAMILY MEDICINE

## 2019-06-21 PROCEDURE — 3078F DIAST BP <80 MM HG: CPT | Mod: HCNC,CPTII,S$GLB, | Performed by: FAMILY MEDICINE

## 2019-06-21 PROCEDURE — 3074F PR MOST RECENT SYSTOLIC BLOOD PRESSURE < 130 MM HG: ICD-10-PCS | Mod: HCNC,CPTII,S$GLB, | Performed by: FAMILY MEDICINE

## 2019-06-21 PROCEDURE — 3288F FALL RISK ASSESSMENT DOCD: CPT | Mod: HCNC,CPTII,S$GLB, | Performed by: FAMILY MEDICINE

## 2019-06-21 PROCEDURE — 1100F PR PT FALLS ASSESS DOC 2+ FALLS/FALL W/INJURY/YR: ICD-10-PCS | Mod: HCNC,CPTII,S$GLB, | Performed by: FAMILY MEDICINE

## 2019-06-21 PROCEDURE — 3074F SYST BP LT 130 MM HG: CPT | Mod: HCNC,CPTII,S$GLB, | Performed by: FAMILY MEDICINE

## 2019-06-21 NOTE — PROGRESS NOTES
Subjective:       Patient ID: Wesley Bernal is a 68 y.o. male.    Chief Complaint: discuss test results    Patient is here for follow-up for the biopsy of the left inguinal node.  Unfortunately the test result was positive for metastatic squamous cell carcinoma.  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 Toure    Review of Systems   Constitutional: Negative for activity change, chills, fatigue, fever and unexpected weight change.   HENT: Negative for sore throat and trouble swallowing.    Respiratory: Negative for cough, chest tightness and shortness of breath.    Cardiovascular: Negative for chest pain and leg swelling.   Gastrointestinal: Negative for abdominal pain.   Endocrine: Negative for cold intolerance and heat intolerance.   Genitourinary: Negative for difficulty urinating.   Musculoskeletal: Negative for back pain and joint swelling.   Skin: Positive for rash.   Neurological: Negative for numbness.   Hematological: Negative for adenopathy.   Psychiatric/Behavioral: Negative for decreased concentration.       Objective:      Vitals:    06/21/19 1104   BP: (!) 110/54   Pulse: (!) 54   Resp: 16     Physical Exam   Constitutional: He appears well-nourished.   Genitourinary:             Assessment:       1. Squamous cell cancer, anus    2. 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  This should be done by the Oncology Department in [place].  He may need further biopsies in the perianal area.  I will refer him back to Dr. Toure

## 2019-06-21 NOTE — TELEPHONE ENCOUNTER
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  I tried to book it as a NP and EP but I can't seem to get him in.  He has seen Dr Toure previously but its been more than 3 years .    Pt was seen by his [facility] family doctorLalo Sweet MD   And he was who recommended the appt.

## 2019-06-25 ENCOUNTER — PES CALL (OUTPATIENT)
Dept: ADMINISTRATIVE | Facility: CLINIC | Age: 69
End: 2019-06-25

## 2019-06-25 ENCOUNTER — TELEPHONE (OUTPATIENT)
Dept: FAMILY MEDICINE | Facility: CLINIC | Age: 69
End: 2019-06-25

## 2019-06-25 NOTE — TELEPHONE ENCOUNTER
Spoke with Tierra--she was calling about referral to oncology and appt. She stated that she spoke with them and they did not receive a referral. Spoke with Shawna/Dr May's nurse and she said she faxed all of the info to them on Friday 6/21/19. Tierra will call them again and let us know if there is anything further we need to do.

## 2019-06-25 NOTE — TELEPHONE ENCOUNTER
----- Message from Li Ludwig sent at 2019  3:43 PM CDT -----  Contact: Arin from [facility]  Wesley Bernal  MRN: [id number]  : 1950  PCP: Lalo Sweet  Home Phone      [phone]  Work Phone      Not on file.  Mobile          [phone]  Home Phone      [phone]      MESSAGE:   Arin called again. She received the referral, but they did not get office notes. Please resend.    Arin,  [phone].    Fax: [phone]

## 2019-06-25 NOTE — TELEPHONE ENCOUNTER
----- Message from Simone Valadez sent at 2019 10:04 AM CDT -----  Contact: ZOË Prietosa Indira Bernal  MRN: [id number]  : 1950  PCP: Lalo Sweet  Home Phone      [phone]  Work Phone      Not on file.  Mobile          [phone]  Home Phone      [phone]      MESSAGE: spoke with Oncology in [place] -- they need referral before treatment can be started -- requesting to speak with Pat    Call Tierra @ [phone]    PCP: Micki

## 2019-06-27 ENCOUNTER — OFFICE VISIT (OUTPATIENT)
Dept: SURGERY | Facility: CLINIC | Age: 69
End: 2019-06-27
Payer: MEDICARE

## 2019-06-27 VITALS
RESPIRATION RATE: 16 BRPM | HEART RATE: 50 BPM | SYSTOLIC BLOOD PRESSURE: 128 MMHG | HEIGHT: 73 IN | BODY MASS INDEX: 26.8 KG/M2 | DIASTOLIC BLOOD PRESSURE: 62 MMHG | WEIGHT: 202.19 LBS

## 2019-06-27 DIAGNOSIS — C21.0 ANAL CANCER: Primary | ICD-10-CM

## 2019-06-27 PROCEDURE — 99213 OFFICE O/P EST LOW 20 MIN: CPT | Mod: HCNC,S$GLB,, | Performed by: COLON & RECTAL SURGERY

## 2019-06-27 PROCEDURE — 99499 RISK ADDL DX/OHS AUDIT: ICD-10-PCS | Mod: S$GLB,,, | Performed by: COLON & RECTAL SURGERY

## 2019-06-27 PROCEDURE — 1101F PT FALLS ASSESS-DOCD LE1/YR: CPT | Mod: HCNC,CPTII,S$GLB, | Performed by: COLON & RECTAL SURGERY

## 2019-06-27 PROCEDURE — 99999 PR PBB SHADOW E&M-EST. PATIENT-LVL III: CPT | Mod: PBBFAC,HCNC,, | Performed by: COLON & RECTAL SURGERY

## 2019-06-27 PROCEDURE — 99999 PR PBB SHADOW E&M-EST. PATIENT-LVL III: ICD-10-PCS | Mod: PBBFAC,HCNC,, | Performed by: COLON & RECTAL SURGERY

## 2019-06-27 PROCEDURE — 3074F SYST BP LT 130 MM HG: CPT | Mod: HCNC,CPTII,S$GLB, | Performed by: COLON & RECTAL SURGERY

## 2019-06-27 PROCEDURE — 3074F PR MOST RECENT SYSTOLIC BLOOD PRESSURE < 130 MM HG: ICD-10-PCS | Mod: HCNC,CPTII,S$GLB, | Performed by: COLON & RECTAL SURGERY

## 2019-06-27 PROCEDURE — 3078F PR MOST RECENT DIASTOLIC BLOOD PRESSURE < 80 MM HG: ICD-10-PCS | Mod: HCNC,CPTII,S$GLB, | Performed by: COLON & RECTAL SURGERY

## 2019-06-27 PROCEDURE — 99213 PR OFFICE/OUTPT VISIT, EST, LEVL III, 20-29 MIN: ICD-10-PCS | Mod: HCNC,S$GLB,, | Performed by: COLON & RECTAL SURGERY

## 2019-06-27 PROCEDURE — 99499 UNLISTED E&M SERVICE: CPT | Mod: S$GLB,,, | Performed by: COLON & RECTAL SURGERY

## 2019-06-27 PROCEDURE — 1101F PR PT FALLS ASSESS DOC 0-1 FALLS W/OUT INJ PAST YR: ICD-10-PCS | Mod: HCNC,CPTII,S$GLB, | Performed by: COLON & RECTAL SURGERY

## 2019-06-27 PROCEDURE — 3078F DIAST BP <80 MM HG: CPT | Mod: HCNC,CPTII,S$GLB, | Performed by: COLON & RECTAL SURGERY

## 2019-06-27 NOTE — PROGRESS NOTES
Subjective:       Patient ID: Wesley Bernal is a 68 y.o. male.    Chief Complaint: Consult (last seen 2016)    HPI  67 yo M initially seen by me Aug 2015 for evaluation of what was thought to be hemorrhoids.  He has an extensive PHM including CAD (prior CABG & AVR), AFIb, CKD (stage III), DM, Hyperlipemia, extensive PVD (carotid stent, AAA stent, brachiocephalic vein stent), PPM/AICD placement.     He had a hemorrhoidectomy in his 20's, performed in an office setting.  Over a period of several months in mid-2015, he had worsening pain, especially after BM's and with sitting - had to sit propped to his right side,and he has noticed increased oozing/bleeding. He also has had an unintentional 20 lb weight loss in prior 6 months and felt very weak and tired.  He was treated presumptively for hemorrhoidal disease.     On my initial examination, he had a large left anal margin mass suspicious for cancer.     CT C/A/P 8/20/15 - no metastatic disease.     He was taken to OR for excision anal margin mass August 26, 2015.     Pathology:   FINAL PATHOLOGIC DIAGNOSIS  SKIN, PERIANAL, EXCISION:  Squamous cell carcinoma, well differentiated (T1)  Extensive high-grade dysplasia amounting to carcinoma-in-situ  Margins negative for invasive carcinoma  Margins positive for high-grade dysplasia (medial, lateral, superior and inferior)  (Note - Invasive tumor is 1.5cm x 0.5cm (single largest linear measurement on the slide); however almost the entire specimen of 4.5 x 2.3 cm shows high-grade dysplasia)     His case was discussed at [facility] and surveillance was recommended.     He had been seeing me fairly regularly for surveillance but was last seen by me in September of 2016 and was doing fairly well at that time.  Plan was to continue to return for regular surveillance examinations, however he was lost to follow-up and had not seen me since then.    Unfortunately he developed a enlarged lymph node in the left groin, which was  "biopsied by Dr. Yin earlier this month and found to be metastatic squamous cell carcinoma.    He presents today for further evaluation.  He has been referred to Oncology Center at [facility] but does not yet have an appt.  He c/o moderate anal discomfort, mainly with sitting, and continuous "weeping" of fluid from the skin around the anus.    Review of patient's allergies indicates:   Allergen Reactions    Atorvastatin Other (See Comments)     Pain in joints, Creatinine level increased    Statins-hmg-coa reductase inhibitors        Past Medical History:   Diagnosis Date    Anxiety     Atrial fibrillation     Diabetes mellitus type II     Heart disease     Hyperlipidemia     Hypertension        Past Surgical History:   Procedure Laterality Date    ABDOMINAL AORTA STENT      BIOPSY, LYMPH NODE (GROIN) Left 6/17/2019    [facility]    BIOPSY-MASS - biopsy of anal mass N/A 8/26/2015    Performed by [facility]o Toure MD at [facility] OR [address] [place] [address]    BRACHIOCEPHALIC VEIN ANGIOPLASTY / STENTING      CARDIAC DEFIBRILLATOR PLACEMENT      1/2011    CARDIAC PACEMAKER PLACEMENT      1-2011    CARDIAC SURGERY      open heart    CARDIAC VALVE SURGERY      pig valve replacement -    CAROTID STENT      CYSTOSCOPY WITH LEFT RETROGRADE PYELOGRAM AND CHECK RESIDUAL N/A 3/31/2015    [facility] by Oliverio Gomez [facility]    EXAM UNDER ANESTHESIA N/A 8/26/2015    Performed by [facility]o Toure MD at [facility] OR [address] [place] [address]       Current Outpatient Medications   Medication Sig Dispense Refill    ALPRAZolam (XANAX) 0.25 MG tablet Take 1 tablet (0.25 mg total) by mouth 2 (two) times daily as needed. 60 tablet 2    amiodarone (PACERONE) 200 MG Tab Take 0.5 tablets (100 mg total) by mouth once daily. 30 tablet 5    aspirin (ECOTRIN) 81 MG EC tablet Take by mouth once daily.      atenolol (TENORMIN) 25 MG tablet Take 1 tablet (25 mg total) by mouth once daily. 30 tablet 5    docusate sodium (COLACE) 100 MG " capsule Take 100 mg by mouth 2 (two) times daily as needed for Constipation.      evolocumab (REPATHA PUSHTRONEX) 420 mg/3.5 mL Injt Inject 420 mg (1 cartridge) into the skin every 30 days. 3.5 mL 5    ezetimibe (ZETIA) 10 mg tablet Take 1 tablet (10 mg total) by mouth once daily. 30 tablet 5    furosemide (LASIX) 20 MG tablet Take 1 tablet (20 mg total) by mouth 2 (two) times daily. 60 tablet 5    gabapentin (NEURONTIN) 100 MG capsule TAKE ONE CAPSULE BY MOUTH THREE TIMES DAILY 90 capsule 5    JANUVIA 100 mg Tab TAKE ONE TABLET BY MOUTH ONCE A DAY 30 tablet 5    omega-3 acid ethyl esters (LOVAZA) 1 gram capsule TAKE TWO CAPSULES BY MOUTH TWICE DAILY 120 capsule 5    pioglitazone (ACTOS) 15 MG tablet Take 1 tablet (15 mg total) by mouth once daily. 30 tablet 5    primidone (MYSOLINE) 50 MG Tab Take 1/2 tablet in the evening for 2 weeks, then 1/2 BID for 2 weeks, then 1/2 in the am and 1 in evening for 2 weeks, then one BID 60 tablet 5    HYDROcodone-acetaminophen (NORCO)  mg per tablet Take 1 tablet by mouth every 6 (six) hours as needed. 50 tablet 0     No current facility-administered medications for this visit.        Family History   Problem Relation Age of Onset    Diabetes Mother     Heart disease Father     Cancer Father         melanoma, prostate    Heart disease Paternal Grandfather        Social History     Socioeconomic History    Marital status:      Spouse name: Not on file    Number of children: Not on file    Years of education: Not on file    Highest education level: Not on file   Occupational History    Not on file   Social Needs    Financial resource strain: Not on file    Food insecurity:     Worry: Not on file     Inability: Not on file    Transportation needs:     Medical: Not on file     Non-medical: Not on file   Tobacco Use    Smoking status: Former Smoker     Packs/day: 1.00     Types: Cigarettes, Cigars     Last attempt to quit: 4/10/2015     Years since  quittin.2    Smokeless tobacco: Never Used   Substance and Sexual Activity    Alcohol use: No     Alcohol/week: 0.0 oz    Drug use: No    Sexual activity: Yes     Partners: Female   Lifestyle    Physical activity:     Days per week: Not on file     Minutes per session: Not on file    Stress: Not on file   Relationships    Social connections:     Talks on phone: Not on file     Gets together: Not on file     Attends Taoist service: Not on file     Active member of club or organization: Not on file     Attends meetings of clubs or organizations: Not on file     Relationship status: Not on file   Other Topics Concern    Not on file   Social History Narrative    Not on file       Review of Systems   Constitutional: Negative for chills and fever.   HENT: Negative for congestion and sinus pressure.    Eyes: Negative for visual disturbance.   Respiratory: Negative for cough and shortness of breath.    Cardiovascular: Negative for chest pain and palpitations.   Gastrointestinal: Positive for rectal pain. Negative for abdominal distention, abdominal pain, anal bleeding, blood in stool, constipation, diarrhea, nausea and vomiting.   Endocrine: Negative for cold intolerance and heat intolerance.   Genitourinary: Negative for dysuria and frequency.   Musculoskeletal: Negative for arthralgias and back pain.   Skin: Negative for rash.   Allergic/Immunologic: Negative for immunocompromised state.   Neurological: Negative for dizziness, light-headedness and headaches.   Psychiatric/Behavioral: Negative for confusion. The patient is nervous/anxious.        Objective:      Physical Exam   Constitutional: He is oriented to person, place, and time. He appears well-developed and well-nourished.   HENT:   Head: Normocephalic and atraumatic.   Eyes: Conjunctivae are normal.   Pulmonary/Chest: Effort normal. No respiratory distress.   Abdominal: Soft. He exhibits no distension and no mass. There is no tenderness. There is  no rebound and no guarding.   Genitourinary:   Genitourinary Comments: Perineum - circumferentially excoriated anal margin skin with contiguous involvement - no obvious mass lesion.  ROSALINO/Anoscopy - not tolerated   Neurological: He is alert and oriented to person, place, and time.   Skin: Skin is warm and dry. No erythema.         Lab Results   Component Value Date    WBC 9.75 06/10/2019    HGB 12.2 (L) 06/10/2019    HCT 37.9 (L) 06/10/2019    MCV 98 06/10/2019     06/10/2019     BMP  Lab Results   Component Value Date     06/10/2019    K 3.9 06/10/2019     06/10/2019    CO2 22 (L) 06/10/2019    BUN 16 06/10/2019    CREATININE 1.1 06/10/2019    CALCIUM 9.6 06/10/2019    ANIONGAP 12 06/10/2019    ESTGFRAFRICA >60 06/10/2019    EGFRNONAA >60 06/10/2019     CMP  Sodium   Date Value Ref Range Status   06/10/2019 136 136 - 145 mmol/L Final     Potassium   Date Value Ref Range Status   06/10/2019 3.9 3.5 - 5.1 mmol/L Final     Chloride   Date Value Ref Range Status   06/10/2019 102 95 - 110 mmol/L Final     CO2   Date Value Ref Range Status   06/10/2019 22 (L) 23 - 29 mmol/L Final     Glucose   Date Value Ref Range Status   06/10/2019 274 (H) 70 - 110 mg/dL Final     BUN, Bld   Date Value Ref Range Status   06/10/2019 16 8 - 23 mg/dL Final     Creatinine   Date Value Ref Range Status   06/10/2019 1.1 0.5 - 1.4 mg/dL Final     Calcium   Date Value Ref Range Status   06/10/2019 9.6 8.7 - 10.5 mg/dL Final     Total Protein   Date Value Ref Range Status   06/10/2019 7.8 6.0 - 8.4 g/dL Final     Albumin   Date Value Ref Range Status   06/10/2019 4.0 3.5 - 5.2 g/dL Final     Total Bilirubin   Date Value Ref Range Status   06/10/2019 0.4 0.1 - 1.0 mg/dL Final     Comment:     For infants and newborns, interpretation of results should be based  on gestational age, weight and in agreement with clinical  observations.  Premature Infant recommended reference ranges:  Up to 24 hours.............<8.0 mg/dL  Up to 48  hours............<12.0 mg/dL  3-5 days..................<15.0 mg/dL  6-29 days.................<15.0 mg/dL       Alkaline Phosphatase   Date Value Ref Range Status   06/10/2019 79 55 - 135 U/L Final     AST   Date Value Ref Range Status   06/10/2019 19 10 - 40 U/L Final     ALT   Date Value Ref Range Status   06/10/2019 28 10 - 44 U/L Final     Anion Gap   Date Value Ref Range Status   06/10/2019 12 8 - 16 mmol/L Final     eGFR if    Date Value Ref Range Status   06/10/2019 >60 >60 mL/min/1.73 m^2 Final     eGFR if non    Date Value Ref Range Status   06/10/2019 >60 >60 mL/min/1.73 m^2 Final     Comment:     Calculation used to obtain the estimated glomerular filtration  rate (eGFR) is the CKD-EPI equation.        No results found for: CEA        Assessment:       1. Anal cancer        Plan:   He needs to schedule an appointment with the Oncology Center at [facility] to discuss combined modality chemoradiotherapy for his squamous cell anal cancer that has metastasized to inguinal lymph nodes.  He will also need complete staging including a CT of the chest, abdomen, and pelvis or a CT/PET scan.    I asked him to call and make an appointment to be seen by me approximately 4-6 weeks following completion of radiation therapy at which point I will likely schedule him for examination under anesthesia so that I can do a thorough exam and determine his response to therapy.    Adelfo Toure MD, FACS, FASCRS  Senior Staff Surgeon  Department of Colon & Rectal Surgery     This note was created using voice recognition software, and may contain some unrecognized transcriptional errors.

## 2019-06-27 NOTE — LETTER
July 3, 2019      Lalo Sweet MD  [address]  [facility] [address]           [facility]  [address] [facility] [address]  Phone: [phone]          Patient: Wesley Bernal   MR Number: [id number]   YOB: 1950   Date of Visit: 6/27/2019       Dear Dr. Lalo Sweet:    Thank you for referring Wesley Bernal to me for evaluation. Attached you will find relevant portions of my assessment and plan of care.    If you have questions, please do not hesitate to call me. I look forward to following Wesley Bernal along with you.    Sincerely,    Adelfo Toure MD    Enclosure  CC:  Carmel Arnett MD    If you would like to receive this communication electronically, please contact [email or web address] or [phone] to request more information on [employer] Link access.    For providers and/or their staff who would like to refer a patient to [facility], please contact us through our one-stop-shop provider referral line, [facility], at [phone].    If you feel you have received this communication in error or would no longer like to receive these types of communications, please e-mail [email or web address]

## 2019-07-01 DIAGNOSIS — M51.16 LUMBAR DISC DISEASE WITH RADICULOPATHY: ICD-10-CM

## 2019-07-01 NOTE — TELEPHONE ENCOUNTER
----- Message from Palma Cole sent at 7/1/2019 12:59 PM CDT -----    MESSAGE:   Pharmacy requesting a refill.   Is this a refill or new RX:  refill  RX name and strength: HYDROcodone-acetaminophen (NORCO)  mg per tablet  Last fill date:05/29/19  Last office visit: 06/21/19  Is this a 30-day or 90-day RX:  60  [facility]  Comments:

## 2019-07-02 RX ORDER — HYDROCODONE BITARTRATE AND ACETAMINOPHEN 10; 325 MG/1; MG/1
1 TABLET ORAL EVERY 6 HOURS PRN
Qty: 50 TABLET | Refills: 0 | Status: SHIPPED | OUTPATIENT
Start: 2019-07-02 | End: 2019-07-31 | Stop reason: SDUPTHER

## 2019-07-15 ENCOUNTER — TELEPHONE (OUTPATIENT)
Dept: SURGERY | Facility: CLINIC | Age: 69
End: 2019-07-15

## 2019-07-15 ENCOUNTER — TELEPHONE (OUTPATIENT)
Dept: FAMILY MEDICINE | Facility: CLINIC | Age: 69
End: 2019-07-15

## 2019-07-15 NOTE — TELEPHONE ENCOUNTER
----- Message from Li Ludwig sent at 7/15/2019 11:33 AM CDT -----  Contact: Tierra JOSE CARLOS  Wesley Bernal  MRN: [id number]  : 1950  PCP: Lalo Sweet  Home Phone      [phone]  Work Phone      Not on file.  Mobile          [phone]  Home Phone      [phone]      MESSAGE:   Patient is wondering if Dr connolly does colonoscopy still. Please advise.    Tierra [phone]

## 2019-07-15 NOTE — TELEPHONE ENCOUNTER
----- Message from Evelia Beltre sent at 7/15/2019  1:04 PM CDT -----  Contact: patient wife  Please call above patient wife [phone] need to speak with the nurse about scheduling a procedure waiting on a call back thanks

## 2019-07-16 DIAGNOSIS — C21.0 ANAL CANCER: Primary | ICD-10-CM

## 2019-07-22 ENCOUNTER — TELEPHONE (OUTPATIENT)
Dept: PREADMISSION TESTING | Facility: HOSPITAL | Age: 69
End: 2019-07-22

## 2019-07-22 NOTE — TELEPHONE ENCOUNTER
We are not able to do colonoscopy at [facility] due to heart history. Pt would like to know who dr connolly recommends at [place]. I will get him set up there.   Thanks -MR

## 2019-07-23 ENCOUNTER — TELEPHONE (OUTPATIENT)
Dept: PHARMACY | Facility: CLINIC | Age: 69
End: 2019-07-23

## 2019-07-31 DIAGNOSIS — M51.16 LUMBAR DISC DISEASE WITH RADICULOPATHY: ICD-10-CM

## 2019-07-31 RX ORDER — HYDROCODONE BITARTRATE AND ACETAMINOPHEN 10; 325 MG/1; MG/1
1 TABLET ORAL EVERY 6 HOURS PRN
Qty: 50 TABLET | Refills: 0 | Status: SHIPPED | OUTPATIENT
Start: 2019-07-31 | End: 2019-08-28 | Stop reason: SDUPTHER

## 2019-07-31 NOTE — TELEPHONE ENCOUNTER
----- Message from Li Ludwig sent at 7/31/2019 10:32 AM CDT -----  Contact: Pharmacy Fax Request  Pharmacy requesting prescription for patient.    Is this a refill or new RX:  refill  RX name and strength: norco  Last office visit: 6/21/19  Last fill date:7/2/19  Is this a 30-day or 90-day RX:  30  [facility]

## 2019-08-28 DIAGNOSIS — M51.16 LUMBAR DISC DISEASE WITH RADICULOPATHY: ICD-10-CM

## 2019-08-28 RX ORDER — HYDROCODONE BITARTRATE AND ACETAMINOPHEN 10; 325 MG/1; MG/1
1 TABLET ORAL EVERY 6 HOURS PRN
Qty: 50 TABLET | Refills: 0 | Status: SHIPPED | OUTPATIENT
Start: 2019-08-28 | End: 2019-09-25 | Stop reason: SDUPTHER

## 2019-08-28 NOTE — TELEPHONE ENCOUNTER
----- Message from Carla Smith sent at 2019  2:55 PM CDT -----  Contact: fax- [facility]  Wesley Bernal  MRN: [id number]  : 1950  PCP: Lalo Sweet  Home Phone      [phone]  Work Phone      Not on file.  Mobile          [phone]  Home Phone      [phone]    MESSAGE:   Pharmacy is requesting a refill or new prescription by fax..  Is this a refill or new RX:  refill  RX name and strength: HYDROcodone-acetaminophen (NORCO)  mg per tablet  Last office visit: 2019  Last fill date: 2019  Is this a 30-day or 90-day RX:  Qty 50  [facility]

## 2019-09-25 ENCOUNTER — TELEPHONE (OUTPATIENT)
Dept: PHARMACY | Facility: CLINIC | Age: 69
End: 2019-09-25

## 2019-09-25 DIAGNOSIS — M51.16 LUMBAR DISC DISEASE WITH RADICULOPATHY: ICD-10-CM

## 2019-09-25 RX ORDER — HYDROCODONE BITARTRATE AND ACETAMINOPHEN 10; 325 MG/1; MG/1
1 TABLET ORAL EVERY 6 HOURS PRN
Qty: 50 TABLET | Refills: 0 | Status: SHIPPED | OUTPATIENT
Start: 2019-09-25 | End: 2019-11-01 | Stop reason: SDUPTHER

## 2019-09-25 NOTE — TELEPHONE ENCOUNTER
RX name and strength: HYDROcodone-acetaminophen (NORCO)  mg per tablet  Last office visit: 6/21/19  Last fill date: 08/28/19  Is this a 30-day or 90-day RX:  Qty 50  [facility]

## 2019-09-25 NOTE — TELEPHONE ENCOUNTER
----- Message from Carla Smith sent at 2019  2:09 PM CDT -----  Contact: fax - [facility]  Wesley Bernal  MRN: [id number]  : 1950  PCP: Lalo Sweet  Home Phone      [phone]  Work Phone      Not on file.  Mobile          [phone]  Home Phone      [phone]      MESSAGE:   Pharmacy is requesting a refill or new prescription by fax..  Is this a refill or new RX:  refill  RX name and strength: HYDROcodone-acetaminophen (NORCO)  mg per tablet  Last office visit: 19  Last fill date: 19  Is this a 30-day or 90-day RX:  Qty 50  [facility]

## 2019-09-26 DIAGNOSIS — E11.9 TYPE 2 DIABETES MELLITUS WITHOUT COMPLICATION: ICD-10-CM

## 2019-10-24 ENCOUNTER — TELEPHONE (OUTPATIENT)
Dept: PHARMACY | Facility: CLINIC | Age: 69
End: 2019-10-24

## 2019-11-01 DIAGNOSIS — M51.16 LUMBAR DISC DISEASE WITH RADICULOPATHY: ICD-10-CM

## 2019-11-01 RX ORDER — HYDROCODONE BITARTRATE AND ACETAMINOPHEN 10; 325 MG/1; MG/1
1 TABLET ORAL EVERY 6 HOURS PRN
Qty: 50 TABLET | Refills: 0 | Status: SHIPPED | OUTPATIENT
Start: 2019-11-01 | End: 2019-12-03 | Stop reason: SDUPTHER

## 2019-11-01 NOTE — TELEPHONE ENCOUNTER
----- Message from Cindy Cuadra sent at 2019  1:02 PM CDT -----  Contact: Self  Wesley Bernal  MRN: [id number]  : 1950  PCP: Lalo Sweet  Home Phone      [phone]  Work Phone      Not on file.  Mobile          [phone]  Home Phone      [phone]      MESSAGE:   Pt requesting refill or new Rx.   Is this a refill or new RX:  Refill  RX name and strength: HYDROcodone-acetaminophen (NORCO)  mg per tablet and ALPRAZolam (XANAX) 0.25 MG tablet  Last office visit: 19  Is this a 30-day or 90-day RX:  30 and 30  Ph[facility] and location:  [facility]   Comments:  Pt out of both medications    Phone:  [phone]

## 2019-11-05 ENCOUNTER — IMMUNIZATION (OUTPATIENT)
Dept: FAMILY MEDICINE | Facility: CLINIC | Age: 69
End: 2019-11-05
Payer: MEDICARE

## 2019-11-05 DIAGNOSIS — F41.1 GENERALIZED ANXIETY DISORDER: ICD-10-CM

## 2019-11-05 PROCEDURE — G0008 ADMIN INFLUENZA VIRUS VAC: HCPCS | Mod: HCNC,S$GLB,, | Performed by: FAMILY MEDICINE

## 2019-11-05 PROCEDURE — G0008 FLU VACCINE - HIGH DOSE (65+) PRESERVATIVE FREE IM: ICD-10-PCS | Mod: HCNC,S$GLB,, | Performed by: FAMILY MEDICINE

## 2019-11-05 PROCEDURE — 90662 IIV NO PRSV INCREASED AG IM: CPT | Mod: HCNC,S$GLB,, | Performed by: FAMILY MEDICINE

## 2019-11-05 PROCEDURE — 90662 FLU VACCINE - HIGH DOSE (65+) PRESERVATIVE FREE IM: ICD-10-PCS | Mod: HCNC,S$GLB,, | Performed by: FAMILY MEDICINE

## 2019-11-06 RX ORDER — ALPRAZOLAM 0.25 MG/1
0.25 TABLET ORAL 2 TIMES DAILY PRN
Qty: 60 TABLET | Refills: 2 | Status: SHIPPED | OUTPATIENT
Start: 2019-11-06 | End: 2020-01-27

## 2019-11-15 DIAGNOSIS — E11.65 TYPE 2 DIABETES MELLITUS WITH HYPERGLYCEMIA, WITHOUT LONG-TERM CURRENT USE OF INSULIN: ICD-10-CM

## 2019-11-15 DIAGNOSIS — E78.5 HYPERLIPIDEMIA, UNSPECIFIED HYPERLIPIDEMIA TYPE: ICD-10-CM

## 2019-11-15 DIAGNOSIS — M51.16 LUMBAR DISC DISEASE WITH RADICULOPATHY: ICD-10-CM

## 2019-11-15 RX ORDER — SITAGLIPTIN 100 MG/1
TABLET, FILM COATED ORAL
Qty: 30 TABLET | Refills: 5 | Status: SHIPPED | OUTPATIENT
Start: 2019-11-15 | End: 2020-06-16 | Stop reason: SDUPTHER

## 2019-11-15 RX ORDER — GABAPENTIN 100 MG/1
CAPSULE ORAL
Qty: 90 CAPSULE | Refills: 5 | Status: SHIPPED | OUTPATIENT
Start: 2019-11-15 | End: 2020-04-30 | Stop reason: SDUPTHER

## 2019-11-15 RX ORDER — OMEGA-3-ACID ETHYL ESTERS 1 G/1
CAPSULE, LIQUID FILLED ORAL
Qty: 120 CAPSULE | Refills: 5 | Status: SHIPPED | OUTPATIENT
Start: 2019-11-15 | End: 2020-06-17 | Stop reason: SDUPTHER

## 2019-11-25 ENCOUNTER — TELEPHONE (OUTPATIENT)
Dept: PHARMACY | Facility: CLINIC | Age: 69
End: 2019-11-25

## 2019-11-25 DIAGNOSIS — I25.10 ASCVD (ARTERIOSCLEROTIC CARDIOVASCULAR DISEASE): ICD-10-CM

## 2019-11-25 DIAGNOSIS — E78.5 HYPERLIPIDEMIA, UNSPECIFIED HYPERLIPIDEMIA TYPE: ICD-10-CM

## 2019-11-25 DIAGNOSIS — Z78.9 STATIN INTOLERANCE: ICD-10-CM

## 2019-11-26 DIAGNOSIS — E78.5 HYPERLIPIDEMIA, UNSPECIFIED HYPERLIPIDEMIA TYPE: ICD-10-CM

## 2019-11-26 DIAGNOSIS — Z78.9 STATIN INTOLERANCE: ICD-10-CM

## 2019-11-26 DIAGNOSIS — I25.10 ASCVD (ARTERIOSCLEROTIC CARDIOVASCULAR DISEASE): ICD-10-CM

## 2019-12-03 DIAGNOSIS — M51.16 LUMBAR DISC DISEASE WITH RADICULOPATHY: ICD-10-CM

## 2019-12-03 RX ORDER — HYDROCODONE BITARTRATE AND ACETAMINOPHEN 10; 325 MG/1; MG/1
1 TABLET ORAL EVERY 6 HOURS PRN
Qty: 50 TABLET | Refills: 0 | Status: SHIPPED | OUTPATIENT
Start: 2019-12-03 | End: 2020-02-10 | Stop reason: SDUPTHER

## 2019-12-03 NOTE — TELEPHONE ENCOUNTER
RX name and strength: HYDROcodone-acetaminophen (NORCO)  mg per tablet  Last office visit: 7/15  Is this a 30-day or 90-day RX:  30-day  Pharm[facility] name and location:  [facility]  Comments:      Norco last printed on 11/1/2019

## 2019-12-03 NOTE — TELEPHONE ENCOUNTER
----- Message from Jessica aMchado sent at 12/3/2019  8:32 AM CST -----  Contact: Paradise CAMP  Wesley Bernal  MRN: [id number]  : 1950  PCP: Lalo Sweet  Home Phone      [phone]  Work Phone      Not on file.  Mobile          [phone]  Home Phone      [phone]      MESSAGE:   Pt requesting refill or new Rx.   Is this a refill or new RX:  refill  RX name and strength: HYDROcodone-acetaminophen (NORCO)  mg per tablet  Last office visit: 7/15  Is this a 30-day or 90-day RX:  30-day  Pharm[facility] name and location:  [facility]  Comments:      Phone:  [phone]

## 2019-12-14 DIAGNOSIS — E78.5 HYPERLIPIDEMIA, UNSPECIFIED HYPERLIPIDEMIA TYPE: ICD-10-CM

## 2019-12-16 ENCOUNTER — PATIENT OUTREACH (OUTPATIENT)
Dept: ADMINISTRATIVE | Facility: OTHER | Age: 69
End: 2019-12-16

## 2019-12-16 DIAGNOSIS — E11.9 TYPE 2 DIABETES MELLITUS WITHOUT COMPLICATION, WITHOUT LONG-TERM CURRENT USE OF INSULIN: ICD-10-CM

## 2019-12-16 DIAGNOSIS — E11.65 TYPE 2 DIABETES MELLITUS WITH HYPERGLYCEMIA, WITHOUT LONG-TERM CURRENT USE OF INSULIN: Primary | ICD-10-CM

## 2019-12-16 RX ORDER — EZETIMIBE 10 MG/1
TABLET ORAL
Qty: 30 TABLET | Refills: 5 | Status: SHIPPED | OUTPATIENT
Start: 2019-12-16 | End: 2020-05-11

## 2019-12-16 RX ORDER — PRIMIDONE 50 MG/1
50 TABLET ORAL 2 TIMES DAILY
Qty: 60 TABLET | Refills: 0 | Status: SHIPPED | OUTPATIENT
Start: 2019-12-16 | End: 2019-12-17 | Stop reason: SDUPTHER

## 2019-12-17 ENCOUNTER — OFFICE VISIT (OUTPATIENT)
Dept: NEUROLOGY | Facility: CLINIC | Age: 69
End: 2019-12-17
Payer: MEDICARE

## 2019-12-17 VITALS
HEIGHT: 73 IN | WEIGHT: 202.38 LBS | SYSTOLIC BLOOD PRESSURE: 120 MMHG | BODY MASS INDEX: 26.82 KG/M2 | DIASTOLIC BLOOD PRESSURE: 64 MMHG | RESPIRATION RATE: 16 BRPM | HEART RATE: 50 BPM | OXYGEN SATURATION: 98 %

## 2019-12-17 DIAGNOSIS — I48.0 PAROXYSMAL ATRIAL FIBRILLATION: ICD-10-CM

## 2019-12-17 DIAGNOSIS — G25.0 BENIGN ESSENTIAL TREMOR: Primary | ICD-10-CM

## 2019-12-17 DIAGNOSIS — M51.16 LUMBAR DISC DISEASE WITH RADICULOPATHY: ICD-10-CM

## 2019-12-17 PROCEDURE — 3078F DIAST BP <80 MM HG: CPT | Mod: HCNC,CPTII,S$GLB, | Performed by: PSYCHIATRY & NEUROLOGY

## 2019-12-17 PROCEDURE — 1101F PT FALLS ASSESS-DOCD LE1/YR: CPT | Mod: HCNC,CPTII,S$GLB, | Performed by: PSYCHIATRY & NEUROLOGY

## 2019-12-17 PROCEDURE — 99214 OFFICE O/P EST MOD 30 MIN: CPT | Mod: HCNC,S$GLB,, | Performed by: PSYCHIATRY & NEUROLOGY

## 2019-12-17 PROCEDURE — 1101F PR PT FALLS ASSESS DOC 0-1 FALLS W/OUT INJ PAST YR: ICD-10-PCS | Mod: HCNC,CPTII,S$GLB, | Performed by: PSYCHIATRY & NEUROLOGY

## 2019-12-17 PROCEDURE — 99214 PR OFFICE/OUTPT VISIT, EST, LEVL IV, 30-39 MIN: ICD-10-PCS | Mod: HCNC,S$GLB,, | Performed by: PSYCHIATRY & NEUROLOGY

## 2019-12-17 PROCEDURE — 3074F PR MOST RECENT SYSTOLIC BLOOD PRESSURE < 130 MM HG: ICD-10-PCS | Mod: HCNC,CPTII,S$GLB, | Performed by: PSYCHIATRY & NEUROLOGY

## 2019-12-17 PROCEDURE — 1159F PR MEDICATION LIST DOCUMENTED IN MEDICAL RECORD: ICD-10-PCS | Mod: HCNC,S$GLB,, | Performed by: PSYCHIATRY & NEUROLOGY

## 2019-12-17 PROCEDURE — 3078F PR MOST RECENT DIASTOLIC BLOOD PRESSURE < 80 MM HG: ICD-10-PCS | Mod: HCNC,CPTII,S$GLB, | Performed by: PSYCHIATRY & NEUROLOGY

## 2019-12-17 PROCEDURE — 99999 PR PBB SHADOW E&M-EST. PATIENT-LVL III: CPT | Mod: PBBFAC,HCNC,, | Performed by: PSYCHIATRY & NEUROLOGY

## 2019-12-17 PROCEDURE — 1159F MED LIST DOCD IN RCRD: CPT | Mod: HCNC,S$GLB,, | Performed by: PSYCHIATRY & NEUROLOGY

## 2019-12-17 PROCEDURE — 3074F SYST BP LT 130 MM HG: CPT | Mod: HCNC,CPTII,S$GLB, | Performed by: PSYCHIATRY & NEUROLOGY

## 2019-12-17 PROCEDURE — 99999 PR PBB SHADOW E&M-EST. PATIENT-LVL III: ICD-10-PCS | Mod: PBBFAC,HCNC,, | Performed by: PSYCHIATRY & NEUROLOGY

## 2019-12-17 RX ORDER — ONDANSETRON 8 MG/1
TABLET, ORALLY DISINTEGRATING ORAL
Refills: 4 | COMMUNITY
Start: 2019-11-19 | End: 2020-07-17

## 2019-12-17 RX ORDER — DIPHENOXYLATE HYDROCHLORIDE AND ATROPINE SULFATE 2.5; .025 MG/1; MG/1
TABLET ORAL
Refills: 0 | COMMUNITY
Start: 2019-10-09 | End: 2020-07-17

## 2019-12-17 RX ORDER — PRIMIDONE 50 MG/1
50 TABLET ORAL 2 TIMES DAILY
Qty: 60 TABLET | Refills: 11 | Status: SHIPPED | OUTPATIENT
Start: 2019-12-17 | End: 2020-10-29

## 2019-12-17 RX ORDER — ISOSORBIDE MONONITRATE 60 MG/1
60 TABLET, EXTENDED RELEASE ORAL DAILY
COMMUNITY
Start: 2019-12-16

## 2019-12-17 RX ORDER — SILVER SULFADIAZINE 10 G/1000G
CREAM TOPICAL
Refills: 0 | COMMUNITY
Start: 2019-09-25 | End: 2020-07-17

## 2019-12-17 NOTE — PROGRESS NOTES
HPI: Wesley Bernal is a 69 y.o. male with tremor      The patient is currently undergoing treatment for metastatic anal cancer- just finished chemo and radiation.      He started primidone for tremor since the last visit and this is very helpful- tremor is improved and he can play his guitar better. 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     Lumbar disc disease stable    Not on blood thinner for afib    He does not smoke      Review of Systems   Constitutional: Negative for fever.   HENT: Negative for nosebleeds.    Eyes: Negative for double vision.   Respiratory: Negative for hemoptysis.    Cardiovascular: Negative for leg swelling.   Gastrointestinal: Positive for blood in stool.        Patient has scar from prior CA and has some bleeding from this rarely   Genitourinary: Negative for hematuria.   Musculoskeletal: Positive for back pain.   Skin: Negative for rash.   Neurological: Positive for tremors.   Psychiatric/Behavioral: The patient does not have insomnia.          I have reviewed all of this patient's past medical and surgical histories as well as family and social histories and active allergies and medications as documented in the electronic medical record.        Exam:  Gen Appearance, well developed/nourished in no apparent distress  CV: 2+ distal pulses with no edema or swelling  Neuro:  MS: Awake, alert, oriented to place, person, time, situation. Sustains attention. Recent/remote memory intact, Language is full to spontaneous speech/repetition/naming/comprehension. Fund of Knowledge is full  CN: Optic discs are flat with normal vasculature, PERRL, Extraoccular movements and visual fields are full. Normal facial sensation and strength, Hearing symmetric, Tongue and Palate are midline and strong. Shoulder Shrug symmetric and strong.  Motor: Normal bulk, tone, no abnormal movements at rest and tremor is noted mildly (improved from moderately) with outstretched hands. 5/5  strength bilateral upper/lower extremities with 1+ reflexes and bilateral plantar response  Sensory: symmetric to light touch, pain, temp, and vibration, Romberg negative  Cerebellar: Finger-nose,Heal-shin, Rapid alternating movements intact  Gait: Normal stance, no ataxia- but arthralgic gait for which he uses a cane      Labs: 2019 CMP, CBC reviewed. TSH normal 12/2018  Imaging: CT L spine 2015: Moderate spinal stenosis.     Assessment/Plan: Wesley Bernal is a 69 y.o. male with benign essential tremor  I recommend:   1. No neuroimaging is needed as symptoms are long standing and consistent with benign essential tremor as I noted in 2015 and he is improved.   -Note his family history of tremor in his dad  -Desires treatment: Primidone helps well. Continue  -Note his use of atenolol for rate with his afib, which may also help his tremor  2. Note: He takes gabapentin for pain from DM and known lumbar spinal stenosis (moderate by 2015 MRI L spine). Note I saw him for this in 2015, but he failed to comply with follow up and EMG at that time. He has relief with gabapentin and does not desire spine surgery consultation  3. He is being treated for metastatic anal cancer currently    RTC 1 year

## 2019-12-26 ENCOUNTER — TELEPHONE (OUTPATIENT)
Dept: PHARMACY | Facility: CLINIC | Age: 69
End: 2019-12-26

## 2019-12-30 ENCOUNTER — OFFICE VISIT (OUTPATIENT)
Dept: FAMILY MEDICINE | Facility: CLINIC | Age: 69
End: 2019-12-30
Payer: MEDICARE

## 2019-12-30 VITALS
DIASTOLIC BLOOD PRESSURE: 62 MMHG | SYSTOLIC BLOOD PRESSURE: 112 MMHG | RESPIRATION RATE: 16 BRPM | HEIGHT: 73 IN | HEART RATE: 60 BPM | WEIGHT: 202.38 LBS | BODY MASS INDEX: 26.82 KG/M2

## 2019-12-30 DIAGNOSIS — I70.0 AORTIC ATHEROSCLEROSIS: ICD-10-CM

## 2019-12-30 DIAGNOSIS — C21.0 SQUAMOUS CELL CANCER, ANUS: ICD-10-CM

## 2019-12-30 DIAGNOSIS — C77.5 METASTATIC CANCER TO INTRAPELVIC LYMPH NODES: ICD-10-CM

## 2019-12-30 DIAGNOSIS — N18.30 CHRONIC RENAL IMPAIRMENT, STAGE 3 (MODERATE): ICD-10-CM

## 2019-12-30 DIAGNOSIS — I10 ESSENTIAL HYPERTENSION: ICD-10-CM

## 2019-12-30 DIAGNOSIS — E78.5 HYPERLIPIDEMIA, UNSPECIFIED HYPERLIPIDEMIA TYPE: ICD-10-CM

## 2019-12-30 DIAGNOSIS — E11.65 TYPE 2 DIABETES MELLITUS WITH HYPERGLYCEMIA, WITHOUT LONG-TERM CURRENT USE OF INSULIN: Primary | ICD-10-CM

## 2019-12-30 PROCEDURE — 1101F PT FALLS ASSESS-DOCD LE1/YR: CPT | Mod: HCNC,CPTII,S$GLB, | Performed by: FAMILY MEDICINE

## 2019-12-30 PROCEDURE — 3078F PR MOST RECENT DIASTOLIC BLOOD PRESSURE < 80 MM HG: ICD-10-PCS | Mod: HCNC,CPTII,S$GLB, | Performed by: FAMILY MEDICINE

## 2019-12-30 PROCEDURE — 1126F PR PAIN SEVERITY QUANTIFIED, NO PAIN PRESENT: ICD-10-PCS | Mod: HCNC,S$GLB,, | Performed by: FAMILY MEDICINE

## 2019-12-30 PROCEDURE — 3074F SYST BP LT 130 MM HG: CPT | Mod: HCNC,CPTII,S$GLB, | Performed by: FAMILY MEDICINE

## 2019-12-30 PROCEDURE — 3044F HG A1C LEVEL LT 7.0%: CPT | Mod: HCNC,CPTII,S$GLB, | Performed by: FAMILY MEDICINE

## 2019-12-30 PROCEDURE — 99999 PR PBB SHADOW E&M-EST. PATIENT-LVL III: ICD-10-PCS | Mod: PBBFAC,HCNC,, | Performed by: FAMILY MEDICINE

## 2019-12-30 PROCEDURE — 1126F AMNT PAIN NOTED NONE PRSNT: CPT | Mod: HCNC,S$GLB,, | Performed by: FAMILY MEDICINE

## 2019-12-30 PROCEDURE — 1159F MED LIST DOCD IN RCRD: CPT | Mod: HCNC,S$GLB,, | Performed by: FAMILY MEDICINE

## 2019-12-30 PROCEDURE — 99499 RISK ADDL DX/OHS AUDIT: ICD-10-PCS | Mod: S$GLB,,, | Performed by: FAMILY MEDICINE

## 2019-12-30 PROCEDURE — 3044F PR MOST RECENT HEMOGLOBIN A1C LEVEL <7.0%: ICD-10-PCS | Mod: HCNC,CPTII,S$GLB, | Performed by: FAMILY MEDICINE

## 2019-12-30 PROCEDURE — 99499 UNLISTED E&M SERVICE: CPT | Mod: S$GLB,,, | Performed by: FAMILY MEDICINE

## 2019-12-30 PROCEDURE — 1101F PR PT FALLS ASSESS DOC 0-1 FALLS W/OUT INJ PAST YR: ICD-10-PCS | Mod: HCNC,CPTII,S$GLB, | Performed by: FAMILY MEDICINE

## 2019-12-30 PROCEDURE — 36415 PR COLLECTION VENOUS BLOOD,VENIPUNCTURE: ICD-10-PCS | Mod: HCNC,S$GLB,, | Performed by: FAMILY MEDICINE

## 2019-12-30 PROCEDURE — 99214 OFFICE O/P EST MOD 30 MIN: CPT | Mod: HCNC,S$GLB,, | Performed by: FAMILY MEDICINE

## 2019-12-30 PROCEDURE — 3078F DIAST BP <80 MM HG: CPT | Mod: HCNC,CPTII,S$GLB, | Performed by: FAMILY MEDICINE

## 2019-12-30 PROCEDURE — 99214 PR OFFICE/OUTPT VISIT, EST, LEVL IV, 30-39 MIN: ICD-10-PCS | Mod: HCNC,S$GLB,, | Performed by: FAMILY MEDICINE

## 2019-12-30 PROCEDURE — 36415 COLL VENOUS BLD VENIPUNCTURE: CPT | Mod: HCNC,S$GLB,, | Performed by: FAMILY MEDICINE

## 2019-12-30 PROCEDURE — 1159F PR MEDICATION LIST DOCUMENTED IN MEDICAL RECORD: ICD-10-PCS | Mod: HCNC,S$GLB,, | Performed by: FAMILY MEDICINE

## 2019-12-30 PROCEDURE — 99999 PR PBB SHADOW E&M-EST. PATIENT-LVL III: CPT | Mod: PBBFAC,HCNC,, | Performed by: FAMILY MEDICINE

## 2019-12-30 PROCEDURE — 83036 HEMOGLOBIN GLYCOSYLATED A1C: CPT | Mod: HCNC

## 2019-12-30 PROCEDURE — 3074F PR MOST RECENT SYSTOLIC BLOOD PRESSURE < 130 MM HG: ICD-10-PCS | Mod: HCNC,CPTII,S$GLB, | Performed by: FAMILY MEDICINE

## 2019-12-30 NOTE — PROGRESS NOTES
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 male here for a checkup.  Completed chemo and RXT for metestatic anal cancer.  Still fatigued but slowly getting better.  He has familial intention tremor.  His tremor is better on Primadone.  Seen by Dr. Chaidez.  Patient has a history of peripheral vascular disease and lumbar spinal stenosis.  Lately his pain in his back has been getting worse.  When he walks a short distance the pain the back intensifies and it radiates into his lower extremities.  He has to stop and bend over, the pain resolves, then he's able start walking again.  Patient has iliac artery and femoral artery stents.  He developed severe myalgias from Crestor and Lipitor.  He lost muscle mass.   His cholesterol increased so cardiology now has him on Zetia and Fish oil.  He is now taking and tolerating Repatha.  He has cardiac stents, peripheral stents.  He probably has familial hypercholesterolemia.  He also sees Cardiology for paroxysmal atrial fibrillation.  He is taking amiodarone and aspirin.  Patient has a history of CRI 3, but creatinine is much better.   Renal ultrasound showed chronic kidney disease.  He no longer sees nephrology.  He is now on Januvia, Actos.  He tolerates it well.  His blood sugars are controlled decently.  Patient also has a history of mitral valve replacement, atrial fib, hypertension.  He has a pacemaker and defibrillator in place.  This has not been a problem for him.  He is now seeing Dr. Leigh.  His cardiologist was in [place].  That was his brother-in-law.  2018r patient had bilateral stents placed in his legs and is doing better.  He denies signs or symptoms of claudications.  He tolerates his blood pressure medication well and is not having side effects such as chronic cough or dizziness.     Clinically his back pain has been stable.  He takes 1 or 2 Norco's per day which helps.  Saw neurology and was started on Gabapentin and is doing better.    ROS:   Gen.:  No fever, chills, weight loss, weight gain.   Feels weak since finishing chemo and RXT.  Not as strong  HEENT: No headaches, sinus congestion, sore throat, change in vision.  Voice getting shakey  CV: No chest pain  RESP: No shortness of breath, wheezing, cough  GI: No change in bowel habits, no diarrhea, no abdominal pain, no hematochezia  : No dysuria, frequency  MSK: Significant muscle pain, joint pain, back pain  NEURO: No numbness, weakness.  Worsening tremor, shuffling gait from back pain  ENDO: No polyuria, polydipsia, heat or cold intolerance    PMH, PSH, ALLERGIES, SH, FH reviewed in today's note    PHYSICAL EXAM;   Vitals:    12/30/19 1547   BP: 112/62   Pulse: 60   Resp: 16     APPEARANCE: Well nourished, well developed, in no acute distress.    HEAD: Normocephalic, atraumatic.  EYES: PERRL. EOMI.      EARS: TM's intact. Light reflex normal. No retraction or perforation.    NOSE: Mucosa pink. Airway clear.  MOUTH & THROAT: No tonsillar enlargement. No pharyngeal erythema or exudate. No stridor.  NECK: Supple.  Bilateral carotid endarterectomy scars  NODES: No cervical, axillary or inguinal lymph node enlargement.  CHEST: Lungs clear to auscultation.  CARDIOVASCULAR: Normal S1, S2. No rubs, murmurs or gallops.  ABDOMEN: Bowel sounds normal. Not distended. Soft. No tenderness or masses.  Abdominal bruits are present  MUSCULOSKELETAL:  No CCE.  Very poor distal pulses in the left leg.  Palpable pulses in the right leg  SKIN: No rashes or pigmented moles.  NEUROLOGIC:       Cranial Nerves: II-XII grossly intact.      Motor: 5/5 strength major flexors/extensors.  Cogwheel rigidity.        DTR's: Knees, Ankles 2+ and equal bilaterally; downgoing toes.      Sensory: Intact to light touch distally.      Gait & Posture: Shuffling gait.  Pill  rolling tremor?  No cogwheeling  MENTAL STATUS: Normal orientation to person, place and time, normal affect, normal flow thought, normal memory.    Protective Sensation (w/ 10 gram monofilament):  Right: decreased  Left: decreased    Visual Inspection:  Normal -  Bilateral    Pedal Pulses:   Right: Present  Left: Present    Posterior tibialis:   Right:Present  Left: Present    Lab Results   Component Value Date    HGBA1C 5.9 (H) 12/30/2019     Lab Results   Component Value Date    LDLCALC Invalid, Trig>400.0 06/10/2019     Lab Results   Component Value Date    CREATININE 1.1 06/10/2019     Lab Results   Component Value Date    PSA 2.1 05/04/2017    PSA 2.0 09/03/2014     Lab Results   Component Value Date    WBC 9.75 06/10/2019    HGB 12.2 (L) 06/10/2019    HCT 37.9 (L) 06/10/2019    MCV 98 06/10/2019     06/10/2019     ASSESSMENT/PLAN:    Atrial fibrillation/Mitral valve replaced  - amiodarone (PACERONE) 200 MG Tab; Take 1 tablet (200 mg total) by mouth once daily.  Dispense: 30 tablet; Refill: 5  - Keep appointment with cardiology     Spinal Stenosis of the Lumbar spine  Consider ortho consult  His lower extremity pain sounds like a mixture of PVD and spinal stenosis.  I'll wait and see what cardiology thinks.  He may need to consider lumbar surgery versus lumbar steroid injections.  Continue [place]    PVD  Has abdominal, carotid, femoral artery stent  He sees Dr. Leigh for evaluation    Type 2 diabetes mellitus  I have recommended the following steps for improving diabetic care and outcome to patient::   Referral to Diabetic Education department if necessary.  Diabetic diet discussed in detail, written exchange diet given, low cholesterol diet, weight control and daily exercise discussed.    All medications, side effects and compliance issues discussed.    Long term complications of diabetes discussed.  Home glucose monitoring emphasized. Fasting morning glucose goals should be less than 140.  2 hour  postprandial goal should be less than 180.  Annual eye examinations at Ophthalmology discussed,   Glycohemoglobin and other lab monitoring discussed.  Medications for this patient will be:  - sitagliptin (JANUVIA) 100 MG Tab; Take 1 tablet (100 mg total) by mouth once daily.  Dispense: 30 tablet; Refill: 5              Actos 15 mg by mouth daily    HTN (hypertension)/PVD  Two gram sodium diet.    Weight loss discussed.    Try to walk 2 miles per day.    Quit smoking.    Current medications will be:  - atenolol (TENORMIN) 25 MG tablet; Take 1 tablet (25 mg total) by mouth once daily.  Dispense: 30 tablet; Refill: 5  - furosemide (LASIX) 20 MG tablet; Take 1 tablet (20 mg total) by mouth 2 (two) times daily.  Dispense: 60 tablet; Refill: 5  - lisinopril 10 MG tablet; Take 1 tablet (10 mg total) by mouth twice daily.  Dispense: 30 tablet; Refill: 5              ASA 81 mg po daily  Patient told to take 20 mg of Lasix if he does not have edema.  He can increase to 40 mg daily when he does have some swelling.    Resolved CRI (chronic renal insufficiency), stage 3 (moderate) - GFR > 60  Currently has normal renal fxn.  Monitor renal fxn every 6 months.  Adjust Lasix according to edema    Hyperlipidemia-statin intolerance/Suspect familial hypercholesterolemia.    Low fat diet.  Avoid sweets.  A 10 pound weight loss by the next visit as a  good goal.  Increase consumption of fruits and vegetables, fish and chicken.  Use medications below:  Statin intolerant  Continue Lovaza  Continues Zetia  Continue Repatha.      Tremor  -     Ambulatory referral to Neurology.    -     familial tremor.  His dad had this.    Aortic atherosclerosis  Keep appointment with Cardiology  Treat blood pressure, lipids aggressively    Next appointment will be in 6 months.

## 2019-12-31 LAB
ESTIMATED AVG GLUCOSE: 123 MG/DL (ref 68–131)
HBA1C MFR BLD HPLC: 5.9 % (ref 4–5.6)

## 2020-01-02 ENCOUNTER — PATIENT MESSAGE (OUTPATIENT)
Dept: RADIOLOGY | Facility: CLINIC | Age: 70
End: 2020-01-02

## 2020-01-10 DIAGNOSIS — I10 ESSENTIAL HYPERTENSION: ICD-10-CM

## 2020-01-10 RX ORDER — ATENOLOL 25 MG/1
TABLET ORAL
Qty: 30 TABLET | Refills: 5 | Status: SHIPPED | OUTPATIENT
Start: 2020-01-10 | End: 2020-05-18 | Stop reason: SDUPTHER

## 2020-01-10 RX ORDER — FUROSEMIDE 20 MG/1
20 TABLET ORAL DAILY
Qty: 30 TABLET | Refills: 5 | Status: SHIPPED | OUTPATIENT
Start: 2020-01-10 | End: 2020-05-18 | Stop reason: SDUPTHER

## 2020-01-27 DIAGNOSIS — F41.1 GENERALIZED ANXIETY DISORDER: ICD-10-CM

## 2020-01-27 RX ORDER — ALPRAZOLAM 0.25 MG/1
TABLET ORAL
Qty: 60 TABLET | Refills: 5 | Status: SHIPPED | OUTPATIENT
Start: 2020-01-27 | End: 2020-07-06 | Stop reason: SDUPTHER

## 2020-01-28 ENCOUNTER — TELEPHONE (OUTPATIENT)
Dept: PHARMACY | Facility: CLINIC | Age: 70
End: 2020-01-28

## 2020-02-10 DIAGNOSIS — M51.16 LUMBAR DISC DISEASE WITH RADICULOPATHY: ICD-10-CM

## 2020-02-10 RX ORDER — HYDROCODONE BITARTRATE AND ACETAMINOPHEN 10; 325 MG/1; MG/1
1 TABLET ORAL EVERY 12 HOURS PRN
Qty: 50 TABLET | Refills: 0 | Status: SHIPPED | OUTPATIENT
Start: 2020-02-10 | End: 2020-03-06

## 2020-02-27 ENCOUNTER — TELEPHONE (OUTPATIENT)
Dept: PHARMACY | Facility: CLINIC | Age: 70
End: 2020-02-27

## 2020-03-06 DIAGNOSIS — M51.16 LUMBAR DISC DISEASE WITH RADICULOPATHY: ICD-10-CM

## 2020-03-06 RX ORDER — HYDROCODONE BITARTRATE AND ACETAMINOPHEN 10; 325 MG/1; MG/1
TABLET ORAL
Qty: 50 TABLET | Refills: 0 | Status: SHIPPED | OUTPATIENT
Start: 2020-03-06 | End: 2020-04-01 | Stop reason: SDUPTHER

## 2020-03-23 ENCOUNTER — TELEPHONE (OUTPATIENT)
Dept: PHARMACY | Facility: CLINIC | Age: 70
End: 2020-03-23

## 2020-03-30 NOTE — TELEPHONE ENCOUNTER
Patient called for refill readiness on Repatha. No answer - lvm for call back. $8.95 copay - 004.    Desmond Mo, Pharm.D.  Pharmacy Resident, PGY-1   [facility]

## 2020-04-01 DIAGNOSIS — M51.16 LUMBAR DISC DISEASE WITH RADICULOPATHY: ICD-10-CM

## 2020-04-01 NOTE — TELEPHONE ENCOUNTER
----- Message from Cindy Cuadra sent at 2020 11:35 AM CDT -----  Contact: Fax  Wesley Bernal  MRN: [id number]  : 1950  PCP: Lalo Sweet  Home Phone      [phone]  Work Phone      Not on file.  Mobile          [phone]  Home Phone      [phone]      MESSAGE:   Pt requesting refill or new Rx.   Is this a refill or new RX: new  RX name and strength: HYDROcodone-acetaminophen (NORCO)  mg per tablet  Last office visit: 19  Is this a 30-day or 90-day RX: n./a  [facility]  Comments:      Phone:  [phone]

## 2020-04-03 RX ORDER — HYDROCODONE BITARTRATE AND ACETAMINOPHEN 10; 325 MG/1; MG/1
TABLET ORAL
Qty: 50 TABLET | Refills: 0 | Status: SHIPPED | OUTPATIENT
Start: 2020-04-03 | End: 2020-05-07

## 2020-04-06 NOTE — TELEPHONE ENCOUNTER
Refill readiness for Repatha confirmed with patient for hyperlipidemia; name/ confirmed; no missed doses; no new medications; no new allergies; no side effects noted; address confirmed for  shipment and  delivery. $8.95 copay. [facility]CO[facility] 3362. Patient states he has 0 doses remaining and is due to inject the first week of the month. Patient will inject . Advised patient to reach out to [facility] if he does not hear from us a week before he's due. Would like patient to inject exactly every 30 days (on the  of each month) and patient verbalized understanding.     Desmond Mo, Pharm.D.  Pharmacy Resident, PGY-1   [facility]

## 2020-04-30 ENCOUNTER — TELEPHONE (OUTPATIENT)
Dept: FAMILY MEDICINE | Facility: CLINIC | Age: 70
End: 2020-04-30

## 2020-04-30 ENCOUNTER — TELEPHONE (OUTPATIENT)
Dept: PHARMACY | Facility: CLINIC | Age: 70
End: 2020-04-30

## 2020-04-30 DIAGNOSIS — M51.16 LUMBAR DISC DISEASE WITH RADICULOPATHY: ICD-10-CM

## 2020-04-30 RX ORDER — GABAPENTIN 100 MG/1
100 CAPSULE ORAL 3 TIMES DAILY
Qty: 90 CAPSULE | Refills: 5 | Status: ON HOLD | OUTPATIENT
Start: 2020-04-30 | End: 2020-09-20 | Stop reason: SDUPTHER

## 2020-04-30 NOTE — TELEPHONE ENCOUNTER
----- Message from Cindy Cuadra sent at 2020  2:45 PM CDT -----  Contact: shahid with [facility]  Wesley Bernal  MRN: [id number]  : 1950  PCP: Lalo Sweet  Home Phone      [phone]  Work Phone      Not on file.  Mobile          [phone]  Home Phone      [phone]      MESSAGE:   Pt requesting refill or new Rx.   Is this a refill or new RX:  refill  RX name and strength: gabapentin (NEURONTIN) 100 MG capsule  Last office visit: 19  Is this a 30-day or 90-day RX:  30  Pharmacy name and location:  [facility]  Comments:      Phone:  [phone]

## 2020-04-30 NOTE — TELEPHONE ENCOUNTER
----- Message from Simone Valadez sent at 2020  2:50 PM CDT -----  Contact: ZOË Bernal  MRN: [id number]  : 1950  PCP: Lalo Sweet  Home Phone      [phone]  Work Phone      Not on file.  Mobile          [phone]  Home Phone      [phone]      MESSAGE:   Pt requesting refill or new Rx.   Is this a refill or new RX:  refill  RX name and strength: Gabapentin 100 mg  Last office visit: 19  Is this a 30-day or 90-day RX:  30 day  Pharmacy name and location:  [facility]  Comments:      Phone:  [phone]    PCP: Micki

## 2020-05-05 ENCOUNTER — TELEPHONE (OUTPATIENT)
Dept: PHARMACY | Facility: CLINIC | Age: 70
End: 2020-05-05

## 2020-05-05 DIAGNOSIS — M51.16 LUMBAR DISC DISEASE WITH RADICULOPATHY: ICD-10-CM

## 2020-05-05 NOTE — TELEPHONE ENCOUNTER
Refill call regarding Repatha from [facility]. Patient reached and informed of copay of $8.95 @004. Patients next dose is scheduled for 5/8 shipping out 5/6 for 5/7 arrival with patients consent.

## 2020-05-07 DIAGNOSIS — M51.16 LUMBAR DISC DISEASE WITH RADICULOPATHY: ICD-10-CM

## 2020-05-07 RX ORDER — HYDROCODONE BITARTRATE AND ACETAMINOPHEN 10; 325 MG/1; MG/1
TABLET ORAL
Qty: 50 TABLET | Refills: 0 | Status: SHIPPED | OUTPATIENT
Start: 2020-05-07 | End: 2020-05-07 | Stop reason: SDUPTHER

## 2020-05-08 RX ORDER — HYDROCODONE BITARTRATE AND ACETAMINOPHEN 10; 325 MG/1; MG/1
TABLET ORAL
Qty: 50 TABLET | Refills: 0 | Status: SHIPPED | OUTPATIENT
Start: 2020-05-08 | End: 2020-06-26 | Stop reason: SDUPTHER

## 2020-05-10 DIAGNOSIS — E11.65 TYPE 2 DIABETES MELLITUS WITH HYPERGLYCEMIA, WITHOUT LONG-TERM CURRENT USE OF INSULIN: ICD-10-CM

## 2020-05-10 DIAGNOSIS — I48.0 PAROXYSMAL ATRIAL FIBRILLATION: ICD-10-CM

## 2020-05-10 DIAGNOSIS — E78.5 HYPERLIPIDEMIA, UNSPECIFIED HYPERLIPIDEMIA TYPE: ICD-10-CM

## 2020-05-11 RX ORDER — EZETIMIBE 10 MG/1
TABLET ORAL
Qty: 30 TABLET | Refills: 5 | Status: SHIPPED | OUTPATIENT
Start: 2020-05-11 | End: 2020-11-06 | Stop reason: SDUPTHER

## 2020-05-11 RX ORDER — AMIODARONE HYDROCHLORIDE 200 MG/1
TABLET ORAL
Qty: 30 TABLET | Refills: 5 | Status: SHIPPED | OUTPATIENT
Start: 2020-05-11 | End: 2020-11-06 | Stop reason: SDUPTHER

## 2020-05-11 RX ORDER — PIOGLITAZONEHYDROCHLORIDE 15 MG/1
TABLET ORAL
Qty: 30 TABLET | Refills: 5 | Status: SHIPPED | OUTPATIENT
Start: 2020-05-11 | End: 2020-11-06 | Stop reason: SDUPTHER

## 2020-05-18 DIAGNOSIS — I10 ESSENTIAL HYPERTENSION: ICD-10-CM

## 2020-05-18 NOTE — TELEPHONE ENCOUNTER
----- Message from Jessica Machado sent at 2020  3:55 PM CDT -----  Contact: FAX  Wesley Bernal  MRN: [id number]  : 1950  PCP: Lalo Sweet  Home Phone      [phone]  Work Phone      Not on file.  Mobile          [phone]  Home Phone      [phone]      MESSAGE:   Pt requesting refill or new Rx.   Is this a refill or new RX:  refill  RX name and strength:   furosemide (LASIX) 20 MG tablet  atenolol (TENORMIN) 25 MG tablet  Last office visit:  2019  Is this a 30-day or 90-day RX:  30-Day  Pharmacy name and location:  [facility]  Comments:

## 2020-05-19 RX ORDER — ATENOLOL 25 MG/1
25 TABLET ORAL DAILY
Qty: 30 TABLET | Refills: 5 | Status: SHIPPED | OUTPATIENT
Start: 2020-05-19 | End: 2020-12-02 | Stop reason: SDUPTHER

## 2020-05-19 RX ORDER — FUROSEMIDE 20 MG/1
20 TABLET ORAL DAILY
Qty: 30 TABLET | Refills: 5 | Status: SHIPPED | OUTPATIENT
Start: 2020-05-19 | End: 2020-07-17

## 2020-05-21 NOTE — TELEPHONE ENCOUNTER
----- Message from Palma Cole sent at 2019 10:52 AM CDT -----  Contact: [facility]  Wesley Bernal  MRN: [id number]  : 1950  PCP: Lalo Sweet  Home Phone      [phone]  Work Phone      Not on file.  Mobile          [phone]  Home Phone      [phone]      MESSAGE:   Arin from [facility] [place] is calling because the patient is calling them saying they were suppose to get a referral from us for him. I don't see a referral sent over there. She asking for a call back to see if they were suppose to receive one or not.    Phone: [phone]     Split-Thickness Skin Graft Text: The defect edges were debeveled with a #15 scalpel blade.  Given the location of the defect, shape of the defect and the proximity to free margins a split thickness skin graft was deemed most appropriate.  Using a sterile surgical marker, the primary defect shape was transferred to the donor site. The split thickness graft was then harvested.  The skin graft was then placed in the primary defect and oriented appropriately.

## 2020-05-28 DIAGNOSIS — Z78.9 STATIN INTOLERANCE: ICD-10-CM

## 2020-05-28 DIAGNOSIS — I25.10 ASCVD (ARTERIOSCLEROTIC CARDIOVASCULAR DISEASE): ICD-10-CM

## 2020-05-28 DIAGNOSIS — E78.5 HYPERLIPIDEMIA, UNSPECIFIED HYPERLIPIDEMIA TYPE: ICD-10-CM

## 2020-05-29 ENCOUNTER — TELEPHONE (OUTPATIENT)
Dept: PHARMACY | Facility: CLINIC | Age: 70
End: 2020-05-29

## 2020-05-29 NOTE — TELEPHONE ENCOUNTER
Refill call attempt #1 regarding Repatha at [facility]. Copay $8.95. No answer, francisco and abby messsage

## 2020-06-04 ENCOUNTER — TELEPHONE (OUTPATIENT)
Dept: PHARMACY | Facility: CLINIC | Age: 70
End: 2020-06-04

## 2020-06-05 DIAGNOSIS — E11.9 TYPE 2 DIABETES MELLITUS WITHOUT COMPLICATION, UNSPECIFIED WHETHER LONG TERM INSULIN USE: ICD-10-CM

## 2020-06-09 ENCOUNTER — NURSE TRIAGE (OUTPATIENT)
Dept: ADMINISTRATIVE | Facility: CLINIC | Age: 70
End: 2020-06-09

## 2020-06-09 ENCOUNTER — HOSPITAL ENCOUNTER (EMERGENCY)
Facility: HOSPITAL | Age: 70
Discharge: HOME | End: 2020-06-09
Attending: SURGERY
Payer: MEDICARE

## 2020-06-09 VITALS
DIASTOLIC BLOOD PRESSURE: 74 MMHG | SYSTOLIC BLOOD PRESSURE: 127 MMHG | TEMPERATURE: 96 F | RESPIRATION RATE: 18 BRPM | HEART RATE: 81 BPM | WEIGHT: 171.44 LBS | BODY MASS INDEX: 22.61 KG/M2 | OXYGEN SATURATION: 99 %

## 2020-06-09 DIAGNOSIS — R19.7 DIARRHEA, UNSPECIFIED TYPE: Primary | ICD-10-CM

## 2020-06-09 DIAGNOSIS — I48.91 ATRIAL FIBRILLATION: ICD-10-CM

## 2020-06-09 LAB
ALBUMIN SERPL BCP-MCNC: 3.6 G/DL (ref 3.5–5.2)
ALP SERPL-CCNC: 78 U/L (ref 55–135)
ALT SERPL W/O P-5'-P-CCNC: 15 U/L (ref 10–44)
ANION GAP SERPL CALC-SCNC: 10 MMOL/L (ref 8–16)
AST SERPL-CCNC: 14 U/L (ref 10–40)
BASOPHILS # BLD AUTO: 0.03 K/UL (ref 0–0.2)
BASOPHILS NFR BLD: 0.5 % (ref 0–1.9)
BILIRUB SERPL-MCNC: 0.6 MG/DL (ref 0.1–1)
BNP SERPL-MCNC: 647 PG/ML (ref 0–99)
BUN SERPL-MCNC: 18 MG/DL (ref 8–23)
CALCIUM SERPL-MCNC: 9 MG/DL (ref 8.7–10.5)
CHLORIDE SERPL-SCNC: 103 MMOL/L (ref 95–110)
CK MB SERPL-MCNC: 1.1 NG/ML (ref 0.1–6.5)
CK MB SERPL-RTO: 3.4 % (ref 0–5)
CK SERPL-CCNC: 32 U/L (ref 20–200)
CO2 SERPL-SCNC: 24 MMOL/L (ref 23–29)
CREAT SERPL-MCNC: 1.6 MG/DL (ref 0.5–1.4)
DIFFERENTIAL METHOD: ABNORMAL
EOSINOPHIL # BLD AUTO: 0.1 K/UL (ref 0–0.5)
EOSINOPHIL NFR BLD: 1.3 % (ref 0–8)
ERYTHROCYTE [DISTWIDTH] IN BLOOD BY AUTOMATED COUNT: 13.2 % (ref 11.5–14.5)
EST. GFR  (AFRICAN AMERICAN): 50 ML/MIN/1.73 M^2
EST. GFR  (NON AFRICAN AMERICAN): 44 ML/MIN/1.73 M^2
GLUCOSE SERPL-MCNC: 250 MG/DL (ref 70–110)
HCT VFR BLD AUTO: 38.2 % (ref 40–54)
HGB BLD-MCNC: 12.7 G/DL (ref 14–18)
IMM GRANULOCYTES # BLD AUTO: 0.02 K/UL (ref 0–0.04)
IMM GRANULOCYTES NFR BLD AUTO: 0.4 % (ref 0–0.5)
LIPASE SERPL-CCNC: 105 U/L (ref 4–60)
LYMPHOCYTES # BLD AUTO: 0.5 K/UL (ref 1–4.8)
LYMPHOCYTES NFR BLD: 8.1 % (ref 18–48)
MCH RBC QN AUTO: 31.5 PG (ref 27–31)
MCHC RBC AUTO-ENTMCNC: 33.2 G/DL (ref 32–36)
MCV RBC AUTO: 95 FL (ref 82–98)
MONOCYTES # BLD AUTO: 0.3 K/UL (ref 0.3–1)
MONOCYTES NFR BLD: 5.8 % (ref 4–15)
NEUTROPHILS # BLD AUTO: 4.7 K/UL (ref 1.8–7.7)
NEUTROPHILS NFR BLD: 83.9 % (ref 38–73)
NRBC BLD-RTO: 0 /100 WBC
PLATELET # BLD AUTO: 164 K/UL (ref 150–350)
PMV BLD AUTO: 8.9 FL (ref 9.2–12.9)
POTASSIUM SERPL-SCNC: 4.2 MMOL/L (ref 3.5–5.1)
PROT SERPL-MCNC: 7 G/DL (ref 6–8.4)
RBC # BLD AUTO: 4.03 M/UL (ref 4.6–6.2)
SODIUM SERPL-SCNC: 137 MMOL/L (ref 136–145)
TROPONIN I SERPL DL<=0.01 NG/ML-MCNC: 0.07 NG/ML (ref 0–0.03)
WBC # BLD AUTO: 5.56 K/UL (ref 3.9–12.7)

## 2020-06-09 PROCEDURE — 93010 EKG 12-LEAD: ICD-10-PCS | Mod: HCNC,,, | Performed by: INTERNAL MEDICINE

## 2020-06-09 PROCEDURE — 93005 ELECTROCARDIOGRAM TRACING: CPT | Mod: HCNC

## 2020-06-09 PROCEDURE — 82550 ASSAY OF CK (CPK): CPT | Mod: HCNC

## 2020-06-09 PROCEDURE — 99284 EMERGENCY DEPT VISIT MOD MDM: CPT | Mod: 25,HCNC

## 2020-06-09 PROCEDURE — 84484 ASSAY OF TROPONIN QUANT: CPT | Mod: HCNC

## 2020-06-09 PROCEDURE — 36415 COLL VENOUS BLD VENIPUNCTURE: CPT | Mod: HCNC

## 2020-06-09 PROCEDURE — 80053 COMPREHEN METABOLIC PANEL: CPT | Mod: HCNC

## 2020-06-09 PROCEDURE — 25000003 PHARM REV CODE 250: Mod: HCNC | Performed by: SURGERY

## 2020-06-09 PROCEDURE — 93010 ELECTROCARDIOGRAM REPORT: CPT | Mod: HCNC,,, | Performed by: INTERNAL MEDICINE

## 2020-06-09 PROCEDURE — 82553 CREATINE MB FRACTION: CPT | Mod: HCNC

## 2020-06-09 PROCEDURE — 85025 COMPLETE CBC W/AUTO DIFF WBC: CPT | Mod: HCNC

## 2020-06-09 PROCEDURE — 83880 ASSAY OF NATRIURETIC PEPTIDE: CPT | Mod: HCNC

## 2020-06-09 PROCEDURE — 96360 HYDRATION IV INFUSION INIT: CPT | Mod: HCNC

## 2020-06-09 PROCEDURE — 83690 ASSAY OF LIPASE: CPT | Mod: HCNC

## 2020-06-09 RX ORDER — SODIUM CHLORIDE 9 MG/ML
1000 INJECTION, SOLUTION INTRAVENOUS
Status: COMPLETED | OUTPATIENT
Start: 2020-06-09 | End: 2020-06-09

## 2020-06-09 RX ORDER — DIPHENOXYLATE HYDROCHLORIDE AND ATROPINE SULFATE 2.5; .025 MG/1; MG/1
1 TABLET ORAL 4 TIMES DAILY PRN
Qty: 15 TABLET | Refills: 0 | Status: SHIPPED | OUTPATIENT
Start: 2020-06-09 | End: 2020-07-17

## 2020-06-09 RX ORDER — DIPHENOXYLATE HYDROCHLORIDE AND ATROPINE SULFATE 2.5; .025 MG/1; MG/1
1 TABLET ORAL
Status: COMPLETED | OUTPATIENT
Start: 2020-06-09 | End: 2020-06-09

## 2020-06-09 RX ADMIN — DIPHENOXYLATE HYDROCHLORIDE AND ATROPINE SULFATE 1 TABLET: 2.5; .025 TABLET ORAL at 10:06

## 2020-06-09 RX ADMIN — SODIUM CHLORIDE 1000 ML: 0.9 INJECTION, SOLUTION INTRAVENOUS at 10:06

## 2020-06-09 NOTE — ED PROVIDER NOTES
Encounter Date: 2020       History     Chief Complaint   Patient presents with    Diarrhea     Patient is 69-year-old white male with watery diarrhea multiple times for the last 2 days.  He is having no abdominal pain.  Denies nausea and vomiting.  History is pertinent for the fact that he has had anal carcinoma and has been treated with chemotherapy and radiation therapy.  No prior history of diarrhea before this episode started 2 days ago.  No blood noted in the diarrhea.        Review of patient's allergies indicates:   Allergen Reactions    Atorvastatin Other (See Comments)     Pain in joints, Creatinine level increased    Statins-hmg-coa reductase inhibitors      Past Medical History:   Diagnosis Date    Anxiety     Atrial fibrillation     Cancer     Diabetes mellitus type II     Heart disease     Hyperlipidemia     Hypertension      Past Surgical History:   Procedure Laterality Date    ABDOMINAL AORTA STENT      BRACHIOCEPHALIC VEIN ANGIOPLASTY / STENTING      CARDIAC DEFIBRILLATOR PLACEMENT      2011    CARDIAC PACEMAKER PLACEMENT          CARDIAC SURGERY      open heart    CARDIAC VALVE SURGERY      pig valve replacement -    CAROTID STENT      LYMPH NODE BIOPSY Left 2019    Procedure: BIOPSY, LYMPH NODE (GROIN);  Surgeon: Adelfo Yin MD;  Location: [facility] OR;  Service: General;  Laterality: Left;     Family History   Problem Relation Age of Onset    Diabetes Mother     Heart disease Father     Cancer Father         melanoma, prostate    Heart disease Paternal Grandfather      Social History     Tobacco Use    Smoking status: Former Smoker     Packs/day: 1.00     Types: Cigarettes, Cigars     Last attempt to quit: 4/10/2015     Years since quittin.1    Smokeless tobacco: Never Used   Substance Use Topics    Alcohol use: No     Alcohol/week: 0.0 standard drinks    Drug use: No     Review of Systems   Constitutional: Negative for fever.   HENT: Negative for sore  throat.    Respiratory: Negative for shortness of breath.    Cardiovascular: Negative for chest pain.   Gastrointestinal: Positive for diarrhea. Negative for nausea.   Genitourinary: Negative for dysuria.   Musculoskeletal: Negative for back pain.   Skin: Negative for rash.   Neurological: Negative for weakness.   Hematological: Does not bruise/bleed easily.       Physical Exam     Initial Vitals [06/09/20 0953]   BP Pulse Resp Temp SpO2   127/74 81 18 96.1 °F (35.6 °C) 99 %      MAP       --         Physical Exam    Nursing note and vitals reviewed.  Constitutional: He appears well-developed and well-nourished.   HENT:   Head: Normocephalic and atraumatic.   Eyes: EOM are normal. Pupils are equal, round, and reactive to light.   Neck: Normal range of motion. Neck supple.   Cardiovascular: Normal rate and regular rhythm.   Pulmonary/Chest: Breath sounds normal. No respiratory distress. He has no wheezes. He has no rales.   Abdominal: Soft. He exhibits no distension. There is no tenderness.   Musculoskeletal: Normal range of motion.   Neurological: He is alert and oriented to person, place, and time.   Skin: Skin is warm and dry.   Psychiatric: He has a normal mood and affect. Thought content normal.         ED Course   Procedures  Labs Reviewed - No data to display       Imaging Results    None           Seen by Cardiology, cleared.  Will Follow-up with Oncology for possible Radiation entertis                               Clinical Impression:       ICD-10-CM ICD-9-CM   1. Atrial fibrillation I48.91 427.31    2.    Diarrhea      Disposition:   Disposition: Discharged  Condition: Stable                        Ric Sanchez Jr., MD  06/09/20 7677

## 2020-06-09 NOTE — TELEPHONE ENCOUNTER
Pt's SO states pt has diarrhea for approx 3 days. SO denies emesis. SO advised per protocol to ED now and SO verbalizes understanding.     Reason for Disposition   SEVERE diarrhea (e.g., 7 or more times / day more than normal) and age > 60 years    Additional Information   Negative: Shock suspected (e.g., cold/pale/clammy skin, too weak to stand, low BP, rapid pulse)   Negative: Difficult to awaken or acting confused (e.g., disoriented, slurred speech)   Negative: Sounds like a life-threatening emergency to the triager   Negative: SEVERE abdominal pain (e.g., excruciating) and present > 1 hour   Negative: SEVERE abdominal pain and age > 60   Negative: Bloody, black, or tarry bowel movements    Protocols used: DIARRHEA-A-OH

## 2020-06-09 NOTE — ED TRIAGE NOTES
69 y.o. male presents to ER ED 05/ED 05   Chief Complaint   Patient presents with    Diarrhea   .   C/o diarrhea for two days

## 2020-06-09 NOTE — CONSULTS
[facility]  Cardiology  Consult Note    Patient Name: Wesley Bernal  MRN: [id number]  Admission Date: 6/9/2020  Hospital Length of Stay: 0 days  Code Status: No Order   Attending Provider: Ric Sanchez Jr., MD   Consulting Provider: MARÍA Jones  Primary Care Physician: Lalo Sweet MD  Principal Problem:<principal problem not specified>    Patient information was obtained from patient, past medical records and ER records.     Inpatient consult to Cardiology-[facility]  Consult performed by: Fausto Leigh MD  Consult ordered by: Ric Sanchez Jr., MD        Subjective:     Chief Complaint:  Diarrhea      HPI: 69 year old w/m with Hx of remote CABG, bioprosthetic MVR, ICMO S/P AICD, anal CA last chemo/radiation 9/2019. Presents to ED with complaints of diarrhea x 2 days with associated generalized weakness. He has received IV fluids and reports that he is feeling better. He denies having any chest pain. SOB or palpitations and has had no recent exertional symptoms. A troponin was drawn which is slightly elevated. [facility] asked to evaluate.     Past Medical History:   Diagnosis Date    Anxiety     Atrial fibrillation     Cancer     Diabetes mellitus type II     Heart disease     Hyperlipidemia     Hypertension        Past Surgical History:   Procedure Laterality Date    ABDOMINAL AORTA STENT      BRACHIOCEPHALIC VEIN ANGIOPLASTY / STENTING      CARDIAC DEFIBRILLATOR PLACEMENT      1/2011    CARDIAC PACEMAKER PLACEMENT      1-2011    CARDIAC SURGERY      open heart    CARDIAC VALVE SURGERY      pig valve replacement -    CAROTID STENT      LYMPH NODE BIOPSY Left 6/17/2019    Procedure: BIOPSY, LYMPH NODE (GROIN);  Surgeon: Adelfo Yin MD;  Location: [facility] OR;  Service: General;  Laterality: Left;       Review of patient's allergies indicates:   Allergen Reactions    Atorvastatin Other (See Comments)     Pain in joints, Creatinine level increased    Statins-hmg-coa  reductase inhibitors        No current facility-administered medications on file prior to encounter.      Current Outpatient Medications on File Prior to Encounter   Medication Sig    ALPRAZolam (XANAX) 0.25 MG tablet TAKE ONE TABLET BY MOUTH TWICE DAILY AS NEEDED    amiodarone (PACERONE) 200 MG Tab TAKE 1/2 TABLET BY MOUTH ONCE A DAY    aspirin (ECOTRIN) 81 MG EC tablet Take by mouth once daily.    atenoloL (TENORMIN) 25 MG tablet Take 1 tablet (25 mg total) by mouth once daily.    diphenoxylate-atropine 2.5-0.025 mg (LOMOTIL) 2.5-0.025 mg per tablet TAKE 1 OR 2 TABLETS BY MOUTH EVERY 6 HOURS AS NEEDED FOR DIARRHEA    docusate sodium (COLACE) 100 MG capsule Take 100 mg by mouth 2 (two) times daily as needed for Constipation.    evolocumab (REPATHA PUSHTRONEX) 420 mg/3.5 mL Injt Inject 420 mg (1 cartridge) into the skin every 30 days.    ezetimibe (ZETIA) 10 mg tablet TAKE ONE TABLET BY MOUTH ONCE A DAY    furosemide (LASIX) 20 MG tablet Take 1 tablet (20 mg total) by mouth once daily.    gabapentin (NEURONTIN) 100 MG capsule Take 1 capsule (100 mg total) by mouth 3 (three) times daily.    HYDROcodone-acetaminophen (NORCO)  mg per tablet GREATER THAN 7 DAY QUANTITY MEDICALLY NECESSARY    isosorbide mononitrate (IMDUR) 60 MG 24 hr tablet Take 60 mg by mouth once daily.     JANUVIA 100 mg Tab TAKE ONE TABLET BY MOUTH ONCE A DAY    omega-3 acid ethyl esters (LOVAZA) 1 gram capsule TAKE TWO CAPSULES BY MOUTH TWICE DAILY    ondansetron (ZOFRAN-ODT) 8 MG TbDL DISSOLVE ONE TABLET BY MOUTH EVERY 6 HOURS AS NEEDED    pioglitazone (ACTOS) 15 MG tablet TAKE ONE TABLET BY MOUTH ONCE A DAY    primidone (MYSOLINE) 50 MG Tab Take 1 tablet (50 mg total) by mouth 2 (two) times daily.    silver sulfADIAZINE 1% (SILVADENE) 1 % cream APPLY TO THE AFFECTED AREA TOPICALLY THREE TIMES DAILY TO FOUR TIMES DAILY AS DIRECTED     Family History     Problem Relation (Age of Onset)    Cancer Father    Diabetes Mother     Heart disease Father, Paternal Grandfather        Tobacco Use    Smoking status: Former Smoker     Packs/day: 1.00     Types: Cigarettes, Cigars     Last attempt to quit: 4/10/2015     Years since quittin.1    Smokeless tobacco: Never Used   Substance and Sexual Activity    Alcohol use: No     Alcohol/week: 0.0 standard drinks    Drug use: No    Sexual activity: Yes     Partners: Female     ROS   Constitutional: Negative   Eyes: Negative    Respiratory: Negative    Cardiovascular: Negative   Gastrointestinal: diarrhea   Genitourinary: Negative   Musculoskeletal: Negative   Skin: Negative .   Neurological: Negative   Objective:     Vital Signs (Most Recent):  Temp: 96.1 °F (35.6 °C) (20)  Pulse: 81 (20)  Resp: 18 (20)  BP: 127/74 (20)  SpO2: 99 % (20) Vital Signs (24h Range):  Temp:  [96.1 °F (35.6 °C)] 96.1 °F (35.6 °C)  Pulse:  [81] 81  Resp:  [18] 18  SpO2:  [99 %] 99 %  BP: (127)/(74) 127/74     Weight: 77.7 kg (171 lb 6.5 oz)  Body mass index is 22.61 kg/m².    SpO2: 99 %       No intake or output data in the 24 hours ending 20 1126    Lines/Drains/Airways     Peripheral Intravenous Line                 Peripheral IV - Single Lumen 20 1016 18 G Left Forearm less than 1 day                Physical Exam  General appearance: alert, appears stated age and cooperative  Head: Normocephalic, without obvious abnormality, atraumatic  Eyes: conjunctivae/corneas clear. PERRL  Neck: no carotid bruit, no JVD and supple, symmetrical, trachea midline  Lungs: clear to auscultation bilaterally, normal respiratory effort  Chest Wall: no tenderness  Heart: regular rate and rhythm, S1, S2 normal, systolic 2/6 murmur, click, rub or gallop  Abdomen: soft, non-tender; bowel sounds normal; no masses,  no organomegaly  Extremities: Extremities normal, atraumatic, no cyanosis, clubbing, or edema  Pulses: Dorsalis Pedis R: 2+ (normal)/L: 2+ (normal)  Skin: Skin  color, texture, turgor normal. No rashes or lesions  Neurologic: Normal mood and affect  Alert and oriented X 3  Significant Labs:   ABG: No results for input(s): PH, PCO2, HCO3, POCSATURATED, BE in the last 48 hours., Blood Culture: No results for input(s): LABBLOO in the last 48 hours., BMP:   Recent Labs   Lab 06/09/20  1011   *      K 4.2      CO2 24   BUN 18   CREATININE 1.6*   CALCIUM 9.0   , CMP   Recent Labs   Lab 06/09/20  1011      K 4.2      CO2 24   *   BUN 18   CREATININE 1.6*   CALCIUM 9.0   PROT 7.0   ALBUMIN 3.6   BILITOT 0.6   ALKPHOS 78   AST 14   ALT 15   ANIONGAP 10   ESTGFRAFRICA 50*   EGFRNONAA 44*   , CBC   Recent Labs   Lab 06/09/20  1011   WBC 5.56   HGB 12.7*   HCT 38.2*      , INR No results for input(s): INR, PROTIME in the last 48 hours., Lipid Panel No results for input(s): CHOL, HDL, LDLCALC, TRIG, CHOLHDL in the last 48 hours.,   Pathology Results  (Last 10 years)    None      , Troponin   Recent Labs   Lab 06/09/20  1011   TROPONINI 0.074*    and All pertinent lab results from the last 24 hours have been reviewed.    Significant Imaging: Cardiac Cath: , CT scan: CT ABDOMEN PELVIS WITH CONTRAST: No results found for this visit on 06/09/20., CT ABDOMEN PELVIS WITHOUT CONTRAST: No results found for this visit on 06/09/20. and , Echocardiogram: 2D echo with color flow doppler: No results found for this or any previous visit., Transthoracic echo (TTE) complete (Cupid Only): No results found for this or any previous visit. and , EKG: , Stress Test: , X-Ray: CXR: X-Ray Chest 1 View (CXR): No results found for this visit on 06/09/20., X-Ray Chest PA and Lateral (CXR): No results found for this visit on 06/09/20. and , KUB: X-Ray Abdomen AP 1 View (KUB): No results found for this visit on 06/09/20. and  and  and   Assessment and Plan:     There are no hospital problems to display for this patient.      VTE Risk Mitigation (From admission, onward)     None        No current facility-administered medications for this encounter.      Current Outpatient Medications   Medication Sig    ALPRAZolam (XANAX) 0.25 MG tablet TAKE ONE TABLET BY MOUTH TWICE DAILY AS NEEDED    amiodarone (PACERONE) 200 MG Tab TAKE 1/2 TABLET BY MOUTH ONCE A DAY    aspirin (ECOTRIN) 81 MG EC tablet Take by mouth once daily.    atenoloL (TENORMIN) 25 MG tablet Take 1 tablet (25 mg total) by mouth once daily.    diphenoxylate-atropine 2.5-0.025 mg (LOMOTIL) 2.5-0.025 mg per tablet TAKE 1 OR 2 TABLETS BY MOUTH EVERY 6 HOURS AS NEEDED FOR DIARRHEA    docusate sodium (COLACE) 100 MG capsule Take 100 mg by mouth 2 (two) times daily as needed for Constipation.    evolocumab (REPATHA PUSHTRONEX) 420 mg/3.5 mL Injt Inject 420 mg (1 cartridge) into the skin every 30 days.    ezetimibe (ZETIA) 10 mg tablet TAKE ONE TABLET BY MOUTH ONCE A DAY    furosemide (LASIX) 20 MG tablet Take 1 tablet (20 mg total) by mouth once daily.    gabapentin (NEURONTIN) 100 MG capsule Take 1 capsule (100 mg total) by mouth 3 (three) times daily.    HYDROcodone-acetaminophen (NORCO)  mg per tablet GREATER THAN 7 DAY QUANTITY MEDICALLY NECESSARY    isosorbide mononitrate (IMDUR) 60 MG 24 hr tablet Take 60 mg by mouth once daily.     JANUVIA 100 mg Tab TAKE ONE TABLET BY MOUTH ONCE A DAY    omega-3 acid ethyl esters (LOVAZA) 1 gram capsule TAKE TWO CAPSULES BY MOUTH TWICE DAILY    ondansetron (ZOFRAN-ODT) 8 MG TbDL DISSOLVE ONE TABLET BY MOUTH EVERY 6 HOURS AS NEEDED    pioglitazone (ACTOS) 15 MG tablet TAKE ONE TABLET BY MOUTH ONCE A DAY    primidone (MYSOLINE) 50 MG Tab Take 1 tablet (50 mg total) by mouth 2 (two) times daily.    silver sulfADIAZINE 1% (SILVADENE) 1 % cream APPLY TO THE AFFECTED AREA TOPICALLY THREE TIMES DAILY TO FOUR TIMES DAILY AS DIRECTED     69 year old w/m presents to ED with Diarrhea x 2 days s/p chemo and radiation therapy for metastatic Squamous cell anal carcinoma  diagnosed July 2019.     Slightly elvated troponin 0.07. EKG with no significant ischemic changes from previous.     CAD status post four-vessel CABG 1991.,  One-vessel CABG to first diagonal 2012.  SVG circumflex and RCA occluded, MARTINEZ LAD patent.  Tissue mitral valve replacement, ischemic cardiomyopathy post AICD left ventricular ejection fraction 40% mild MR August 2019  Extensive inferior lateral mid anterior scars left ventricular ejection fraction 29% August 2019.  Hypertension now well controlled.  Paroxysmal A. fib controlled.  diabetic neuropathy and nephropathy.  Creatinine 1.9 August 2019  PAD status post PCI.  NEPTALI 0.58 left 0.92 right August 2019  Moderate bilateral internal carotid stenosis August 2019 status post endarterectomy  Dyslipidemia LDL 76 August 2019    PLAN:Pt has diarrhea, with recent chemo and DXT for anal cancer ,exacerbated with diet.  No symptom or sign to suggest ACS at this time.  Hydrate. Watch diet  May DC home from CV standpoint.      MARÍA Jones for Dr. Leigh  Cardiology   [facility]    I attest that I have personally seen and examined this patient. I have reviewed and discussed the management in detail as outlined above.

## 2020-06-16 DIAGNOSIS — E11.65 TYPE 2 DIABETES MELLITUS WITH HYPERGLYCEMIA, WITHOUT LONG-TERM CURRENT USE OF INSULIN: ICD-10-CM

## 2020-06-16 NOTE — TELEPHONE ENCOUNTER
----- Message from Jessica Machado sent at 2020  3:51 PM CDT -----  Regarding: refill  Contact: ARMENGARFIELD Bernal  MRN: [id number]  : 1950  PCP: Lalo Sweet  Home Phone      [phone]  Work Phone      Not on file.  Mobile          [phone]  Home Phone      [phone]      MESSAGE:   Pt requesting refill or new Rx.   Is this a refill or new RX:  refill  RX name and strength: JANUVIA 100 mg Tab  Last office visit: 2019  Is this a 30-day or 90-day RX:  30-day  Pharmacy name and location:  [facility]  Comments:

## 2020-06-26 DIAGNOSIS — M51.16 LUMBAR DISC DISEASE WITH RADICULOPATHY: ICD-10-CM

## 2020-06-26 NOTE — TELEPHONE ENCOUNTER
LOV: 12/30/2019  Upcoming OV: 06/30/2020    Patient is requesting a refill for:    1.) hydrocodone  Last fill: 05/08/2020    Pharmacy: [facility]

## 2020-06-26 NOTE — TELEPHONE ENCOUNTER
----- Message from Cindy Cuadra sent at 2020 11:58 AM CDT -----  Contact: fax  Wesley Bernal  MRN: [id number]  : 1950  PCP: Lalo Sweet  Home Phone      [phone]  Work Phone      Not on file.  Mobile          [phone]  Home Phone      [phone]      MESSAGE:   Pt requesting refill or new Rx.   Is this a refill or new RX:  new  RX name and strength:HYDROcodone-acetaminophen (NORCO)  mg per tablet  Last office visit: 19  Is this a 30-day or 90-day RX:  na  Pharm[facility] and location: [facility]  Comments:      Phone: [phone]

## 2020-06-30 DIAGNOSIS — M51.16 LUMBAR DISC DISEASE WITH RADICULOPATHY: ICD-10-CM

## 2020-06-30 RX ORDER — HYDROCODONE BITARTRATE AND ACETAMINOPHEN 10; 325 MG/1; MG/1
1 TABLET ORAL EVERY 6 HOURS PRN
Qty: 50 TABLET | Refills: 0 | Status: SHIPPED | OUTPATIENT
Start: 2020-06-30 | End: 2020-07-21 | Stop reason: SDUPTHER

## 2020-06-30 RX ORDER — HYDROCODONE BITARTRATE AND ACETAMINOPHEN 10; 325 MG/1; MG/1
TABLET ORAL
Qty: 50 TABLET | Refills: 0 | Status: SHIPPED | OUTPATIENT
Start: 2020-06-30 | End: 2020-06-30 | Stop reason: SDUPTHER

## 2020-06-30 NOTE — TELEPHONE ENCOUNTER
----- Message from Cindy Cuadra sent at 2020  8:14 AM CDT -----  [facility] [place] [facility]  Wesley SYLVIA Bernal  MRN: [id number]  : 1950  PCP: Lalo Sweet  Home Phone      [phone]  Work Phone      Not on file.  Mobile          [phone]  Home Phone      [phone]      MESSAGE:   The prescription for HYDROcodone-acetaminophen (NORCO)  mg per tablet doesn't have any directions on it and they are needing the directions.  Phone: [phone]

## 2020-06-30 NOTE — TELEPHONE ENCOUNTER
HYDROcodone-acetaminophen (NORCO)  mg per tablet (Discontinued) 50 tablet 0 12/3/2019 2/10/2020 No   Sig - Route: Take 1 tablet by mouth every 6 (six) hours as needed (GREATER THAN 7 DAY QUANTITY MEDICALLY NECESSARY). - Oral     This was from previous rx

## 2020-07-06 DIAGNOSIS — F41.1 GENERALIZED ANXIETY DISORDER: ICD-10-CM

## 2020-07-06 NOTE — TELEPHONE ENCOUNTER
----- Message from Palma Cole sent at 7/6/2020 11:08 AM CDT -----    MESSAGE:   Pharmacy requesting a refill.   Is this a refill or new RX:  refill  RX name and strength: ALPRAZolam (XANAX) 0.25 MG tablet  Last fill date:01/27/20  Last office visit: 12/30/19  Is this a 30-day or 90-day RX:  30  Pharmacy name and location:  [facility]  Comments:

## 2020-07-07 RX ORDER — ALPRAZOLAM 0.25 MG/1
0.25 TABLET ORAL 2 TIMES DAILY PRN
Qty: 60 TABLET | Refills: 5 | Status: SHIPPED | OUTPATIENT
Start: 2020-07-07 | End: 2021-01-27 | Stop reason: SDUPTHER

## 2020-07-17 PROBLEM — Z45.02 IMPLANTABLE CARDIOVERTER-DEFIBRILLATOR (ICD) AT END OF BATTERY LIFE: Status: ACTIVE | Noted: 2020-07-17

## 2020-07-21 ENCOUNTER — TELEPHONE (OUTPATIENT)
Dept: PHARMACY | Facility: CLINIC | Age: 70
End: 2020-07-21

## 2020-07-21 DIAGNOSIS — M51.16 LUMBAR DISC DISEASE WITH RADICULOPATHY: ICD-10-CM

## 2020-07-21 NOTE — TELEPHONE ENCOUNTER
----- Message from Cindy Cuadra sent at 2020  2:15 PM CDT -----  Contact: fax  Wesley Bernal  MRN: [id number]  : 1950  PCP: Lalo Sweet  Home Phone      [phone]  Work Phone      Not on file.  Mobile          [phone]  Home Phone      [phone]      MESSAGE:   Pt requesting refill or new Rx.   Is this a refill or new RX:  refill  RX name and strength: HYDROcodone-acetaminophen (NORCO)  mg per tablet  Last office visit: 19  Is this a 30-day or 90-day RX:  n/a  Pharmacy name and location:  [facility]  Comments:      Phone:  [phone]

## 2020-07-21 NOTE — TELEPHONE ENCOUNTER
RX name and strength: HYDROcodone-acetaminophen (NORCO)  mg per tablet  Last office visit: 06/21/19  Is this a 30-day or 90-day RX:  n/a  Pharmacy name and location:  [facility]    NORCO LAST PRINTED ON 6/30/2020

## 2020-07-22 RX ORDER — HYDROCODONE BITARTRATE AND ACETAMINOPHEN 10; 325 MG/1; MG/1
1 TABLET ORAL EVERY 6 HOURS PRN
Qty: 50 TABLET | Refills: 0 | Status: SHIPPED | OUTPATIENT
Start: 2020-07-22 | End: 2020-09-03 | Stop reason: SDUPTHER

## 2020-07-29 PROBLEM — I25.5 ISCHEMIC CARDIOMYOPATHY: Status: ACTIVE | Noted: 2020-07-29

## 2020-07-29 PROBLEM — I50.22 CHF (CONGESTIVE HEART FAILURE), NYHA CLASS II, CHRONIC, SYSTOLIC: Status: ACTIVE | Noted: 2020-07-29

## 2020-08-17 ENCOUNTER — OFFICE VISIT (OUTPATIENT)
Dept: FAMILY MEDICINE | Facility: CLINIC | Age: 70
End: 2020-08-17
Payer: MEDICARE

## 2020-08-17 VITALS
TEMPERATURE: 99 F | RESPIRATION RATE: 18 BRPM | HEIGHT: 73 IN | DIASTOLIC BLOOD PRESSURE: 60 MMHG | WEIGHT: 190.5 LBS | SYSTOLIC BLOOD PRESSURE: 132 MMHG | HEART RATE: 54 BPM | BODY MASS INDEX: 25.25 KG/M2

## 2020-08-17 DIAGNOSIS — I50.22 CHF (CONGESTIVE HEART FAILURE), NYHA CLASS II, CHRONIC, SYSTOLIC: ICD-10-CM

## 2020-08-17 DIAGNOSIS — M51.16 LUMBAR DISC DISEASE WITH RADICULOPATHY: ICD-10-CM

## 2020-08-17 DIAGNOSIS — E11.65 TYPE 2 DIABETES MELLITUS WITH HYPERGLYCEMIA, WITHOUT LONG-TERM CURRENT USE OF INSULIN: ICD-10-CM

## 2020-08-17 DIAGNOSIS — Z78.9 STATIN INTOLERANCE: ICD-10-CM

## 2020-08-17 DIAGNOSIS — F41.9 ANXIETY DISORDER, UNSPECIFIED TYPE: ICD-10-CM

## 2020-08-17 DIAGNOSIS — R26.89 SHUFFLING GAIT: Primary | ICD-10-CM

## 2020-08-17 DIAGNOSIS — I48.0 PAROXYSMAL ATRIAL FIBRILLATION: ICD-10-CM

## 2020-08-17 DIAGNOSIS — R63.4 WEIGHT LOSS: ICD-10-CM

## 2020-08-17 PROCEDURE — 99999 PR PBB SHADOW E&M-EST. PATIENT-LVL V: ICD-10-PCS | Mod: PBBFAC,HCNC,, | Performed by: FAMILY MEDICINE

## 2020-08-17 PROCEDURE — 3075F SYST BP GE 130 - 139MM HG: CPT | Mod: HCNC,CPTII,S$GLB, | Performed by: FAMILY MEDICINE

## 2020-08-17 PROCEDURE — 1101F PR PT FALLS ASSESS DOC 0-1 FALLS W/OUT INJ PAST YR: ICD-10-PCS | Mod: HCNC,CPTII,S$GLB, | Performed by: FAMILY MEDICINE

## 2020-08-17 PROCEDURE — 3078F DIAST BP <80 MM HG: CPT | Mod: HCNC,CPTII,S$GLB, | Performed by: FAMILY MEDICINE

## 2020-08-17 PROCEDURE — 99499 RISK ADDL DX/OHS AUDIT: ICD-10-PCS | Mod: HCNC,S$GLB,, | Performed by: FAMILY MEDICINE

## 2020-08-17 PROCEDURE — 1126F PR PAIN SEVERITY QUANTIFIED, NO PAIN PRESENT: ICD-10-PCS | Mod: HCNC,S$GLB,, | Performed by: FAMILY MEDICINE

## 2020-08-17 PROCEDURE — 1101F PT FALLS ASSESS-DOCD LE1/YR: CPT | Mod: HCNC,CPTII,S$GLB, | Performed by: FAMILY MEDICINE

## 2020-08-17 PROCEDURE — 3078F PR MOST RECENT DIASTOLIC BLOOD PRESSURE < 80 MM HG: ICD-10-PCS | Mod: HCNC,CPTII,S$GLB, | Performed by: FAMILY MEDICINE

## 2020-08-17 PROCEDURE — 1159F PR MEDICATION LIST DOCUMENTED IN MEDICAL RECORD: ICD-10-PCS | Mod: HCNC,S$GLB,, | Performed by: FAMILY MEDICINE

## 2020-08-17 PROCEDURE — 99214 OFFICE O/P EST MOD 30 MIN: CPT | Mod: HCNC,S$GLB,, | Performed by: FAMILY MEDICINE

## 2020-08-17 PROCEDURE — 3075F PR MOST RECENT SYSTOLIC BLOOD PRESS GE 130-139MM HG: ICD-10-PCS | Mod: HCNC,CPTII,S$GLB, | Performed by: FAMILY MEDICINE

## 2020-08-17 PROCEDURE — 3008F PR BODY MASS INDEX (BMI) DOCUMENTED: ICD-10-PCS | Mod: HCNC,CPTII,S$GLB, | Performed by: FAMILY MEDICINE

## 2020-08-17 PROCEDURE — 3044F PR MOST RECENT HEMOGLOBIN A1C LEVEL <7.0%: ICD-10-PCS | Mod: HCNC,CPTII,S$GLB, | Performed by: FAMILY MEDICINE

## 2020-08-17 PROCEDURE — 1159F MED LIST DOCD IN RCRD: CPT | Mod: HCNC,S$GLB,, | Performed by: FAMILY MEDICINE

## 2020-08-17 PROCEDURE — 1126F AMNT PAIN NOTED NONE PRSNT: CPT | Mod: HCNC,S$GLB,, | Performed by: FAMILY MEDICINE

## 2020-08-17 PROCEDURE — 99214 PR OFFICE/OUTPT VISIT, EST, LEVL IV, 30-39 MIN: ICD-10-PCS | Mod: HCNC,S$GLB,, | Performed by: FAMILY MEDICINE

## 2020-08-17 PROCEDURE — 3044F HG A1C LEVEL LT 7.0%: CPT | Mod: HCNC,CPTII,S$GLB, | Performed by: FAMILY MEDICINE

## 2020-08-17 PROCEDURE — 99499 UNLISTED E&M SERVICE: CPT | Mod: HCNC,S$GLB,, | Performed by: FAMILY MEDICINE

## 2020-08-17 PROCEDURE — 3008F BODY MASS INDEX DOCD: CPT | Mod: HCNC,CPTII,S$GLB, | Performed by: FAMILY MEDICINE

## 2020-08-17 PROCEDURE — 99999 PR PBB SHADOW E&M-EST. PATIENT-LVL V: CPT | Mod: PBBFAC,HCNC,, | Performed by: FAMILY MEDICINE

## 2020-08-17 RX ORDER — FUROSEMIDE 20 MG/1
20 TABLET ORAL DAILY
Status: ON HOLD | COMMUNITY
Start: 2020-08-15 | End: 2023-01-01 | Stop reason: SDUPTHER

## 2020-08-17 NOTE — PROGRESS NOTES
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 male here for a checkup.    Completed chemo and RXT for metestatic anal cancer.  Still fatigued but slowly getting better.  Had ct abd and chest recently in [place].  No cancer seen.  He has familial intention tremor.  Gait is getting worse.  Starting to shuffle.  His tremor is better on Primadone.  Seen by Dr. Chaidez.  He's been loosing weight.  Patient has a history of peripheral vascular disease and lumbar spinal stenosis.  Lately his pain in his back has been getting worse.  When he walks a short distance the pain the back intensifies and it radiates into his lower extremities.  He has to stop and bend over, the pain resolves, then he's able start walking again.  Patient has iliac artery and femoral artery stents.  He developed severe myalgias from Crestor and Lipitor.  He lost muscle mass.   His cholesterol increased so cardiology now has him on Zetia and Fish oil.  He is now taking and tolerating Repatha.  He has cardiac stents, peripheral stents.  He probably has familial hypercholesterolemia.  He also sees Cardiology for paroxysmal atrial fibrillation.  He is taking amiodarone and aspirin.  Patient has a history of CRI 3, but creatinine is much better.   Renal ultrasound showed chronic kidney disease.  He no longer sees nephrology.  He is now on Januvia, Actos.  He tolerates it well.  His blood sugars are controlled decently.  Patient also has a history of mitral valve replacement, atrial fib, hypertension.  He has a pacemaker and defibrillator in place.  This has not been a problem for him.  He is now seeing Dr. Leigh.  His cardiologist was in [place].  That was his brother-in-law.  2018r patient had bilateral stents placed in his legs and is doing better.  He denies signs or symptoms of  claudications.  He tolerates his blood pressure medication well and is not having side effects such as chronic cough or dizziness.    Clinically his back pain has been stable.  He takes 1 or 2 Norco's per day which helps.  Saw neurology and was started on Gabapentin and is doing better.    ROS:   Gen.:  No fever, chills.  + weight loss   Fatigued.  Not interested in doing things  HEENT: No headaches, sinus congestion, sore throat, change in vision.  Voice getting shakey  CV: No chest pain  RESP: No shortness of breath, wheezing, cough  GI: No change in bowel habits, no diarrhea, no abdominal pain, no hematochezia  : No dysuria, frequency  MSK: Significant muscle pain, joint pain, back pain  NEURO: No numbness, weakness.  Worsening tremor, shuffling gait from back pain  ENDO: No polyuria, polydipsia, heat or cold intolerance    PHYSICAL EXAM;   Vitals:    08/17/20 1056   BP: 132/60   Pulse: (!) 54   Resp: 18   Temp: 98.6 °F (37 °C)     APPEARANCE: Well nourished, well developed, in no acute distress.    HEAD: Normocephalic, atraumatic.  EYES: PERRL. EOMI.      EARS: TM's intact. Light reflex normal. No retraction or perforation.    NOSE: Mucosa pink. Airway clear.  MOUTH & THROAT: No tonsillar enlargement. No pharyngeal erythema or exudate. No stridor.  NECK: Supple.  Bilateral carotid endarterectomy scars  NODES: No cervical, axillary or inguinal lymph node enlargement.  CHEST: Lungs clear to auscultation.  CARDIOVASCULAR: Normal S1, S2. No rubs, murmurs or gallops.  ABDOMEN: Bowel sounds normal. Not distended. Soft. No tenderness or masses.  Abdominal bruits are present  MUSCULOSKELETAL:  No CCE.  Very poor distal pulses in the left leg.  Palpable pulses in the right leg  SKIN: No rashes or pigmented moles.  NEUROLOGIC:       Cranial Nerves: II-XII grossly intact.      Motor: 5/5 strength major flexors/extensors.  Cogwheel rigidity.        DTR's: Knees, Ankles 2+ and equal bilaterally; downgoing toes.       Sensory: Intact to light touch distally.      Gait & Posture: Shuffling gait.  Pill rolling tremor?  Mild cogwheeling?  MENTAL STATUS: Normal orientation to person, place and time, normal affect, normal flow thought, normal memory.    Protective Sensation (w/ 10 gram monofilament):  Right: decreased  Left: decreased    Visual Inspection:  Normal -  Bilateral    Pedal Pulses:   Right: Present  Left: Present    Posterior tibialis:   Right:Present  Left: Present    Lab Results   Component Value Date    HGBA1C 5.9 (H) 12/30/2019     Lab Results   Component Value Date    LDLCALC Invalid, Trig>400.0 06/10/2019     Lab Results   Component Value Date    CREATININE 1.6 (H) 06/09/2020     Lab Results   Component Value Date    PSA 2.1 05/04/2017    PSA 2.0 09/03/2014     Lab Results   Component Value Date    WBC 5.56 06/09/2020    HGB 12.7 (L) 06/09/2020    HCT 38.2 (L) 06/09/2020    MCV 95 06/09/2020     06/09/2020     ASSESSMENT/PLAN:    Atrial fibrillation/Mitral valve replaced  - amiodarone (PACERONE) 200 MG Tab; Take 1 tablet (200 mg total) by mouth once daily.    - Keep appointment with cardiology     Spinal Stenosis of the Lumbar spine  Consider ortho consult  His lower extremity pain sounds like a mixture of PVD and spinal stenosis.  I'll wait and see what cardiology thinks.  He may need to consider lumbar surgery versus lumbar steroid injections.  Continue [place]    PVD  Has abdominal, carotid, femoral artery stent  He sees Dr. Leigh for evaluation    Type 2 diabetes mellitus  I have recommended the following steps for improving diabetic care and outcome to patient::   Referral to Diabetic Education department if necessary.  Diabetic diet discussed in detail, written exchange diet given, low cholesterol diet, weight control and daily exercise discussed.    All medications, side effects and compliance issues discussed.    Long term complications of diabetes discussed.  Home glucose monitoring emphasized.  Fasting morning glucose goals should be less than 140.  2 hour postprandial goal should be less than 180.  Annual eye examinations at Ophthalmology discussed,   Glycohemoglobin and other lab monitoring discussed.  Medications for this patient will be:  - sitagliptin (JANUVIA) 100 MG Tab; Take 1 tablet (100 mg total) by mouth once daily.                Actos 15 mg by mouth daily    HTN (hypertension)/PVD  Two gram sodium diet.    Weight loss discussed.    Try to walk 2 miles per day.    Quit smoking.    Current medications will be:  - atenolol (TENORMIN) 25 MG tablet; Take 1 tablet (25 mg total) by mouth once daily.    - furosemide (LASIX) 20 MG tablet; Take 1 tablet (20 mg total) by mouth 2 (two) times daily.   - lisinopril 10 MG tablet; Take 1 tablet (10 mg total) by mouth twice daily.  Dispense: 30 tablet; Refill: 5              ASA 81 mg po daily              Patient told to take 20 mg of Lasix if he does not have edema.  He can increase to 40 mg daily when he does have some swelling.    Resolved CRI (chronic renal insufficiency), stage 3 (moderate) - GFR > 60  Currently has normal renal fxn.  Monitor renal fxn every 6 months.  Adjust Lasix according to edema    Hyperlipidemia-statin intolerance/Suspect familial hypercholesterolemia.    Low fat diet.  Avoid sweets.  A 10 pound weight loss by the next visit as a  good goal.  Increase consumption of fruits and vegetables, fish and chicken.  Use medications below:  Statin intolerant  Continue Lovaza  Continues Zetia  Continue Repatha.      Tremor/now with shuffling gait/anhedonia  -     Ambulatory referral to Neurology.    -     familial tremor.  His dad had this.  Consider physical therapy after Neuro eval    Aortic atherosclerosis  Keep appointment with Cardiology  Treat blood pressure, lipids aggressively    Shuffling gait  -     Ambulatory referral/consult to Neurology; Future; Expected date: 08/24/2020    Type 2 diabetes mellitus with hyperglycemia, without  long-term current use of insulin  -     Hemoglobin A1C; Future; Expected date: 08/17/2020    Anxiety disorder, unspecified type    Statin intolerance    Lumbar disc disease with radiculopathy    CHF (congestive heart failure), NYHA class II, chronic, systolic    Paroxysmal atrial fibrillation    Weight loss  -     TSH; Future; Expected date: 08/17/2020        Next appointment will be in 6 months.

## 2020-08-18 ENCOUNTER — TELEPHONE (OUTPATIENT)
Dept: PHARMACY | Facility: CLINIC | Age: 70
End: 2020-08-18

## 2020-08-18 NOTE — PROGRESS NOTES
Patient, Wesley Bernal (MRN #[id number]), presented with a recent Estimated Glumerular Filtration Rate (EGFR) between 30 and 45 consistent with the definition of chronic kidney disease stage 3 - moderate (ICD10 - N18.3).    eGFR if non    Date Value Ref Range Status   06/09/2020 44 (A) >60 mL/min/1.73 m^2 Final     Comment:     Calculation used to obtain the estimated glomerular filtration  rate (eGFR) is the CKD-EPI equation.          The patient's chronic kidney disease stage 3 was monitored, evaluated, addressed and/or treated. This addendum to the medical record is made on 08/17/2020.

## 2020-08-19 ENCOUNTER — PATIENT OUTREACH (OUTPATIENT)
Dept: ADMINISTRATIVE | Facility: OTHER | Age: 70
End: 2020-08-19

## 2020-08-19 NOTE — PROGRESS NOTES
Health Maintenance Due   Topic Date Due    TETANUS VACCINE  11/10/1969    Sign Pain Contract  11/10/1969    Complete Opioid Risk Tool  11/10/1969    High Dose Statin  11/10/1972    Shingles Vaccine (1 of 2) 11/10/2001    Colorectal Cancer Screening  11/10/2001    Abdominal Aortic Aneurysm Screening  11/10/2016    Urine Microalbumin  02/21/2018    Pneumococcal Vaccine (65+ High/Highest Risk) (2 of 2 - PPSV23) 01/28/2019    Eye Exam  03/16/2019    Foot Exam  12/03/2019     Updates were requested from care everywhere.  Chart was reviewed for overdue Proactive [facility] Encounters ([facility]) topics (CRS, Breast Cancer Screening, Eye exam)  Health Maintenance has been updated.  LINKS immunization registry triggered.  Immunizations were reconciled.  Pt has open case request for colonoscopy. FIT kit not ordered.

## 2020-09-01 ENCOUNTER — CLINICAL SUPPORT (OUTPATIENT)
Dept: FAMILY MEDICINE | Facility: CLINIC | Age: 70
End: 2020-09-01
Payer: MEDICARE

## 2020-09-01 DIAGNOSIS — R63.4 WEIGHT LOSS: ICD-10-CM

## 2020-09-01 DIAGNOSIS — E11.9 TYPE 2 DIABETES MELLITUS WITHOUT COMPLICATION, UNSPECIFIED WHETHER LONG TERM INSULIN USE: ICD-10-CM

## 2020-09-01 DIAGNOSIS — E11.9 TYPE 2 DIABETES MELLITUS WITHOUT COMPLICATION: ICD-10-CM

## 2020-09-01 DIAGNOSIS — E11.65 TYPE 2 DIABETES MELLITUS WITH HYPERGLYCEMIA, WITHOUT LONG-TERM CURRENT USE OF INSULIN: ICD-10-CM

## 2020-09-01 LAB
ALBUMIN/CREAT UR: 85.4 UG/MG (ref 0–30)
CREAT UR-MCNC: 187.4 MG/DL (ref 23–375)
MICROALBUMIN UR DL<=1MG/L-MCNC: 160 UG/ML
TSH SERPL DL<=0.005 MIU/L-ACNC: 2.64 UIU/ML (ref 0.4–4)

## 2020-09-01 PROCEDURE — 82043 UR ALBUMIN QUANTITATIVE: CPT | Mod: HCNC

## 2020-09-01 PROCEDURE — 36415 COLL VENOUS BLD VENIPUNCTURE: CPT | Mod: HCNC,S$GLB,, | Performed by: FAMILY MEDICINE

## 2020-09-01 PROCEDURE — 36415 PR COLLECTION VENOUS BLOOD,VENIPUNCTURE: ICD-10-PCS | Mod: HCNC,S$GLB,, | Performed by: FAMILY MEDICINE

## 2020-09-01 PROCEDURE — 83036 HEMOGLOBIN GLYCOSYLATED A1C: CPT | Mod: HCNC

## 2020-09-01 PROCEDURE — 84443 ASSAY THYROID STIM HORMONE: CPT | Mod: HCNC

## 2020-09-02 LAB
ESTIMATED AVG GLUCOSE: 131 MG/DL (ref 68–131)
HBA1C MFR BLD HPLC: 6.2 % (ref 4–5.6)

## 2020-09-03 DIAGNOSIS — M51.16 LUMBAR DISC DISEASE WITH RADICULOPATHY: ICD-10-CM

## 2020-09-03 NOTE — TELEPHONE ENCOUNTER
----- Message from Cindy Cuadra sent at 9/3/2020  1:27 PM CDT -----  Contact: fax  Wesley Bernal  MRN: [id number]  : 1950  PCP: Lalo Sweet  Home Phone      [phone]  Work Phone      Not on file.  Mobile          [phone]  Home Phone      [phone]      MESSAGE:   Pt requesting refill or new Rx.   Is this a refill or new RX: new  RX name and strength: HYDROcodone-acetaminophen (NORCO)  mg per tablet  Last office visit: 2020  Is this a 30-day or 90-day RX:  n/a  [facility] and location:  [facility]  Comments:      Phone:  [phone]

## 2020-09-03 NOTE — TELEPHONE ENCOUNTER
RX name and strength: HYDROcodone-acetaminophen (NORCO)  mg per tablet  Last office visit: 08/17/2020  Is this a 30-day or 90-day RX:  n/a  Pharmacy name and location:  [facility]      norco last printed on 7/22/2020

## 2020-09-04 RX ORDER — HYDROCODONE BITARTRATE AND ACETAMINOPHEN 10; 325 MG/1; MG/1
1 TABLET ORAL EVERY 6 HOURS PRN
Qty: 50 TABLET | Refills: 0 | Status: ON HOLD | OUTPATIENT
Start: 2020-09-04 | End: 2020-09-20 | Stop reason: SDUPTHER

## 2020-09-15 ENCOUNTER — HOSPITAL ENCOUNTER (EMERGENCY)
Facility: HOSPITAL | Age: 70
Discharge: HOME | End: 2020-09-15
Attending: SURGERY
Payer: MEDICARE

## 2020-09-15 VITALS
HEART RATE: 53 BPM | OXYGEN SATURATION: 98 % | RESPIRATION RATE: 20 BRPM | DIASTOLIC BLOOD PRESSURE: 67 MMHG | SYSTOLIC BLOOD PRESSURE: 147 MMHG

## 2020-09-15 DIAGNOSIS — L03.211 FACIAL CELLULITIS: Primary | ICD-10-CM

## 2020-09-15 LAB
ALBUMIN SERPL BCP-MCNC: 3.6 G/DL (ref 3.5–5.2)
ALP SERPL-CCNC: 68 U/L (ref 55–135)
ALT SERPL W/O P-5'-P-CCNC: 11 U/L (ref 10–44)
ANION GAP SERPL CALC-SCNC: 10 MMOL/L (ref 8–16)
AST SERPL-CCNC: 13 U/L (ref 10–40)
BASOPHILS # BLD AUTO: 0.03 K/UL (ref 0–0.2)
BASOPHILS NFR BLD: 0.6 % (ref 0–1.9)
BILIRUB SERPL-MCNC: 0.5 MG/DL (ref 0.1–1)
BUN SERPL-MCNC: 22 MG/DL (ref 8–23)
CALCIUM SERPL-MCNC: 8.9 MG/DL (ref 8.7–10.5)
CHLORIDE SERPL-SCNC: 103 MMOL/L (ref 95–110)
CO2 SERPL-SCNC: 24 MMOL/L (ref 23–29)
CREAT SERPL-MCNC: 1.6 MG/DL (ref 0.5–1.4)
DIFFERENTIAL METHOD: ABNORMAL
EOSINOPHIL # BLD AUTO: 0.1 K/UL (ref 0–0.5)
EOSINOPHIL NFR BLD: 1.5 % (ref 0–8)
ERYTHROCYTE [DISTWIDTH] IN BLOOD BY AUTOMATED COUNT: 13.3 % (ref 11.5–14.5)
EST. GFR  (AFRICAN AMERICAN): 50 ML/MIN/1.73 M^2
EST. GFR  (NON AFRICAN AMERICAN): 44 ML/MIN/1.73 M^2
GLUCOSE SERPL-MCNC: 127 MG/DL (ref 70–110)
HCT VFR BLD AUTO: 32.7 % (ref 40–54)
HGB BLD-MCNC: 10.8 G/DL (ref 14–18)
IMM GRANULOCYTES # BLD AUTO: 0.02 K/UL (ref 0–0.04)
IMM GRANULOCYTES NFR BLD AUTO: 0.4 % (ref 0–0.5)
LYMPHOCYTES # BLD AUTO: 0.4 K/UL (ref 1–4.8)
LYMPHOCYTES NFR BLD: 8.3 % (ref 18–48)
MCH RBC QN AUTO: 32.1 PG (ref 27–31)
MCHC RBC AUTO-ENTMCNC: 33 G/DL (ref 32–36)
MCV RBC AUTO: 97 FL (ref 82–98)
MONOCYTES # BLD AUTO: 0.5 K/UL (ref 0.3–1)
MONOCYTES NFR BLD: 11.5 % (ref 4–15)
NEUTROPHILS # BLD AUTO: 3.6 K/UL (ref 1.8–7.7)
NEUTROPHILS NFR BLD: 77.7 % (ref 38–73)
NRBC BLD-RTO: 0 /100 WBC
PLATELET # BLD AUTO: 127 K/UL (ref 150–350)
PMV BLD AUTO: 9 FL (ref 9.2–12.9)
POTASSIUM SERPL-SCNC: 4.8 MMOL/L (ref 3.5–5.1)
PROT SERPL-MCNC: 6.9 G/DL (ref 6–8.4)
RBC # BLD AUTO: 3.36 M/UL (ref 4.6–6.2)
SODIUM SERPL-SCNC: 137 MMOL/L (ref 136–145)
WBC # BLD AUTO: 4.69 K/UL (ref 3.9–12.7)

## 2020-09-15 PROCEDURE — 96365 THER/PROPH/DIAG IV INF INIT: CPT | Mod: HCNC

## 2020-09-15 PROCEDURE — 85025 COMPLETE CBC W/AUTO DIFF WBC: CPT | Mod: HCNC

## 2020-09-15 PROCEDURE — 63600175 PHARM REV CODE 636 W HCPCS: Mod: HCNC | Performed by: SURGERY

## 2020-09-15 PROCEDURE — 96361 HYDRATE IV INFUSION ADD-ON: CPT | Mod: HCNC

## 2020-09-15 PROCEDURE — 99284 EMERGENCY DEPT VISIT MOD MDM: CPT | Mod: 25,HCNC

## 2020-09-15 PROCEDURE — 25000003 PHARM REV CODE 250: Mod: HCNC | Performed by: SURGERY

## 2020-09-15 PROCEDURE — 96375 TX/PRO/DX INJ NEW DRUG ADDON: CPT | Mod: HCNC

## 2020-09-15 PROCEDURE — 80053 COMPREHEN METABOLIC PANEL: CPT | Mod: HCNC

## 2020-09-15 RX ORDER — SULFAMETHOXAZOLE AND TRIMETHOPRIM 800; 160 MG/1; MG/1
1 TABLET ORAL
Status: COMPLETED | OUTPATIENT
Start: 2020-09-15 | End: 2020-09-15

## 2020-09-15 RX ORDER — SODIUM CHLORIDE 9 MG/ML
1000 INJECTION, SOLUTION INTRAVENOUS
Status: COMPLETED | OUTPATIENT
Start: 2020-09-15 | End: 2020-09-15

## 2020-09-15 RX ORDER — SULFAMETHOXAZOLE AND TRIMETHOPRIM 800; 160 MG/1; MG/1
1 TABLET ORAL 2 TIMES DAILY
Qty: 14 TABLET | Refills: 0 | Status: ON HOLD | OUTPATIENT
Start: 2020-09-15 | End: 2020-09-20 | Stop reason: HOSPADM

## 2020-09-15 RX ORDER — METHYLPREDNISOLONE SOD SUCC 125 MG
125 VIAL (EA) INJECTION
Status: COMPLETED | OUTPATIENT
Start: 2020-09-15 | End: 2020-09-15

## 2020-09-15 RX ADMIN — METHYLPREDNISOLONE SODIUM SUCCINATE 125 MG: 125 INJECTION, POWDER, FOR SOLUTION INTRAMUSCULAR; INTRAVENOUS at 11:09

## 2020-09-15 RX ADMIN — CEFTRIAXONE 2 G: 2 INJECTION, SOLUTION INTRAVENOUS at 11:09

## 2020-09-15 RX ADMIN — SODIUM CHLORIDE 1000 ML: 0.9 INJECTION, SOLUTION INTRAVENOUS at 12:09

## 2020-09-15 RX ADMIN — SULFAMETHOXAZOLE AND TRIMETHOPRIM 1 TABLET: 800; 160 TABLET ORAL at 12:09

## 2020-09-15 NOTE — ED TRIAGE NOTES
Pt reports last night at 1 am he woke up to use the restroom and noticed his left eye was swollen and he had bumps and a rash to left side of his forehead and scalp. Pt reports his eye hurts. During triage patient became weak and reported he felt like he was about to pass out. Pt brought to room via wheelchair and helped in bed to lay down.

## 2020-09-15 NOTE — ED PROVIDER NOTES
Encounter Date: 9/15/2020       History     Chief Complaint   Patient presents with    Rash    Facial Swelling     left eye swelling.     Eye Pain     left eye pain .     Patient is 69-year-old white male who awoke with swelling and rash involving the left side of his face, to include the forehead and the eliane orbital area.  Eyelid has swollen to the point that it is almost closed.  No visual changes are reported.  No drainage from the eye reported.        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    Cardiomyopathy     Carotid artery disease     Coronary artery disease     Diabetes mellitus type II     Encounter for blood transfusion     Heart disease     Hyperlipidemia     Hypertension     PAD (peripheral artery disease)      Past Surgical History:   Procedure Laterality Date    ABDOMINAL AORTA STENT      BRACHIOCEPHALIC VEIN ANGIOPLASTY / STENTING      CARDIAC DEFIBRILLATOR PLACEMENT      1/2011    CARDIAC PACEMAKER PLACEMENT      1-2011    CARDIAC SURGERY      open heart    CARDIAC VALVE SURGERY      pig valve replacement -    CAROTID STENT      LASIK SX Bilateral     LYMPH NODE BIOPSY Left 6/17/2019    Procedure: BIOPSY, LYMPH NODE (GROIN);  Surgeon: Adelfo Yin MD;  Location: [facility] OR;  Service: General;  Laterality: Left;    REPLACEMENT OF IMPLANTABLE CARDIOVERTER-DEFIBRILLATOR (ICD) GENERATOR N/A 7/29/2020    Procedure: REPLACEMENT, ICD GENERATOR;  Surgeon: Marko Hartley MD;  Location: [facility] CATH;  Service: Cardiology;  Laterality: N/A;     Family History   Problem Relation Age of Onset    Diabetes Mother     Heart disease Father     Cancer Father         melanoma, prostate    Heart disease  Paternal Grandfather      Social History     Tobacco Use    Smoking status: Former Smoker     Packs/day: 1.00     Types: Cigarettes, Cigars     Quit date: 4/10/2015     Years since quittin.4    Smokeless tobacco: Never Used   Substance Use Topics    Alcohol use: No     Alcohol/week: 0.0 standard drinks    Drug use: No     Review of Systems   Constitutional: Negative for fever.   HENT: Negative for sore throat.    Respiratory: Negative for shortness of breath.    Cardiovascular: Negative for chest pain.   Gastrointestinal: Negative for nausea.   Genitourinary: Negative for dysuria.   Musculoskeletal: Negative for back pain.   Skin: Positive for color change. Negative for rash.   Neurological: Negative for weakness.   Hematological: Does not bruise/bleed easily.       Physical Exam     Initial Vitals   BP Pulse Resp Temp SpO2   09/15/20 1054 09/15/20 1053 09/15/20 1053 09/15/20 1047 09/15/20 1053   (!) 205/82 (!) 51 20 (P) 97.8 °F (36.6 °C) 99 %      MAP       --                Physical Exam    Nursing note and vitals reviewed.  Constitutional: He appears well-developed and well-nourished.   HENT:   Head: Normocephalic and atraumatic.   Eyes: EOM are normal. Pupils are equal, round, and reactive to light.   Neck: Normal range of motion. Neck supple.   Cardiovascular: Normal rate.   Pulmonary/Chest: Breath sounds normal. No respiratory distress. He has no wheezes.   Abdominal: Soft. He exhibits no distension. There is no abdominal tenderness.   Musculoskeletal: Normal range of motion.   Neurological: He is alert and oriented to person, place, and time. GCS score is 15. GCS eye subscore is 4. GCS verbal subscore is 5. GCS motor subscore is 6.   Skin: Skin is warm and dry. Rash noted. There is erythema.   Erythema involving the left side of the face, to include the left periorbital area.  EOM intact in the left eye.   Psychiatric: He has a normal mood and affect. Thought content normal.         ED Course    Procedures  Labs Reviewed - No data to display       Imaging Results    None                                      Clinical Impression:       ICD-10-CM ICD-9-CM   1. Facial cellulitis  L03.211 682.0         Disposition:   Disposition: Discharged  Condition: Stable                          Ric Sanchez Jr., MD  09/15/20 [id number]

## 2020-09-17 ENCOUNTER — HOSPITAL ENCOUNTER (EMERGENCY)
Facility: HOSPITAL | Age: 70
Discharge: ANOTHER HOSPITAL | End: 2020-09-18
Attending: SURGERY | Admitting: HOSPITALIST
Payer: MEDICARE

## 2020-09-17 DIAGNOSIS — B02.32 HERPES ZOSTER IRITIS: Primary | ICD-10-CM

## 2020-09-17 DIAGNOSIS — H16.002 CORNEAL ULCER (INCLUDING HERPETIC), LEFT: ICD-10-CM

## 2020-09-17 DIAGNOSIS — R22.0 FACIAL SWELLING: ICD-10-CM

## 2020-09-17 DIAGNOSIS — R79.89 ELEVATED TROPONIN: ICD-10-CM

## 2020-09-17 DIAGNOSIS — I50.9 CONGESTIVE HEART FAILURE, UNSPECIFIED HF CHRONICITY, UNSPECIFIED HEART FAILURE TYPE: ICD-10-CM

## 2020-09-17 DIAGNOSIS — B02.9 SHINGLES OUTBREAK: ICD-10-CM

## 2020-09-17 LAB
ALBUMIN SERPL BCP-MCNC: 3.9 G/DL (ref 3.5–5.2)
ALP SERPL-CCNC: 74 U/L (ref 55–135)
ALT SERPL W/O P-5'-P-CCNC: 14 U/L (ref 10–44)
ANION GAP SERPL CALC-SCNC: 13 MMOL/L (ref 8–16)
AST SERPL-CCNC: 12 U/L (ref 10–40)
BACTERIA #/AREA URNS HPF: ABNORMAL /HPF
BASOPHILS # BLD AUTO: ABNORMAL K/UL (ref 0–0.2)
BASOPHILS NFR BLD: 1 % (ref 0–1.9)
BILIRUB SERPL-MCNC: 0.5 MG/DL (ref 0.1–1)
BILIRUB UR QL STRIP: NEGATIVE
BNP SERPL-MCNC: 1098 PG/ML (ref 0–99)
BUN SERPL-MCNC: 19 MG/DL (ref 8–23)
CALCIUM SERPL-MCNC: 9.3 MG/DL (ref 8.7–10.5)
CHLORIDE SERPL-SCNC: 99 MMOL/L (ref 95–110)
CK MB SERPL-MCNC: 1.3 NG/ML (ref 0.1–6.5)
CK MB SERPL-RTO: 4.2 % (ref 0–5)
CK SERPL-CCNC: 31 U/L (ref 20–200)
CK SERPL-CCNC: 31 U/L (ref 20–200)
CLARITY UR: CLEAR
CO2 SERPL-SCNC: 22 MMOL/L (ref 23–29)
COLOR UR: YELLOW
CREAT SERPL-MCNC: 1.5 MG/DL (ref 0.5–1.4)
DIFFERENTIAL METHOD: ABNORMAL
EOSINOPHIL # BLD AUTO: ABNORMAL K/UL (ref 0–0.5)
EOSINOPHIL NFR BLD: 0 % (ref 0–8)
ERYTHROCYTE [DISTWIDTH] IN BLOOD BY AUTOMATED COUNT: 13.2 % (ref 11.5–14.5)
EST. GFR  (AFRICAN AMERICAN): 55 ML/MIN/1.73 M^2
EST. GFR  (NON AFRICAN AMERICAN): 47 ML/MIN/1.73 M^2
GLUCOSE SERPL-MCNC: 195 MG/DL (ref 70–110)
GLUCOSE UR QL STRIP: ABNORMAL
HCT VFR BLD AUTO: 37.5 % (ref 40–54)
HGB BLD-MCNC: 12.8 G/DL (ref 14–18)
HGB UR QL STRIP: ABNORMAL
HYALINE CASTS #/AREA URNS LPF: 0 /LPF
IMM GRANULOCYTES # BLD AUTO: ABNORMAL K/UL (ref 0–0.04)
IMM GRANULOCYTES NFR BLD AUTO: ABNORMAL % (ref 0–0.5)
KETONES UR QL STRIP: NEGATIVE
LACTATE SERPL-SCNC: 1.6 MMOL/L (ref 0.5–2.2)
LEUKOCYTE ESTERASE UR QL STRIP: NEGATIVE
LYMPHOCYTES # BLD AUTO: ABNORMAL K/UL (ref 1–4.8)
LYMPHOCYTES NFR BLD: 12 % (ref 18–48)
MCH RBC QN AUTO: 31.8 PG (ref 27–31)
MCHC RBC AUTO-ENTMCNC: 34.1 G/DL (ref 32–36)
MCV RBC AUTO: 93 FL (ref 82–98)
MICROSCOPIC COMMENT: ABNORMAL
MONOCYTES # BLD AUTO: ABNORMAL K/UL (ref 0.3–1)
MONOCYTES NFR BLD: 9 % (ref 4–15)
NEUTROPHILS NFR BLD: 76 % (ref 38–73)
NEUTS BAND NFR BLD MANUAL: 2 %
NITRITE UR QL STRIP: NEGATIVE
NRBC BLD-RTO: 0 /100 WBC
PH UR STRIP: 7 [PH] (ref 5–8)
PLATELET # BLD AUTO: 147 K/UL (ref 150–350)
PLATELET BLD QL SMEAR: ABNORMAL
PMV BLD AUTO: 9.2 FL (ref 9.2–12.9)
POTASSIUM SERPL-SCNC: 4.2 MMOL/L (ref 3.5–5.1)
PROT SERPL-MCNC: 7.6 G/DL (ref 6–8.4)
PROT UR QL STRIP: ABNORMAL
RBC # BLD AUTO: 4.02 M/UL (ref 4.6–6.2)
RBC #/AREA URNS HPF: 10 /HPF (ref 0–4)
SARS-COV-2 RDRP RESP QL NAA+PROBE: NEGATIVE
SODIUM SERPL-SCNC: 134 MMOL/L (ref 136–145)
SP GR UR STRIP: 1.01 (ref 1–1.03)
SQUAMOUS #/AREA URNS HPF: 1 /HPF
TROPONIN I SERPL DL<=0.01 NG/ML-MCNC: 0.07 NG/ML (ref 0–0.03)
URN SPEC COLLECT METH UR: ABNORMAL
UROBILINOGEN UR STRIP-ACNC: NEGATIVE EU/DL
WBC # BLD AUTO: 5.16 K/UL (ref 3.9–12.7)
WBC #/AREA URNS HPF: 0 /HPF (ref 0–5)

## 2020-09-17 PROCEDURE — 82550 ASSAY OF CK (CPK): CPT | Mod: HCNC

## 2020-09-17 PROCEDURE — U0002 COVID-19 LAB TEST NON-CDC: HCPCS | Mod: HCNC

## 2020-09-17 PROCEDURE — 96361 HYDRATE IV INFUSION ADD-ON: CPT | Mod: HCNC

## 2020-09-17 PROCEDURE — 84484 ASSAY OF TROPONIN QUANT: CPT | Mod: HCNC

## 2020-09-17 PROCEDURE — 93010 ELECTROCARDIOGRAM REPORT: CPT | Mod: HCNC,,, | Performed by: INTERNAL MEDICINE

## 2020-09-17 PROCEDURE — 85027 COMPLETE CBC AUTOMATED: CPT | Mod: HCNC

## 2020-09-17 PROCEDURE — 83605 ASSAY OF LACTIC ACID: CPT | Mod: HCNC

## 2020-09-17 PROCEDURE — 99285 EMERGENCY DEPT VISIT HI MDM: CPT | Mod: 25,HCNC

## 2020-09-17 PROCEDURE — 83880 ASSAY OF NATRIURETIC PEPTIDE: CPT | Mod: HCNC

## 2020-09-17 PROCEDURE — 25000003 PHARM REV CODE 250: Mod: HCNC | Performed by: SURGERY

## 2020-09-17 PROCEDURE — 80053 COMPREHEN METABOLIC PANEL: CPT | Mod: HCNC

## 2020-09-17 PROCEDURE — 85007 BL SMEAR W/DIFF WBC COUNT: CPT | Mod: HCNC

## 2020-09-17 PROCEDURE — 63600175 PHARM REV CODE 636 W HCPCS: Mod: HCNC | Performed by: SURGERY

## 2020-09-17 PROCEDURE — 63600175 PHARM REV CODE 636 W HCPCS: Mod: HCNC | Performed by: NURSE PRACTITIONER

## 2020-09-17 PROCEDURE — 96376 TX/PRO/DX INJ SAME DRUG ADON: CPT | Mod: HCNC

## 2020-09-17 PROCEDURE — 93005 ELECTROCARDIOGRAM TRACING: CPT | Mod: HCNC

## 2020-09-17 PROCEDURE — 81000 URINALYSIS NONAUTO W/SCOPE: CPT | Mod: HCNC

## 2020-09-17 PROCEDURE — 93010 EKG 12-LEAD: ICD-10-PCS | Mod: HCNC,,, | Performed by: INTERNAL MEDICINE

## 2020-09-17 PROCEDURE — 82553 CREATINE MB FRACTION: CPT | Mod: HCNC

## 2020-09-17 PROCEDURE — 87040 BLOOD CULTURE FOR BACTERIA: CPT | Mod: 59,HCNC

## 2020-09-17 PROCEDURE — 96365 THER/PROPH/DIAG IV INF INIT: CPT | Mod: HCNC

## 2020-09-17 PROCEDURE — 96375 TX/PRO/DX INJ NEW DRUG ADDON: CPT | Mod: HCNC

## 2020-09-17 PROCEDURE — 36415 COLL VENOUS BLD VENIPUNCTURE: CPT | Mod: HCNC

## 2020-09-17 RX ORDER — MORPHINE SULFATE 2 MG/ML
2 INJECTION, SOLUTION INTRAMUSCULAR; INTRAVENOUS
Status: COMPLETED | OUTPATIENT
Start: 2020-09-17 | End: 2020-09-17

## 2020-09-17 RX ORDER — TETRACAINE HYDROCHLORIDE 5 MG/ML
2 SOLUTION OPHTHALMIC
Status: COMPLETED | OUTPATIENT
Start: 2020-09-17 | End: 2020-09-17

## 2020-09-17 RX ORDER — ACETAMINOPHEN 500 MG
1000 TABLET ORAL
Status: COMPLETED | OUTPATIENT
Start: 2020-09-17 | End: 2020-09-17

## 2020-09-17 RX ORDER — MORPHINE SULFATE 2 MG/ML
1 INJECTION, SOLUTION INTRAMUSCULAR; INTRAVENOUS
Status: COMPLETED | OUTPATIENT
Start: 2020-09-17 | End: 2020-09-17

## 2020-09-17 RX ORDER — ONDANSETRON 2 MG/ML
4 INJECTION INTRAMUSCULAR; INTRAVENOUS
Status: COMPLETED | OUTPATIENT
Start: 2020-09-17 | End: 2020-09-17

## 2020-09-17 RX ORDER — FUROSEMIDE 10 MG/ML
40 INJECTION INTRAMUSCULAR; INTRAVENOUS
Status: COMPLETED | OUTPATIENT
Start: 2020-09-17 | End: 2020-09-17

## 2020-09-17 RX ADMIN — MORPHINE SULFATE 1 MG: 2 INJECTION, SOLUTION INTRAMUSCULAR; INTRAVENOUS at 07:09

## 2020-09-17 RX ADMIN — ACETAMINOPHEN 1000 MG: 500 TABLET ORAL at 07:09

## 2020-09-17 RX ADMIN — FLUORESCEIN SODIUM 1 EACH: 1 STRIP OPHTHALMIC at 05:09

## 2020-09-17 RX ADMIN — ONDANSETRON 4 MG: 2 INJECTION INTRAMUSCULAR; INTRAVENOUS at 05:09

## 2020-09-17 RX ADMIN — FUROSEMIDE 40 MG: 10 INJECTION, SOLUTION INTRAMUSCULAR; INTRAVENOUS at 07:09

## 2020-09-17 RX ADMIN — MORPHINE SULFATE 2 MG: 2 INJECTION, SOLUTION INTRAMUSCULAR; INTRAVENOUS at 10:09

## 2020-09-17 RX ADMIN — TETRACAINE HYDROCHLORIDE 2 DROP: 5 SOLUTION OPHTHALMIC at 05:09

## 2020-09-17 RX ADMIN — SODIUM CHLORIDE 1000 ML: 0.9 INJECTION, SOLUTION INTRAVENOUS at 05:09

## 2020-09-17 RX ADMIN — ACYCLOVIR SODIUM 800 MG: 500 INJECTION, SOLUTION INTRAVENOUS at 06:09

## 2020-09-17 RX ADMIN — ONDANSETRON 4 MG: 2 INJECTION INTRAMUSCULAR; INTRAVENOUS at 07:09

## 2020-09-17 NOTE — ED TRIAGE NOTES
69 y.o. male presents to ER ED 05/ED 05   Chief Complaint   Patient presents with    Facial Swelling     left periorbital swelling, redness and tenderness    Rash     left forehead   . No acute distress noted.

## 2020-09-17 NOTE — ED PROVIDER NOTES
Encounter Date: 9/17/2020       History     Chief Complaint   Patient presents with    Facial Swelling     left periorbital swelling, redness and tenderness    Rash     left forehead     Wesley Bernal is a 69 y.o. male with PMH of anxiety, atrial fibrillation, CAD, cardiomyopathy, DM type 2, hyperlipidemia, hypertension, PAD who presents the ED for evaluation of left periorbital swelling, painful rash.  Patient was seen in the ED on 09/15, diagnosed with left facial cellulitis.  He was discharged home with a prescription for Bactrim DS.  Patient reports that since this time rash has worsened with increased pain and swelling.  He presents with left facial crusted, vesicular/pustular rash on erythematous base, including nose with + periorbital swelling and pain.     The history is provided by the patient.     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    Cardiomyopathy     Carotid artery disease     Coronary artery disease     Diabetes mellitus type II     Encounter for blood transfusion     Heart disease     Hyperlipidemia     Hypertension     PAD (peripheral artery disease)      Past Surgical History:   Procedure Laterality Date    ABDOMINAL AORTA STENT      BRACHIOCEPHALIC VEIN ANGIOPLASTY / STENTING      CARDIAC DEFIBRILLATOR PLACEMENT      1/2011    CARDIAC PACEMAKER PLACEMENT      1-2011    CARDIAC SURGERY      open heart    CARDIAC VALVE SURGERY      pig valve replacement -    CAROTID STENT      LASIK SX Bilateral     LYMPH NODE BIOPSY Left 6/17/2019    Procedure: BIOPSY, LYMPH NODE (GROIN);  Surgeon: Adelfo Yin MD;  Location: [facility] OR;  Service: General;  Laterality: Left;    REPLACEMENT OF  IMPLANTABLE CARDIOVERTER-DEFIBRILLATOR (ICD) GENERATOR N/A 2020    Procedure: REPLACEMENT, ICD GENERATOR;  Surgeon: Marko Hartley MD;  Location: [facility] CATH;  Service: Cardiology;  Laterality: N/A;     Family History   Problem Relation Age of Onset    Diabetes Mother     Heart disease Father     Cancer Father         melanoma, prostate    Heart disease Paternal Grandfather      Social History     Tobacco Use    Smoking status: Former Smoker     Packs/day: 1.00     Types: Cigarettes, Cigars     Quit date: 4/10/2015     Years since quittin.4    Smokeless tobacco: Never Used   Substance Use Topics    Alcohol use: No     Alcohol/week: 0.0 standard drinks    Drug use: No     Review of Systems   Constitutional: Negative.  Negative for appetite change, chills and fever.   HENT: Negative.  Negative for congestion, ear discharge, ear pain, postnasal drip, rhinorrhea and sore throat.    Eyes: Positive for photophobia, pain, redness and visual disturbance.   Respiratory: Negative.  Negative for cough, chest tightness and shortness of breath.    Cardiovascular: Negative.  Negative for chest pain.   Gastrointestinal: Negative.  Negative for abdominal distention, abdominal pain and nausea.   Endocrine: Negative.    Genitourinary: Negative.  Negative for dysuria, flank pain, hematuria and urgency.   Musculoskeletal: Negative.  Negative for arthralgias and back pain.   Skin: Positive for wound (left forehead/eye crusted rash. ). Negative for rash.   Allergic/Immunologic: Negative.    Neurological: Negative.  Negative for dizziness, weakness, numbness and headaches.   Hematological: Negative.  Does not bruise/bleed easily.   Psychiatric/Behavioral: Negative.        Physical Exam     Initial Vitals [20 1655]   BP Pulse Resp Temp SpO2   110/71 73 20 97.7 °F (36.5 °C) 99 %      MAP       --         Physical Exam    Constitutional: He appears well-developed and well-nourished.   HENT:   Head: Normocephalic and  atraumatic.   Eyes: EOM are normal. Pupils are equal, round, and reactive to light. Left eye exhibits no discharge. Left conjunctiva is injected.   Neck: Normal range of motion. Neck supple.   Cardiovascular: Normal rate, regular rhythm, normal heart sounds and intact distal pulses.   Pulmonary/Chest: Breath sounds normal.   Abdominal: Soft. Bowel sounds are normal.   Musculoskeletal: Normal range of motion.   Neurological: He is alert and oriented to person, place, and time. He has normal strength.   Skin: Skin is warm and dry. Capillary refill takes less than 2 seconds. Rash noted. Rash is pustular and vesicular. There is erythema (crusted lesions on erythematous base).   Psychiatric: He has a normal mood and affect. His behavior is normal. Judgment and thought content normal.             ED Course   Procedures  Labs Reviewed   COMPREHENSIVE METABOLIC PANEL - Abnormal; Notable for the following components:       Result Value    Sodium 134 (*)     CO2 22 (*)     Glucose 195 (*)     Creatinine 1.5 (*)     eGFR if  55 (*)     eGFR if non  47 (*)     All other components within normal limits   CBC W/ AUTO DIFFERENTIAL - Abnormal; Notable for the following components:    RBC 4.02 (*)     Hemoglobin 12.8 (*)     Hematocrit 37.5 (*)     Mean Corpuscular Hemoglobin 31.8 (*)     Platelets 147 (*)     Gran% 76.0 (*)     Lymph% 12.0 (*)     Platelet Estimate Decreased (*)     All other components within normal limits   URINALYSIS, REFLEX TO URINE CULTURE - Abnormal; Notable for the following components:    Protein, UA 2+ (*)     Glucose, UA Trace (*)     Occult Blood UA 1+ (*)     All other components within normal limits    Narrative:     Specimen Source->Urine   TROPONIN I - Abnormal; Notable for the following components:    Troponin I 0.068 (*)     All other components within normal limits   B-TYPE NATRIURETIC PEPTIDE - Abnormal; Notable for the following components:    BNP 1,098 (*)      All other components within normal limits   URINALYSIS MICROSCOPIC - Abnormal; Notable for the following components:    RBC, UA 10 (*)     All other components within normal limits    Narrative:     Specimen Source->Urine   CULTURE, BLOOD   CULTURE, BLOOD   LACTIC ACID, PLASMA   CK   CK-MB   SARS-COV-2 RNA AMPLIFICATION, QUAL   LACTIC ACID, PLASMA          Imaging Results          X-Ray Chest 1 View (Final result)  Result time 09/17/20 17:56:34    Final result by Kana Ochoa Jr., MD (09/17/20 17:56:34)                 Impression:      No acute findings.      Electronically signed by: Kana Ochoa Jr., MD  Date:    09/17/2020  Time:    17:56             Narrative:    EXAMINATION:  XR CHEST AP PORTABLE    CLINICAL HISTORY:  Fatigue;    COMPARISON:  Prior radiograph from July 17, 2020.    FINDINGS:  Prior median sternotomy.  Right-sided MediPort in place.  Left-sided AICD.  The lungs are clear.  No pleural fluid or pneumothorax.  Heart size within normal limits.  No significant bony findings.                                                             Clinical Impression:       ICD-10-CM ICD-9-CM   1. Herpes zoster iritis  B02.32 053.22   2. Facial swelling  R22.0 784.2   3. Corneal ulcer (including herpetic), left  H16.002 370.00   4. Congestive heart failure, unspecified HF chronicity, unspecified heart failure type  I50.9 428.0   5. Elevated troponin  R79.89 790.6                          ED Disposition Condition    Transfer to Another Facility Stable                 This Patient Was Turned Over To Me From The Nurse Practitioner     -- I took over this note from the nurse practitioner this shift  -- His/her documentation and exam is above this line, my documentation is below  -- patient presented with significant left-sided facial pain and reported rash  -- patient on presentation has obvious shingles the left scalp, ocular involvement   -- patient on fluorescein eye exam has the central herpetic ulcer/injury  today  -- patient does not have good vision in his left eye, IV acyclovir started ASAP  -- patient discussed at length with [facility], would like to see  -- patient transferred to hospital medicine with Ophthalmology consult to [facility]  -- we will also treat the patient CHF with IV Lasix, minimal troponin elevation noted tonight  -- patient states the pain in his left scalp is intense, decreased vision on reinspection                Alexi Carl MD  09/17/20 2020

## 2020-09-18 ENCOUNTER — HOSPITAL ENCOUNTER (INPATIENT)
Facility: HOSPITAL | Age: 70
LOS: 2 days | Discharge: HOME | DRG: 125 | End: 2020-09-20
Attending: HOSPITALIST | Admitting: HOSPITALIST
Payer: MEDICARE

## 2020-09-18 VITALS
TEMPERATURE: 98 F | DIASTOLIC BLOOD PRESSURE: 73 MMHG | WEIGHT: 173 LBS | BODY MASS INDEX: 22.82 KG/M2 | SYSTOLIC BLOOD PRESSURE: 165 MMHG | HEART RATE: 60 BPM | OXYGEN SATURATION: 96 % | RESPIRATION RATE: 24 BRPM

## 2020-09-18 DIAGNOSIS — B02.9 SHINGLES OUTBREAK: ICD-10-CM

## 2020-09-18 DIAGNOSIS — B02.32 HERPES ZOSTER IRITIS: Primary | ICD-10-CM

## 2020-09-18 DIAGNOSIS — E78.5 HYPERLIPIDEMIA, UNSPECIFIED HYPERLIPIDEMIA TYPE: ICD-10-CM

## 2020-09-18 DIAGNOSIS — E87.5 HYPERKALEMIA: ICD-10-CM

## 2020-09-18 DIAGNOSIS — M51.16 LUMBAR DISC DISEASE WITH RADICULOPATHY: ICD-10-CM

## 2020-09-18 DIAGNOSIS — R07.9 CHEST PAIN: ICD-10-CM

## 2020-09-18 DIAGNOSIS — I50.9 CHF (CONGESTIVE HEART FAILURE): ICD-10-CM

## 2020-09-18 PROBLEM — N17.9 AKI (ACUTE KIDNEY INJURY): Status: ACTIVE | Noted: 2020-09-18

## 2020-09-18 LAB
ALBUMIN SERPL BCP-MCNC: 3.9 G/DL (ref 3.5–5.2)
ALP SERPL-CCNC: 81 U/L (ref 55–135)
ALT SERPL W/O P-5'-P-CCNC: 13 U/L (ref 10–44)
ANION GAP SERPL CALC-SCNC: 11 MMOL/L (ref 8–16)
AST SERPL-CCNC: 14 U/L (ref 10–40)
AV INDEX (PROSTH): 0.98
AV MEAN GRADIENT: 2 MMHG
AV PEAK GRADIENT: 4 MMHG
AV VALVE AREA: 3.38 CM2
AV VELOCITY RATIO: 0.69
BASOPHILS # BLD AUTO: 0.02 K/UL (ref 0–0.2)
BASOPHILS NFR BLD: 0.5 % (ref 0–1.9)
BILIRUB SERPL-MCNC: 0.4 MG/DL (ref 0.1–1)
BSA FOR ECHO PROCEDURE: 1.99 M2
BUN SERPL-MCNC: 20 MG/DL (ref 8–23)
CALCIUM SERPL-MCNC: 9.4 MG/DL (ref 8.7–10.5)
CHLORIDE SERPL-SCNC: 97 MMOL/L (ref 95–110)
CO2 SERPL-SCNC: 25 MMOL/L (ref 23–29)
CREAT SERPL-MCNC: 1.7 MG/DL (ref 0.5–1.4)
CV ECHO LV RWT: 0.18 CM
DIFFERENTIAL METHOD: ABNORMAL
DOP CALC AO PEAK VEL: 1.05 M/S
DOP CALC AO VTI: 19.98 CM
DOP CALC LVOT AREA: 3.5 CM2
DOP CALC LVOT DIAMETER: 2.1 CM
DOP CALC LVOT PEAK VEL: 0.72 M/S
DOP CALC LVOT STROKE VOLUME: 67.51 CM3
DOP CALCLVOT PEAK VEL VTI: 19.5 CM
E WAVE DECELERATION TIME: 291.76 MSEC
E/A RATIO: 0.81
E/E' RATIO: 31.43 M/S
ECHO LV POSTERIOR WALL: 0.53 CM (ref 0.6–1.1)
EOSINOPHIL # BLD AUTO: 0 K/UL (ref 0–0.5)
EOSINOPHIL NFR BLD: 0.5 % (ref 0–8)
ERYTHROCYTE [DISTWIDTH] IN BLOOD BY AUTOMATED COUNT: 13.2 % (ref 11.5–14.5)
EST. GFR  (AFRICAN AMERICAN): 46.9 ML/MIN/1.73 M^2
EST. GFR  (NON AFRICAN AMERICAN): 40.5 ML/MIN/1.73 M^2
ESTIMATED AVG GLUCOSE: 137 MG/DL (ref 68–131)
FRACTIONAL SHORTENING: 21 % (ref 28–44)
GLUCOSE SERPL-MCNC: 252 MG/DL (ref 70–110)
HBA1C MFR BLD HPLC: 6.4 % (ref 4–5.6)
HCT VFR BLD AUTO: 40.9 % (ref 40–54)
HGB BLD-MCNC: 13.3 G/DL (ref 14–18)
IMM GRANULOCYTES # BLD AUTO: 0.02 K/UL (ref 0–0.04)
IMM GRANULOCYTES NFR BLD AUTO: 0.5 % (ref 0–0.5)
INTERVENTRICULAR SEPTUM: 1.61 CM (ref 0.6–1.1)
LA MAJOR: 5.2 CM
LA MINOR: 5.52 CM
LA WIDTH: 4.34 CM
LEFT ATRIUM SIZE: 2.04 CM
LEFT ATRIUM VOLUME INDEX: 20.1 ML/M2
LEFT ATRIUM VOLUME: 40.3 CM3
LEFT INTERNAL DIMENSION IN SYSTOLE: 4.58 CM (ref 2.1–4)
LEFT VENTRICLE DIASTOLIC VOLUME INDEX: 83.74 ML/M2
LEFT VENTRICLE DIASTOLIC VOLUME: 167.75 ML
LEFT VENTRICLE MASS INDEX: 128 G/M2
LEFT VENTRICLE SYSTOLIC VOLUME INDEX: 48 ML/M2
LEFT VENTRICLE SYSTOLIC VOLUME: 96.17 ML
LEFT VENTRICULAR INTERNAL DIMENSION IN DIASTOLE: 5.82 CM (ref 3.5–6)
LEFT VENTRICULAR MASS: 256.21 G
LV LATERAL E/E' RATIO: 27.5 M/S
LV SEPTAL E/E' RATIO: 36.67 M/S
LYMPHOCYTES # BLD AUTO: 0.5 K/UL (ref 1–4.8)
LYMPHOCYTES NFR BLD: 13.1 % (ref 18–48)
MAGNESIUM SERPL-MCNC: 2 MG/DL (ref 1.6–2.6)
MCH RBC QN AUTO: 31.4 PG (ref 27–31)
MCHC RBC AUTO-ENTMCNC: 32.5 G/DL (ref 32–36)
MCV RBC AUTO: 97 FL (ref 82–98)
MONOCYTES # BLD AUTO: 0.5 K/UL (ref 0.3–1)
MONOCYTES NFR BLD: 13.6 % (ref 4–15)
MV MEAN GRADIENT: 3 MMHG
MV PEAK A VEL: 1.35 M/S
MV PEAK E VEL: 1.1 M/S
MV STENOSIS PRESSURE HALF TIME: 140 MS
MV VALVE AREA P 1/2 METHOD: 1.57 CM2
NEUTROPHILS # BLD AUTO: 2.8 K/UL (ref 1.8–7.7)
NEUTROPHILS NFR BLD: 71.8 % (ref 38–73)
NRBC BLD-RTO: 0 /100 WBC
OVALOCYTES BLD QL SMEAR: ABNORMAL
PISA TR MAX VEL: 1.39 M/S
PLATELET # BLD AUTO: 159 K/UL (ref 150–350)
PMV BLD AUTO: 9.6 FL (ref 9.2–12.9)
POCT GLUCOSE: 121 MG/DL (ref 70–110)
POCT GLUCOSE: 138 MG/DL (ref 70–110)
POCT GLUCOSE: 229 MG/DL (ref 70–110)
POCT GLUCOSE: 264 MG/DL (ref 70–110)
POIKILOCYTOSIS BLD QL SMEAR: SLIGHT
POTASSIUM SERPL-SCNC: 4.7 MMOL/L (ref 3.5–5.1)
PROT SERPL-MCNC: 7.7 G/DL (ref 6–8.4)
RA MAJOR: 5.09 CM
RA PRESSURE: 3 MMHG
RA WIDTH: 2.12 CM
RBC # BLD AUTO: 4.24 M/UL (ref 4.6–6.2)
RIGHT VENTRICULAR END-DIASTOLIC DIMENSION: 4.23 CM
RV TISSUE DOPPLER FREE WALL SYSTOLIC VELOCITY 1 (APICAL 4 CHAMBER VIEW): 14.78 CM/S
SINUS: 3.96 CM
SODIUM SERPL-SCNC: 133 MMOL/L (ref 136–145)
STJ: 2.71 CM
TDI LATERAL: 0.04 M/S
TDI SEPTAL: 0.03 M/S
TDI: 0.04 M/S
TR MAX PG: 8 MMHG
TRICUSPID ANNULAR PLANE SYSTOLIC EXCURSION: 1.51 CM
TROPONIN I SERPL DL<=0.01 NG/ML-MCNC: 0.09 NG/ML (ref 0–0.03)
TROPONIN I SERPL DL<=0.01 NG/ML-MCNC: 0.09 NG/ML (ref 0–0.03)
TV REST PULMONARY ARTERY PRESSURE: 11 MMHG
WBC # BLD AUTO: 3.96 K/UL (ref 3.9–12.7)

## 2020-09-18 PROCEDURE — C9399 UNCLASSIFIED DRUGS OR BIOLOG: HCPCS | Mod: HCNC | Performed by: STUDENT IN AN ORGANIZED HEALTH CARE EDUCATION/TRAINING PROGRAM

## 2020-09-18 PROCEDURE — 97161 PT EVAL LOW COMPLEX 20 MIN: CPT | Mod: HCNC

## 2020-09-18 PROCEDURE — 80053 COMPREHEN METABOLIC PANEL: CPT | Mod: HCNC

## 2020-09-18 PROCEDURE — 99223 PR INITIAL HOSPITAL CARE,LEVL III: ICD-10-PCS | Mod: HCNC,AI,GC, | Performed by: HOSPITALIST

## 2020-09-18 PROCEDURE — 11000001 HC ACUTE MED/SURG PRIVATE ROOM: Mod: HCNC

## 2020-09-18 PROCEDURE — 25000003 PHARM REV CODE 250: Mod: HCNC | Performed by: STUDENT IN AN ORGANIZED HEALTH CARE EDUCATION/TRAINING PROGRAM

## 2020-09-18 PROCEDURE — 36415 COLL VENOUS BLD VENIPUNCTURE: CPT | Mod: HCNC

## 2020-09-18 PROCEDURE — 99223 1ST HOSP IP/OBS HIGH 75: CPT | Mod: HCNC,AI,GC, | Performed by: HOSPITALIST

## 2020-09-18 PROCEDURE — 84484 ASSAY OF TROPONIN QUANT: CPT | Mod: HCNC

## 2020-09-18 PROCEDURE — 63600175 PHARM REV CODE 636 W HCPCS: Mod: HCNC | Performed by: SURGERY

## 2020-09-18 PROCEDURE — 83735 ASSAY OF MAGNESIUM: CPT | Mod: HCNC

## 2020-09-18 PROCEDURE — 97165 OT EVAL LOW COMPLEX 30 MIN: CPT | Mod: HCNC

## 2020-09-18 PROCEDURE — 85025 COMPLETE CBC W/AUTO DIFF WBC: CPT | Mod: HCNC

## 2020-09-18 PROCEDURE — 63600175 PHARM REV CODE 636 W HCPCS: Mod: HCNC | Performed by: STUDENT IN AN ORGANIZED HEALTH CARE EDUCATION/TRAINING PROGRAM

## 2020-09-18 PROCEDURE — 83036 HEMOGLOBIN GLYCOSYLATED A1C: CPT | Mod: HCNC

## 2020-09-18 PROCEDURE — 97116 GAIT TRAINING THERAPY: CPT | Mod: HCNC

## 2020-09-18 RX ORDER — IBUPROFEN 200 MG
24 TABLET ORAL
Status: DISCONTINUED | OUTPATIENT
Start: 2020-09-18 | End: 2020-09-20 | Stop reason: HOSPADM

## 2020-09-18 RX ORDER — FUROSEMIDE 20 MG/1
20 TABLET ORAL DAILY
Status: DISCONTINUED | OUTPATIENT
Start: 2020-09-18 | End: 2020-09-20 | Stop reason: HOSPADM

## 2020-09-18 RX ORDER — ISOSORBIDE MONONITRATE 30 MG/1
60 TABLET, EXTENDED RELEASE ORAL DAILY
Status: DISCONTINUED | OUTPATIENT
Start: 2020-09-18 | End: 2020-09-20 | Stop reason: HOSPADM

## 2020-09-18 RX ORDER — PRIMIDONE 50 MG/1
50 TABLET ORAL 2 TIMES DAILY
Status: DISCONTINUED | OUTPATIENT
Start: 2020-09-18 | End: 2020-09-20 | Stop reason: HOSPADM

## 2020-09-18 RX ORDER — GLUCAGON 1 MG
1 KIT INJECTION
Status: DISCONTINUED | OUTPATIENT
Start: 2020-09-18 | End: 2020-09-20 | Stop reason: HOSPADM

## 2020-09-18 RX ORDER — SODIUM CHLORIDE 0.9 % (FLUSH) 0.9 %
10 SYRINGE (ML) INJECTION
Status: DISCONTINUED | OUTPATIENT
Start: 2020-09-18 | End: 2020-09-20 | Stop reason: HOSPADM

## 2020-09-18 RX ORDER — ONDANSETRON 2 MG/ML
4 INJECTION INTRAMUSCULAR; INTRAVENOUS
Status: COMPLETED | OUTPATIENT
Start: 2020-09-18 | End: 2020-09-18

## 2020-09-18 RX ORDER — SODIUM CHLORIDE, SODIUM LACTATE, POTASSIUM CHLORIDE, CALCIUM CHLORIDE 600; 310; 30; 20 MG/100ML; MG/100ML; MG/100ML; MG/100ML
INJECTION, SOLUTION INTRAVENOUS CONTINUOUS
Status: ACTIVE | OUTPATIENT
Start: 2020-09-18 | End: 2020-09-18

## 2020-09-18 RX ORDER — ERYTHROMYCIN 5 MG/G
OINTMENT OPHTHALMIC 2 TIMES DAILY
Status: DISCONTINUED | OUTPATIENT
Start: 2020-09-18 | End: 2020-09-20 | Stop reason: HOSPADM

## 2020-09-18 RX ORDER — ASPIRIN 81 MG/1
81 TABLET ORAL DAILY
Status: DISCONTINUED | OUTPATIENT
Start: 2020-09-18 | End: 2020-09-20 | Stop reason: HOSPADM

## 2020-09-18 RX ORDER — AMIODARONE HYDROCHLORIDE 100 MG/1
100 TABLET ORAL DAILY
Status: DISCONTINUED | OUTPATIENT
Start: 2020-09-18 | End: 2020-09-20 | Stop reason: HOSPADM

## 2020-09-18 RX ORDER — IBUPROFEN 200 MG
16 TABLET ORAL
Status: DISCONTINUED | OUTPATIENT
Start: 2020-09-18 | End: 2020-09-20 | Stop reason: HOSPADM

## 2020-09-18 RX ORDER — INSULIN ASPART 100 [IU]/ML
0-5 INJECTION, SOLUTION INTRAVENOUS; SUBCUTANEOUS
Status: DISCONTINUED | OUTPATIENT
Start: 2020-09-18 | End: 2020-09-20 | Stop reason: HOSPADM

## 2020-09-18 RX ORDER — ATENOLOL 25 MG/1
25 TABLET ORAL DAILY
Status: DISCONTINUED | OUTPATIENT
Start: 2020-09-18 | End: 2020-09-20 | Stop reason: HOSPADM

## 2020-09-18 RX ORDER — ALPRAZOLAM 0.25 MG/1
0.25 TABLET ORAL 2 TIMES DAILY
Status: DISCONTINUED | OUTPATIENT
Start: 2020-09-18 | End: 2020-09-20 | Stop reason: HOSPADM

## 2020-09-18 RX ADMIN — ALPRAZOLAM 0.25 MG: 0.25 TABLET ORAL at 09:09

## 2020-09-18 RX ADMIN — PRIMIDONE 50 MG: 50 TABLET ORAL at 10:09

## 2020-09-18 RX ADMIN — RIVAROXABAN 15 MG: 15 TABLET, FILM COATED ORAL at 05:09

## 2020-09-18 RX ADMIN — ACYCLOVIR SODIUM 800 MG: 1000 INJECTION, SOLUTION INTRAVENOUS at 05:09

## 2020-09-18 RX ADMIN — ISOSORBIDE MONONITRATE 60 MG: 30 TABLET, EXTENDED RELEASE ORAL at 10:09

## 2020-09-18 RX ADMIN — ALPRAZOLAM 0.25 MG: 0.25 TABLET ORAL at 12:09

## 2020-09-18 RX ADMIN — INSULIN DETEMIR 8 UNITS: 100 INJECTION, SOLUTION SUBCUTANEOUS at 12:09

## 2020-09-18 RX ADMIN — ATENOLOL 25 MG: 25 TABLET ORAL at 10:09

## 2020-09-18 RX ADMIN — ONDANSETRON 4 MG: 2 INJECTION INTRAMUSCULAR; INTRAVENOUS at 12:09

## 2020-09-18 RX ADMIN — ASPIRIN 81 MG: 81 TABLET, COATED ORAL at 10:09

## 2020-09-18 RX ADMIN — FUROSEMIDE 20 MG: 20 TABLET ORAL at 10:09

## 2020-09-18 RX ADMIN — ERYTHROMYCIN: 5 OINTMENT OPHTHALMIC at 09:09

## 2020-09-18 RX ADMIN — SODIUM CHLORIDE, SODIUM LACTATE, POTASSIUM CHLORIDE, AND CALCIUM CHLORIDE: .6; .31; .03; .02 INJECTION, SOLUTION INTRAVENOUS at 12:09

## 2020-09-18 RX ADMIN — AMIODARONE HYDROCHLORIDE 100 MG: 100 TABLET ORAL at 10:09

## 2020-09-18 RX ADMIN — PRIMIDONE 50 MG: 50 TABLET ORAL at 09:09

## 2020-09-18 NOTE — CONSULTS
Consultation Report  Ophthalmology Service    Date: 09/18/2020    Chief complaint/Reason for Consult: Eye pain and inflammation     History of Present Illness: Wesley Bernal is a 69 y.o. male who presents with 2 days of painful rash limited to left side of his face, swelling (especially around the eye), some blurry vision in the left eye, and light sensitivity. He denies any other vision changes, including flashes, floaters, dark spots, curtains, scotomas. He has not lost vision at any point.    POcularHx: No history of ocular problems or past ocular surgeries.  Does not use glasses or contacts.    Current eye gtts: none      PMHx:  has a past medical history of Anxiety, Atrial fibrillation, Cancer (2019), Cardiomyopathy, Carotid artery disease, Coronary artery disease, Diabetes mellitus type II, Encounter for blood transfusion, Heart disease, Hyperlipidemia, Hypertension, and PAD (peripheral artery disease).     PSurgHx:  has a past surgical history that includes Carotid stent; Abdominal aorta stent; Brachiocephalic vein angioplasty / stenting; Lymph node biopsy (Left, 6/17/2019); Cardiac surgery; Cardiac valuve replacement; Cardiac pacemaker placement; Cardiac defibrillator placement; LASIK SX (Bilateral); and replacement of implantable cardioverter-defibrillator (icd) generator (N/A, 7/29/2020).     Home Medications:   Prior to Admission medications    Medication Sig Start Date End Date Taking? Authorizing Provider   ALPRAZolam (XANAX) 0.25 MG tablet Take 1 tablet (0.25 mg total) by mouth 2 (two) times daily as needed. 7/7/20   Lalo Sweet MD   amiodarone (PACERONE) 200 MG Tab TAKE 1/2 TABLET BY MOUTH ONCE A DAY 5/11/20   Lalo Sweet MD   aspirin (ECOTRIN) 81 MG EC tablet Take by mouth once daily.    Historical Provider   atenoloL (TENORMIN) 25 MG tablet Take 1 tablet (25 mg total) by mouth once daily. 5/19/20   Lalo Sweet MD   docusate sodium (COLACE) 100 MG capsule Take 100 mg by  mouth 2 (two) times daily as needed for Constipation.    Historical Provider   evolocumab (REPATHA PUSHTRONEX) 420 mg/3.5 mL Injt Inject 420 mg (1 cartridge) into the skin every 30 days. 5/28/20   Lalo Sweet MD   ezetimibe (ZETIA) 10 mg tablet TAKE ONE TABLET BY MOUTH ONCE A DAY 5/11/20   Lalo Sweet MD   furosemide (LASIX) 20 MG tablet  8/15/20   Historical Provider   gabapentin (NEURONTIN) 100 MG capsule Take 1 capsule (100 mg total) by mouth 3 (three) times daily. 4/30/20   Lalo Sweet MD   HYDROcodone-acetaminophen (NORCO)  mg per tablet Take 1 tablet by mouth every 6 (six) hours as needed. GREATER THAN 7 DAY QUANTITY MEDICALLY NECESSARY 9/4/20   Lalo Sweet MD   isosorbide mononitrate (IMDUR) 60 MG 24 hr tablet Take 60 mg by mouth once daily.  12/16/19   Historical Provider   omega-3 acid ethyl esters (LOVAZA) 1 gram capsule Take 2 capsules (2 g total) by mouth 2 (two) times daily. 6/17/20   Lalo Sweet MD   pioglitazone (ACTOS) 15 MG tablet TAKE ONE TABLET BY MOUTH ONCE A DAY 5/11/20   Lalo Sweet MD   primidone (MYSOLINE) 50 MG Tab Take 1 tablet (50 mg total) by mouth 2 (two) times daily. 12/17/19   Walter Baker MD   SITagliptin (JANUVIA) 100 MG Tab Take 1 tablet (100 mg total) by mouth once daily. 6/16/20   Lalo Sweet MD   sulfamethoxazole-trimethoprim 800-160mg (BACTRIM DS) 800-160 mg Tab Take 1 tablet by mouth 2 (two) times daily. for 7 days 9/15/20 9/22/20  Ric Sanchez Jr., MD        Medications this encounter:    acyclovir  10 mg/kg (Ideal) Intravenous Q12H    ALPRAZolam  0.25 mg Oral BID    amiodarone  100 mg Oral Daily    aspirin  81 mg Oral Daily    atenoloL  25 mg Oral Daily    furosemide  20 mg Oral Daily    isosorbide mononitrate  60 mg Oral Daily    primidone  50 mg Oral BID    rivaroxaban  15 mg Oral Daily with dinner       Allergies: is allergic to atorvastatin and statins-hmg-coa reductase inhibitors.      Social:  reports that he quit smoking about 5 years ago. His smoking use included cigarettes and cigars. He smoked 1.00 pack per day. He has never used smokeless tobacco. He reports that he does not drink alcohol or use drugs.     Family Hx: No family history of glaucoma, macular degeneration, or blindness. family history includes Cancer in his father; Diabetes in his mother; Heart disease in his father and paternal grandfather.     ROS: Negative x 10 except for complaints as described in HPI; negative for fever, chills, weight loss, nausea, vomiting, diarrhea, shortness of breath, nasal discharge, cough, abdominal pain, dyspnea, difficulty moving arms and legs, confusion, dysuria, palpitations, or chest pain     Ocular examination/Dilated fundus examination:  Base Eye Exam     Visual Acuity       Right Left    Near sc 20/70 20/100    Near cc 20/20 20/40          Tonometry (Tonopen, 1:31 PM)       Right Left    Pressure 13 18          Pupils       Pupils    Right PERRL    Left PERRL          Visual Fields       Right Left     Full Full          Extraocular Movement       Right Left     Full, Ortho Full, Ortho            Slit Lamp and Fundus Exam     External Exam       Right Left    External Normal Well-defined erythematous rash with vesicular crusting extending from scalp to nose to cheek, completely sparing right side of face; positive Calvillo sign          Slit Lamp Exam       Right Left    Lids/Lashes Normal MATTHIAS and LLL edema, diffuse erythema of lids    Conjunctiva/Sclera White and quiet 1+ Chemosis    Cornea Clear, no fluorescein uptake 1-2+ Injection, no evidence of dendritic or pseudodendritic lesions with fluorescein staining    Anterior Chamber Deep and formed Deep and formed    Iris Round and reactive Round and reactive    Lens Pseudophakic Pseudophakic    Vitreous Normal Normal    Bedside exam with 20D lens          Fundus Exam       Right Left    Disc Normal Normal    C/D Ratio 0.3 0.3    Macula  Normal Normal    Vessels Normal Normal    Periphery Normal Normal                Assessment/Plan:   1. Herpes zoster ophthalmicus, left eye  - Exam with unilateral vesicular dermatomal rash along V1 and V2 distribution, positive Calvillo sign (higher risk of ophthalmic involvement)  - Has history of anal cancer treated with chemotherapy and resection in 2019  - Complaining of some vision blurriness and photophobia of left eye, with some improvement since treatment with acyclovir  - Bedside examination with 20D lens revealed lid edema and conjunctivitis without corneal involvement  - DFE showed no evidence of retinal involvement  - Discussed staying away from susceptible individuals such as young children, immunocompromised individuals       Recommendations   - Start antiviral treatment for course of 7-10 days; typically administer acyclovir 5-10 mg/kg IV q8 hours, but given patient's CARLOS, advise renal dosing  - Erythromycin ointment in left eye BID + to skin lesions BID  - Cool compresses to left eye BID    Discussed patient's history, physical, assessment and plan with Dr. Ortiz.  If there are further questions, please page the on call ophthalmology resident.    Haja Quinones MD  PGY2, Ophthalmology Resident  09/18/2020  11:55 AM

## 2020-09-18 NOTE — ASSESSMENT & PLAN NOTE
Baseline1.5-2, recent values 1.5-1.6  Patient recently took Bactrim 1-2 days; can elevated SCr  Patient denies peripheral edema, decreased urine, kidney stones, UTI  PLAN  - Trend SCr for now  - Strict in / out qshift  - Will wait to initial lab workup for CARLSO  - Renally dose medications  - Avoid nephrotoxins

## 2020-09-18 NOTE — SUBJECTIVE & OBJECTIVE
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    Cardiomyopathy     Carotid artery disease     Coronary artery disease     Diabetes mellitus type II     Encounter for blood transfusion     Heart disease     Hyperlipidemia     Hypertension     PAD (peripheral artery disease)        Past Surgical History:   Procedure Laterality Date    ABDOMINAL AORTA STENT      BRACHIOCEPHALIC VEIN ANGIOPLASTY / STENTING      CARDIAC DEFIBRILLATOR PLACEMENT      1/2011    CARDIAC PACEMAKER PLACEMENT      1-2011    CARDIAC SURGERY      open heart    CARDIAC VALVE SURGERY      pig valve replacement -    CAROTID STENT      LASIK SX Bilateral     LYMPH NODE BIOPSY Left 6/17/2019    Procedure: BIOPSY, LYMPH NODE (GROIN);  Surgeon: Adelfo Yin MD;  Location: [facility] OR;  Service: General;  Laterality: Left;    REPLACEMENT OF IMPLANTABLE CARDIOVERTER-DEFIBRILLATOR (ICD) GENERATOR N/A 7/29/2020    Procedure: REPLACEMENT, ICD GENERATOR;  Surgeon: Marko Hartley MD;  Location: [facility] CATH;  Service: Cardiology;  Laterality: N/A;       Review of patient's allergies indicates:   Allergen Reactions    Atorvastatin Other (See Comments)     Pain in joints, Creatinine level increased    Statins-hmg-coa reductase inhibitors        Current Facility-Administered Medications on File Prior to Encounter   Medication    [COMPLETED] acetaminophen tablet 1,000 mg    [COMPLETED] acyclovir (ZOVIRAX) 800 mg in dextrose 5 % 250 mL IVPB    [COMPLETED] fluorescein ophthalmic strip 1 each    [COMPLETED] furosemide injection 40 mg    [COMPLETED] morphine injection 1 mg    [COMPLETED] morphine injection 2 mg    [COMPLETED] ondansetron injection 4 mg    [COMPLETED] ondansetron injection 4 mg    [COMPLETED] ondansetron injection 4 mg    [COMPLETED] sodium chloride 0.9% bolus 1,000 mL    [COMPLETED] tetracaine HCl (PF) 0.5 % Drop 2 drop      Current Outpatient Medications on File Prior to Encounter   Medication Sig    ALPRAZolam (XANAX) 0.25 MG tablet Take 1 tablet (0.25 mg total) by mouth 2 (two) times daily as needed.    amiodarone (PACERONE) 200 MG Tab TAKE 1/2 TABLET BY MOUTH ONCE A DAY    aspirin (ECOTRIN) 81 MG EC tablet Take by mouth once daily.    atenoloL (TENORMIN) 25 MG tablet Take 1 tablet (25 mg total) by mouth once daily.    docusate sodium (COLACE) 100 MG capsule Take 100 mg by mouth 2 (two) times daily as needed for Constipation.    evolocumab (REPATHA PUSHTRONEX) 420 mg/3.5 mL Injt Inject 420 mg (1 cartridge) into the skin every 30 days.    ezetimibe (ZETIA) 10 mg tablet TAKE ONE TABLET BY MOUTH ONCE A DAY    furosemide (LASIX) 20 MG tablet     gabapentin (NEURONTIN) 100 MG capsule Take 1 capsule (100 mg total) by mouth 3 (three) times daily.    HYDROcodone-acetaminophen (NORCO)  mg per tablet Take 1 tablet by mouth every 6 (six) hours as needed. GREATER THAN 7 DAY QUANTITY MEDICALLY NECESSARY    isosorbide mononitrate (IMDUR) 60 MG 24 hr tablet Take 60 mg by mouth once daily.     omega-3 acid ethyl esters (LOVAZA) 1 gram capsule Take 2 capsules (2 g total) by mouth 2 (two) times daily.    pioglitazone (ACTOS) 15 MG tablet TAKE ONE TABLET BY MOUTH ONCE A DAY    primidone (MYSOLINE) 50 MG Tab Take 1 tablet (50 mg total) by mouth 2 (two) times daily.    SITagliptin (JANUVIA) 100 MG Tab Take 1 tablet (100 mg total) by mouth once daily.    sulfamethoxazole-trimethoprim 800-160mg (BACTRIM DS) 800-160 mg Tab Take 1 tablet by mouth 2 (two) times daily. for 7 days     Family History     Problem Relation (Age of Onset)    Cancer Father    Diabetes Mother    Heart disease Father, Paternal Grandfather        Tobacco Use    Smoking status: Former Smoker     Packs/day: 1.00     Types: Cigarettes, Cigars     Quit date: 4/10/2015     Years since quittin.4    Smokeless tobacco: Never Used   Substance and Sexual Activity     Alcohol use: No     Alcohol/week: 0.0 standard drinks    Drug use: No    Sexual activity: Yes     Partners: Female     Review of Systems   Constitutional: Negative for chills and fever.   HENT: Negative for congestion, ear pain, sinus pain, sore throat and tinnitus.    Eyes: Positive for photophobia, pain, redness and visual disturbance.   Respiratory: Negative for cough, shortness of breath and wheezing.    Cardiovascular: Negative for chest pain and palpitations.   Gastrointestinal: Negative for abdominal distention, abdominal pain, anal bleeding, blood in stool, diarrhea, nausea and vomiting.   Endocrine: Negative for polyuria.   Genitourinary: Negative for decreased urine volume, dysuria and frequency.   Musculoskeletal: Negative for arthralgias and back pain.   Skin: Positive for rash (left facial rash; red with crusts, midline demarcation).   Neurological: Positive for tremors (right upper extremity). Negative for dizziness, light-headedness and headaches.   Hematological: Negative for adenopathy.   Psychiatric/Behavioral: Negative for agitation.     Objective:     Vital Signs (Most Recent):  Temp: 97.9 °F (36.6 °C) (09/18/20 0253)  Pulse: 67 (09/18/20 0253)  Resp: 15 (09/18/20 0253)  BP: (!) 150/89 (09/18/20 0253)  SpO2: 99 % (09/18/20 0253) Vital Signs (24h Range):  Temp:  [97.7 °F (36.5 °C)-97.9 °F (36.6 °C)] 97.9 °F (36.6 °C)  Pulse:  [57-73] 67  Resp:  [15-24] 15  SpO2:  [96 %-99 %] 99 %  BP: (102-165)/(45-90) 150/89     Weight: 76.9 kg (169 lb 8.5 oz)  Body mass index is 22.37 kg/m².    Physical Exam  Constitutional:       General: He is not in acute distress.     Appearance: Normal appearance.   HENT:      Head: Normocephalic and atraumatic.      Right Ear: External ear normal.      Left Ear: External ear normal.      Mouth/Throat:      Mouth: Mucous membranes are moist.   Eyes:      General: No scleral icterus.        Right eye: No discharge.         Left eye: Discharge (minimal tearing)  present.No foreign body.      Conjunctiva/sclera:      Left eye: Left conjunctiva is injected.      Comments: Left eye mostly closed due to pain during evaluation.   Neck:      Musculoskeletal: Neck supple. No neck rigidity or muscular tenderness.   Cardiovascular:      Rate and Rhythm: Normal rate. Rhythm irregular.      Pulses: Normal pulses.      Heart sounds: Normal heart sounds. No murmur. No friction rub.   Pulmonary:      Effort: Pulmonary effort is normal. No respiratory distress.      Breath sounds: Normal breath sounds. No stridor. No wheezing.   Abdominal:      General: Abdomen is flat. There is no distension.      Palpations: Abdomen is soft. There is no mass.   Musculoskeletal:         General: No swelling, tenderness or deformity.   Skin:     General: Skin is warm.      Coloration: Skin is not jaundiced or pale.      Findings: No bruising.   Neurological:      General: No focal deficit present.      Mental Status: He is alert and oriented to person, place, and time.   Psychiatric:         Mood and Affect: Mood normal.         Behavior: Behavior normal.             Significant Labs:   CBC:   Recent Labs   Lab 09/17/20  1714   WBC 5.16   HGB 12.8*   HCT 37.5*   *     CMP:   Recent Labs   Lab 09/17/20  1714   *   K 4.2   CL 99   CO2 22*   *   BUN 19   CREATININE 1.5*   CALCIUM 9.3   PROT 7.6   ALBUMIN 3.9   BILITOT 0.5   ALKPHOS 74   AST 12   ALT 14   ANIONGAP 13   EGFRNONAA 47*     All pertinent labs within the past 24 hours have been reviewed.    Significant Imaging: I have reviewed all pertinent imaging results/findings within the past 24 hours.

## 2020-09-18 NOTE — ASSESSMENT & PLAN NOTE
On Amiodarone, atenolol per med rec  States he is on Xarelto for anticoagulation but cannot find this in Epic  Rhythm irregular on exam  PLAN  - Continue Amio, atenolol  - Entered Xarelto 20 daily with meals; ask pharmacy to follow-up

## 2020-09-18 NOTE — PLAN OF CARE
09/18/20 1030   Discharge Assessment   Assessment Type Discharge Planning Assessment   Confirmed/corrected address and phone number on facesheet? Yes   Assessment information obtained from? Patient   Expected Length of Stay (days) 3   Communicated expected length of stay with patient/caregiver yes   Prior to hospitilization cognitive status: Alert/Oriented   Prior to hospitalization functional status: Assistive Equipment   Current cognitive status: Alert/Oriented   Current Functional Status: Needs Assistance   Lives With significant other;other (see comments)  (s.o., Tierra Jacobson ([phone]), & Tierra's brother and son)   Able to Return to Prior Arrangements yes   Is patient able to care for self after discharge? Yes   Patient's perception of discharge disposition home or selfcare   Readmission Within the Last 30 Days no previous admission in last 30 days   Patient currently being followed by outpatient case management? No   Patient currently receives any other outside agency services? No   Equipment Currently Used at Home cane, straight;walker, rolling;grab bar;glucometer;other (see comments)  (BP machine)   Do you have any problems affording any of your prescribed medications? No   Is the patient taking medications as prescribed? yes   Does the patient have transportation home? Yes   Transportation Anticipated family or friend will provide  (Tierra's brother)   Does the patient receive services at the [facility] Clinic? No   Discharge Plan A Home with family   Discharge Plan B Home Health   DME Needed Upon Discharge  other (see comments)  (tbd)   Patient/Family in Agreement with Plan yes       Dx: herpes zoster iritis  Consult: ophth & PT/OT      [facility], [place] - [place] - [address]  [address]  [place] [address]  Phone: [phone] Fax: [phone]    Payor: [employer] MEDICARE / Plan: [employer] MEDICARE HMO / Product Type: Capitation  /     Lalo Sweet MD  [address] / [place] [address]  [phone]  Hospital follow up appointment scheduled with Dr. Gideon Donnelly on 9/25/2020 at 0800.

## 2020-09-18 NOTE — PLAN OF CARE
Problem: Physical Therapy Goal  Goal: Physical Therapy Goal  Description: Goals to be met by: 10/02/20     Patient will increase functional independence with mobility by performin. Sit to stand transfer with Modified [place]  2. Gait  x 50 feet with Supervision using LRAD or w/o AD, as appropriate.   3. Lower extremity exercise program x15 reps with assistance as needed    Outcome: Ongoing, Progressing   PT eval performed. Nette Ortiz, PT

## 2020-09-18 NOTE — ASSESSMENT & PLAN NOTE
Denies chest pain, palpitations, swelling  Trop 0.068, BNP 1098 on admit  No peripheral edema or JVD on exam  LAsix 40mg given at OSH  PLAN  - Trend trop  - TTE ordered, f/u  - Amiodarone, Imdur, atenolol continued  - Zetia, Repatha held

## 2020-09-18 NOTE — PLAN OF CARE
(Physician in Lead of Transfers)   Outside Transfer Acceptance Note / Regional Referral Center      Upon patient arrival to floor, please call extension [phone] (if no answer, this will flip to a beeper, so enter your call back number) for Hospital Medicine admit team assignment and for additional admit orders for the patient.  Do not page the attending physician associated with the patient on arrival (this physician may not be on duty at the time of arrival).  Rather, always call [phone] to reach the triage physician for orders and team assignment.      Transferring Physician: Dr. Carl    Accepting Physician: Lindsey Leong MD    Date of Acceptance: 09/17/2020    Transferring Facility: [facility]     Reason for Transfer: needs ophthalmology     Report from Transferring Physician/Hospital course: The patient is a 70 y/o male with PMH of CAD, a-fb, T2DM, HTN, and HLP who presented with left facial swelling. Found to have herpes zoster iritis and also in CHF exacerbation. Mild CARLOS noted as well; BNP 1098 and responded to 40 mg IV lasix x 1. Given a dose of IV acyclovir and the case was discussed with ophthalmology and they will consult.       Labs & Radiographs: see EPIC      To Do List:   1) Telemetry  2) Contact and droplet isolation   3) Ophthalmology consult  4) Continue acyclovir       Lindsey Leong MD  Hospital Medicine Staff

## 2020-09-18 NOTE — ASSESSMENT & PLAN NOTE
A1c 6.2 on 9/1/20  On pioglitazone, sitagliptin at home  PLAN  - Cardiac diet w/ consistent carbs  - SSI LD; no basal as glucose may be within goal (140-180) while inpatient

## 2020-09-18 NOTE — PT/OT/SLP EVAL
Physical Therapy Evaluation    Patient Name:  Wesley Bernal   MRN:  [id number]    Recommendations:     Discharge Recommendations:  home   Discharge Equipment Recommendations: none   Barriers to discharge: None    Assessment:     Wesley Bernal is a 69 y.o. male admitted with a medical diagnosis of Herpes zoster iritis.  He presents with the following impairments/functional limitations:  impaired functional mobilty, impaired endurance . Patient ambulates from bed to toilet w/ Supervision, managing IV pole; transfers w/ supervision. .    Rehab Prognosis: Good; patient would benefit from acute skilled PT services to address these deficits and reach maximum level of function.    Recent Surgery: * No surgery found *      Plan:     During this hospitalization, patient to be seen 2 x/week to address the identified rehab impairments via gait training, therapeutic activities, therapeutic exercises and progress toward the following goals:    · Plan of Care Expires:  10/18/20    Subjective     Chief Complaint: concern about facial swelling  Patient/Family Comments/goals: agreeable to session  Pain/Comfort:  · Pain Rating 1: (not rated)  · Location - Side 1: Left  · Location 1: face  · Pain Addressed 1: Distraction  · Pain Rating Post-Intervention 1: 0/10    Patients cultural, spiritual, Christian conflicts given the current situation:      Living Environment:  Patient lives w/ s/o in [facility] w/ one small KAYCE  Prior to admission, patients level of function was independent.  Equipment used at home: walker, rolling, cane, quad(from prior use; not presently used).  DME owned (not currently used): rolling walker and QC.  Upon discharge, patient will have assistance from significant other.    Objective:     Communicated with nurse prior to session.  Patient found HOB elevated with peripheral IV  upon PT entry to room.    General Precautions: Standard, airborne, contact, droplet   Orthopedic Precautions:N/A   Braces: N/A      Exams:  · Cognitive Exam:  Patient is oriented to Person, Place, Time and Situation  · Postural Exam:  Patient presented with the following abnormalities:    · -       No postural abnormalities identified  · RLE ROM: WFL  · RLE Strength: WFL  · LLE ROM: WFL  · LLE Strength: WFL    Functional Mobility:  · Bed Mobility:     · Supine to Sit: modified independence  · Sit to Supine: modified independence  · Transfers:     · Sit to Stand:  supervision with no AD  · Toilet Transfer: supervision with  no AD  using  Step Transfer  · Gait: from bed to toilet approx 10 feet w/ supervision w/ patient managing IV pole. after toilet transfer, patient reurns to bed w/o use of IV pole, amb 10 feet.     Therapeutic Activities and Exercises:   patient performs 20x AP  Patient educated on PT POC, gait and trf technique    AM-PAC 6 CLICK MOBILITY  Total Score:22     Patient left HOB elevated with all lines intact and call button in reach.    GOALS:   Multidisciplinary Problems     Physical Therapy Goals        Problem: Physical Therapy Goal    Goal Priority Disciplines Outcome Goal Variances Interventions   Physical Therapy Goal     PT, PT/OT Ongoing, Progressing     Description: Goals to be met by: 10/02/20     Patient will increase functional independence with mobility by performin. Sit to stand transfer with Modified [place]  2. Gait  x 50 feet with Supervision using LRAD or w/o AD, as appropriate.   3. 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    Cardiomyopathy     Carotid artery disease     Coronary artery disease     Diabetes mellitus type II     Encounter for blood transfusion     Heart disease     Hyperlipidemia     Hypertension     PAD (peripheral artery disease)        Past Surgical History:   Procedure  Laterality Date    ABDOMINAL AORTA STENT      BRACHIOCEPHALIC VEIN ANGIOPLASTY / STENTING      CARDIAC DEFIBRILLATOR PLACEMENT      1/2011    CARDIAC PACEMAKER PLACEMENT      1-2011    CARDIAC SURGERY      open heart    CARDIAC VALVE SURGERY      pig valve replacement -    CAROTID STENT      LASIK SX Bilateral     LYMPH NODE BIOPSY Left 6/17/2019    Procedure: BIOPSY, LYMPH NODE (GROIN);  Surgeon: Adelfo Yin MD;  Location: [facility];  Service: General;  Laterality: Left;    REPLACEMENT OF IMPLANTABLE CARDIOVERTER-DEFIBRILLATOR (ICD) GENERATOR N/A 7/29/2020    Procedure: REPLACEMENT, ICD GENERATOR;  Surgeon: Marko Hartley MD;  Location: [facility];  Service: Cardiology;  Laterality: N/A;       Time Tracking:     PT Received On: 09/18/20  PT Start Time: 1141     PT Stop Time: 1201  PT Total Time (min): 20 min     Billable Minutes: Evaluation 10 and Gait Training 10      Nette Ortiz, PT  09/18/2020

## 2020-09-18 NOTE — PLAN OF CARE
Problem: Occupational Therapy Goal  Goal: Occupational Therapy Goal  Outcome: Met     OT eval completed.  No further OT needed

## 2020-09-18 NOTE — PT/OT/SLP EVAL
Occupational Therapy   Evaluation and Discharge Note    Name: Wesley Bernal  MRN: [id number]  Admitting Diagnosis:  Herpes zoster iritis      Recommendations:     Discharge Recommendations: home  Discharge Equipment Recommendations:  none  Barriers to discharge:  None    Assessment:     Wesley Bernal is a 69 y.o. male with a medical diagnosis of Herpes zoster iritis. At this time, patient is functioning at their prior level of function and does not require further acute OT services.     Plan:     During this hospitalization, patient does not require further acute OT services.  Please re-consult if situation changes.    · Plan of Care Reviewed with: patient    Subjective     Chief Complaint: none  Patient/Family Comments/goals: none stated    Occupational Profile:  Living Environment: Pt resides with significant other in 1 story house with 1 step in front of house & 2 steps in back of house.  Pt was independent with ADL's & ambulation.  Pt is an active  & works as an  of a [employer] & enjoys fishing.  Equipment Used at home:  other (see comments)(owns however does not use RW & QC)    Pain/Comfort:  · Pain Rating 1: 0/10  · Pain Rating Post-Intervention 1: 0/10    Patients cultural, spiritual, Religion conflicts given the current situation: no    Objective:     Communicated with: RN prior to session.  Patient found supine with peripheral IV upon OT entry to room.    General Precautions: Standard, fall, droplet, airborne, contact   Orthopedic Precautions:N/A   Braces: N/A     Occupational Performance:    Bed Mobility:    · Patient completed Supine to Sit with modified independence  · Patient completed Sit to Supine with modified independence    Functional Mobility/Transfers:  · Patient completed Sit <> Stand Transfer with supervision  with  no assistive device   · Functional Mobility: Supervision with transfers from toilet; SBA with ambulation in rom    Activities of Daily Living:  · Upper  Body Dressing: modified independence seated EOB  · Lower Body Dressing: modified independence seated EOB    Cognitive/Visual Perceptual:  Cognitive/Psychosocial Skills:     -       Oriented to: Person, Place, Time and Situation   -       Follows Commands/attention:Follows multistep  commands  -       Safety awareness/insight to disability: intact     Physical Exam:  Sensation:    -       Intact  Upper Extremity Range of Motion:  BUE WFL  Upper Extremity Strength: BUE WFL   Strength: WFL BUE    AMPAC 6 Click ADL:  AMPAC Total Score: 24    Treatment & Education:  Provided education regarding role of OT, POC, & discharge summary.  Pt had no further questions & when asked whether there were any concerns pt reported none.    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    Cardiomyopathy     Carotid artery disease     Coronary artery disease     Diabetes mellitus type II     Encounter for blood transfusion     Heart disease     Hyperlipidemia     Hypertension     PAD (peripheral artery disease)        Past Surgical History:   Procedure Laterality Date    ABDOMINAL AORTA STENT      BRACHIOCEPHALIC VEIN ANGIOPLASTY / STENTING      CARDIAC DEFIBRILLATOR PLACEMENT      1/2011    CARDIAC PACEMAKER PLACEMENT      1-2011    CARDIAC SURGERY      open heart    CARDIAC VALVE SURGERY      pig valve replacement -    CAROTID STENT      LASIK [place] Bilateral     LYMPH NODE BIOPSY Left 6/17/2019    Procedure:  BIOPSY, LYMPH NODE (GROIN);  Surgeon: Adelfo Yin MD;  Location: [facility];  Service: General;  Laterality: Left;    REPLACEMENT OF IMPLANTABLE CARDIOVERTER-DEFIBRILLATOR (ICD) GENERATOR N/A 7/29/2020    Procedure: REPLACEMENT, ICD GENERATOR;  Surgeon: Marko Hartley MD;  Location: [facility];  Service: Cardiology;  Laterality: N/A;       Time Tracking:     OT Date of Treatment: 09/18/20  OT Start Time: 1141  OT Stop Time: 1156  OT Total Time (min): 15 min    Billable Minutes:Evaluation 15    IVY Swanson  9/18/2020

## 2020-09-18 NOTE — H&P
[facility] Medicine  History & Physical    Patient Name: Wesley Bernal  MRN: [id number]  Admission Date: 9/18/2020  Attending Physician: Nichole Emmanuel*   Primary Care Provider: Lalo Sweet MD    [facility] Medicine Team: Networked reference to record PCT  Itzel Macdonald MD     Patient information was obtained from patient, past medical records and ER records.     Subjective:     Principal Problem:Herpes zoster iritis    Chief Complaint:   Chief Complaint   Patient presents with    Iritis        HPI: The patient is a 70 y/o male with PMH of CAD, a-fib (on Xarelto), essential tremor, T2DM, HTN, HLD, and anal cancer s/p chemo and resection 2019 who presented as transfer from [facility] with left facial swelling, rash, and eye pain. He states he began noticing a red rash on his left face 2-3 days ago. He sought care at [facility] ED and was diagnosed with cellulitis and given Bactrim. Yesterday, his eye began to hurt and he asked his wife to look for foreign object. He also noticed facial rash and swelling had worsened. He states the eye is painful and the rash is burning painful and extends into scalp. He returned to the ED and was found to have herpes zoster iritis. He also has decreased vision in his left eye and photophobia and requests the room is dim/dark. He has never had this rash previously. He denies chest pain, SOB, palpitations, cough, rectal bleeding, decreased urination, burning upon urination, and other rashes/lesions.     VS T97.7 / HR67 / RR15 / /89 on arrival to inpatient room.  Lab work-up also concerning for CHF exacerbation as well as mild CARLOS noted. He was given Lasix 40 mg IV x 1 and a dose of IV acyclovir and the case was discussed with ophthalmology and he was transferred for ophtho evaluation.    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     Cardiomyopathy     Carotid artery disease     Coronary artery disease     Diabetes mellitus type II     Encounter for blood transfusion     Heart disease     Hyperlipidemia     Hypertension     PAD (peripheral artery disease)        Past Surgical History:   Procedure Laterality Date    ABDOMINAL AORTA STENT      BRACHIOCEPHALIC VEIN ANGIOPLASTY / STENTING      CARDIAC DEFIBRILLATOR PLACEMENT      1/2011    CARDIAC PACEMAKER PLACEMENT      1-2011    CARDIAC SURGERY      open heart    CARDIAC VALVE SURGERY      pig valve replacement -    CAROTID STENT      LASIK SX Bilateral     LYMPH NODE BIOPSY Left 6/17/2019    Procedure: BIOPSY, LYMPH NODE (GROIN);  Surgeon: Adelfo Yin MD;  Location[facility];  Service: General;  Laterality: Left;    REPLACEMENT OF IMPLANTABLE CARDIOVERTER-DEFIBRILLATOR (ICD) GENERATOR N/A 7/29/2020    Procedure: REPLACEMENT, ICD GENERATOR;  Surgeon: Marko Hartley MD;  Location: [facility] CATH;  Service: Cardiology;  Laterality: N/A;       Review of patient's allergies indicates:   Allergen Reactions    Atorvastatin Other (See Comments)     Pain in joints, Creatinine level increased    Statins-hmg-coa reductase inhibitors        Current Facility-Administered Medications on File Prior to Encounter   Medication    [COMPLETED] acetaminophen tablet 1,000 mg    [COMPLETED] acyclovir (ZOVIRAX) 800 mg in dextrose 5 % 250 mL IVPB    [COMPLETED] fluorescein ophthalmic strip 1 each    [COMPLETED] furosemide injection 40 mg    [COMPLETED] morphine injection 1 mg    [COMPLETED] morphine injection 2 mg    [COMPLETED] ondansetron injection 4 mg    [COMPLETED] ondansetron injection 4 mg    [COMPLETED] ondansetron injection 4 mg    [COMPLETED] sodium chloride 0.9% bolus 1,000 mL    [COMPLETED] tetracaine HCl (PF) 0.5 % Drop 2 drop     Current Outpatient Medications on File Prior to Encounter   Medication Sig    ALPRAZolam (XANAX) 0.25 MG tablet Take 1 tablet (0.25 mg total) by  mouth 2 (two) times daily as needed.    amiodarone (PACERONE) 200 MG Tab TAKE 1/2 TABLET BY MOUTH ONCE A DAY    aspirin (ECOTRIN) 81 MG EC tablet Take by mouth once daily.    atenoloL (TENORMIN) 25 MG tablet Take 1 tablet (25 mg total) by mouth once daily.    docusate sodium (COLACE) 100 MG capsule Take 100 mg by mouth 2 (two) times daily as needed for Constipation.    evolocumab (REPATHA PUSHTRONEX) 420 mg/3.5 mL Injt Inject 420 mg (1 cartridge) into the skin every 30 days.    ezetimibe (ZETIA) 10 mg tablet TAKE ONE TABLET BY MOUTH ONCE A DAY    furosemide (LASIX) 20 MG tablet     gabapentin (NEURONTIN) 100 MG capsule Take 1 capsule (100 mg total) by mouth 3 (three) times daily.    HYDROcodone-acetaminophen (NORCO)  mg per tablet Take 1 tablet by mouth every 6 (six) hours as needed. GREATER THAN 7 DAY QUANTITY MEDICALLY NECESSARY    isosorbide mononitrate (IMDUR) 60 MG 24 hr tablet Take 60 mg by mouth once daily.     omega-3 acid ethyl esters (LOVAZA) 1 gram capsule Take 2 capsules (2 g total) by mouth 2 (two) times daily.    pioglitazone (ACTOS) 15 MG tablet TAKE ONE TABLET BY MOUTH ONCE A DAY    primidone (MYSOLINE) 50 MG Tab Take 1 tablet (50 mg total) by mouth 2 (two) times daily.    SITagliptin (JANUVIA) 100 MG Tab Take 1 tablet (100 mg total) by mouth once daily.    sulfamethoxazole-trimethoprim 800-160mg (BACTRIM DS) 800-160 mg Tab Take 1 tablet by mouth 2 (two) times daily. for 7 days     Family History     Problem Relation (Age of Onset)    Cancer Father    Diabetes Mother    Heart disease Father, Paternal Grandfather        Tobacco Use    Smoking status: Former Smoker     Packs/day: 1.00     Types: Cigarettes, Cigars     Quit date: 4/10/2015     Years since quittin.4    Smokeless tobacco: Never Used   Substance and Sexual Activity    Alcohol use: No     Alcohol/week: 0.0 standard drinks    Drug use: No    Sexual activity: Yes     Partners: Female     Review of Systems    Constitutional: Negative for chills and fever.   HENT: Negative for congestion, ear pain, sinus pain, sore throat and tinnitus.    Eyes: Positive for photophobia, pain, redness and visual disturbance.   Respiratory: Negative for cough, shortness of breath and wheezing.    Cardiovascular: Negative for chest pain and palpitations.   Gastrointestinal: Negative for abdominal distention, abdominal pain, anal bleeding, blood in stool, diarrhea, nausea and vomiting.   Endocrine: Negative for polyuria.   Genitourinary: Negative for decreased urine volume, dysuria and frequency.   Musculoskeletal: Negative for arthralgias and back pain.   Skin: Positive for rash (left facial rash; red with crusts, midline demarcation).   Neurological: Positive for tremors (right upper extremity). Negative for dizziness, light-headedness and headaches.   Hematological: Negative for adenopathy.   Psychiatric/Behavioral: Negative for agitation.     Objective:     Vital Signs (Most Recent):  Temp: 97.9 °F (36.6 °C) (09/18/20 0253)  Pulse: 67 (09/18/20 0253)  Resp: 15 (09/18/20 0253)  BP: (!) 150/89 (09/18/20 0253)  SpO2: 99 % (09/18/20 0253) Vital Signs (24h Range):  Temp:  [97.7 °F (36.5 °C)-97.9 °F (36.6 °C)] 97.9 °F (36.6 °C)  Pulse:  [57-73] 67  Resp:  [15-24] 15  SpO2:  [96 %-99 %] 99 %  BP: (102-165)/(45-90) 150/89     Weight: 76.9 kg (169 lb 8.5 oz)  Body mass index is 22.37 kg/m².    Physical Exam  Constitutional:       General: He is not in acute distress.     Appearance: Normal appearance.   HENT:      Head: Normocephalic and atraumatic.      Right Ear: External ear normal.      Left Ear: External ear normal.      Mouth/Throat:      Mouth: Mucous membranes are moist.   Eyes:      General: No scleral icterus.        Right eye: No discharge.         Left eye: Discharge (minimal tearing) present.No foreign body.      Conjunctiva/sclera:      Left eye: Left conjunctiva is injected.      Comments: Left eye mostly closed due to pain during  evaluation.   Neck:      Musculoskeletal: Neck supple. No neck rigidity or muscular tenderness.   Cardiovascular:      Rate and Rhythm: Normal rate. Rhythm irregular.      Pulses: Normal pulses.      Heart sounds: Normal heart sounds. No murmur. No friction rub.   Pulmonary:      Effort: Pulmonary effort is normal. No respiratory distress.      Breath sounds: Normal breath sounds. No stridor. No wheezing.   Abdominal:      General: Abdomen is flat. There is no distension.      Palpations: Abdomen is soft. There is no mass.   Musculoskeletal:         General: No swelling, tenderness or deformity.   Skin:     General: Skin is warm.      Coloration: Skin is not jaundiced or pale.      Findings: No bruising.   Neurological:      General: No focal deficit present.      Mental Status: He is alert and oriented to person, place, and time.   Psychiatric:         Mood and Affect: Mood normal.         Behavior: Behavior normal.             Significant Labs:   CBC:   Recent Labs   Lab 09/17/20  1714   WBC 5.16   HGB 12.8*   HCT 37.5*   *     CMP:   Recent Labs   Lab 09/17/20  1714   *   K 4.2   CL 99   CO2 22*   *   BUN 19   CREATININE 1.5*   CALCIUM 9.3   PROT 7.6   ALBUMIN 3.9   BILITOT 0.5   ALKPHOS 74   AST 12   ALT 14   ANIONGAP 13   EGFRNONAA 47*     All pertinent labs within the past 24 hours have been reviewed.    Significant Imaging: I have reviewed all pertinent imaging results/findings within the past 24 hours.    Assessment/Plan:     * Herpes zoster iritis  Left facial swelling x 2-3 days; h/o chemo in 2019 for anal cancer  Tranferred in for ophtho eval  Acyclovir 10mg/kg started at OSH  Pt also in CARLOS and likely CHF; current CrCl 51  PLAN  - Acyclovir 10mg/kg IV Q12h (dose reduced for CrCl <50); may increase to standard Q8h dosing but given CARLOS and CHF, optimal IV hydration may be challenging  - Ophtho eval in the AM (ordered)      CHF (congestive heart failure), NYHA class II, chronic,  systolic  Denies chest pain, palpitations, swelling  Trop 0.068, BNP 1098 on admit  No peripheral edema or JVD on exam  LAsix 40mg given at OSH  PLAN  - Trend trop  - TTE ordered, f/u  - Amiodarone, Imdur, atenolol continued  - Zetia, Repatha held      Ischemic cardiomyopathy  ICD in place      Type 2 diabetes mellitus  A1c 6.2 on 9/1/20  On pioglitazone, sitagliptin at home  PLAN  - Cardiac diet w/ consistent carbs  - SSI LD; no basal as glucose may be within goal (140-180) while inpatient    CARLOS (acute kidney injury)  Baseline1.5-2, recent values 1.5-1.6  Patient recently took Bactrim 1-2 days; can elevated SCr  Patient denies peripheral edema, decreased urine, kidney stones, UTI  PLAN  - Trend SCr for now  - Strict in / out qshift  - Will wait to initial lab workup for CARLOS  - Renally dose medications  - Avoid nephrotoxins    Atrial fibrillation  On Amiodarone, atenolol per med rec  States he is on Xarelto for anticoagulation but cannot find this in Epic  Rhythm irregular on exam  PLAN  - Continue Amio, atenolol  - Entered Xarelto 20 daily with meals; ask pharmacy to follow-up      VTE Risk Mitigation (From admission, onward)         Ordered     rivaroxaban tablet 20 mg  With dinner      09/18/20 0501     IP VTE HIGH RISK PATIENT  Once      09/18/20 0321     Place sequential compression device  Until discontinued      09/18/20 0321                   Itzel Macdonald MD  Department of Hospital Medicine   [facility]

## 2020-09-18 NOTE — HPI
The patient is a 70 y/o male with PMH of CAD, a-fib (on Xarelto), essential tremor, T2DM, HTN, HLD, and anal cancer s/p chemo and resection 2019 who presented as transfer from [facility] with left facial swelling, rash, and eye pain. He states he began noticing a red rash on his left face 2-3 days ago. He sought care at [facility] ED and was diagnosed with cellulitis and given Bactrim. Yesterday, his eye began to hurt and he asked his wife to look for foreign object. He also noticed facial rash and swelling had worsened. He states the eye is painful and the rash is burning painful and extends into scalp. He returned to the ED and was found to have herpes zoster iritis. He also has decreased vision in his left eye and photophobia and requests the room is dim/dark. He has never had this rash previously. He denies chest pain, SOB, palpitations, cough, rectal bleeding, decreased urination, burning upon urination, and other rashes/lesions.     VS T97.7 / HR67 / RR15 / /89 on arrival to inpatient room.  Lab work-up also concerning for CHF exacerbation as well as mild CARLOS noted. He was given Lasix 40 mg IV x 1 and a dose of IV acyclovir and the case was discussed with ophthalmology and he was transferred for ophtho evaluation.

## 2020-09-19 LAB
ALBUMIN SERPL BCP-MCNC: 3.1 G/DL (ref 3.5–5.2)
ALP SERPL-CCNC: 62 U/L (ref 55–135)
ALT SERPL W/O P-5'-P-CCNC: 10 U/L (ref 10–44)
ANION GAP SERPL CALC-SCNC: 8 MMOL/L (ref 8–16)
AST SERPL-CCNC: 13 U/L (ref 10–40)
BASOPHILS # BLD AUTO: 0.04 K/UL (ref 0–0.2)
BASOPHILS NFR BLD: 0.9 % (ref 0–1.9)
BILIRUB SERPL-MCNC: 0.4 MG/DL (ref 0.1–1)
BUN SERPL-MCNC: 28 MG/DL (ref 8–23)
CALCIUM SERPL-MCNC: 8.2 MG/DL (ref 8.7–10.5)
CHLORIDE SERPL-SCNC: 97 MMOL/L (ref 95–110)
CO2 SERPL-SCNC: 27 MMOL/L (ref 23–29)
CREAT SERPL-MCNC: 1.8 MG/DL (ref 0.5–1.4)
DIFFERENTIAL METHOD: ABNORMAL
EOSINOPHIL # BLD AUTO: 0.2 K/UL (ref 0–0.5)
EOSINOPHIL NFR BLD: 4.4 % (ref 0–8)
ERYTHROCYTE [DISTWIDTH] IN BLOOD BY AUTOMATED COUNT: 13.1 % (ref 11.5–14.5)
EST. GFR  (AFRICAN AMERICAN): 43.7 ML/MIN/1.73 M^2
EST. GFR  (NON AFRICAN AMERICAN): 37.8 ML/MIN/1.73 M^2
GLUCOSE SERPL-MCNC: 160 MG/DL (ref 70–110)
HCT VFR BLD AUTO: 35.6 % (ref 40–54)
HGB BLD-MCNC: 11.8 G/DL (ref 14–18)
IMM GRANULOCYTES # BLD AUTO: 0.06 K/UL (ref 0–0.04)
IMM GRANULOCYTES NFR BLD AUTO: 1.3 % (ref 0–0.5)
LYMPHOCYTES # BLD AUTO: 0.9 K/UL (ref 1–4.8)
LYMPHOCYTES NFR BLD: 20.3 % (ref 18–48)
MAGNESIUM SERPL-MCNC: 2 MG/DL (ref 1.6–2.6)
MCH RBC QN AUTO: 31.8 PG (ref 27–31)
MCHC RBC AUTO-ENTMCNC: 33.1 G/DL (ref 32–36)
MCV RBC AUTO: 96 FL (ref 82–98)
MONOCYTES # BLD AUTO: 0.6 K/UL (ref 0.3–1)
MONOCYTES NFR BLD: 12.6 % (ref 4–15)
NEUTROPHILS # BLD AUTO: 2.8 K/UL (ref 1.8–7.7)
NEUTROPHILS NFR BLD: 60.5 % (ref 38–73)
NRBC BLD-RTO: 0 /100 WBC
PHOSPHATE SERPL-MCNC: 3.4 MG/DL (ref 2.7–4.5)
PLATELET # BLD AUTO: 141 K/UL (ref 150–350)
PMV BLD AUTO: 10 FL (ref 9.2–12.9)
POCT GLUCOSE: 112 MG/DL (ref 70–110)
POCT GLUCOSE: 154 MG/DL (ref 70–110)
POCT GLUCOSE: 191 MG/DL (ref 70–110)
POTASSIUM SERPL-SCNC: 4.3 MMOL/L (ref 3.5–5.1)
PROT SERPL-MCNC: 6.5 G/DL (ref 6–8.4)
RBC # BLD AUTO: 3.71 M/UL (ref 4.6–6.2)
SODIUM SERPL-SCNC: 132 MMOL/L (ref 136–145)
WBC # BLD AUTO: 4.59 K/UL (ref 3.9–12.7)

## 2020-09-19 PROCEDURE — 80053 COMPREHEN METABOLIC PANEL: CPT | Mod: HCNC

## 2020-09-19 PROCEDURE — 99232 SBSQ HOSP IP/OBS MODERATE 35: CPT | Mod: HCNC,GC,, | Performed by: HOSPITALIST

## 2020-09-19 PROCEDURE — 83735 ASSAY OF MAGNESIUM: CPT | Mod: HCNC

## 2020-09-19 PROCEDURE — 11000001 HC ACUTE MED/SURG PRIVATE ROOM: Mod: HCNC

## 2020-09-19 PROCEDURE — 85025 COMPLETE CBC W/AUTO DIFF WBC: CPT | Mod: HCNC

## 2020-09-19 PROCEDURE — 25000003 PHARM REV CODE 250: Mod: HCNC | Performed by: STUDENT IN AN ORGANIZED HEALTH CARE EDUCATION/TRAINING PROGRAM

## 2020-09-19 PROCEDURE — 84100 ASSAY OF PHOSPHORUS: CPT | Mod: HCNC

## 2020-09-19 PROCEDURE — 36415 COLL VENOUS BLD VENIPUNCTURE: CPT | Mod: HCNC

## 2020-09-19 PROCEDURE — 63600175 PHARM REV CODE 636 W HCPCS: Mod: HCNC | Performed by: STUDENT IN AN ORGANIZED HEALTH CARE EDUCATION/TRAINING PROGRAM

## 2020-09-19 PROCEDURE — 99232 PR SUBSEQUENT HOSPITAL CARE,LEVL II: ICD-10-PCS | Mod: HCNC,GC,, | Performed by: HOSPITALIST

## 2020-09-19 RX ORDER — ONDANSETRON 4 MG/1
8 TABLET, ORALLY DISINTEGRATING ORAL EVERY 6 HOURS PRN
Status: DISCONTINUED | OUTPATIENT
Start: 2020-09-19 | End: 2020-09-20 | Stop reason: HOSPADM

## 2020-09-19 RX ORDER — PROMETHAZINE HYDROCHLORIDE 12.5 MG/1
12.5 TABLET ORAL EVERY 6 HOURS PRN
Status: DISCONTINUED | OUTPATIENT
Start: 2020-09-19 | End: 2020-09-20 | Stop reason: HOSPADM

## 2020-09-19 RX ADMIN — PRIMIDONE 50 MG: 50 TABLET ORAL at 11:09

## 2020-09-19 RX ADMIN — ERYTHROMYCIN: 5 OINTMENT OPHTHALMIC at 08:09

## 2020-09-19 RX ADMIN — ALPRAZOLAM 0.25 MG: 0.25 TABLET ORAL at 11:09

## 2020-09-19 RX ADMIN — RIVAROXABAN 15 MG: 15 TABLET, FILM COATED ORAL at 05:09

## 2020-09-19 RX ADMIN — ONDANSETRON 8 MG: 4 TABLET, ORALLY DISINTEGRATING ORAL at 01:09

## 2020-09-19 RX ADMIN — ISOSORBIDE MONONITRATE 60 MG: 30 TABLET, EXTENDED RELEASE ORAL at 11:09

## 2020-09-19 RX ADMIN — AMIODARONE HYDROCHLORIDE 100 MG: 100 TABLET ORAL at 11:09

## 2020-09-19 RX ADMIN — ALPRAZOLAM 0.25 MG: 0.25 TABLET ORAL at 08:09

## 2020-09-19 RX ADMIN — FUROSEMIDE 20 MG: 20 TABLET ORAL at 11:09

## 2020-09-19 RX ADMIN — ACYCLOVIR SODIUM 800 MG: 1000 INJECTION, SOLUTION INTRAVENOUS at 05:09

## 2020-09-19 RX ADMIN — INSULIN DETEMIR 8 UNITS: 100 INJECTION, SOLUTION SUBCUTANEOUS at 11:09

## 2020-09-19 RX ADMIN — ACYCLOVIR SODIUM 800 MG: 1000 INJECTION, SOLUTION INTRAVENOUS at 04:09

## 2020-09-19 RX ADMIN — ATENOLOL 25 MG: 25 TABLET ORAL at 11:09

## 2020-09-19 RX ADMIN — ASPIRIN 81 MG: 81 TABLET, COATED ORAL at 11:09

## 2020-09-19 RX ADMIN — PRIMIDONE 50 MG: 50 TABLET ORAL at 08:09

## 2020-09-19 NOTE — ASSESSMENT & PLAN NOTE
Baseline1.5-2, recent values 1.5-1.6  Patient recently took Bactrim 1-2 days; can elevated SCr  Patient denies peripheral edema, decreased urine, kidney stones, UTI  PLAN  - Trend SCr for now  - Strict in / out qshift  - Will wait to initial lab workup for CARLOS  - Renally dose medications  - Avoid nephrotoxins

## 2020-09-19 NOTE — ASSESSMENT & PLAN NOTE
On Amiodarone, atenolol per med rec  States he is on Xarelto for anticoagulation but cannot find this in Epic  Rhythm irregular on exam  PLAN  - Continue Amio, atenolol  - Xarelto 20 daily with meals

## 2020-09-19 NOTE — ASSESSMENT & PLAN NOTE
Left facial swelling x 2-3 days; h/o chemo in 2019 for anal cancer  Tranferred in for ophtho eval  Acyclovir 10mg/kg started at OSH  Pt also in CARLOS and likely CHF; current CrCl 51  PLAN  - Acyclovir 10mg/kg IV Q12h (dose reduced for CrCl <50); may increase to standard Q8h dosing but given CARLOS and CHF, optimal IV hydration may be challenging  - Plan to transition to PO tomorrow if continuing to improve   - Ophtho recs:  antiviral tx acyclovir 7-10 days renally dosed, erythromycin ointment for left eye BID and cold compresses

## 2020-09-19 NOTE — PLAN OF CARE
Problem: Adult Inpatient Plan of Care  Goal: Plan of Care Review  Outcome: Ongoing, Progressing     Problem: Fall Injury Risk  Goal: Absence of Fall and Fall-Related Injury  Outcome: Ongoing, Progressing     Problem: Diabetes Comorbidity  Goal: Blood Glucose Level Within Desired Range  Outcome: Ongoing, Progressing     Problem: Infection  Goal: Infection Symptom Resolution  Outcome: Ongoing, Progressing    Plan of care review with pt. Pt free of fall this shift. Pain assessed and pt denied pain. Cbg monitored to manage pt's DM2 and no insulin administered this shift. Antiviral administered to manage pt shingles. Pt aaox4. Afebrile. Vss. Will continue to monitor pt.

## 2020-09-19 NOTE — SUBJECTIVE & OBJECTIVE
Interval History: Mr Bernal reports improvement in his pain and says he has not had any other symptoms overnight. He is tolerating food well and asking about when he would be able to go home.     Review of Systems   Constitutional: Negative for chills and fever.   HENT: Negative for congestion, ear pain, sinus pain, sore throat and tinnitus.    Eyes: Positive for photophobia, pain, redness and visual disturbance.   Respiratory: Negative for cough, shortness of breath and wheezing.    Cardiovascular: Negative for chest pain and palpitations.   Gastrointestinal: Negative for abdominal distention, abdominal pain, anal bleeding, blood in stool, diarrhea, nausea and vomiting.   Endocrine: Negative for polyuria.   Genitourinary: Negative for decreased urine volume, dysuria and frequency.   Musculoskeletal: Negative for arthralgias and back pain.   Skin: Positive for rash (left facial rash; red with crusts, midline demarcation).   Neurological: Positive for tremors (right upper extremity). Negative for dizziness, light-headedness and headaches.   Hematological: Negative for adenopathy.   Psychiatric/Behavioral: Negative for agitation.     Objective:     Vital Signs (Most Recent):  Temp: 98.1 °F (36.7 °C) (09/19/20 0827)  Pulse: (!) 55 (09/19/20 0827)  Resp: 12 (09/19/20 0827)  BP: (!) 152/75 (09/19/20 0827)  SpO2: 99 % (09/19/20 0827) Vital Signs (24h Range):  Temp:  [98.1 °F (36.7 °C)-98.6 °F (37 °C)] 98.1 °F (36.7 °C)  Pulse:  [52-66] 55  Resp:  [10-13] 12  SpO2:  [94 %-99 %] 99 %  BP: ()/(59-75) 152/75     Weight: 76.7 kg (169 lb)  Body mass index is 22.3 kg/m².    Intake/Output Summary (Last 24 hours) at 9/19/2020 1045  Last data filed at 9/19/2020 0827  Gross per 24 hour   Intake --   Output 375 ml   Net -375 ml      Physical Exam  Constitutional:       General: He is not in acute distress.     Appearance: Normal appearance.   HENT:      Head: Normocephalic and atraumatic.      Right Ear: External ear normal.       Left Ear: External ear normal.      Mouth/Throat:      Mouth: Mucous membranes are moist.   Eyes:      General: No scleral icterus.        Right eye: No discharge.         Left eye: Discharge (minimal tearing) present.No foreign body.      Conjunctiva/sclera:      Left eye: Left conjunctiva is injected.      Comments: Left eye mostly closed due to pain during evaluation.   Neck:      Musculoskeletal: Neck supple. No neck rigidity or muscular tenderness.   Cardiovascular:      Rate and Rhythm: Normal rate.      Pulses: Normal pulses.      Heart sounds: Normal heart sounds. No murmur. No friction rub.   Pulmonary:      Effort: Pulmonary effort is normal. No respiratory distress.      Breath sounds: Normal breath sounds. No stridor. No wheezing.   Abdominal:      General: Abdomen is flat. There is no distension.      Palpations: Abdomen is soft. There is no mass.   Musculoskeletal:         General: No swelling, tenderness or deformity.   Skin:     General: Skin is warm.      Coloration: Skin is not jaundiced or pale.      Findings: No bruising.   Neurological:      General: No focal deficit present.      Mental Status: He is alert and oriented to person, place, and time.   Psychiatric:         Mood and Affect: Mood normal.         Behavior: Behavior normal.         Significant Labs:   CBC:   Recent Labs   Lab 09/17/20  1714 09/18/20  0506 09/19/20  0646   WBC 5.16 3.96 4.59   HGB 12.8* 13.3* 11.8*   HCT 37.5* 40.9 35.6*   * 159 141*     CMP:   Recent Labs   Lab 09/17/20  1714 09/18/20  0506 09/19/20  0646   * 133* 132*   K 4.2 4.7 4.3   CL 99 97 97   CO2 22* 25 27   * 252* 160*   BUN 19 20 28*   CREATININE 1.5* 1.7* 1.8*   CALCIUM 9.3 9.4 8.2*   PROT 7.6 7.7 6.5   ALBUMIN 3.9 3.9 3.1*   BILITOT 0.5 0.4 0.4   ALKPHOS 74 81 62   AST 12 14 13   ALT 14 13 10   ANIONGAP 13 11 8   EGFRNONAA 47* 40.5* 37.8*       Significant Imaging: I have reviewed and interpreted all pertinent imaging  results/findings within the past 24 hours.

## 2020-09-19 NOTE — ASSESSMENT & PLAN NOTE
Denies chest pain, palpitations, swelling  Trop 0.068, BNP 1098 on admit  No peripheral edema or JVD on exam  Lasix 40mg given at OSH    TTE:  · Mild left ventricular enlargement.  · Moderately decreased left ventricular systolic function. The estimated ejection fraction is 33%.  · Eccentric left ventricular hypertrophy.  · Grade I (mild) left ventricular diastolic dysfunction consistent with impaired relaxation.  · Local segmental wall motion abnormalities.  · Low normal right ventricular systolic function.  · Mild right atrial enlargement.  · There is a mechanical mitral valve prosthesis.  · The mean diastolic gradient across the mitral valve is 3 mmHg ; MVA 1.57; mild MR  · Normal central venous pressure (3 mmHg).  · The estimated PA systolic pressure is 11 mmHg ( THIS MAY NOT BE TRUE PEAK??).  · Mild mitral regurgitation.      PLAN  - Trend trop showed decrease from 0.092 to 0.088  - Amiodarone, Imdur, atenolol continued  - Zetia, Repatha held

## 2020-09-19 NOTE — PROGRESS NOTES
[facility] Hospital Medicine  Progress Note    Patient Name: Wesley Bernal  MRN: [id number]  Patient Class: IP- Inpatient   Admission Date: 9/18/2020  Length of Stay: 1 days  Attending Physician: Nichole Emmanuel*  Primary Care Provider: Lalo Sweet MD    [facility] Medicine Team: [facility] 4 Martine España MD    Subjective:     Principal Problem:Herpes zoster iritis        HPI:  The patient is a 68 y/o male with PMH of CAD, a-fib (on Xarelto), essential tremor, T2DM, HTN, HLD, and anal cancer s/p chemo and resection 2019 who presented as transfer from [facility] with left facial swelling, rash, and eye pain. He states he began noticing a red rash on his left face 2-3 days ago. He sought care at [facility] ED and was diagnosed with cellulitis and given Bactrim. Yesterday, his eye began to hurt and he asked his wife to look for foreign object. He also noticed facial rash and swelling had worsened. He states the eye is painful and the rash is burning painful and extends into scalp. He returned to the ED and was found to have herpes zoster iritis. He also has decreased vision in his left eye and photophobia and requests the room is dim/dark. He has never had this rash previously. He denies chest pain, SOB, palpitations, cough, rectal bleeding, decreased urination, burning upon urination, and other rashes/lesions.     VS T97.7 / HR67 / RR15 / /89 on arrival to inpatient room.  Lab work-up also concerning for CHF exacerbation as well as mild CARLOS noted. He was given Lasix 40 mg IV x 1 and a dose of IV acyclovir and the case was discussed with ophthalmology and he was transferred for ophtho evaluation.    Overview/Hospital Course:  No notes on file    Interval History: Mr Bernal reports improvement in his pain and says he has not had any other symptoms overnight. He is tolerating food well and asking about when he would be able to go home.     Review of Systems   Constitutional:  Negative for chills and fever.   HENT: Negative for congestion, ear pain, sinus pain, sore throat and tinnitus.    Eyes: Positive for photophobia, pain, redness and visual disturbance.   Respiratory: Negative for cough, shortness of breath and wheezing.    Cardiovascular: Negative for chest pain and palpitations.   Gastrointestinal: Negative for abdominal distention, abdominal pain, anal bleeding, blood in stool, diarrhea, nausea and vomiting.   Endocrine: Negative for polyuria.   Genitourinary: Negative for decreased urine volume, dysuria and frequency.   Musculoskeletal: Negative for arthralgias and back pain.   Skin: Positive for rash (left facial rash; red with crusts, midline demarcation).   Neurological: Positive for tremors (right upper extremity). Negative for dizziness, light-headedness and headaches.   Hematological: Negative for adenopathy.   Psychiatric/Behavioral: Negative for agitation.     Objective:     Vital Signs (Most Recent):  Temp: 98.1 °F (36.7 °C) (09/19/20 0827)  Pulse: (!) 55 (09/19/20 0827)  Resp: 12 (09/19/20 0827)  BP: (!) 152/75 (09/19/20 0827)  SpO2: 99 % (09/19/20 0827) Vital Signs (24h Range):  Temp:  [98.1 °F (36.7 °C)-98.6 °F (37 °C)] 98.1 °F (36.7 °C)  Pulse:  [52-66] 55  Resp:  [10-13] 12  SpO2:  [94 %-99 %] 99 %  BP: ()/(59-75) 152/75     Weight: 76.7 kg (169 lb)  Body mass index is 22.3 kg/m².    Intake/Output Summary (Last 24 hours) at 9/19/2020 1045  Last data filed at 9/19/2020 0827  Gross per 24 hour   Intake --   Output 375 ml   Net -375 ml      Physical Exam  Constitutional:       General: He is not in acute distress.     Appearance: Normal appearance.   HENT:      Head: Normocephalic and atraumatic.      Right Ear: External ear normal.      Left Ear: External ear normal.      Mouth/Throat:      Mouth: Mucous membranes are moist.   Eyes:      General: No scleral icterus.        Right eye: No discharge.         Left eye: Discharge (minimal tearing) present.No foreign  body.      Conjunctiva/sclera:      Left eye: Left conjunctiva is injected.      Comments: Left eye mostly closed due to pain during evaluation.   Neck:      Musculoskeletal: Neck supple. No neck rigidity or muscular tenderness.   Cardiovascular:      Rate and Rhythm: Normal rate.      Pulses: Normal pulses.      Heart sounds: Normal heart sounds. No murmur. No friction rub.   Pulmonary:      Effort: Pulmonary effort is normal. No respiratory distress.      Breath sounds: Normal breath sounds. No stridor. No wheezing.   Abdominal:      General: Abdomen is flat. There is no distension.      Palpations: Abdomen is soft. There is no mass.   Musculoskeletal:         General: No swelling, tenderness or deformity.   Skin:     General: Skin is warm.      Coloration: Skin is not jaundiced or pale.      Findings: No bruising.   Neurological:      General: No focal deficit present.      Mental Status: He is alert and oriented to person, place, and time.   Psychiatric:         Mood and Affect: Mood normal.         Behavior: Behavior normal.         Significant Labs:   CBC:   Recent Labs   Lab 09/17/20  1714 09/18/20  0506 09/19/20  0646   WBC 5.16 3.96 4.59   HGB 12.8* 13.3* 11.8*   HCT 37.5* 40.9 35.6*   * 159 141*     CMP:   Recent Labs   Lab 09/17/20  1714 09/18/20  0506 09/19/20  0646   * 133* 132*   K 4.2 4.7 4.3   CL 99 97 97   CO2 22* 25 27   * 252* 160*   BUN 19 20 28*   CREATININE 1.5* 1.7* 1.8*   CALCIUM 9.3 9.4 8.2*   PROT 7.6 7.7 6.5   ALBUMIN 3.9 3.9 3.1*   BILITOT 0.5 0.4 0.4   ALKPHOS 74 81 62   AST 12 14 13   ALT 14 13 10   ANIONGAP 13 11 8   EGFRNONAA 47* 40.5* 37.8*       Significant Imaging: I have reviewed and interpreted all pertinent imaging results/findings within the past 24 hours.      Assessment/Plan:      * Herpes zoster iritis  Left facial swelling x 2-3 days; h/o chemo in 2019 for anal cancer  Tranferred in for ophtho eval  Acyclovir 10mg/kg started at OSH  Pt also in CARLOS and  likely CHF; current CrCl 51  PLAN  - Acyclovir 10mg/kg IV Q12h (dose reduced for CrCl <50); may increase to standard Q8h dosing but given CARLOS and CHF, optimal IV hydration may be challenging  - Plan to transition to PO tomorrow if continuing to improve   - Ophtho recs:  antiviral tx acyclovir 7-10 days renally dosed, erythromycin ointment for left eye BID and cold compresses      CARLOS (acute kidney injury)  Baseline1.5-2, recent values 1.5-1.6  Patient recently took Bactrim 1-2 days; can elevated SCr  Patient denies peripheral edema, decreased urine, kidney stones, UTI  PLAN  - Trend SCr for now  - Strict in / out qshift  - Will wait to initial lab workup for CARLOS  - Renally dose medications  - Avoid nephrotoxins    Ischemic cardiomyopathy  ICD in place      CHF (congestive heart failure), NYHA class II, chronic, systolic  Denies chest pain, palpitations, swelling  Trop 0.068, BNP 1098 on admit  No peripheral edema or JVD on exam  Lasix 40mg given at OSH    TTE:  · Mild left ventricular enlargement.  · Moderately decreased left ventricular systolic function. The estimated ejection fraction is 33%.  · Eccentric left ventricular hypertrophy.  · Grade I (mild) left ventricular diastolic dysfunction consistent with impaired relaxation.  · Local segmental wall motion abnormalities.  · Low normal right ventricular systolic function.  · Mild right atrial enlargement.  · There is a mechanical mitral valve prosthesis.  · The mean diastolic gradient across the mitral valve is 3 mmHg ; MVA 1.57; mild MR  · Normal central venous pressure (3 mmHg).  · The estimated PA systolic pressure is 11 mmHg ( THIS MAY NOT BE TRUE PEAK??).  · Mild mitral regurgitation.      PLAN  - Trend trop showed decrease from 0.092 to 0.088  - Amiodarone, Imdur, atenolol continued  - Zetia, Repatha held      Type 2 diabetes mellitus  A1c 6.2 on 9/1/20  On pioglitazone, sitagliptin at home  PLAN  - Cardiac diet w/ consistent carbs  - SSI LD; no basal as  glucose may be within goal (140-180) while inpatient    Atrial fibrillation  On Amiodarone, atenolol per med rec  States he is on Xarelto for anticoagulation but cannot find this in Epic  Rhythm irregular on exam  PLAN  - Continue Amio, atenolol  - Xarelto 20 daily with meals        VTE Risk Mitigation (From admission, onward)         Ordered     rivaroxaban tablet 15 mg  With dinner      09/18/20 0501     IP VTE HIGH RISK PATIENT  Once      09/18/20 0321     Place sequential compression device  Until discontinued      09/18/20 0321                Discharge Planning   THU: 9/19/2020     Code Status: Full Code   Is the patient medically ready for discharge?: No    Reason for patient still in hospital (select all that apply): Treatment  Discharge Plan A: Home with family                  Martine España MD  Department of Hospital Medicine   [facility]

## 2020-09-20 VITALS
OXYGEN SATURATION: 98 % | RESPIRATION RATE: 16 BRPM | HEIGHT: 73 IN | SYSTOLIC BLOOD PRESSURE: 125 MMHG | TEMPERATURE: 98 F | HEART RATE: 55 BPM | BODY MASS INDEX: 22.4 KG/M2 | WEIGHT: 169 LBS | DIASTOLIC BLOOD PRESSURE: 60 MMHG

## 2020-09-20 LAB
ALBUMIN SERPL BCP-MCNC: 3.2 G/DL (ref 3.5–5.2)
ALP SERPL-CCNC: 66 U/L (ref 55–135)
ALT SERPL W/O P-5'-P-CCNC: 10 U/L (ref 10–44)
ANION GAP SERPL CALC-SCNC: 9 MMOL/L (ref 8–16)
AST SERPL-CCNC: 12 U/L (ref 10–40)
BASOPHILS # BLD AUTO: 0.03 K/UL (ref 0–0.2)
BASOPHILS NFR BLD: 0.5 % (ref 0–1.9)
BILIRUB SERPL-MCNC: 0.3 MG/DL (ref 0.1–1)
BUN SERPL-MCNC: 39 MG/DL (ref 8–23)
CALCIUM SERPL-MCNC: 8.4 MG/DL (ref 8.7–10.5)
CHLORIDE SERPL-SCNC: 99 MMOL/L (ref 95–110)
CO2 SERPL-SCNC: 25 MMOL/L (ref 23–29)
CREAT SERPL-MCNC: 1.8 MG/DL (ref 0.5–1.4)
DIFFERENTIAL METHOD: ABNORMAL
EOSINOPHIL # BLD AUTO: 0.3 K/UL (ref 0–0.5)
EOSINOPHIL NFR BLD: 4.3 % (ref 0–8)
ERYTHROCYTE [DISTWIDTH] IN BLOOD BY AUTOMATED COUNT: 12.9 % (ref 11.5–14.5)
EST. GFR  (AFRICAN AMERICAN): 43.7 ML/MIN/1.73 M^2
EST. GFR  (NON AFRICAN AMERICAN): 37.8 ML/MIN/1.73 M^2
GLUCOSE SERPL-MCNC: 129 MG/DL (ref 70–110)
HCT VFR BLD AUTO: 33.4 % (ref 40–54)
HGB BLD-MCNC: 11.2 G/DL (ref 14–18)
IMM GRANULOCYTES # BLD AUTO: 0.06 K/UL (ref 0–0.04)
IMM GRANULOCYTES NFR BLD AUTO: 1 % (ref 0–0.5)
LYMPHOCYTES # BLD AUTO: 0.8 K/UL (ref 1–4.8)
LYMPHOCYTES NFR BLD: 13.9 % (ref 18–48)
MAGNESIUM SERPL-MCNC: 2.1 MG/DL (ref 1.6–2.6)
MCH RBC QN AUTO: 31.6 PG (ref 27–31)
MCHC RBC AUTO-ENTMCNC: 33.5 G/DL (ref 32–36)
MCV RBC AUTO: 94 FL (ref 82–98)
MONOCYTES # BLD AUTO: 0.6 K/UL (ref 0.3–1)
MONOCYTES NFR BLD: 9.4 % (ref 4–15)
NEUTROPHILS # BLD AUTO: 4.2 K/UL (ref 1.8–7.7)
NEUTROPHILS NFR BLD: 70.9 % (ref 38–73)
NRBC BLD-RTO: 0 /100 WBC
PHOSPHATE SERPL-MCNC: 4.1 MG/DL (ref 2.7–4.5)
PLATELET # BLD AUTO: 157 K/UL (ref 150–350)
PMV BLD AUTO: 9.8 FL (ref 9.2–12.9)
POTASSIUM SERPL-SCNC: 4.4 MMOL/L (ref 3.5–5.1)
PROT SERPL-MCNC: 6.4 G/DL (ref 6–8.4)
RBC # BLD AUTO: 3.54 M/UL (ref 4.6–6.2)
SODIUM SERPL-SCNC: 133 MMOL/L (ref 136–145)
WBC # BLD AUTO: 5.88 K/UL (ref 3.9–12.7)

## 2020-09-20 PROCEDURE — 80053 COMPREHEN METABOLIC PANEL: CPT | Mod: HCNC

## 2020-09-20 PROCEDURE — 85025 COMPLETE CBC W/AUTO DIFF WBC: CPT | Mod: HCNC

## 2020-09-20 PROCEDURE — 25000003 PHARM REV CODE 250: Mod: HCNC | Performed by: STUDENT IN AN ORGANIZED HEALTH CARE EDUCATION/TRAINING PROGRAM

## 2020-09-20 PROCEDURE — 99239 PR HOSPITAL DISCHARGE DAY,>30 MIN: ICD-10-PCS | Mod: HCNC,GC,, | Performed by: HOSPITALIST

## 2020-09-20 PROCEDURE — 99239 HOSP IP/OBS DSCHRG MGMT >30: CPT | Mod: HCNC,GC,, | Performed by: HOSPITALIST

## 2020-09-20 PROCEDURE — 63600175 PHARM REV CODE 636 W HCPCS: Mod: HCNC | Performed by: STUDENT IN AN ORGANIZED HEALTH CARE EDUCATION/TRAINING PROGRAM

## 2020-09-20 PROCEDURE — 36415 COLL VENOUS BLD VENIPUNCTURE: CPT | Mod: HCNC

## 2020-09-20 PROCEDURE — 84100 ASSAY OF PHOSPHORUS: CPT | Mod: HCNC

## 2020-09-20 PROCEDURE — 83735 ASSAY OF MAGNESIUM: CPT | Mod: HCNC

## 2020-09-20 RX ORDER — HYDROCODONE BITARTRATE AND ACETAMINOPHEN 10; 325 MG/1; MG/1
1 TABLET ORAL EVERY 6 HOURS PRN
Qty: 50 TABLET | Refills: 0 | Status: CANCELLED | OUTPATIENT
Start: 2020-09-20

## 2020-09-20 RX ORDER — ACYCLOVIR 200 MG/1
800 CAPSULE ORAL
Status: DISCONTINUED | OUTPATIENT
Start: 2020-09-20 | End: 2020-09-20

## 2020-09-20 RX ORDER — ERYTHROMYCIN 5 MG/G
OINTMENT OPHTHALMIC 2 TIMES DAILY
Qty: 1 TUBE | Refills: 0 | Status: SHIPPED | OUTPATIENT
Start: 2020-09-20 | End: 2020-09-28

## 2020-09-20 RX ORDER — VALACYCLOVIR HYDROCHLORIDE 1 G/1
1000 TABLET, FILM COATED ORAL 2 TIMES DAILY
Qty: 16 TABLET | Refills: 0 | Status: SHIPPED | OUTPATIENT
Start: 2020-09-20 | End: 2021-03-15

## 2020-09-20 RX ORDER — ERYTHROMYCIN 5 MG/G
OINTMENT OPHTHALMIC 2 TIMES DAILY
Qty: 1 TUBE | Refills: 0 | Status: SHIPPED | OUTPATIENT
Start: 2020-09-20 | End: 2020-09-20

## 2020-09-20 RX ORDER — GABAPENTIN 100 MG/1
300 CAPSULE ORAL NIGHTLY
Qty: 30 CAPSULE | Refills: 1 | Status: SHIPPED | OUTPATIENT
Start: 2020-09-20 | End: 2020-11-24 | Stop reason: SDUPTHER

## 2020-09-20 RX ORDER — VALACYCLOVIR HYDROCHLORIDE 500 MG/1
1000 TABLET, FILM COATED ORAL 2 TIMES DAILY
Status: DISCONTINUED | OUTPATIENT
Start: 2020-09-20 | End: 2020-09-20 | Stop reason: HOSPADM

## 2020-09-20 RX ORDER — HYDROCODONE BITARTRATE AND ACETAMINOPHEN 10; 325 MG/1; MG/1
1 TABLET ORAL EVERY 6 HOURS PRN
Qty: 50 TABLET | Refills: 0
Start: 2020-09-20 | End: 2020-12-16 | Stop reason: SDUPTHER

## 2020-09-20 RX ORDER — OMEGA-3-ACID ETHYL ESTERS 1 G/1
4 CAPSULE, LIQUID FILLED ORAL EVERY MORNING
Qty: 30 CAPSULE | Refills: 1 | Status: SHIPPED | OUTPATIENT
Start: 2020-09-20 | End: 2020-11-24 | Stop reason: SDUPTHER

## 2020-09-20 RX ORDER — ACYCLOVIR 200 MG/1
800 CAPSULE ORAL
Qty: 160 CAPSULE | Refills: 0 | Status: CANCELLED | OUTPATIENT
Start: 2020-09-20 | End: 2020-09-28

## 2020-09-20 RX ADMIN — PRIMIDONE 50 MG: 50 TABLET ORAL at 09:09

## 2020-09-20 RX ADMIN — ISOSORBIDE MONONITRATE 60 MG: 30 TABLET, EXTENDED RELEASE ORAL at 09:09

## 2020-09-20 RX ADMIN — ERYTHROMYCIN: 5 OINTMENT OPHTHALMIC at 09:09

## 2020-09-20 RX ADMIN — ATENOLOL 25 MG: 25 TABLET ORAL at 09:09

## 2020-09-20 RX ADMIN — VALACYCLOVIR HYDROCHLORIDE 1000 MG: 500 TABLET, FILM COATED ORAL at 11:09

## 2020-09-20 RX ADMIN — FUROSEMIDE 20 MG: 20 TABLET ORAL at 09:09

## 2020-09-20 RX ADMIN — ASPIRIN 81 MG: 81 TABLET, COATED ORAL at 09:09

## 2020-09-20 RX ADMIN — AMIODARONE HYDROCHLORIDE 100 MG: 100 TABLET ORAL at 09:09

## 2020-09-20 RX ADMIN — INSULIN DETEMIR 8 UNITS: 100 INJECTION, SOLUTION SUBCUTANEOUS at 09:09

## 2020-09-20 RX ADMIN — ACYCLOVIR SODIUM 800 MG: 1000 INJECTION, SOLUTION INTRAVENOUS at 04:09

## 2020-09-20 RX ADMIN — ALPRAZOLAM 0.25 MG: 0.25 TABLET ORAL at 09:09

## 2020-09-20 NOTE — DISCHARGE SUMMARY
[facility] Hospital Medicine  Discharge Summary      Patient Name: Wesley Bernal  MRN: [id number]  Admission Date: 9/18/2020  Hospital Length of Stay: 2 days  Discharge Date and Time:  09/20/2020 10:39 AM  Attending Physician: Nichole Emmanuel*   Discharging Provider: Martine España MD  Primary Care Provider: Lalo Sweet MD  [facility] Medicine Team: [facility] MED 4 Martine España MD    HPI:   The patient is a 68 y/o male with PMH of CAD, a-fib (on Xarelto), essential tremor, T2DM, HTN, HLD, and anal cancer s/p chemo and resection 2019 who presented as transfer from [facility] with left facial swelling, rash, and eye pain. He states he began noticing a red rash on his left face 2-3 days ago. He sought care at [facility] ED and was diagnosed with cellulitis and given Bactrim. Yesterday, his eye began to hurt and he asked his wife to look for foreign object. He also noticed facial rash and swelling had worsened. He states the eye is painful and the rash is burning painful and extends into scalp. He returned to the ED and was found to have herpes zoster iritis. He also has decreased vision in his left eye and photophobia and requests the room is dim/dark. He has never had this rash previously. He denies chest pain, SOB, palpitations, cough, rectal bleeding, decreased urination, burning upon urination, and other rashes/lesions.     VS T97.7 / HR67 / RR15 / /89 on arrival to inpatient room.  Lab work-up also concerning for CHF exacerbation as well as mild CARLOS noted. He was given Lasix 40 mg IV x 1 and a dose of IV acyclovir and the case was discussed with ophthalmology and he was transferred for ophtho evaluation.    * No surgery found *      Vitals:    09/20/20 0718   BP:    Pulse: (!) 52   Resp:    Temp:      Physical Exam  Constitutional:       General: He is not in acute distress.     Appearance: Normal appearance.   HENT:      Head: Normocephalic and atraumatic.      Right Ear:  External ear normal.      Left Ear: External ear normal.      Mouth/Throat:      Mouth: Mucous membranes are moist.   Eyes:      General: No scleral icterus.        Right eye: No discharge.         Left eye: Discharge (minimal tearing) present.No foreign body.      Conjunctiva/sclera:      Left eye: Left conjunctiva is injected.      Comments: Left eye mostly closed due to pain during evaluation.   Neck:      Musculoskeletal: Neck supple. No neck rigidity or muscular tenderness.   Cardiovascular:      Rate and Rhythm: Normal rate.      Pulses: Normal pulses.      Heart sounds: Normal heart sounds. No murmur. No friction rub.   Pulmonary:      Effort: Pulmonary effort is normal. No respiratory distress.      Breath sounds: Normal breath sounds. No stridor. No wheezing.   Abdominal:      General: Abdomen is flat. There is no distension.      Palpations: Abdomen is soft. There is no mass.   Musculoskeletal:         General: No swelling, tenderness or deformity.   Skin:     General: Skin is warm.      Coloration: Skin is not jaundiced or pale.      Findings: No bruising.   Neurological:      General: No focal deficit present.      Mental Status: He is alert and oriented to person, place, and time.   Psychiatric:         Mood and Affect: Mood normal.         Behavior: Behavior normal.   Hospital Course:   No notes on file     Consults:   Consults (From admission, onward)        Status Ordering Provider     Inpatient consult to Ophthalmology  Once     Provider:  (Not yet assigned)    Completed DARLENE MOBLEY          * Herpes zoster iritis  Left facial swelling x 2-3 days; h/o chemo in 2019 for anal cancer  Tranferred in for ophtho eval  Acyclovir 10mg/kg started at OSH  Pt also in CARLOS and likely CHF; current CrCl 51  PLAN  - Acyclovir 10mg/kg IV Q12h (dose reduced for CrCl <50) discontinued  - Transitioned to PO valacyclovir 1G BID 8 days (renally dosed for CrCL <50)   - Ophtho recs:  Continue antiviral tx acyclovir  7-10 days renally dosed, erythromycin ointment for left eye BID and cold compresses  - FU with infectious disease  - FU with PCP       CARLOS (acute kidney injury)  Baseline1.5-2, recent values 1.5-1.6  Patient recently took Bactrim 1-2 days; can elevated SCr  Patient denies peripheral edema, decreased urine, kidney stones, UTI  PLAN  - Renally dose medications (acyclovir renally dosed)  - Avoid nephrotoxins  - FU with PCP     Ischemic cardiomyopathy  ICD in place      CHF (congestive heart failure), NYHA class II, chronic, systolic  Denies chest pain, palpitations, swelling  Trop 0.068, BNP 1098 on admit  No peripheral edema or JVD on exam  Lasix 40mg given at OSH    TTE:  · Mild left ventricular enlargement.  · Moderately decreased left ventricular systolic function. The estimated ejection fraction is 33%.  · Eccentric left ventricular hypertrophy.  · Grade I (mild) left ventricular diastolic dysfunction consistent with impaired relaxation.  · Local segmental wall motion abnormalities.  · Low normal right ventricular systolic function.  · Mild right atrial enlargement.  · There is a mechanical mitral valve prosthesis.  · The mean diastolic gradient across the mitral valve is 3 mmHg ; MVA 1.57; mild MR  · Normal central venous pressure (3 mmHg).  · The estimated PA systolic pressure is 11 mmHg ( THIS MAY NOT BE TRUE PEAK??).  · Mild mitral regurgitation.      PLAN  - Continue Amiodarone, Imdur, atenolol at home   - Resume Zetia, Repatha at home       Type 2 diabetes mellitus  A1c 6.2 on 9/1/20  On pioglitazone, sitagliptin at home  PLAN  - Resume home medication as well controlled previously     Atrial fibrillation  On Amiodarone, atenolol per med rec  States he is on Xarelto for anticoagulation but cannot find this in Epic  Rhythm irregular on exam  PLAN  - Continue Amio, atenolol at home  - Continue Xarelto 20 daily with meals at home      Final Active Diagnoses:    Diagnosis Date Noted POA    PRINCIPAL PROBLEM:   Herpes zoster iritis [B02.32] 09/18/2020 Yes    CARLOS (acute kidney injury) [N17.9] 09/18/2020 Unknown    Shingles outbreak [B02.9] 09/17/2020 Yes    CHF (congestive heart failure), NYHA class II, chronic, systolic [I50.22] 07/29/2020 Yes    Ischemic cardiomyopathy [I25.5] 07/29/2020 Yes    Type 2 diabetes mellitus [E11.9] 11/25/2013 Yes    Atrial fibrillation [I48.91] 11/25/2013 Yes      Problems Resolved During this Admission:       Discharged Condition: stable    Disposition: Home or Self Care    Follow Up:  Follow-up Information     Gideon Donnelly MD On 9/25/2020.    Specialty: Family Medicine  Why: at 8:00 AM; hospital follow up appointment (Dr. Sweet not available)  Contact information:  [address] [facility] [id number] [phone]             Hernandez Cameron MD.    Specialty: Ophthalmology  Why: eye doctor hospital follow up appointment  Contact information:  [address] [place] [address] [id number] [phone]             [facility] - Infectious Disease [facility] In 1 week.    Specialty: Infectious Diseases  [facility] [facility] [place] [facility] Herpes Zoster ir[place]  Contact information:  [address] [facility] [address] [phone]  Additional information:  Main Building, 1st floor near [place] entrance   Please park in [facility]               Patient Instructions:      BASIC METABOLIC PANEL   Standing Status: Future Standing Exp. Date: 11/19/21     Ambulatory referral/consult to Infectious Disease   Standing Status: Future   Referral Priority: Routine Referral Type: Consultation   Referral Reason: Specialty Services Required   Requested Specialty: Infectious Diseases   Number of Visits Requested: 1     Ambulatory referral/consult to Ophthalmology   Standing Status: Future   Referral Priority: Routine Referral Type: Consultation   Referral Reason: Specialty Services Required   Requested Specialty: Ophthalmology   Number of Visits Requested: 1     Diet Cardiac     Notify your  health care provider if you experience any of the following:  temperature >100.4     Notify your health care provider if you experience any of the following:  persistent nausea and vomiting or diarrhea     Notify your health care provider if you experience any of the following:  severe uncontrolled pain     Notify your health care provider if you experience any of the following:  redness, tenderness, or signs of infection (pain, swelling, redness, odor or green/yellow discharge around incision site)     Notify your health care provider if you experience any of the following:  difficulty breathing or increased cough     Notify your health care provider if you experience any of the following:  severe persistent headache     Notify your health care provider if you experience any of the following:  persistent dizziness, light-headedness, or visual disturbances     Notify your health care provider if you experience any of the following:  worsening rash     Notify your health care provider if you experience any of the following:  increased confusion or weakness     Follow-up primary physician   Standing Status: Future Standing Exp. Date: 09/20/21     Activity as tolerated       Significant Diagnostic Studies: Labs:   CMP   Recent Labs   Lab 09/19/20  0646 09/20/20  0526   * 133*   K 4.3 4.4   CL 97 99   CO2 27 25   * 129*   BUN 28* 39*   CREATININE 1.8* 1.8*   CALCIUM 8.2* 8.4*   PROT 6.5 6.4   ALBUMIN 3.1* 3.2*   BILITOT 0.4 0.3   ALKPHOS 62 66   AST 13 12   ALT 10 10   ANIONGAP 8 9   ESTGFRAFRICA 43.7* 43.7*   EGFRNONAA 37.8* 37.8*    and CBC   Recent Labs   Lab 09/19/20  0646 09/20/20  0526   WBC 4.59 5.88   HGB 11.8* 11.2*   HCT 35.6* 33.4*   * 157       Pending Diagnostic Studies:     None         Medications:  Reconciled Home Medications:      Medication List      START taking these medications    erythromycin ophthalmic ointment  Commonly known as: ROMYCIN  Place into the left eye 2 (two) times  a day. Take for 8 more days Stop date 9/28/20 for 8 days     valACYclovir 1000 MG tablet  Commonly known as: VALTREX  Take 1 tablet (1,000 mg total) by mouth 2 (two) times daily. for 8 days     XARELTO 15 mg Tab  Generic drug: rivaroxaban  Take 1 tablet (15 mg total) by mouth daily with dinner or evening meal.        CHANGE how you take these medications    HYDROcodone-acetaminophen  mg per tablet  Commonly known as: NORCO  Take 1 tablet by mouth every 6 (six) hours as needed. GREATER THAN 7 DAY QUANTITY MEDICALLY NECESSARY  What changed:   · how much to take  · when to take this  · reasons to take this     primidone 50 MG Tab  Commonly known as: MYSOLINE  Take 1 tablet (50 mg total) by mouth 2 (two) times daily.  What changed: when to take this        CONTINUE taking these medications    ALPRAZolam 0.25 MG tablet  Commonly known as: XANAX  Take 1 tablet (0.25 mg total) by mouth 2 (two) times daily as needed.     amiodarone 200 MG Tab  Commonly known as: PACERONE  TAKE 1/2 TABLET BY MOUTH ONCE A DAY     aspirin 81 MG EC tablet  Commonly known as: ECOTRIN  Take by mouth once daily.     atenoloL 25 MG tablet  Commonly known as: TENORMIN  Take 1 tablet (25 mg total) by mouth once daily.     docusate sodium 100 MG capsule  Commonly known as: COLACE  Take 200 mg by mouth every morning.     ezetimibe 10 mg tablet  Commonly known as: ZETIA  TAKE ONE TABLET BY MOUTH ONCE A DAY     furosemide 20 MG tablet  Commonly known as: LASIX  Take 20 mg by mouth once daily.     gabapentin 100 MG capsule  Commonly known as: NEURONTIN  Take 3 capsules (300 mg total) by mouth every evening.     isosorbide mononitrate 60 MG 24 hr tablet  Commonly known as: IMDUR  Take 60 mg by mouth once daily.     omega-3 acid ethyl esters 1 gram capsule  Commonly known as: LOVAZA  Take 4 capsules (4 g total) by mouth every morning.     pioglitazone 15 MG tablet  Commonly known as: ACTOS  TAKE ONE TABLET BY MOUTH ONCE A DAY     REPATHA PUSHTRONEX  420 mg/3.5 mL Injt  Generic drug: evolocumab  Inject 420 mg (1 cartridge) into the skin every 30 days.     SITagliptin 100 MG Tab  Commonly known as: JANUVIA  Take 1 tablet (100 mg total) by mouth once daily.        STOP taking these medications    sulfamethoxazole-trimethoprim 800-160mg 800-160 mg Tab  Commonly known as: BACTRIM DS            Indwelling Lines/Drains at time of discharge:   Lines/Drains/Airways     None                 Time spent on the discharge of patient: 45 minutes  Patient was seen and examined on the date of discharge and determined to be suitable for discharge.         Martine España MD  Department of Hospital Medicine  [facility]

## 2020-09-20 NOTE — ASSESSMENT & PLAN NOTE
On Amiodarone, atenolol per med rec  States he is on Xarelto for anticoagulation but cannot find this in Epic  Rhythm irregular on exam  PLAN  - Continue Amio, atenolol at home  - Continue Xarelto 20 daily with meals at home

## 2020-09-20 NOTE — PLAN OF CARE
Pt aaox4, continues with antiviral medications, on contact/airborne precautions. Pt calls for assistance when ambulating. Pt can move independently in bed. He is free of any injuries. Bed is locked and low with call bell in reach.

## 2020-09-20 NOTE — ASSESSMENT & PLAN NOTE
Baseline1.5-2, recent values 1.5-1.6  Patient recently took Bactrim 1-2 days; can elevated SCr  Patient denies peripheral edema, decreased urine, kidney stones, UTI  PLAN  - Renally dose medications (acyclovir renally dosed)  - Avoid nephrotoxins  - FU with PCP

## 2020-09-20 NOTE — NURSING
Patient given d/c instructions; medications delivered bedside; IV removed with catheter intact; patient transported with personal belongings via wheelchair, family waiting downstairs.

## 2020-09-20 NOTE — ASSESSMENT & PLAN NOTE
A1c 6.2 on 9/1/20  On pioglitazone, sitagliptin at home  PLAN  - Resume home medication as well controlled previously

## 2020-09-20 NOTE — ASSESSMENT & PLAN NOTE
Denies chest pain, palpitations, swelling  Trop 0.068, BNP 1098 on admit  No peripheral edema or JVD on exam  Lasix 40mg given at OSH    TTE:  · Mild left ventricular enlargement.  · Moderately decreased left ventricular systolic function. The estimated ejection fraction is 33%.  · Eccentric left ventricular hypertrophy.  · Grade I (mild) left ventricular diastolic dysfunction consistent with impaired relaxation.  · Local segmental wall motion abnormalities.  · Low normal right ventricular systolic function.  · Mild right atrial enlargement.  · There is a mechanical mitral valve prosthesis.  · The mean diastolic gradient across the mitral valve is 3 mmHg ; MVA 1.57; mild MR  · Normal central venous pressure (3 mmHg).  · The estimated PA systolic pressure is 11 mmHg ( THIS MAY NOT BE TRUE PEAK??).  · Mild mitral regurgitation.      PLAN  - Continue Amiodarone, Imdur, atenolol at home   - Resume Zetia, Repatha at home

## 2020-09-20 NOTE — ASSESSMENT & PLAN NOTE
Left facial swelling x 2-3 days; h/o chemo in 2019 for anal cancer  Tranferred in for ophtho eval  Acyclovir 10mg/kg started at OSH  Pt also in CARLOS and likely CHF; current CrCl 51  PLAN  - Acyclovir 10mg/kg IV Q12h (dose reduced for CrCl <50) discontinued  - Transitioned to PO valacyclovir 1G BID 8 days (renally dosed for CrCL <50)   - Ophtho recs:  Continue antiviral tx acyclovir 7-10 days renally dosed, erythromycin ointment for left eye BID and cold compresses  - FU with infectious disease  - FU with PCP

## 2020-09-21 LAB
POCT GLUCOSE: 127 MG/DL (ref 70–110)
POCT GLUCOSE: 138 MG/DL (ref 70–110)

## 2020-09-21 NOTE — PLAN OF CARE
09/21/20 0753   Final Note   Assessment Type Final Discharge Note       Patient discharged home with no needs on 9/20/2020. Discharge summary faxed to Dr. Hernandez Cameron (ophth) f [phone] & [employer].

## 2020-09-22 ENCOUNTER — TELEPHONE (OUTPATIENT)
Dept: FAMILY MEDICINE | Facility: CLINIC | Age: 70
End: 2020-09-22

## 2020-09-22 ENCOUNTER — PATIENT OUTREACH (OUTPATIENT)
Dept: ADMINISTRATIVE | Facility: CLINIC | Age: 70
End: 2020-09-22

## 2020-09-22 LAB
BACTERIA BLD CULT: NORMAL
BACTERIA BLD CULT: NORMAL

## 2020-09-22 NOTE — PROGRESS NOTES
C3 nurse attempted to contact patient. The following occurred:   C3 nurse attempted to contact Wesley SYLVIA Bernal for a TCC post hospital discharge follow up call. The patient is unable to conduct the call @ this time. The patient requested a callback.    The patient has a scheduled HOSFU appointment with Gideon Donnelly MD on 09/25/2020 @ 0800. Message sent to Physician staff.

## 2020-09-22 NOTE — TELEPHONE ENCOUNTER
----- Message from Diana Palomino LPN sent at 9/22/2020  3:02 PM CDT -----  Pt would like to know if he can take a bath. Would also like to know if he can get the scabs wet. Please contact patient as soon as possible.      Respectfully,  Diana Palomino LPN  [facility]    [email or web address]       Please do not reply to this message, as this inbox is not routinely monitored.

## 2020-09-22 NOTE — PATIENT INSTRUCTIONS

## 2020-09-23 ENCOUNTER — TELEPHONE (OUTPATIENT)
Dept: PHARMACY | Facility: CLINIC | Age: 70
End: 2020-09-23

## 2020-09-29 ENCOUNTER — PATIENT MESSAGE (OUTPATIENT)
Dept: OTHER | Facility: OTHER | Age: 70
End: 2020-09-29

## 2020-10-07 ENCOUNTER — PATIENT OUTREACH (OUTPATIENT)
Dept: ADMINISTRATIVE | Facility: OTHER | Age: 70
End: 2020-10-07

## 2020-10-07 NOTE — PROGRESS NOTES
Health Maintenance Due   Topic Date Due    TETANUS VACCINE  11/10/1969    Sign Pain Contract  11/10/1969    Complete Opioid Risk Tool  11/10/1969    High Dose Statin  11/10/1972    Shingles Vaccine (1 of 2) 11/10/2001    Colorectal Cancer Screening  11/10/2001    Abdominal Aortic Aneurysm Screening  11/10/2016    Pneumococcal Vaccine (65+ High/Highest Risk) (2 of 2 - PPSV23) 01/28/2019    Foot Exam  12/03/2019    Influenza Vaccine (1) 08/01/2020     Updates were requested from care everywhere.  Chart was reviewed for overdue Proactive [facility] Encounters ([facility]) topics (CRS, Breast Cancer Screening, Eye exam)  Health Maintenance has been updated.  LINKS immunization registry triggered.  Immunizations were reconciled.  Pt has open case request for colonoscopy. FIT kit not ordered.

## 2020-10-13 NOTE — TELEPHONE ENCOUNTER
Confirmed Repatha shipment 10/13 to arrive to patient home 10/14. Patient reported no doses on hand and is late for his injection - dose was due 10/1. Advised patient to take next dose as soon as shipment received 10/14 and adjust dosing calendar accordingly. Patient voiced understanding. Address and  verified. $0 copay (004).

## 2020-11-06 DIAGNOSIS — I48.0 PAROXYSMAL ATRIAL FIBRILLATION: ICD-10-CM

## 2020-11-06 DIAGNOSIS — E78.5 HYPERLIPIDEMIA, UNSPECIFIED HYPERLIPIDEMIA TYPE: ICD-10-CM

## 2020-11-06 DIAGNOSIS — E11.65 TYPE 2 DIABETES MELLITUS WITH HYPERGLYCEMIA, WITHOUT LONG-TERM CURRENT USE OF INSULIN: ICD-10-CM

## 2020-11-06 RX ORDER — EZETIMIBE 10 MG/1
10 TABLET ORAL DAILY
Qty: 30 TABLET | Refills: 5 | Status: SHIPPED | OUTPATIENT
Start: 2020-11-06 | End: 2021-04-22

## 2020-11-06 RX ORDER — PIOGLITAZONEHYDROCHLORIDE 15 MG/1
15 TABLET ORAL DAILY
Qty: 30 TABLET | Refills: 5 | Status: SHIPPED | OUTPATIENT
Start: 2020-11-06 | End: 2020-12-03

## 2020-11-06 RX ORDER — AMIODARONE HYDROCHLORIDE 200 MG/1
100 TABLET ORAL DAILY
Qty: 30 TABLET | Refills: 5 | Status: SHIPPED | OUTPATIENT
Start: 2020-11-06 | End: 2022-01-28

## 2020-11-06 NOTE — TELEPHONE ENCOUNTER
----- Message from Jessica Machado sent at 2020 11:03 AM CST -----  Regarding: med refill  Contact: FAX  Wesley Bernal  MRN: [id number]  : 1950  PCP: Lalo Sweet  Home Phone      [phone]  Work Phone      Not on file.  Mobile          [phone]  Home Phone      [phone]      MESSAGE:   Pt requesting refill or new Rx.   Is this a refill or new RX:  refill    RX name and strength:   pioglitazone (ACTOS) 15 MG tablet  ezetimibe (ZETIA) 10 mg tablet  amiodarone (PACERONE) 200 MG Tab    Last office visit: 2020  Is this a 30-day or 90-day RX:  30-Day  Pharmacy name and location:  [facility]  Comments:

## 2020-11-14 ENCOUNTER — PATIENT MESSAGE (OUTPATIENT)
Dept: PHARMACY | Facility: CLINIC | Age: 70
End: 2020-11-14

## 2020-11-24 DIAGNOSIS — M51.16 LUMBAR DISC DISEASE WITH RADICULOPATHY: ICD-10-CM

## 2020-11-24 DIAGNOSIS — E78.5 HYPERLIPIDEMIA, UNSPECIFIED HYPERLIPIDEMIA TYPE: ICD-10-CM

## 2020-11-24 NOTE — TELEPHONE ENCOUNTER
----- Message from Cindy Cuadra sent at 2020  1:56 PM CST -----  Contact: fax  Wesley Bernal  MRN: [id number]  : 1950  PCP: Lalo Sweet  Home Phone      [phone]  Work Phone      Not on file.  Mobile          [phone]  Home Phone      [phone]      MESSAGE:   Pt requesting refill or new Rx.   Is this a refill or new RX: refill  RX name and strength: omega-3 acid ethyl esters (LOVAZA) 1 gram capsule  Last office visit:2020  Is this a 30-day or 90-day RX:  30  [facility]  Comments:      Phone:  [phone]

## 2020-11-24 NOTE — TELEPHONE ENCOUNTER
[place], [place]    DISCHARGE SUMMARY    NAME:            JADIEL BRIZUELA                     AGE:  89  ACCT#:           [id number]                              :  1928  MR#:             [id number]                             ROOM:  3536  ADMIT DATE:      2018                        DIS DATE:  2018  PT TYPE:         I                                       DP:  [id number]  ATTENDING:       RAGINI ECHEVERRIA MD    DICTATING PHYSICIAN:  KANNAN ECHEVERRIA MD          FINAL DIAGNOSES:  1. Acute respiratory failure, acute nursing home acquired pneumonia.  2. Chronic obstructive pulmonary disease exacerbation, bronchial asthma  exacerbation.  3. Coronary artery disease.  4. Moribund obesity.  5. Dementia, progressing.  6. Hypokalemia.  7. Hyperchloremia.  8. Renal failure, stage 3.  9. Dehydration.  10.Altered mental status.  11.Hypoalbuminemia.  12.Hyperglobulinemia.  13.Iron deficiency anemia and folate deficiency anemia.  Treated B12  deficiency anemia.  14.Zinc in blood is low.  Normal is 70 to 120 and she is 59, zinc deficiency.    HISTORY OF THE PRESENT ILLNESS:  The patient is an 89-year-old long-term nursing  home resident.  She was having severe hypoxemia at the nursing home, 70-80.  There was no improvement on the BiPAP and she was sent to the hospital.  The  patient was treated in intensive care unit by intensivist and thereafter she was  transferred to telemetry and monitoring.    The initial diagnoses, nursing home acquired pneumonia, Alzheimer dementia,  acute kidney injury, acute kidney failure, acute exacerbation of chronic  obstructive pulmonary disease, pneumonia, mixed pneumonia due to iron  deficiency, folic acid deficiency and B12 deficiency treated.  Dehydration,  altered mental status.  The patient was unable by the admission to give any  history.  She did have fever, shortness of breath, and initially severe  respiratory failure,  ----- Message from Simone Valadez sent at 2020  1:25 PM CST -----  Contact: [facility]  Wesley Bernal  MRN: [id number]  : 1950  PCP: Lalo Sweet  Home Phone      [phone]  Work Phone      Not on file.  Mobile          [phone]  Home Phone      [phone]      MESSAGE:   Pt requesting refill or new Rx.   Is this a refill or new RX:  refill  RX name and strength: Gabapentin 100 mg  Last office visit: 19  Is this a 30-day or 90-day RX:  30 day  Pharmacy name and location:  [facility]  Comments:      Phone:  [facility] [phone]    PCP: Micki        which continues through the hospitalization.    PAST MEDICAL HISTORY:  COPD, coronary artery disease, stroke, hyperlipidemia,  anemia, history of blood transfusion, hypertension, recurrent UTI and  progression of dementia.    MEDICATIONS:  See on the list.    ALLERGIES:  NOT KNOWN.    FAMILY HISTORY:  Hypertension in mother, sister, brother.  Brother had ischemic  heart disease.    PAST SURGICAL HISTORY:  Laparoscopic repair of hiatal hernia and .    SOCIAL HISTORY:  In youth, she was heavy smoker, she drank alcohol.  No alcohol.  No drugs.  No smoking.  Long-term in the nursing home.    PHYSICAL EXAMINATION:  VITAL SIGNS:  Weight is 90.7 kg, BMI 43.3, BSA 1.8, height 145 cm, temperature  37.9, heart rate 64, 62, 94.  Respirations 22, 24.  Respirations now 17, O2  saturation 96, blood pressure 135/78.  The patient did have low blood pressure,  low oxygen each time she removed her oxygen and tachypnea.  HEENT:  Head normocephalic.  Cranial nerves intact.  Pupils PERRLA.  Fundi not  seen well.  Ears normal.  Throat irritable, whitish coated.  NECK:  Supple.  LUNGS:  Using accessory muscles by respiration.  Lungs, decreased breath sounds,  right base rales more than left.  HEART:  Rate bradycardia.  ABDOMEN:  Soft, nontender.  Obese.  EXTREMITIES:  Lower extremity, trace edema.  NEUROLOGIC:  Nonfocal, but the patient has significant memory loss.    LABORATORY DATA:  As above, folate was low.  Albumin was low.  The patient has  anemia and the kidney failure, now improved hypopotassemia, hyperchloremia.  BUN  initially was 51, now is 25, creatinine was 1.81, now is 1.30.  BUN and  creatinine ratio now is 19 from 28.  The sugar intermittently elevated highest  136.  Hemoglobin 11.3, hematocrit 35.7.  WBC is still elevated at 12.2 and she  has low zinc level.    The chest x-ray initially showed cardiac silhouette is borderline enlarged.  Aortic, no vascular calcification.  Pulmonary vasculature with limits of  normal.  Lungs are well inflated.  There is mixed interstitial opacity in the right lung  base, may be infiltrate.  Pneumonia in the right lung base Recurrent mild  chronic changes bilaterally.  No large volume pleural fluid.    IMPRESSION:  Mixed interstitial opacity in the right lung base may represent  area of infiltrate, pneumonia.  Followup chest x-ray on 05/11, stable right  lower lobe infiltrate and small effusion.  Minimal left basilar increased  markings are stable.  Right lower lobe infiltrate.  Small right pleural effusion  are identified.    HOSPITAL COURSE:  The patient was noncompliant.  She was putting oxygen off and  on oxygen, which she needs 24 hours and until she heals the pneumonia, she was  on Zosyn and vancomycin.  Now, she was transferred to Augmentin and doxycycline  for 7 days with followup chest x-ray at the nursing home.    DISCHARGE INSTRUCTIONS:  ANTICIPATED MEDICATIONS:  Medications as on the discharge reconciliation.  Amoxicillin clavulanate 500-125 one tab b.i.d. 7 days, doxycycline 100 mg 10 mL  q.12 hours for 7 days.  Advair Diskus twice a day, Lasix 20 mg daily,  hydrochlorothiazide, triamterene.  To discharge, Tylenol 325 p.r.n. by pain,  albuterol inhalation q.6 hours p.r.n., aspirin 81 mg daily, colistin to  continue, vitamin B12 1000 every 1 month every month, donepezil 10 mg at  bedtime, folic acid 1 mg daily, lisinopril 5 mg daily, nifedipine 30 mg 1 tablet  daily, Nystatin in the lower abdomen and the pubic area, Creon, pancrelipase 24  mg with each meal 3 times a day, pravastatin 20 mg daily, vitamin A and D  ointment with protection for the pubic area and rectum, zinc sulfate 220 mg  capsule b.i.d., Lovenox 30 mg daily was discontinued.  DIET:  Push fluids.  Soft general diet.  ACTIVITY:  FOLLOWUP:  Followup by our service at the nursing home.    Please see discharge medication reconciliation for final discharge medications.            Mychal Spencer,  MD RAMOS/Kelsey  DP:  [id number]  DD:  05/12/2018 16:14:32  DT:  05/13/2018 04:03:48  Job #:  [id number]               Electronically Signed On 05/21/2018 15:26  __________________________________________________   KANNAN HILARIO

## 2020-11-25 RX ORDER — GABAPENTIN 100 MG/1
300 CAPSULE ORAL NIGHTLY
Qty: 30 CAPSULE | Refills: 5 | Status: SHIPPED | OUTPATIENT
Start: 2020-11-25 | End: 2021-02-22 | Stop reason: SDUPTHER

## 2020-11-25 RX ORDER — OMEGA-3-ACID ETHYL ESTERS 1 G/1
4 CAPSULE, LIQUID FILLED ORAL EVERY MORNING
Qty: 30 CAPSULE | Refills: 1 | Status: SHIPPED | OUTPATIENT
Start: 2020-11-25 | End: 2020-12-03 | Stop reason: SDUPTHER

## 2020-12-02 DIAGNOSIS — E11.65 TYPE 2 DIABETES MELLITUS WITH HYPERGLYCEMIA, WITHOUT LONG-TERM CURRENT USE OF INSULIN: ICD-10-CM

## 2020-12-02 DIAGNOSIS — I10 ESSENTIAL HYPERTENSION: ICD-10-CM

## 2020-12-02 RX ORDER — ATENOLOL 25 MG/1
25 TABLET ORAL DAILY
Qty: 30 TABLET | Refills: 5 | Status: SHIPPED | OUTPATIENT
Start: 2020-12-02 | End: 2021-05-12

## 2020-12-02 NOTE — TELEPHONE ENCOUNTER
----- Message from Cindy Cuadra sent at 2020 11:09 AM CST -----  Contact: fax  Wesley Bernal  MRN: [id number]  : 1950  PCP: Lalo Sweet  Home Phone      [phone]  Work Phone      Not on file.  Mobile          [phone]  Home Phone      [phone]      MESSAGE:   Pt requesting refill or new Rx.   Is this a refill or new RX:  refill  RX name and strength: SITagliptin (JANUVIA) 100 MG Tab  atenoloL (TENORMIN) 25 MG tablet  Last office visit: 2020  Is this a 30-day or 90-day RX: 30  [facility] location[facility]  Comments:      Phone:  [phone]

## 2020-12-03 ENCOUNTER — SPECIALTY PHARMACY (OUTPATIENT)
Dept: PHARMACY | Facility: CLINIC | Age: 70
End: 2020-12-03

## 2020-12-03 DIAGNOSIS — E78.5 HYPERLIPIDEMIA, UNSPECIFIED HYPERLIPIDEMIA TYPE: ICD-10-CM

## 2020-12-03 RX ORDER — OMEGA-3-ACID ETHYL ESTERS 1 G/1
4 CAPSULE, LIQUID FILLED ORAL EVERY MORNING
Qty: 90 CAPSULE | Refills: 1 | Status: SHIPPED | OUTPATIENT
Start: 2020-12-03 | End: 2021-02-17

## 2020-12-03 NOTE — TELEPHONE ENCOUNTER
----- Message from Cindy Cuadra sent at 12/3/2020  2:52 PM CST -----  Contact: fax  Wesley Bernal  MRN: [id number]  : 1950  PCP: Lalo Sweet  Home Phone      [phone]  Work Phone      Not on file.  Mobile          [phone]  Home Phone      [phone]      MESSAGE:   Pt requesting refill or new Rx.   Is this a refill or new RX: refill  RX name and strength: omega-3 acid ethyl esters (LOVAZA) 1 gram capsule  Last office visit: 2020  Is this a 30-day or 90-day RX: 90  [facility] location[facility]  Comments:      Phone:  [phone]

## 2020-12-16 DIAGNOSIS — M51.16 LUMBAR DISC DISEASE WITH RADICULOPATHY: ICD-10-CM

## 2020-12-16 RX ORDER — HYDROCODONE BITARTRATE AND ACETAMINOPHEN 10; 325 MG/1; MG/1
1 TABLET ORAL EVERY 6 HOURS PRN
Qty: 50 TABLET | Refills: 0 | Status: SHIPPED | OUTPATIENT
Start: 2020-12-16 | End: 2021-01-27 | Stop reason: SDUPTHER

## 2020-12-16 NOTE — TELEPHONE ENCOUNTER
Patient requesting new Eugene Rx to be sent to [facility].     LOV: 8/17/2020    Phone: [phone]

## 2021-01-27 DIAGNOSIS — M51.16 LUMBAR DISC DISEASE WITH RADICULOPATHY: ICD-10-CM

## 2021-01-27 RX ORDER — HYDROCODONE BITARTRATE AND ACETAMINOPHEN 10; 325 MG/1; MG/1
1 TABLET ORAL EVERY 6 HOURS PRN
Qty: 50 TABLET | Refills: 0 | Status: SHIPPED | OUTPATIENT
Start: 2021-01-27 | End: 2021-03-19 | Stop reason: SDUPTHER

## 2021-03-05 DIAGNOSIS — E11.9 TYPE 2 DIABETES MELLITUS WITHOUT COMPLICATION: ICD-10-CM

## 2021-03-09 ENCOUNTER — PATIENT OUTREACH (OUTPATIENT)
Dept: ADMINISTRATIVE | Facility: OTHER | Age: 71
End: 2021-03-09

## 2021-03-15 ENCOUNTER — OFFICE VISIT (OUTPATIENT)
Dept: NEUROLOGY | Facility: CLINIC | Age: 71
End: 2021-03-15
Payer: MEDICARE

## 2021-03-15 VITALS
SYSTOLIC BLOOD PRESSURE: 118 MMHG | WEIGHT: 192.25 LBS | BODY MASS INDEX: 25.48 KG/M2 | HEART RATE: 50 BPM | DIASTOLIC BLOOD PRESSURE: 50 MMHG | HEIGHT: 73 IN | RESPIRATION RATE: 16 BRPM | TEMPERATURE: 98 F

## 2021-03-15 DIAGNOSIS — I48.0 PAROXYSMAL ATRIAL FIBRILLATION: ICD-10-CM

## 2021-03-15 DIAGNOSIS — G25.0 BENIGN ESSENTIAL TREMOR: Primary | ICD-10-CM

## 2021-03-15 DIAGNOSIS — M51.16 LUMBAR DISC DISEASE WITH RADICULOPATHY: ICD-10-CM

## 2021-03-15 DIAGNOSIS — F43.21 ADJUSTMENT DISORDER WITH DEPRESSED MOOD: ICD-10-CM

## 2021-03-15 DIAGNOSIS — I73.9 PVD (PERIPHERAL VASCULAR DISEASE): ICD-10-CM

## 2021-03-15 DIAGNOSIS — B02.32 HERPES ZOSTER IRITIS: ICD-10-CM

## 2021-03-15 PROCEDURE — 1157F ADVNC CARE PLAN IN RCRD: CPT | Mod: S$GLB,,, | Performed by: PSYCHIATRY & NEUROLOGY

## 2021-03-15 PROCEDURE — 99999 PR PBB SHADOW E&M-EST. PATIENT-LVL IV: ICD-10-PCS | Mod: PBBFAC,,, | Performed by: PSYCHIATRY & NEUROLOGY

## 2021-03-15 PROCEDURE — 3008F BODY MASS INDEX DOCD: CPT | Mod: CPTII,S$GLB,, | Performed by: PSYCHIATRY & NEUROLOGY

## 2021-03-15 PROCEDURE — 99214 PR OFFICE/OUTPT VISIT, EST, LEVL IV, 30-39 MIN: ICD-10-PCS | Mod: S$GLB,,, | Performed by: PSYCHIATRY & NEUROLOGY

## 2021-03-15 PROCEDURE — 1125F PR PAIN SEVERITY QUANTIFIED, PAIN PRESENT: ICD-10-PCS | Mod: S$GLB,,, | Performed by: PSYCHIATRY & NEUROLOGY

## 2021-03-15 PROCEDURE — 99214 OFFICE O/P EST MOD 30 MIN: CPT | Mod: S$GLB,,, | Performed by: PSYCHIATRY & NEUROLOGY

## 2021-03-15 PROCEDURE — 3074F PR MOST RECENT SYSTOLIC BLOOD PRESSURE < 130 MM HG: ICD-10-PCS | Mod: CPTII,S$GLB,, | Performed by: PSYCHIATRY & NEUROLOGY

## 2021-03-15 PROCEDURE — 3074F SYST BP LT 130 MM HG: CPT | Mod: CPTII,S$GLB,, | Performed by: PSYCHIATRY & NEUROLOGY

## 2021-03-15 PROCEDURE — 1159F PR MEDICATION LIST DOCUMENTED IN MEDICAL RECORD: ICD-10-PCS | Mod: S$GLB,,, | Performed by: PSYCHIATRY & NEUROLOGY

## 2021-03-15 PROCEDURE — 3078F DIAST BP <80 MM HG: CPT | Mod: CPTII,S$GLB,, | Performed by: PSYCHIATRY & NEUROLOGY

## 2021-03-15 PROCEDURE — 1159F MED LIST DOCD IN RCRD: CPT | Mod: S$GLB,,, | Performed by: PSYCHIATRY & NEUROLOGY

## 2021-03-15 PROCEDURE — 99999 PR PBB SHADOW E&M-EST. PATIENT-LVL IV: CPT | Mod: PBBFAC,,, | Performed by: PSYCHIATRY & NEUROLOGY

## 2021-03-15 PROCEDURE — 3078F PR MOST RECENT DIASTOLIC BLOOD PRESSURE < 80 MM HG: ICD-10-PCS | Mod: CPTII,S$GLB,, | Performed by: PSYCHIATRY & NEUROLOGY

## 2021-03-15 PROCEDURE — 1157F PR ADVANCE CARE PLAN OR EQUIV PRESENT IN MEDICAL RECORD: ICD-10-PCS | Mod: S$GLB,,, | Performed by: PSYCHIATRY & NEUROLOGY

## 2021-03-15 PROCEDURE — 99499 RISK ADDL DX/OHS AUDIT: ICD-10-PCS | Mod: S$GLB,,, | Performed by: PSYCHIATRY & NEUROLOGY

## 2021-03-15 PROCEDURE — 1125F AMNT PAIN NOTED PAIN PRSNT: CPT | Mod: S$GLB,,, | Performed by: PSYCHIATRY & NEUROLOGY

## 2021-03-15 PROCEDURE — 99499 UNLISTED E&M SERVICE: CPT | Mod: S$GLB,,, | Performed by: PSYCHIATRY & NEUROLOGY

## 2021-03-15 PROCEDURE — 3008F PR BODY MASS INDEX (BMI) DOCUMENTED: ICD-10-PCS | Mod: CPTII,S$GLB,, | Performed by: PSYCHIATRY & NEUROLOGY

## 2021-03-15 RX ORDER — GABAPENTIN 300 MG/1
300 CAPSULE ORAL NIGHTLY
COMMUNITY
Start: 2021-03-01 | End: 2022-01-24

## 2021-03-31 DIAGNOSIS — E11.9 TYPE 2 DIABETES MELLITUS WITHOUT COMPLICATION: ICD-10-CM

## 2021-04-05 ENCOUNTER — PES CALL (OUTPATIENT)
Dept: ADMINISTRATIVE | Facility: CLINIC | Age: 71
End: 2021-04-05

## 2021-04-05 ENCOUNTER — PATIENT MESSAGE (OUTPATIENT)
Dept: ADMINISTRATIVE | Facility: HOSPITAL | Age: 71
End: 2021-04-05

## 2021-05-18 ENCOUNTER — PES CALL (OUTPATIENT)
Dept: ADMINISTRATIVE | Facility: CLINIC | Age: 71
End: 2021-05-18

## 2021-06-02 ENCOUNTER — PATIENT MESSAGE (OUTPATIENT)
Dept: ADMINISTRATIVE | Facility: HOSPITAL | Age: 71
End: 2021-06-02

## 2021-07-07 ENCOUNTER — PATIENT MESSAGE (OUTPATIENT)
Dept: ADMINISTRATIVE | Facility: HOSPITAL | Age: 71
End: 2021-07-07

## 2021-07-30 ENCOUNTER — OFFICE VISIT (OUTPATIENT)
Dept: INTERNAL MEDICINE | Facility: CLINIC | Age: 71
End: 2021-07-30
Payer: MEDICARE

## 2021-07-30 VITALS
BODY MASS INDEX: 25.48 KG/M2 | OXYGEN SATURATION: 99 % | HEIGHT: 73 IN | WEIGHT: 192.25 LBS | DIASTOLIC BLOOD PRESSURE: 70 MMHG | HEART RATE: 60 BPM | SYSTOLIC BLOOD PRESSURE: 138 MMHG | RESPIRATION RATE: 18 BRPM

## 2021-07-30 DIAGNOSIS — E11.65 TYPE 2 DIABETES MELLITUS WITH HYPERGLYCEMIA, WITHOUT LONG-TERM CURRENT USE OF INSULIN: Primary | ICD-10-CM

## 2021-07-30 DIAGNOSIS — I48.0 PAROXYSMAL ATRIAL FIBRILLATION: ICD-10-CM

## 2021-07-30 DIAGNOSIS — I25.5 ISCHEMIC CARDIOMYOPATHY: ICD-10-CM

## 2021-07-30 DIAGNOSIS — E78.5 HYPERLIPIDEMIA, UNSPECIFIED HYPERLIPIDEMIA TYPE: ICD-10-CM

## 2021-07-30 DIAGNOSIS — I50.22 CHF (CONGESTIVE HEART FAILURE), NYHA CLASS II, CHRONIC, SYSTOLIC: ICD-10-CM

## 2021-07-30 DIAGNOSIS — Z23 NEED FOR VACCINATION: ICD-10-CM

## 2021-07-30 PROCEDURE — 99999 PR PBB SHADOW E&M-EST. PATIENT-LVL IV: ICD-10-PCS | Mod: PBBFAC,,, | Performed by: FAMILY MEDICINE

## 2021-07-30 PROCEDURE — 3075F PR MOST RECENT SYSTOLIC BLOOD PRESS GE 130-139MM HG: ICD-10-PCS | Mod: CPTII,S$GLB,, | Performed by: FAMILY MEDICINE

## 2021-07-30 PROCEDURE — 3288F PR FALLS RISK ASSESSMENT DOCUMENTED: ICD-10-PCS | Mod: CPTII,S$GLB,, | Performed by: FAMILY MEDICINE

## 2021-07-30 PROCEDURE — 1101F PR PT FALLS ASSESS DOC 0-1 FALLS W/OUT INJ PAST YR: ICD-10-PCS | Mod: CPTII,S$GLB,, | Performed by: FAMILY MEDICINE

## 2021-07-30 PROCEDURE — 3008F BODY MASS INDEX DOCD: CPT | Mod: CPTII,S$GLB,, | Performed by: FAMILY MEDICINE

## 2021-07-30 PROCEDURE — 99499 RISK ADDL DX/OHS AUDIT: ICD-10-PCS | Mod: HCNC,S$GLB,, | Performed by: FAMILY MEDICINE

## 2021-07-30 PROCEDURE — 3075F SYST BP GE 130 - 139MM HG: CPT | Mod: CPTII,S$GLB,, | Performed by: FAMILY MEDICINE

## 2021-07-30 PROCEDURE — 99214 OFFICE O/P EST MOD 30 MIN: CPT | Mod: ,,, | Performed by: FAMILY MEDICINE

## 2021-07-30 PROCEDURE — 1160F PR REVIEW ALL MEDS BY PRESCRIBER/CLIN PHARMACIST DOCUMENTED: ICD-10-PCS | Mod: CPTII,S$GLB,, | Performed by: FAMILY MEDICINE

## 2021-07-30 PROCEDURE — 0031A COVID-19,VECTOR-NR,RS-AD26,PF,0.5 ML DOSE VACCINE (JANSSEN): CPT | Mod: CV19,,, | Performed by: FAMILY MEDICINE

## 2021-07-30 PROCEDURE — 0031A COVID-19,VECTOR-NR,RS-AD26,PF,0.5 ML DOSE VACCINE (JANSSEN): ICD-10-PCS | Mod: CV19,,, | Performed by: FAMILY MEDICINE

## 2021-07-30 PROCEDURE — 91303 COVID-19,VECTOR-NR,RS-AD26,PF,0.5 ML DOSE VACCINE (JANSSEN): ICD-10-PCS | Mod: ,,, | Performed by: FAMILY MEDICINE

## 2021-07-30 PROCEDURE — 1157F PR ADVANCE CARE PLAN OR EQUIV PRESENT IN MEDICAL RECORD: ICD-10-PCS | Mod: CPTII,S$GLB,, | Performed by: FAMILY MEDICINE

## 2021-07-30 PROCEDURE — 99999 PR PBB SHADOW E&M-EST. PATIENT-LVL IV: CPT | Mod: PBBFAC,,, | Performed by: FAMILY MEDICINE

## 2021-07-30 PROCEDURE — 1101F PT FALLS ASSESS-DOCD LE1/YR: CPT | Mod: CPTII,S$GLB,, | Performed by: FAMILY MEDICINE

## 2021-07-30 PROCEDURE — 3288F FALL RISK ASSESSMENT DOCD: CPT | Mod: CPTII,S$GLB,, | Performed by: FAMILY MEDICINE

## 2021-07-30 PROCEDURE — 1157F ADVNC CARE PLAN IN RCRD: CPT | Mod: CPTII,S$GLB,, | Performed by: FAMILY MEDICINE

## 2021-07-30 PROCEDURE — 91303 COVID-19,VECTOR-NR,RS-AD26,PF,0.5 ML DOSE VACCINE (JANSSEN): CPT | Mod: ,,, | Performed by: FAMILY MEDICINE

## 2021-07-30 PROCEDURE — 1126F PR PAIN SEVERITY QUANTIFIED, NO PAIN PRESENT: ICD-10-PCS | Mod: CPTII,S$GLB,, | Performed by: FAMILY MEDICINE

## 2021-07-30 PROCEDURE — 1126F AMNT PAIN NOTED NONE PRSNT: CPT | Mod: CPTII,S$GLB,, | Performed by: FAMILY MEDICINE

## 2021-07-30 PROCEDURE — 3078F DIAST BP <80 MM HG: CPT | Mod: CPTII,S$GLB,, | Performed by: FAMILY MEDICINE

## 2021-07-30 PROCEDURE — 1159F PR MEDICATION LIST DOCUMENTED IN MEDICAL RECORD: ICD-10-PCS | Mod: CPTII,S$GLB,, | Performed by: FAMILY MEDICINE

## 2021-07-30 PROCEDURE — 99214 PR OFFICE/OUTPT VISIT, EST, LEVL IV, 30-39 MIN: ICD-10-PCS | Mod: ,,, | Performed by: FAMILY MEDICINE

## 2021-07-30 PROCEDURE — 99499 UNLISTED E&M SERVICE: CPT | Mod: HCNC,S$GLB,, | Performed by: FAMILY MEDICINE

## 2021-07-30 PROCEDURE — 1159F MED LIST DOCD IN RCRD: CPT | Mod: CPTII,S$GLB,, | Performed by: FAMILY MEDICINE

## 2021-07-30 PROCEDURE — 3078F PR MOST RECENT DIASTOLIC BLOOD PRESSURE < 80 MM HG: ICD-10-PCS | Mod: CPTII,S$GLB,, | Performed by: FAMILY MEDICINE

## 2021-07-30 PROCEDURE — 1160F RVW MEDS BY RX/DR IN RCRD: CPT | Mod: CPTII,S$GLB,, | Performed by: FAMILY MEDICINE

## 2021-07-30 PROCEDURE — 3008F PR BODY MASS INDEX (BMI) DOCUMENTED: ICD-10-PCS | Mod: CPTII,S$GLB,, | Performed by: FAMILY MEDICINE

## 2021-08-04 ENCOUNTER — PATIENT MESSAGE (OUTPATIENT)
Dept: ADMINISTRATIVE | Facility: HOSPITAL | Age: 71
End: 2021-08-04

## 2021-08-04 DIAGNOSIS — Z12.11 COLON CANCER SCREENING: ICD-10-CM

## 2021-08-17 ENCOUNTER — PES CALL (OUTPATIENT)
Dept: ADMINISTRATIVE | Facility: CLINIC | Age: 71
End: 2021-08-17

## 2021-09-16 ENCOUNTER — PATIENT OUTREACH (OUTPATIENT)
Dept: ADMINISTRATIVE | Facility: OTHER | Age: 71
End: 2021-09-16

## 2021-09-20 ENCOUNTER — OFFICE VISIT (OUTPATIENT)
Dept: NEUROLOGY | Facility: CLINIC | Age: 71
End: 2021-09-20
Payer: MEDICARE

## 2021-09-20 VITALS
DIASTOLIC BLOOD PRESSURE: 66 MMHG | RESPIRATION RATE: 18 BRPM | BODY MASS INDEX: 25.1 KG/M2 | SYSTOLIC BLOOD PRESSURE: 140 MMHG | HEIGHT: 73 IN | HEART RATE: 51 BPM | OXYGEN SATURATION: 97 % | WEIGHT: 189.38 LBS

## 2021-09-20 DIAGNOSIS — G25.0 BENIGN ESSENTIAL TREMOR: Primary | ICD-10-CM

## 2021-09-20 DIAGNOSIS — R41.3 MEMORY LOSS: ICD-10-CM

## 2021-09-20 DIAGNOSIS — M51.16 LUMBAR DISC DISEASE WITH RADICULOPATHY: ICD-10-CM

## 2021-09-20 DIAGNOSIS — F43.21 ADJUSTMENT DISORDER WITH DEPRESSED MOOD: ICD-10-CM

## 2021-09-20 PROCEDURE — 3077F SYST BP >= 140 MM HG: CPT | Mod: HCNC,CPTII,S$GLB, | Performed by: PSYCHIATRY & NEUROLOGY

## 2021-09-20 PROCEDURE — 1125F PR PAIN SEVERITY QUANTIFIED, PAIN PRESENT: ICD-10-PCS | Mod: HCNC,CPTII,S$GLB, | Performed by: PSYCHIATRY & NEUROLOGY

## 2021-09-20 PROCEDURE — 1101F PT FALLS ASSESS-DOCD LE1/YR: CPT | Mod: HCNC,CPTII,S$GLB, | Performed by: PSYCHIATRY & NEUROLOGY

## 2021-09-20 PROCEDURE — 1157F ADVNC CARE PLAN IN RCRD: CPT | Mod: HCNC,CPTII,S$GLB, | Performed by: PSYCHIATRY & NEUROLOGY

## 2021-09-20 PROCEDURE — 3077F PR MOST RECENT SYSTOLIC BLOOD PRESSURE >= 140 MM HG: ICD-10-PCS | Mod: HCNC,CPTII,S$GLB, | Performed by: PSYCHIATRY & NEUROLOGY

## 2021-09-20 PROCEDURE — 3008F PR BODY MASS INDEX (BMI) DOCUMENTED: ICD-10-PCS | Mod: HCNC,CPTII,S$GLB, | Performed by: PSYCHIATRY & NEUROLOGY

## 2021-09-20 PROCEDURE — 1159F PR MEDICATION LIST DOCUMENTED IN MEDICAL RECORD: ICD-10-PCS | Mod: HCNC,CPTII,S$GLB, | Performed by: PSYCHIATRY & NEUROLOGY

## 2021-09-20 PROCEDURE — 1160F RVW MEDS BY RX/DR IN RCRD: CPT | Mod: HCNC,CPTII,S$GLB, | Performed by: PSYCHIATRY & NEUROLOGY

## 2021-09-20 PROCEDURE — 1157F PR ADVANCE CARE PLAN OR EQUIV PRESENT IN MEDICAL RECORD: ICD-10-PCS | Mod: HCNC,CPTII,S$GLB, | Performed by: PSYCHIATRY & NEUROLOGY

## 2021-09-20 PROCEDURE — 1101F PR PT FALLS ASSESS DOC 0-1 FALLS W/OUT INJ PAST YR: ICD-10-PCS | Mod: HCNC,CPTII,S$GLB, | Performed by: PSYCHIATRY & NEUROLOGY

## 2021-09-20 PROCEDURE — 3078F PR MOST RECENT DIASTOLIC BLOOD PRESSURE < 80 MM HG: ICD-10-PCS | Mod: HCNC,CPTII,S$GLB, | Performed by: PSYCHIATRY & NEUROLOGY

## 2021-09-20 PROCEDURE — 1125F AMNT PAIN NOTED PAIN PRSNT: CPT | Mod: HCNC,CPTII,S$GLB, | Performed by: PSYCHIATRY & NEUROLOGY

## 2021-09-20 PROCEDURE — 1159F MED LIST DOCD IN RCRD: CPT | Mod: HCNC,CPTII,S$GLB, | Performed by: PSYCHIATRY & NEUROLOGY

## 2021-09-20 PROCEDURE — 99999 PR PBB SHADOW E&M-EST. PATIENT-LVL IV: ICD-10-PCS | Mod: PBBFAC,HCNC,, | Performed by: PSYCHIATRY & NEUROLOGY

## 2021-09-20 PROCEDURE — 99214 PR OFFICE/OUTPT VISIT, EST, LEVL IV, 30-39 MIN: ICD-10-PCS | Mod: HCNC,S$GLB,, | Performed by: PSYCHIATRY & NEUROLOGY

## 2021-09-20 PROCEDURE — 99214 OFFICE O/P EST MOD 30 MIN: CPT | Mod: HCNC,S$GLB,, | Performed by: PSYCHIATRY & NEUROLOGY

## 2021-09-20 PROCEDURE — 3288F PR FALLS RISK ASSESSMENT DOCUMENTED: ICD-10-PCS | Mod: HCNC,CPTII,S$GLB, | Performed by: PSYCHIATRY & NEUROLOGY

## 2021-09-20 PROCEDURE — 3078F DIAST BP <80 MM HG: CPT | Mod: HCNC,CPTII,S$GLB, | Performed by: PSYCHIATRY & NEUROLOGY

## 2021-09-20 PROCEDURE — 1160F PR REVIEW ALL MEDS BY PRESCRIBER/CLIN PHARMACIST DOCUMENTED: ICD-10-PCS | Mod: HCNC,CPTII,S$GLB, | Performed by: PSYCHIATRY & NEUROLOGY

## 2021-09-20 PROCEDURE — 99999 PR PBB SHADOW E&M-EST. PATIENT-LVL IV: CPT | Mod: PBBFAC,HCNC,, | Performed by: PSYCHIATRY & NEUROLOGY

## 2021-09-20 PROCEDURE — 3008F BODY MASS INDEX DOCD: CPT | Mod: HCNC,CPTII,S$GLB, | Performed by: PSYCHIATRY & NEUROLOGY

## 2021-09-20 PROCEDURE — 3288F FALL RISK ASSESSMENT DOCD: CPT | Mod: HCNC,CPTII,S$GLB, | Performed by: PSYCHIATRY & NEUROLOGY

## 2021-09-20 RX ORDER — DULOXETIN HYDROCHLORIDE 60 MG/1
60 CAPSULE, DELAYED RELEASE ORAL DAILY
Qty: 30 CAPSULE | Refills: 11 | Status: ON HOLD | OUTPATIENT
Start: 2021-09-20 | End: 2023-01-01 | Stop reason: HOSPADM

## 2021-09-20 RX ORDER — PRAVASTATIN SODIUM 20 MG/1
20 TABLET ORAL DAILY
COMMUNITY
Start: 2021-09-13 | End: 2023-01-01 | Stop reason: SINTOL

## 2021-09-20 RX ORDER — PIOGLITAZONEHYDROCHLORIDE 15 MG/1
15 TABLET ORAL DAILY
COMMUNITY
Start: 2021-05-20 | End: 2022-01-03

## 2021-11-10 ENCOUNTER — TELEPHONE (OUTPATIENT)
Dept: FAMILY MEDICINE | Facility: CLINIC | Age: 71
End: 2021-11-10
Payer: MEDICARE

## 2021-11-12 ENCOUNTER — PATIENT MESSAGE (OUTPATIENT)
Dept: ADMINISTRATIVE | Facility: HOSPITAL | Age: 71
End: 2021-11-12
Payer: MEDICARE

## 2021-11-12 ENCOUNTER — IMMUNIZATION (OUTPATIENT)
Dept: FAMILY MEDICINE | Facility: CLINIC | Age: 71
End: 2021-11-12
Payer: MEDICARE

## 2021-11-12 ENCOUNTER — CLINICAL SUPPORT (OUTPATIENT)
Dept: FAMILY MEDICINE | Facility: CLINIC | Age: 71
End: 2021-11-12
Payer: MEDICARE

## 2021-11-12 ENCOUNTER — OFFICE VISIT (OUTPATIENT)
Dept: FAMILY MEDICINE | Facility: CLINIC | Age: 71
End: 2021-11-12
Payer: MEDICARE

## 2021-11-12 VITALS
DIASTOLIC BLOOD PRESSURE: 60 MMHG | WEIGHT: 198.19 LBS | BODY MASS INDEX: 26.27 KG/M2 | HEIGHT: 73 IN | HEART RATE: 80 BPM | SYSTOLIC BLOOD PRESSURE: 112 MMHG | RESPIRATION RATE: 18 BRPM

## 2021-11-12 DIAGNOSIS — E11.9 TYPE 2 DIABETES MELLITUS WITHOUT COMPLICATION, WITHOUT LONG-TERM CURRENT USE OF INSULIN: ICD-10-CM

## 2021-11-12 DIAGNOSIS — E11.9 TYPE 2 DIABETES MELLITUS WITHOUT COMPLICATION, UNSPECIFIED WHETHER LONG TERM INSULIN USE: ICD-10-CM

## 2021-11-12 DIAGNOSIS — M51.16 LUMBAR DISC DISEASE WITH RADICULOPATHY: ICD-10-CM

## 2021-11-12 DIAGNOSIS — C21.0 SQUAMOUS CELL CANCER, ANUS: ICD-10-CM

## 2021-11-12 DIAGNOSIS — E87.5 HYPERKALEMIA: ICD-10-CM

## 2021-11-12 DIAGNOSIS — E78.5 HYPERLIPIDEMIA, UNSPECIFIED HYPERLIPIDEMIA TYPE: ICD-10-CM

## 2021-11-12 DIAGNOSIS — E11.9 TYPE 2 DIABETES MELLITUS WITHOUT COMPLICATION: ICD-10-CM

## 2021-11-12 DIAGNOSIS — I10 ESSENTIAL HYPERTENSION: ICD-10-CM

## 2021-11-12 DIAGNOSIS — K59.00 CONSTIPATION, UNSPECIFIED CONSTIPATION TYPE: Primary | ICD-10-CM

## 2021-11-12 DIAGNOSIS — Z23 NEED FOR VACCINATION: Primary | ICD-10-CM

## 2021-11-12 DIAGNOSIS — I48.0 PAROXYSMAL ATRIAL FIBRILLATION: ICD-10-CM

## 2021-11-12 LAB
ANION GAP SERPL CALC-SCNC: 8 MMOL/L (ref 8–16)
BUN SERPL-MCNC: 34 MG/DL (ref 8–23)
CALCIUM SERPL-MCNC: 9.6 MG/DL (ref 8.7–10.5)
CHLORIDE SERPL-SCNC: 104 MMOL/L (ref 95–110)
CHOLEST SERPL-MCNC: 220 MG/DL (ref 120–199)
CHOLEST/HDLC SERPL: 5.8 {RATIO} (ref 2–5)
CO2 SERPL-SCNC: 28 MMOL/L (ref 23–29)
CREAT SERPL-MCNC: 1.7 MG/DL (ref 0.5–1.4)
EST. GFR  (AFRICAN AMERICAN): 46 ML/MIN/1.73 M^2
EST. GFR  (NON AFRICAN AMERICAN): 40 ML/MIN/1.73 M^2
GLUCOSE SERPL-MCNC: 168 MG/DL (ref 70–110)
HDLC SERPL-MCNC: 38 MG/DL (ref 40–75)
HDLC SERPL: 17.3 % (ref 20–50)
LDLC SERPL CALC-MCNC: 154.6 MG/DL (ref 63–159)
NONHDLC SERPL-MCNC: 182 MG/DL
POTASSIUM SERPL-SCNC: 4.6 MMOL/L (ref 3.5–5.1)
SODIUM SERPL-SCNC: 140 MMOL/L (ref 136–145)
TRIGL SERPL-MCNC: 137 MG/DL (ref 30–150)

## 2021-11-12 PROCEDURE — 1126F PR PAIN SEVERITY QUANTIFIED, NO PAIN PRESENT: ICD-10-PCS | Mod: HCNC,CPTII,S$GLB, | Performed by: FAMILY MEDICINE

## 2021-11-12 PROCEDURE — 1157F PR ADVANCE CARE PLAN OR EQUIV PRESENT IN MEDICAL RECORD: ICD-10-PCS | Mod: HCNC,CPTII,S$GLB, | Performed by: FAMILY MEDICINE

## 2021-11-12 PROCEDURE — 3288F FALL RISK ASSESSMENT DOCD: CPT | Mod: HCNC,CPTII,S$GLB, | Performed by: FAMILY MEDICINE

## 2021-11-12 PROCEDURE — 1160F PR REVIEW ALL MEDS BY PRESCRIBER/CLIN PHARMACIST DOCUMENTED: ICD-10-PCS | Mod: HCNC,CPTII,S$GLB, | Performed by: FAMILY MEDICINE

## 2021-11-12 PROCEDURE — G0008 FLU VACCINE - QUADRIVALENT - ADJUVANTED: ICD-10-PCS | Mod: 59,HCNC,S$GLB, | Performed by: FAMILY MEDICINE

## 2021-11-12 PROCEDURE — 83036 HEMOGLOBIN GLYCOSYLATED A1C: CPT | Mod: HCNC | Performed by: FAMILY MEDICINE

## 2021-11-12 PROCEDURE — 1159F PR MEDICATION LIST DOCUMENTED IN MEDICAL RECORD: ICD-10-PCS | Mod: HCNC,CPTII,S$GLB, | Performed by: FAMILY MEDICINE

## 2021-11-12 PROCEDURE — 3074F SYST BP LT 130 MM HG: CPT | Mod: HCNC,CPTII,S$GLB, | Performed by: FAMILY MEDICINE

## 2021-11-12 PROCEDURE — 99499 RISK ADDL DX/OHS AUDIT: ICD-10-PCS | Mod: S$GLB,,, | Performed by: FAMILY MEDICINE

## 2021-11-12 PROCEDURE — 3074F PR MOST RECENT SYSTOLIC BLOOD PRESSURE < 130 MM HG: ICD-10-PCS | Mod: HCNC,CPTII,S$GLB, | Performed by: FAMILY MEDICINE

## 2021-11-12 PROCEDURE — 99214 PR OFFICE/OUTPT VISIT, EST, LEVL IV, 30-39 MIN: ICD-10-PCS | Mod: HCNC,S$GLB,, | Performed by: FAMILY MEDICINE

## 2021-11-12 PROCEDURE — 1101F PR PT FALLS ASSESS DOC 0-1 FALLS W/OUT INJ PAST YR: ICD-10-PCS | Mod: HCNC,CPTII,S$GLB, | Performed by: FAMILY MEDICINE

## 2021-11-12 PROCEDURE — 3288F PR FALLS RISK ASSESSMENT DOCUMENTED: ICD-10-PCS | Mod: HCNC,CPTII,S$GLB, | Performed by: FAMILY MEDICINE

## 2021-11-12 PROCEDURE — 1160F RVW MEDS BY RX/DR IN RCRD: CPT | Mod: HCNC,CPTII,S$GLB, | Performed by: FAMILY MEDICINE

## 2021-11-12 PROCEDURE — 99214 OFFICE O/P EST MOD 30 MIN: CPT | Mod: HCNC,S$GLB,, | Performed by: FAMILY MEDICINE

## 2021-11-12 PROCEDURE — 99499 UNLISTED E&M SERVICE: CPT | Mod: S$GLB,,, | Performed by: FAMILY MEDICINE

## 2021-11-12 PROCEDURE — 91300 COVID-19, MRNA, LNP-S, PF, 30 MCG/0.3 ML DOSE VACCINE: CPT | Mod: HCNC,PBBFAC | Performed by: FAMILY MEDICINE

## 2021-11-12 PROCEDURE — 80048 BASIC METABOLIC PNL TOTAL CA: CPT | Mod: HCNC | Performed by: STUDENT IN AN ORGANIZED HEALTH CARE EDUCATION/TRAINING PROGRAM

## 2021-11-12 PROCEDURE — 0003A COVID-19, MRNA, LNP-S, PF, 30 MCG/0.3 ML DOSE VACCINE: CPT | Mod: HCNC,PBBFAC | Performed by: FAMILY MEDICINE

## 2021-11-12 PROCEDURE — G0008 ADMIN INFLUENZA VIRUS VAC: HCPCS | Mod: 59,HCNC,S$GLB, | Performed by: FAMILY MEDICINE

## 2021-11-12 PROCEDURE — 36415 PR COLLECTION VENOUS BLOOD,VENIPUNCTURE: ICD-10-PCS | Mod: HCNC,S$GLB,, | Performed by: FAMILY MEDICINE

## 2021-11-12 PROCEDURE — 1157F ADVNC CARE PLAN IN RCRD: CPT | Mod: HCNC,CPTII,S$GLB, | Performed by: FAMILY MEDICINE

## 2021-11-12 PROCEDURE — 3078F PR MOST RECENT DIASTOLIC BLOOD PRESSURE < 80 MM HG: ICD-10-PCS | Mod: HCNC,CPTII,S$GLB, | Performed by: FAMILY MEDICINE

## 2021-11-12 PROCEDURE — 3008F BODY MASS INDEX DOCD: CPT | Mod: HCNC,CPTII,S$GLB, | Performed by: FAMILY MEDICINE

## 2021-11-12 PROCEDURE — 3078F DIAST BP <80 MM HG: CPT | Mod: HCNC,CPTII,S$GLB, | Performed by: FAMILY MEDICINE

## 2021-11-12 PROCEDURE — 3044F HG A1C LEVEL LT 7.0%: CPT | Mod: HCNC,CPTII,S$GLB, | Performed by: FAMILY MEDICINE

## 2021-11-12 PROCEDURE — 99999 PR PBB SHADOW E&M-EST. PATIENT-LVL IV: CPT | Mod: PBBFAC,HCNC,, | Performed by: FAMILY MEDICINE

## 2021-11-12 PROCEDURE — 3008F PR BODY MASS INDEX (BMI) DOCUMENTED: ICD-10-PCS | Mod: HCNC,CPTII,S$GLB, | Performed by: FAMILY MEDICINE

## 2021-11-12 PROCEDURE — 80061 LIPID PANEL: CPT | Mod: HCNC | Performed by: FAMILY MEDICINE

## 2021-11-12 PROCEDURE — 99999 PR PBB SHADOW E&M-EST. PATIENT-LVL IV: ICD-10-PCS | Mod: PBBFAC,HCNC,, | Performed by: FAMILY MEDICINE

## 2021-11-12 PROCEDURE — 90694 FLU VACCINE - QUADRIVALENT - ADJUVANTED: ICD-10-PCS | Mod: HCNC,S$GLB,, | Performed by: FAMILY MEDICINE

## 2021-11-12 PROCEDURE — 3044F PR MOST RECENT HEMOGLOBIN A1C LEVEL <7.0%: ICD-10-PCS | Mod: HCNC,CPTII,S$GLB, | Performed by: FAMILY MEDICINE

## 2021-11-12 PROCEDURE — 90694 VACC AIIV4 NO PRSRV 0.5ML IM: CPT | Mod: HCNC,S$GLB,, | Performed by: FAMILY MEDICINE

## 2021-11-12 PROCEDURE — 36415 COLL VENOUS BLD VENIPUNCTURE: CPT | Mod: HCNC,S$GLB,, | Performed by: FAMILY MEDICINE

## 2021-11-12 PROCEDURE — 1126F AMNT PAIN NOTED NONE PRSNT: CPT | Mod: HCNC,CPTII,S$GLB, | Performed by: FAMILY MEDICINE

## 2021-11-12 PROCEDURE — 1159F MED LIST DOCD IN RCRD: CPT | Mod: HCNC,CPTII,S$GLB, | Performed by: FAMILY MEDICINE

## 2021-11-12 PROCEDURE — 1101F PT FALLS ASSESS-DOCD LE1/YR: CPT | Mod: HCNC,CPTII,S$GLB, | Performed by: FAMILY MEDICINE

## 2021-11-12 RX ORDER — POLYETHYLENE GLYCOL 3350 17 G/17G
17 POWDER, FOR SOLUTION ORAL DAILY
Qty: 30 PACKET | Refills: 5 | Status: SHIPPED | OUTPATIENT
Start: 2021-11-12 | End: 2023-01-01

## 2021-11-13 LAB
ESTIMATED AVG GLUCOSE: 134 MG/DL (ref 68–131)
HBA1C MFR BLD: 6.3 % (ref 4–5.6)

## 2022-01-01 ENCOUNTER — PATIENT OUTREACH (OUTPATIENT)
Dept: ADMINISTRATIVE | Facility: HOSPITAL | Age: 72
End: 2022-01-01
Payer: MEDICARE

## 2022-01-12 ENCOUNTER — TELEPHONE (OUTPATIENT)
Dept: FAMILY MEDICINE | Facility: CLINIC | Age: 72
End: 2022-01-12
Payer: MEDICARE

## 2022-01-12 NOTE — TELEPHONE ENCOUNTER
----- Message from Simone Valadez sent at 2022  1:04 PM CST -----  Contact: ZOËGhislaine Tierrasa Indira Bernal  MRN: [id number]  : 1950  PCP: Lalo Sweet  Home Phone      [phone]  Work Phone      Not on file.  Mobile          [phone]  Home Phone      [phone]      MESSAGE:   Pt requesting refill or new Rx.   Is this a refill or new RX:  refill  RX name and strength: Hydrocodone  mg  Last office visit: 21  Is this a 30-day or 90-day RX:  30 day  Pharmacy name and location:  [facility]  Comments:      Phone:  [facility] @ [phone]    PCP: Micki

## 2022-01-26 DIAGNOSIS — E11.9 TYPE 2 DIABETES MELLITUS WITHOUT COMPLICATION: ICD-10-CM

## 2022-02-16 ENCOUNTER — PATIENT MESSAGE (OUTPATIENT)
Dept: ADMINISTRATIVE | Facility: HOSPITAL | Age: 72
End: 2022-02-16
Payer: MEDICARE

## 2022-03-08 DIAGNOSIS — M51.16 LUMBAR DISC DISEASE WITH RADICULOPATHY: ICD-10-CM

## 2022-03-08 RX ORDER — HYDROCODONE BITARTRATE AND ACETAMINOPHEN 10; 325 MG/1; MG/1
TABLET ORAL
Qty: 60 TABLET | Refills: 0 | Status: SHIPPED | OUTPATIENT
Start: 2022-03-08 | End: 2022-04-29 | Stop reason: SDUPTHER

## 2022-03-08 NOTE — TELEPHONE ENCOUNTER
----- Message from Simone Valadez sent at 3/8/2022  9:53 AM CST -----  Contact: ZOË Ghislaine Tierrasa Indira Bernal  MRN: [id number]  : 1950  PCP: Lalo Sweet  Home Phone      [phone]  Work Phone      Not on file.  Mobile          [phone]  Home Phone      [phone]      MESSAGE:   Pt requesting refill or new Rx.   Is this a refill or new RX:  refill  RX name and strength: Hydrocodone  mg  Last office visit: 21  Is this a 30-day or 90-day RX:  30 day  Pharmacy name and location:  [facility]  Comments:      Phone:  [facility] @ [phone]    PCP: Micki

## 2022-03-08 NOTE — TELEPHONE ENCOUNTER
Care Due:                  Date            Visit Type   Department     Provider  --------------------------------------------------------------------------------                                EP -                              [facility] FAMILY Lalo Orellana  Last Visit: 11-      CARE ([facility])   [facility] -                              PRIMARY      [facility]    Lalo Orellana  Next Visit: 05-      [facility] ([facility]                                                            Last  Test          Frequency    Reason                     Performed    Due Date  --------------------------------------------------------------------------------    CMP.........  12 months..  ezetimibe, icosapent,      09-   09-                             pioglitazone.............    HBA1C.......  6 months...  CLARK pioglitazone....  11- 05-    Powered by [employer]. Reference number: [id number].   3/08/2022 10:04:45 AM CST

## 2022-03-15 ENCOUNTER — PATIENT OUTREACH (OUTPATIENT)
Dept: ADMINISTRATIVE | Facility: OTHER | Age: 72
End: 2022-03-15
Payer: MEDICARE

## 2022-03-15 DIAGNOSIS — E11.9 DIABETES MELLITUS WITHOUT COMPLICATION: Primary | ICD-10-CM

## 2022-03-15 NOTE — PROGRESS NOTES
Health Maintenance Due   Topic Date Due    TETANUS VACCINE  Never done    Sign Pain Contract  Never done    Complete Opioid Risk Tool  Never done    High Dose Statin  Never done    Colorectal Cancer Screening  Never done    Shingles Vaccine (1 of 2) Never done    Abdominal Aortic Aneurysm Screening  Never done    Pneumococcal Vaccines (Age 65+) (1 of 1 - PPSV23) 12/03/2019    Diabetes Urine Screening  09/01/2021    Eye Exam  09/18/2021     Updates were requested from care everywhere.  Chart was reviewed for overdue Proactive [facility] Encounters ([facility]) topics (CRS, Breast Cancer Screening, Eye exam)  Health Maintenance has been updated.  LINKS immunization registry triggered.  Immunizations were reconciled.  Order placed for diabetic eye screening photo.

## 2022-03-21 ENCOUNTER — LAB VISIT (OUTPATIENT)
Dept: LAB | Facility: HOSPITAL | Age: 72
End: 2022-03-21
Attending: PSYCHIATRY & NEUROLOGY
Payer: MEDICARE

## 2022-03-21 ENCOUNTER — OFFICE VISIT (OUTPATIENT)
Dept: NEUROLOGY | Facility: CLINIC | Age: 72
End: 2022-03-21
Payer: MEDICARE

## 2022-03-21 VITALS
HEIGHT: 73 IN | BODY MASS INDEX: 25.25 KG/M2 | WEIGHT: 190.5 LBS | HEART RATE: 64 BPM | DIASTOLIC BLOOD PRESSURE: 66 MMHG | SYSTOLIC BLOOD PRESSURE: 154 MMHG | RESPIRATION RATE: 16 BRPM

## 2022-03-21 DIAGNOSIS — R41.3 OTHER AMNESIA: ICD-10-CM

## 2022-03-21 DIAGNOSIS — R41.3 MEMORY LOSS: ICD-10-CM

## 2022-03-21 DIAGNOSIS — R41.3 MEMORY LOSS: Primary | ICD-10-CM

## 2022-03-21 LAB
ALBUMIN SERPL BCP-MCNC: 3.7 G/DL (ref 3.5–5.2)
ALP SERPL-CCNC: 70 U/L (ref 55–135)
ALT SERPL W/O P-5'-P-CCNC: 11 U/L (ref 10–44)
ANION GAP SERPL CALC-SCNC: 10 MMOL/L (ref 8–16)
AST SERPL-CCNC: 11 U/L (ref 10–40)
BASOPHILS # BLD AUTO: 0.07 K/UL (ref 0–0.2)
BASOPHILS NFR BLD: 1.2 % (ref 0–1.9)
BILIRUB SERPL-MCNC: 0.3 MG/DL (ref 0.1–1)
BUN SERPL-MCNC: 27 MG/DL (ref 8–23)
CALCIUM SERPL-MCNC: 9 MG/DL (ref 8.7–10.5)
CHLORIDE SERPL-SCNC: 105 MMOL/L (ref 95–110)
CO2 SERPL-SCNC: 29 MMOL/L (ref 23–29)
CREAT SERPL-MCNC: 1.6 MG/DL (ref 0.5–1.4)
DIFFERENTIAL METHOD: ABNORMAL
EOSINOPHIL # BLD AUTO: 0.3 K/UL (ref 0–0.5)
EOSINOPHIL NFR BLD: 4.2 % (ref 0–8)
ERYTHROCYTE [DISTWIDTH] IN BLOOD BY AUTOMATED COUNT: 13.2 % (ref 11.5–14.5)
EST. GFR  (AFRICAN AMERICAN): 50 ML/MIN/1.73 M^2
EST. GFR  (NON AFRICAN AMERICAN): 43 ML/MIN/1.73 M^2
GLUCOSE SERPL-MCNC: 128 MG/DL (ref 70–110)
HCT VFR BLD AUTO: 34.6 % (ref 40–54)
HGB BLD-MCNC: 11.7 G/DL (ref 14–18)
IMM GRANULOCYTES # BLD AUTO: 0.04 K/UL (ref 0–0.04)
IMM GRANULOCYTES NFR BLD AUTO: 0.7 % (ref 0–0.5)
LYMPHOCYTES # BLD AUTO: 0.7 K/UL (ref 1–4.8)
LYMPHOCYTES NFR BLD: 12.6 % (ref 18–48)
MCH RBC QN AUTO: 32.9 PG (ref 27–31)
MCHC RBC AUTO-ENTMCNC: 33.8 G/DL (ref 32–36)
MCV RBC AUTO: 97 FL (ref 82–98)
MONOCYTES # BLD AUTO: 0.5 K/UL (ref 0.3–1)
MONOCYTES NFR BLD: 8.1 % (ref 4–15)
NEUTROPHILS # BLD AUTO: 4.3 K/UL (ref 1.8–7.7)
NEUTROPHILS NFR BLD: 73.2 % (ref 38–73)
NRBC BLD-RTO: 0 /100 WBC
PLATELET # BLD AUTO: 185 K/UL (ref 150–450)
PMV BLD AUTO: 9.1 FL (ref 9.2–12.9)
POTASSIUM SERPL-SCNC: 4.1 MMOL/L (ref 3.5–5.1)
PROT SERPL-MCNC: 7 G/DL (ref 6–8.4)
RBC # BLD AUTO: 3.56 M/UL (ref 4.6–6.2)
RPR SER QL: NORMAL
SODIUM SERPL-SCNC: 144 MMOL/L (ref 136–145)
TSH SERPL DL<=0.005 MIU/L-ACNC: 2.01 UIU/ML (ref 0.4–4)
VIT B12 SERPL-MCNC: 308 PG/ML (ref 210–950)
WBC # BLD AUTO: 5.89 K/UL (ref 3.9–12.7)

## 2022-03-21 PROCEDURE — 80053 COMPREHEN METABOLIC PANEL: CPT | Performed by: PSYCHIATRY & NEUROLOGY

## 2022-03-21 PROCEDURE — 3008F PR BODY MASS INDEX (BMI) DOCUMENTED: ICD-10-PCS | Mod: CPTII,S$GLB,, | Performed by: PSYCHIATRY & NEUROLOGY

## 2022-03-21 PROCEDURE — 84443 ASSAY THYROID STIM HORMONE: CPT | Performed by: PSYCHIATRY & NEUROLOGY

## 2022-03-21 PROCEDURE — 3008F BODY MASS INDEX DOCD: CPT | Mod: CPTII,S$GLB,, | Performed by: PSYCHIATRY & NEUROLOGY

## 2022-03-21 PROCEDURE — 85025 COMPLETE CBC W/AUTO DIFF WBC: CPT | Performed by: PSYCHIATRY & NEUROLOGY

## 2022-03-21 PROCEDURE — 99214 PR OFFICE/OUTPT VISIT, EST, LEVL IV, 30-39 MIN: ICD-10-PCS | Mod: S$GLB,,, | Performed by: PSYCHIATRY & NEUROLOGY

## 2022-03-21 PROCEDURE — 1126F AMNT PAIN NOTED NONE PRSNT: CPT | Mod: CPTII,S$GLB,, | Performed by: PSYCHIATRY & NEUROLOGY

## 2022-03-21 PROCEDURE — 36415 COLL VENOUS BLD VENIPUNCTURE: CPT | Performed by: PSYCHIATRY & NEUROLOGY

## 2022-03-21 PROCEDURE — 1157F ADVNC CARE PLAN IN RCRD: CPT | Mod: CPTII,S$GLB,, | Performed by: PSYCHIATRY & NEUROLOGY

## 2022-03-21 PROCEDURE — 3077F PR MOST RECENT SYSTOLIC BLOOD PRESSURE >= 140 MM HG: ICD-10-PCS | Mod: CPTII,S$GLB,, | Performed by: PSYCHIATRY & NEUROLOGY

## 2022-03-21 PROCEDURE — 3078F DIAST BP <80 MM HG: CPT | Mod: CPTII,S$GLB,, | Performed by: PSYCHIATRY & NEUROLOGY

## 2022-03-21 PROCEDURE — 1160F RVW MEDS BY RX/DR IN RCRD: CPT | Mod: CPTII,S$GLB,, | Performed by: PSYCHIATRY & NEUROLOGY

## 2022-03-21 PROCEDURE — 1159F PR MEDICATION LIST DOCUMENTED IN MEDICAL RECORD: ICD-10-PCS | Mod: CPTII,S$GLB,, | Performed by: PSYCHIATRY & NEUROLOGY

## 2022-03-21 PROCEDURE — 1160F PR REVIEW ALL MEDS BY PRESCRIBER/CLIN PHARMACIST DOCUMENTED: ICD-10-PCS | Mod: CPTII,S$GLB,, | Performed by: PSYCHIATRY & NEUROLOGY

## 2022-03-21 PROCEDURE — 86592 SYPHILIS TEST NON-TREP QUAL: CPT | Performed by: PSYCHIATRY & NEUROLOGY

## 2022-03-21 PROCEDURE — 1126F PR PAIN SEVERITY QUANTIFIED, NO PAIN PRESENT: ICD-10-PCS | Mod: CPTII,S$GLB,, | Performed by: PSYCHIATRY & NEUROLOGY

## 2022-03-21 PROCEDURE — 3078F PR MOST RECENT DIASTOLIC BLOOD PRESSURE < 80 MM HG: ICD-10-PCS | Mod: CPTII,S$GLB,, | Performed by: PSYCHIATRY & NEUROLOGY

## 2022-03-21 PROCEDURE — 99214 OFFICE O/P EST MOD 30 MIN: CPT | Mod: S$GLB,,, | Performed by: PSYCHIATRY & NEUROLOGY

## 2022-03-21 PROCEDURE — 82607 VITAMIN B-12: CPT | Performed by: PSYCHIATRY & NEUROLOGY

## 2022-03-21 PROCEDURE — 99999 PR PBB SHADOW E&M-EST. PATIENT-LVL V: ICD-10-PCS | Mod: PBBFAC,,, | Performed by: PSYCHIATRY & NEUROLOGY

## 2022-03-21 PROCEDURE — 3077F SYST BP >= 140 MM HG: CPT | Mod: CPTII,S$GLB,, | Performed by: PSYCHIATRY & NEUROLOGY

## 2022-03-21 PROCEDURE — 1159F MED LIST DOCD IN RCRD: CPT | Mod: CPTII,S$GLB,, | Performed by: PSYCHIATRY & NEUROLOGY

## 2022-03-21 PROCEDURE — 99999 PR PBB SHADOW E&M-EST. PATIENT-LVL V: CPT | Mod: PBBFAC,,, | Performed by: PSYCHIATRY & NEUROLOGY

## 2022-03-21 PROCEDURE — 1157F PR ADVANCE CARE PLAN OR EQUIV PRESENT IN MEDICAL RECORD: ICD-10-PCS | Mod: CPTII,S$GLB,, | Performed by: PSYCHIATRY & NEUROLOGY

## 2022-03-21 RX ORDER — CARVEDILOL 6.25 MG/1
6.25 TABLET ORAL 2 TIMES DAILY
COMMUNITY
Start: 2021-12-15 | End: 2023-01-01

## 2022-03-21 NOTE — PATIENT INSTRUCTIONS
Be sure to bring all mediations to every visit.  We are not sure we have your correct medication list today.    If you are on both pravastatin and Repatha, please clarify this with Dr Leigh. Statin med scan cause or worsen leg pain.    I ordered special memory testing for you today called neuropsychological testing. Please call the number on the handout to make an appointment for this.     Also checking CT head and labs.

## 2022-03-21 NOTE — PROGRESS NOTES
HPI: Wesley Bernal is a 71 y.o. male with tremor    Here for scheduled follow up    Primidone dose changed to one BID    Tremor is improved     Cymbalta is well tolerated    Memory is improved to him. His significant other sent a note stating his memory is worse      Mood is better on Cymbalta    Repatha and statin are both still on his list of meds. But he does not have his meds today  Still seeing Dr Leigh    Lumbar pain continues and pain in the legs (He has PAD and DM2) and he uses opiates per PCP     No falls    Driving well    Never was a drinker  He does not smoke, former smoker      Review of Systems   Constitutional: Negative for fever.   HENT: Negative for nosebleeds.    Eyes: Negative for double vision.   Respiratory: Negative for hemoptysis.    Cardiovascular: Negative for leg swelling.   Gastrointestinal: Positive for blood in stool.        + long history of rectal bleeding with cancer   Genitourinary: Negative for hematuria.   Musculoskeletal: Positive for back pain.   Skin: Negative for rash.   Neurological: Positive for tremors.   Psychiatric/Behavioral: Negative for depression.         I have reviewed all of this patient's past medical and surgical histories as well as family and social histories and active allergies and medications as documented in the electronic medical record.        Exam:  Gen Appearance, well developed/nourished in no apparent distress  CV: 2+ distal pulses with no edema or swelling  Neuro:  MS: Awake, alert, oriented to place, person, time, situation. Sustains attention. Recent / remote memory intack, Language is full to spontaneous speech/repetition/naming/comprehension. Fund of Knowledge is full  Good visual spatial skills  CN: Optic discs are flat with normal vasculature, PERRL, Extraoccular movements and visual fields are full. Normal facial sensation and strength, Hearing symmetric, Tongue and Palate are midline and strong. Shoulder Shrug symmetric and  strong.  Motor: Normal bulk, tone, no abnormal movements at rest and tremor is noted mildly (improved from moderately) with outstretched hands. 5/5 strength bilateral upper/lower extremities with 1+ reflexes and no clonus  Sensory: symmetric to light touch, pain, temp, and vibration, Romberg negative  Cerebellar: Finger-nose,Heal-shin, Rapid alternating movements intact  Gait: Normal stance, no ataxia- but arthralgic gait for which he uses a cane often and mild sensory ataxia from DM likely    Labs: 2019 CMP, CBC reviewed. TSH normal 12/2018  Imaging: CT L spine 2015: Moderate spinal stenosis.     Assessment/Plan: Wesley Bernal is a 71 y.o. male with benign essential tremor  I recommend:     1. No neuroimaging is needed as symptoms are long standing and consistent with benign essential tremor as I noted in 2015  -Note his family history of tremor in his dad  -Continue Primidone which helps well currently  -Note his use of atenolol for rate with his afib, which may also help his tremor  2. Note: He takes gabapentin for pain from DM and known lumbar spinal stenosis (moderate by 2015 MRI L spine). Note I saw him for this in 2015, but he failed to comply with follow up and EMG at that time.  PCP treats with opiates  -Pain management consult suggested, but he declines  -discussed maybe statin is further contributing to pain.  Advised him to clarify with cardiology again today. Instructions written. Asked patient to bring all active meds to the next visit  3. He was treated for metastatic anal cancer in 2020, ongoing follow up with oncology noted  4. PVD. Per the record he follows with cardiology,  Dr Leigh.  Has abdominal, carotid, femoral artery stent with pain with walking  5. Recovered recently from zoster involving in the left eye region in 2020  6. Has some adjustment disorder with depressed mood as reviewed. Mood is much better on Cymbalta prescribed by me (may help neuropathy pain). He thinks his memory is  improved but significant other states this is worse  -CT head and labs per orders (noting his CKD)  neuropsychological testing to further evaluate memory  -NO restrictions, he is functioning well.        RTC 6 months

## 2022-03-23 ENCOUNTER — HOSPITAL ENCOUNTER (OUTPATIENT)
Dept: RADIOLOGY | Facility: HOSPITAL | Age: 72
Discharge: HOME | End: 2022-03-23
Attending: PSYCHIATRY & NEUROLOGY
Payer: MEDICARE

## 2022-03-23 DIAGNOSIS — R41.3 OTHER AMNESIA: ICD-10-CM

## 2022-03-23 DIAGNOSIS — R41.3 MEMORY LOSS: ICD-10-CM

## 2022-03-23 PROCEDURE — 70450 CT HEAD WITHOUT CONTRAST: ICD-10-PCS | Mod: 26,,, | Performed by: RADIOLOGY

## 2022-03-23 PROCEDURE — 70450 CT HEAD/BRAIN W/O DYE: CPT | Mod: TC

## 2022-03-23 PROCEDURE — 70450 CT HEAD/BRAIN W/O DYE: CPT | Mod: 26,,, | Performed by: RADIOLOGY

## 2022-04-04 ENCOUNTER — PATIENT MESSAGE (OUTPATIENT)
Dept: ADMINISTRATIVE | Facility: HOSPITAL | Age: 72
End: 2022-04-04
Payer: MEDICARE

## 2022-04-25 DIAGNOSIS — E78.5 HYPERLIPIDEMIA, UNSPECIFIED HYPERLIPIDEMIA TYPE: ICD-10-CM

## 2022-04-25 NOTE — TELEPHONE ENCOUNTER
No new care gaps identified.  Powered by [employer] by [employer]. Reference number: [id number].   4/25/2022 8:01:07 AM CDT

## 2022-04-26 RX ORDER — ICOSAPENT ETHYL 1000 MG/1
2 CAPSULE ORAL 2 TIMES DAILY
Qty: 360 CAPSULE | Refills: 1 | Status: SHIPPED | OUTPATIENT
Start: 2022-04-26 | End: 2023-01-01

## 2022-04-29 ENCOUNTER — PES CALL (OUTPATIENT)
Dept: ADMINISTRATIVE | Facility: CLINIC | Age: 72
End: 2022-04-29
Payer: MEDICARE

## 2022-06-09 ENCOUNTER — PATIENT MESSAGE (OUTPATIENT)
Dept: ADMINISTRATIVE | Facility: HOSPITAL | Age: 72
End: 2022-06-09
Payer: MEDICARE

## 2022-06-17 DIAGNOSIS — M51.16 LUMBAR DISC DISEASE WITH RADICULOPATHY: ICD-10-CM

## 2022-06-17 NOTE — TELEPHONE ENCOUNTER
----- Message from Simone Valadez sent at 2022 11:51 AM CDT -----  Contact: Patient  Wesley Bernal  MRN: [id number]  : 1950  PCP: Lalo Sweet  Home Phone      [phone]  Work Phone      Not on file.  Mobile          [phone]  Home Phone      [phone]      MESSAGE:   Pt requesting refill or new Rx.   Is this a refill or new RX:  refill  RX name and strength: Hydrocodone  mg  Last office visit: 21  Is this a 30-day or 90-day RX:  30 day  Pharmacy name and location:  [facility]  Comments:      Phone:  [phone]    PCP: Micki

## 2022-06-20 RX ORDER — HYDROCODONE BITARTRATE AND ACETAMINOPHEN 10; 325 MG/1; MG/1
TABLET ORAL
Qty: 60 TABLET | Refills: 0 | Status: SHIPPED | OUTPATIENT
Start: 2022-06-20 | End: 2022-08-01

## 2022-07-11 ENCOUNTER — PATIENT MESSAGE (OUTPATIENT)
Dept: ADMINISTRATIVE | Facility: HOSPITAL | Age: 72
End: 2022-07-11
Payer: MEDICARE

## 2022-08-03 DIAGNOSIS — E11.9 TYPE 2 DIABETES MELLITUS WITHOUT COMPLICATION: ICD-10-CM

## 2022-08-03 DIAGNOSIS — M51.16 LUMBAR DISC DISEASE WITH RADICULOPATHY: ICD-10-CM

## 2022-08-03 RX ORDER — HYDROCODONE BITARTRATE AND ACETAMINOPHEN 10; 325 MG/1; MG/1
1 TABLET ORAL EVERY 12 HOURS PRN
Qty: 60 TABLET | Refills: 0 | Status: SHIPPED | OUTPATIENT
Start: 2022-08-03 | End: 2022-11-04

## 2022-08-03 NOTE — TELEPHONE ENCOUNTER
Care Due:                  Date            Visit Type   Department     Provider  --------------------------------------------------------------------------------                                EP [facility] FAMILY Lalo Orellana  Last Visit: 11-      CARE ([facility])   [facility]       [facility]  Next Visit: None Scheduled  None         None Found                                                            Last  Test          Frequency    Reason                     Performed    Due Date  --------------------------------------------------------------------------------    HBA1C.......  6 months..roel SONI....  11- 05-    [facility] Embedded Care Gaps. Reference number: [id number]. 8/03/2022   12:09:39 PM CDT

## 2022-08-03 NOTE — TELEPHONE ENCOUNTER
----- Message from Simone Valadez sent at 8/3/2022 12:04 PM CDT -----  Contact: Patient  Wesley Bernal  MRN: [id number]  : 1950  PCP: Lalo Sweet  Home Phone      [phone]  Work Phone      Not on file.  Mobile          [phone]  Home Phone      [phone]      MESSAGE: requesting refill on Hydrocodone - Rx was sent to [facility] on 22 -- looks like it needs grater than 7 day override -- please re-send    Call [phone]    PCP: Micki

## 2022-08-10 ENCOUNTER — PATIENT MESSAGE (OUTPATIENT)
Dept: ADMINISTRATIVE | Facility: HOSPITAL | Age: 72
End: 2022-08-10
Payer: MEDICARE

## 2022-08-10 DIAGNOSIS — Z12.11 COLON CANCER SCREENING: ICD-10-CM

## 2022-08-15 ENCOUNTER — PATIENT OUTREACH (OUTPATIENT)
Dept: ADMINISTRATIVE | Facility: HOSPITAL | Age: 72
End: 2022-08-15
Payer: MEDICARE

## 2022-08-15 NOTE — PROGRESS NOTES
Chart reviewed, immunization record updated.  No new results noted on [facility] or [facility] web site.  Care Everywhere updated.   Patient care coordination note updated.  LOV with PCP 11/12/2021.  Left detailed message for patient to return call to discuss scheduling routine PCP visit, Colonoscopy, DM labs and Diabetic Eye Exam.

## 2022-09-02 LAB
CHOLEST SERPL-MSCNC: 188 MG/DL (ref 0–200)
CHOLEST/HDLC SERPL: 4.4 {RATIO}
HDLC SERPL-MCNC: 43 MG/DL (ref 35–70)
LDL CHOLESTEROL DIRECT: 123 MG/DL
NON HDL CHOL. (LDL+VLDL): 145
TRIGL SERPL-MCNC: 109 MG/DL (ref 40–160)
VLDL CHOLESTEROL: 22 MG/DL

## 2022-09-27 ENCOUNTER — OFFICE VISIT (OUTPATIENT)
Dept: NEUROLOGY | Facility: CLINIC | Age: 72
End: 2022-09-27
Payer: MEDICARE

## 2022-09-27 VITALS
WEIGHT: 185.44 LBS | BODY MASS INDEX: 25.12 KG/M2 | DIASTOLIC BLOOD PRESSURE: 56 MMHG | HEIGHT: 72 IN | HEART RATE: 74 BPM | SYSTOLIC BLOOD PRESSURE: 118 MMHG | RESPIRATION RATE: 14 BRPM

## 2022-09-27 DIAGNOSIS — F43.21 ADJUSTMENT DISORDER WITH DEPRESSED MOOD: ICD-10-CM

## 2022-09-27 DIAGNOSIS — I48.0 PAROXYSMAL ATRIAL FIBRILLATION: ICD-10-CM

## 2022-09-27 DIAGNOSIS — M51.16 LUMBAR DISC DISEASE WITH RADICULOPATHY: ICD-10-CM

## 2022-09-27 DIAGNOSIS — R41.3 MEMORY LOSS: ICD-10-CM

## 2022-09-27 DIAGNOSIS — G25.0 BENIGN ESSENTIAL TREMOR: Primary | ICD-10-CM

## 2022-09-27 DIAGNOSIS — M51.16 INTERVERTEBRAL DISC DISORDERS WITH RADICULOPATHY, LUMBAR REGION: ICD-10-CM

## 2022-09-27 PROCEDURE — 3074F SYST BP LT 130 MM HG: CPT | Mod: CPTII,S$GLB,, | Performed by: PSYCHIATRY & NEUROLOGY

## 2022-09-27 PROCEDURE — 99214 OFFICE O/P EST MOD 30 MIN: CPT | Mod: S$GLB,,, | Performed by: PSYCHIATRY & NEUROLOGY

## 2022-09-27 PROCEDURE — 99499 UNLISTED E&M SERVICE: CPT | Mod: S$GLB,,, | Performed by: PSYCHIATRY & NEUROLOGY

## 2022-09-27 PROCEDURE — 99999 PR PBB SHADOW E&M-EST. PATIENT-LVL IV: CPT | Mod: PBBFAC,,, | Performed by: PSYCHIATRY & NEUROLOGY

## 2022-09-27 PROCEDURE — 99999 PR PBB SHADOW E&M-EST. PATIENT-LVL IV: ICD-10-PCS | Mod: PBBFAC,,, | Performed by: PSYCHIATRY & NEUROLOGY

## 2022-09-27 PROCEDURE — 1160F RVW MEDS BY RX/DR IN RCRD: CPT | Mod: CPTII,S$GLB,, | Performed by: PSYCHIATRY & NEUROLOGY

## 2022-09-27 PROCEDURE — 1159F MED LIST DOCD IN RCRD: CPT | Mod: CPTII,S$GLB,, | Performed by: PSYCHIATRY & NEUROLOGY

## 2022-09-27 PROCEDURE — 1126F AMNT PAIN NOTED NONE PRSNT: CPT | Mod: CPTII,S$GLB,, | Performed by: PSYCHIATRY & NEUROLOGY

## 2022-09-27 PROCEDURE — 99499 RISK ADDL DX/OHS AUDIT: ICD-10-PCS | Mod: S$GLB,,, | Performed by: PSYCHIATRY & NEUROLOGY

## 2022-09-27 PROCEDURE — 1157F ADVNC CARE PLAN IN RCRD: CPT | Mod: CPTII,S$GLB,, | Performed by: PSYCHIATRY & NEUROLOGY

## 2022-09-27 PROCEDURE — 1160F PR REVIEW ALL MEDS BY PRESCRIBER/CLIN PHARMACIST DOCUMENTED: ICD-10-PCS | Mod: CPTII,S$GLB,, | Performed by: PSYCHIATRY & NEUROLOGY

## 2022-09-27 PROCEDURE — 99214 PR OFFICE/OUTPT VISIT, EST, LEVL IV, 30-39 MIN: ICD-10-PCS | Mod: S$GLB,,, | Performed by: PSYCHIATRY & NEUROLOGY

## 2022-09-27 PROCEDURE — 3074F PR MOST RECENT SYSTOLIC BLOOD PRESSURE < 130 MM HG: ICD-10-PCS | Mod: CPTII,S$GLB,, | Performed by: PSYCHIATRY & NEUROLOGY

## 2022-09-27 PROCEDURE — 3008F PR BODY MASS INDEX (BMI) DOCUMENTED: ICD-10-PCS | Mod: CPTII,S$GLB,, | Performed by: PSYCHIATRY & NEUROLOGY

## 2022-09-27 PROCEDURE — 1126F PR PAIN SEVERITY QUANTIFIED, NO PAIN PRESENT: ICD-10-PCS | Mod: CPTII,S$GLB,, | Performed by: PSYCHIATRY & NEUROLOGY

## 2022-09-27 PROCEDURE — 3078F PR MOST RECENT DIASTOLIC BLOOD PRESSURE < 80 MM HG: ICD-10-PCS | Mod: CPTII,S$GLB,, | Performed by: PSYCHIATRY & NEUROLOGY

## 2022-09-27 PROCEDURE — 3078F DIAST BP <80 MM HG: CPT | Mod: CPTII,S$GLB,, | Performed by: PSYCHIATRY & NEUROLOGY

## 2022-09-27 PROCEDURE — 1157F PR ADVANCE CARE PLAN OR EQUIV PRESENT IN MEDICAL RECORD: ICD-10-PCS | Mod: CPTII,S$GLB,, | Performed by: PSYCHIATRY & NEUROLOGY

## 2022-09-27 PROCEDURE — 1159F PR MEDICATION LIST DOCUMENTED IN MEDICAL RECORD: ICD-10-PCS | Mod: CPTII,S$GLB,, | Performed by: PSYCHIATRY & NEUROLOGY

## 2022-09-27 PROCEDURE — 3008F BODY MASS INDEX DOCD: CPT | Mod: CPTII,S$GLB,, | Performed by: PSYCHIATRY & NEUROLOGY

## 2022-09-27 RX ORDER — GABAPENTIN 300 MG/1
300 CAPSULE ORAL 2 TIMES DAILY
Qty: 180 CAPSULE | Refills: 3 | Status: SHIPPED | OUTPATIENT
Start: 2022-09-27 | End: 2023-01-01

## 2022-09-27 NOTE — PROGRESS NOTES
HPI: Wesley Bernal is a 71 y.o. male with tremor    Here for 6 months follow up      Tremor  is well controlled    Primidone still helps    Mood is better still    Neuropsychological testing never done by patient    Memory is still challenging him but feels this is improved and he states his wife does too    Cymbalta is still used for mood    No falls    Driving without accidents or incidents    Repatha  not used/ and on statin only per him    Leg pain is improved    Lumbar pain continues and pain in the legs (He has PAD and DM2) and he uses opiates per PCP     Still CA free/ follows with oncology at [facility]  Does not drink alcohol  He does not smoke, former smoker in more recent years.     Noting his use Eliquis now      Review of Systems   Constitutional:  Negative for fever.   HENT:  Negative for nosebleeds.    Eyes:  Negative for double vision.   Respiratory:  Negative for hemoptysis.    Cardiovascular:  Negative for leg swelling.   Gastrointestinal:  Positive for blood in stool.        + long history of rectal bleeding with cancer prior now only with constipation   Genitourinary:  Negative for hematuria.   Musculoskeletal:  Negative for back pain.   Skin:  Negative for rash.   Neurological:  Positive for tremors.   Psychiatric/Behavioral:  Negative for depression.        I have reviewed all of this patient's past medical and surgical histories as well as family and social histories and active allergies and medications as documented in the electronic medical record.        Exam:  Gen Appearance, well developed/nourished in no apparent distress  CV: 2+ distal pulses with no edema or swelling  Neuro:  MS: Awake, alert, oriented to place, person, time, situation. Sustains attention. Recent / remote memory intack, Language is full to spontaneous speech/repetition/naming/comprehension. Fund of Knowledge is full  Good visual spatial skills  CN: Optic discs are flat with normal vasculature, PERRL, Extraoccular  movements and visual fields are full. Normal facial sensation and strength, Hearing symmetric, Tongue and Palate are midline and strong. Shoulder Shrug symmetric and strong.  Motor: Normal bulk, tone, no abnormal movements at rest and tremor is noted mildly (improved from moderately) with outstretched hands. 5/5 strength bilateral upper/lower extremities with 1+ reflexes and no clonus  Sensory: symmetric to temp, and vibration, Romberg negative  Cerebellar: Finger-nose,Heal-shin, Rapid alternating movements intact  Gait: Normal stance, no ataxia- but arthralgic gait for which he uses a cane at time often and mild sensory ataxia from DM likely    Labs: 2019 CMP, CBC reviewed. TSH normal 12/2018 2022 TSH, B12, RPR normal    Imaging: CT L spine 2015: Moderate spinal stenosis.     3/2022 CT head; No acute intracranial findings.     Age-appropriate cerebral volume loss with mild moderate patchy decreased attenuation supratentorial white matter while nonspecific suggestive for chronic ischemic change.     No evidence for acute intracranial hemorrhage.  Clinical correlation and further evaluation as warranted.       Assessment/Plan: Wesley Bernal is a 71 y.o. male with benign essential tremor  I recommend:     1. No neuroimaging is needed as symptoms are long standing and consistent with benign essential tremor as I noted in 2015  -Note his family history of tremor in his dad  -Continue Primidone which helps well currently  -Note his use of atenolol for rate with his afib, which may also help his tremor  -NOTE HIS USE ELIQUIS now due to afib and interaction with PRIMIDONE. Primidone will make Eliquis less effect.   -Stop Primidone and raise dose of Gabapentin to 300mg BID Unless sedation, mood changes or other side effects . If tremor is worse, he can call in 6 weeks for higher dose Gabapentin     2. Note: He takes gabapentin for pain from DM and known lumbar spinal stenosis (moderate by 2015 MRI L spine). Note I  saw him for this in 2015, but he failed to comply with follow up and EMG at that time.  PCP treats with opiates  -Pain management consult suggested, but he declines/ leg pain is improved    3. He was treated for metastatic anal cancer in 2020, ongoing follow up with oncology noted    4. PVD. Per the record he follows with cardiology,  Dr Leigh.  Has abdominal, carotid, femoral artery stent with pain with walking    5. Recovered recently from zoster involving in the left eye region in 2020    6. Has some adjustment disorder with depressed mood  Mood and memory are  much better on Cymbalta prescribed by me (may help neuropathy pain).   -2022 CT head and memory labs unremarkable(noting his CKD)  neuropsychological testing if worse  -NO restrictions, he is functioning well.        RTC 1 year. See me sooner if tremor worsens.

## 2022-09-27 NOTE — PATIENT INSTRUCTIONS
STOP PRIMIDONE AS it interacts with your new blood thinner    Increase your gabapentin to twice daily for tremor instead.    If your tremor is worse, you can call [phone] in 6 weeks to increase the gabapentin dose.

## 2022-10-28 ENCOUNTER — PATIENT OUTREACH (OUTPATIENT)
Dept: ADMINISTRATIVE | Facility: HOSPITAL | Age: 72
End: 2022-10-28
Payer: MEDICARE

## 2022-10-28 DIAGNOSIS — H25.043 POSTERIOR SUBCAPSULAR POLAR AGE-RELATED CATARACT OF BOTH EYES: ICD-10-CM

## 2022-10-28 NOTE — PROGRESS NOTES
Chart reviewed, immunization record updated.  No new results noted on [facility] or [facility] web site.  Care Everywhere updated.   Patient care coordination note updated.  LOV with PCP 11/12/2021.  Spoke to patient regarding schedule lab visit for HBA1C and Urine Micro albumin, scheduling routine PCP visit, Eye exam and Colorectal cancer screening. Patient declined to schedule any screenings at this time. Patient states his spouse is currently in patient at [facility].

## 2022-10-28 NOTE — LETTER
AUTHORIZATION FOR RELEASE OF   CONFIDENTIAL INFORMATION    Dear Dr. Fausto Leigh,    We are seeing Wesley Bernal, date of birth 1950, in the clinic at [facility]. Lalo Sweet MD is the patient's PCP. Wesley Bernal has an outstanding lab/procedure at the time we reviewed his chart. In order to help keep his health information updated, he has authorized us to request the following medical record(s):          ( X )  OUTSIDE LAB RESULTS (Lipid Panel, HBA1C and Urine Microalbumin                              if available)        Please fax records to [facility], Jack W Heidenreich, MD Laura Rogers, LPN  Clinical Care Coordinator  [facility]  Phone: [phone]  Fax: [phone]            Patient Name: Wesley Bernal  : 1950  Patient Phone #: [phone]

## 2022-11-02 ENCOUNTER — PATIENT OUTREACH (OUTPATIENT)
Dept: ADMINISTRATIVE | Facility: HOSPITAL | Age: 72
End: 2022-11-02
Payer: MEDICARE

## 2022-11-09 ENCOUNTER — PATIENT MESSAGE (OUTPATIENT)
Dept: ADMINISTRATIVE | Facility: HOSPITAL | Age: 72
End: 2022-11-09
Payer: MEDICARE

## 2023-01-01 ENCOUNTER — ANESTHESIA EVENT (OUTPATIENT)
Dept: ENDOSCOPY | Facility: HOSPITAL | Age: 73
DRG: 378 | End: 2023-01-01
Payer: MEDICARE

## 2023-01-01 ENCOUNTER — PATIENT OUTREACH (OUTPATIENT)
Dept: EMERGENCY MEDICINE | Facility: HOSPITAL | Age: 73
End: 2023-01-01
Payer: MEDICARE

## 2023-01-01 ENCOUNTER — TELEPHONE (OUTPATIENT)
Dept: FAMILY MEDICINE | Facility: CLINIC | Age: 73
End: 2023-01-01
Payer: MEDICARE

## 2023-01-01 ENCOUNTER — PATIENT MESSAGE (OUTPATIENT)
Dept: ADMINISTRATIVE | Facility: HOSPITAL | Age: 73
End: 2023-01-01
Payer: MEDICARE

## 2023-01-01 ENCOUNTER — PATIENT OUTREACH (OUTPATIENT)
Dept: ADMINISTRATIVE | Facility: HOSPITAL | Age: 73
End: 2023-01-01
Payer: MEDICARE

## 2023-01-01 ENCOUNTER — OFFICE VISIT (OUTPATIENT)
Dept: FAMILY MEDICINE | Facility: CLINIC | Age: 73
End: 2023-01-01
Payer: MEDICARE

## 2023-01-01 ENCOUNTER — HOSPITAL ENCOUNTER (OUTPATIENT)
Facility: HOSPITAL | Age: 73
Discharge: LEFT BEFORE DISCHARGE | End: 2023-12-22
Attending: EMERGENCY MEDICINE | Admitting: STUDENT IN AN ORGANIZED HEALTH CARE EDUCATION/TRAINING PROGRAM
Payer: MEDICARE

## 2023-01-01 ENCOUNTER — HOSPITAL ENCOUNTER (EMERGENCY)
Facility: HOSPITAL | Age: 73
Discharge: ANOTHER HOSPITAL | End: 2023-11-12
Attending: STUDENT IN AN ORGANIZED HEALTH CARE EDUCATION/TRAINING PROGRAM
Payer: MEDICARE

## 2023-01-01 ENCOUNTER — HOSPITAL ENCOUNTER (OUTPATIENT)
Facility: HOSPITAL | Age: 73
Discharge: HOME | End: 2023-11-10
Attending: INTERNAL MEDICINE | Admitting: INTERNAL MEDICINE
Payer: MEDICARE

## 2023-01-01 ENCOUNTER — HOSPITAL ENCOUNTER (EMERGENCY)
Facility: HOSPITAL | Age: 73
Discharge: HOME | End: 2023-12-20
Attending: SURGERY
Payer: MEDICARE

## 2023-01-01 ENCOUNTER — OFFICE VISIT (OUTPATIENT)
Dept: INTERNAL MEDICINE | Facility: CLINIC | Age: 73
End: 2023-01-01
Payer: MEDICARE

## 2023-01-01 ENCOUNTER — ANESTHESIA (OUTPATIENT)
Dept: ENDOSCOPY | Facility: HOSPITAL | Age: 73
DRG: 378 | End: 2023-01-01
Payer: MEDICARE

## 2023-01-01 ENCOUNTER — HOSPITAL ENCOUNTER (OUTPATIENT)
Facility: HOSPITAL | Age: 73
LOS: 1 days | Discharge: HOME | End: 2023-11-03
Attending: SURGERY | Admitting: INTERNAL MEDICINE
Payer: MEDICARE

## 2023-01-01 ENCOUNTER — HOSPITAL ENCOUNTER (INPATIENT)
Facility: HOSPITAL | Age: 73
LOS: 5 days | Discharge: HOME | DRG: 378 | End: 2023-08-20
Attending: HOSPITALIST | Admitting: HOSPITALIST
Payer: MEDICARE

## 2023-01-01 ENCOUNTER — HOSPITAL ENCOUNTER (EMERGENCY)
Facility: HOSPITAL | Age: 73
Discharge: HOME | End: 2023-12-16
Attending: EMERGENCY MEDICINE
Payer: MEDICARE

## 2023-01-01 ENCOUNTER — PATIENT OUTREACH (OUTPATIENT)
Dept: ADMINISTRATIVE | Facility: CLINIC | Age: 73
End: 2023-01-01
Payer: MEDICARE

## 2023-01-01 ENCOUNTER — HOSPITAL ENCOUNTER (EMERGENCY)
Facility: HOSPITAL | Age: 73
Discharge: HOME | End: 2023-11-19
Attending: SURGERY
Payer: MEDICARE

## 2023-01-01 ENCOUNTER — HOSPITAL ENCOUNTER (EMERGENCY)
Facility: HOSPITAL | Age: 73
Discharge: ANOTHER HOSPITAL | End: 2023-08-15
Attending: STUDENT IN AN ORGANIZED HEALTH CARE EDUCATION/TRAINING PROGRAM
Payer: MEDICARE

## 2023-01-01 ENCOUNTER — HOSPITAL ENCOUNTER (EMERGENCY)
Facility: HOSPITAL | Age: 73
Discharge: HOME | End: 2023-12-11
Attending: STUDENT IN AN ORGANIZED HEALTH CARE EDUCATION/TRAINING PROGRAM
Payer: MEDICARE

## 2023-01-01 ENCOUNTER — LAB VISIT (OUTPATIENT)
Dept: LAB | Facility: HOSPITAL | Age: 73
End: 2023-01-01
Attending: FAMILY MEDICINE
Payer: MEDICARE

## 2023-01-01 ENCOUNTER — TELEPHONE (OUTPATIENT)
Dept: GASTROENTEROLOGY | Facility: CLINIC | Age: 73
End: 2023-01-01
Payer: MEDICARE

## 2023-01-01 VITALS
OXYGEN SATURATION: 97 % | TEMPERATURE: 99 F | HEART RATE: 57 BPM | SYSTOLIC BLOOD PRESSURE: 120 MMHG | BODY MASS INDEX: 22.13 KG/M2 | RESPIRATION RATE: 16 BRPM | WEIGHT: 167 LBS | DIASTOLIC BLOOD PRESSURE: 59 MMHG | HEIGHT: 73 IN

## 2023-01-01 VITALS
OXYGEN SATURATION: 100 % | SYSTOLIC BLOOD PRESSURE: 110 MMHG | BODY MASS INDEX: 22.67 KG/M2 | RESPIRATION RATE: 16 BRPM | WEIGHT: 171.06 LBS | DIASTOLIC BLOOD PRESSURE: 50 MMHG | HEIGHT: 73 IN | HEART RATE: 50 BPM

## 2023-01-01 VITALS
WEIGHT: 170 LBS | HEIGHT: 73 IN | HEART RATE: 55 BPM | RESPIRATION RATE: 17 BRPM | TEMPERATURE: 98 F | SYSTOLIC BLOOD PRESSURE: 164 MMHG | OXYGEN SATURATION: 91 % | DIASTOLIC BLOOD PRESSURE: 71 MMHG | BODY MASS INDEX: 22.53 KG/M2

## 2023-01-01 VITALS
SYSTOLIC BLOOD PRESSURE: 123 MMHG | DIASTOLIC BLOOD PRESSURE: 56 MMHG | RESPIRATION RATE: 18 BRPM | BODY MASS INDEX: 22.44 KG/M2 | HEART RATE: 50 BPM | WEIGHT: 169.31 LBS | HEIGHT: 73 IN | OXYGEN SATURATION: 100 % | TEMPERATURE: 98 F

## 2023-01-01 VITALS
TEMPERATURE: 98 F | OXYGEN SATURATION: 95 % | WEIGHT: 170 LBS | SYSTOLIC BLOOD PRESSURE: 122 MMHG | BODY MASS INDEX: 22.43 KG/M2 | DIASTOLIC BLOOD PRESSURE: 56 MMHG | RESPIRATION RATE: 18 BRPM | HEART RATE: 59 BPM

## 2023-01-01 VITALS
SYSTOLIC BLOOD PRESSURE: 108 MMHG | BODY MASS INDEX: 22.62 KG/M2 | WEIGHT: 170.63 LBS | DIASTOLIC BLOOD PRESSURE: 50 MMHG | HEART RATE: 55 BPM | RESPIRATION RATE: 16 BRPM | HEIGHT: 73 IN | OXYGEN SATURATION: 97 %

## 2023-01-01 VITALS
WEIGHT: 176.5 LBS | TEMPERATURE: 98 F | SYSTOLIC BLOOD PRESSURE: 160 MMHG | OXYGEN SATURATION: 96 % | HEART RATE: 55 BPM | DIASTOLIC BLOOD PRESSURE: 71 MMHG | BODY MASS INDEX: 23.29 KG/M2 | RESPIRATION RATE: 18 BRPM

## 2023-01-01 VITALS
SYSTOLIC BLOOD PRESSURE: 96 MMHG | BODY MASS INDEX: 25.31 KG/M2 | DIASTOLIC BLOOD PRESSURE: 60 MMHG | RESPIRATION RATE: 16 BRPM | HEART RATE: 60 BPM | HEIGHT: 73 IN | WEIGHT: 190.94 LBS

## 2023-01-01 VITALS
WEIGHT: 175 LBS | DIASTOLIC BLOOD PRESSURE: 77 MMHG | SYSTOLIC BLOOD PRESSURE: 169 MMHG | HEIGHT: 73 IN | OXYGEN SATURATION: 97 % | HEART RATE: 50 BPM | TEMPERATURE: 98 F | RESPIRATION RATE: 13 BRPM | BODY MASS INDEX: 23.19 KG/M2

## 2023-01-01 VITALS
WEIGHT: 163.56 LBS | OXYGEN SATURATION: 96 % | HEIGHT: 73 IN | RESPIRATION RATE: 16 BRPM | HEART RATE: 52 BPM | SYSTOLIC BLOOD PRESSURE: 112 MMHG | BODY MASS INDEX: 21.68 KG/M2 | DIASTOLIC BLOOD PRESSURE: 64 MMHG

## 2023-01-01 VITALS
BODY MASS INDEX: 21.2 KG/M2 | RESPIRATION RATE: 19 BRPM | DIASTOLIC BLOOD PRESSURE: 65 MMHG | HEIGHT: 73 IN | OXYGEN SATURATION: 97 % | WEIGHT: 160 LBS | TEMPERATURE: 99 F | SYSTOLIC BLOOD PRESSURE: 138 MMHG | HEART RATE: 62 BPM

## 2023-01-01 VITALS
DIASTOLIC BLOOD PRESSURE: 62 MMHG | RESPIRATION RATE: 22 BRPM | HEART RATE: 51 BPM | SYSTOLIC BLOOD PRESSURE: 136 MMHG | WEIGHT: 190 LBS | OXYGEN SATURATION: 100 % | TEMPERATURE: 97 F | HEIGHT: 73 IN | BODY MASS INDEX: 25.18 KG/M2

## 2023-01-01 VITALS
DIASTOLIC BLOOD PRESSURE: 71 MMHG | RESPIRATION RATE: 17 BRPM | HEART RATE: 61 BPM | OXYGEN SATURATION: 95 % | SYSTOLIC BLOOD PRESSURE: 162 MMHG

## 2023-01-01 VITALS
HEIGHT: 73 IN | WEIGHT: 165.56 LBS | HEART RATE: 62 BPM | TEMPERATURE: 97 F | DIASTOLIC BLOOD PRESSURE: 60 MMHG | OXYGEN SATURATION: 95 % | BODY MASS INDEX: 21.94 KG/M2 | RESPIRATION RATE: 16 BRPM | SYSTOLIC BLOOD PRESSURE: 132 MMHG

## 2023-01-01 DIAGNOSIS — I49.9 CARDIAC RHYTHM DISORDER OR DISTURBANCE OR CHANGE: ICD-10-CM

## 2023-01-01 DIAGNOSIS — Z00.00 ENCOUNTER FOR MEDICARE ANNUAL WELLNESS EXAM: ICD-10-CM

## 2023-01-01 DIAGNOSIS — E78.2 MIXED HYPERLIPIDEMIA: ICD-10-CM

## 2023-01-01 DIAGNOSIS — E11.51 TYPE 2 DIABETES MELLITUS WITH DIABETIC PERIPHERAL ANGIOPATHY WITHOUT GANGRENE, WITHOUT LONG-TERM CURRENT USE OF INSULIN: ICD-10-CM

## 2023-01-01 DIAGNOSIS — G89.3 CANCER ASSOCIATED PAIN: Primary | ICD-10-CM

## 2023-01-01 DIAGNOSIS — M51.16 LUMBAR DISC DISEASE WITH RADICULOPATHY: ICD-10-CM

## 2023-01-01 DIAGNOSIS — Z95.3 BIOPROSTHETIC MITRAL VALVE REPLACEMENT, CURRENT HOSPITALIZATION: ICD-10-CM

## 2023-01-01 DIAGNOSIS — I10 ESSENTIAL HYPERTENSION: ICD-10-CM

## 2023-01-01 DIAGNOSIS — R91.8 LUNG MASS: ICD-10-CM

## 2023-01-01 DIAGNOSIS — J41.0 SIMPLE CHRONIC BRONCHITIS: ICD-10-CM

## 2023-01-01 DIAGNOSIS — R53.83 FATIGUE: ICD-10-CM

## 2023-01-01 DIAGNOSIS — E11.65 TYPE 2 DIABETES MELLITUS WITH HYPERGLYCEMIA, WITHOUT LONG-TERM CURRENT USE OF INSULIN: ICD-10-CM

## 2023-01-01 DIAGNOSIS — E11.65 TYPE 2 DIABETES MELLITUS WITH HYPERGLYCEMIA: ICD-10-CM

## 2023-01-01 DIAGNOSIS — R91.8 MASS OF LEFT LUNG: ICD-10-CM

## 2023-01-01 DIAGNOSIS — D64.9 ACUTE ON CHRONIC ANEMIA: ICD-10-CM

## 2023-01-01 DIAGNOSIS — R06.00 DYSPNEA: ICD-10-CM

## 2023-01-01 DIAGNOSIS — R07.89 CHEST WALL PAIN: Primary | ICD-10-CM

## 2023-01-01 DIAGNOSIS — E78.5 HYPERLIPIDEMIA, UNSPECIFIED HYPERLIPIDEMIA TYPE: ICD-10-CM

## 2023-01-01 DIAGNOSIS — N18.31 STAGE 3A CHRONIC KIDNEY DISEASE: ICD-10-CM

## 2023-01-01 DIAGNOSIS — K80.20 CALCULUS OF GALLBLADDER WITHOUT CHOLECYSTITIS WITHOUT OBSTRUCTION: ICD-10-CM

## 2023-01-01 DIAGNOSIS — N18.32 CHRONIC RENAL IMPAIRMENT, STAGE 3B: Primary | ICD-10-CM

## 2023-01-01 DIAGNOSIS — F41.1 GENERALIZED ANXIETY DISORDER: ICD-10-CM

## 2023-01-01 DIAGNOSIS — R07.81 PLEURITIC PAIN: ICD-10-CM

## 2023-01-01 DIAGNOSIS — Z85.118 HISTORY OF LUNG CANCER: ICD-10-CM

## 2023-01-01 DIAGNOSIS — K92.2 GIB (GASTROINTESTINAL BLEEDING): ICD-10-CM

## 2023-01-01 DIAGNOSIS — K92.2 GASTROINTESTINAL HEMORRHAGE, UNSPECIFIED GASTROINTESTINAL HEMORRHAGE TYPE: ICD-10-CM

## 2023-01-01 DIAGNOSIS — E11.65 TYPE 2 DIABETES MELLITUS WITH HYPERGLYCEMIA, WITHOUT LONG-TERM CURRENT USE OF INSULIN: Primary | ICD-10-CM

## 2023-01-01 DIAGNOSIS — I48.0 PAROXYSMAL ATRIAL FIBRILLATION: ICD-10-CM

## 2023-01-01 DIAGNOSIS — Z12.11 COLON CANCER SCREENING: Primary | ICD-10-CM

## 2023-01-01 DIAGNOSIS — R07.9 CHEST PAIN: ICD-10-CM

## 2023-01-01 DIAGNOSIS — N18.9 CHRONIC KIDNEY DISEASE, UNSPECIFIED CKD STAGE: ICD-10-CM

## 2023-01-01 DIAGNOSIS — Z23 IMMUNIZATION DUE: ICD-10-CM
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
## 2023-01-01 DIAGNOSIS — E11.9 TYPE 2 DIABETES MELLITUS WITHOUT COMPLICATION: ICD-10-CM

## 2023-01-01 DIAGNOSIS — R07.9 ACUTE CHEST PAIN: Primary | ICD-10-CM

## 2023-01-01 DIAGNOSIS — K25.0 ACUTE GASTRIC ULCER WITH HEMORRHAGE: Primary | ICD-10-CM

## 2023-01-01 DIAGNOSIS — K25.9 GASTRIC ULCER, UNSPECIFIED CHRONICITY, UNSPECIFIED WHETHER GASTRIC ULCER HEMORRHAGE OR PERFORATION PRESENT: ICD-10-CM

## 2023-01-01 DIAGNOSIS — N28.9 RENAL LESION: ICD-10-CM

## 2023-01-01 DIAGNOSIS — C21.0 SQUAMOUS CELL CANCER, ANUS: ICD-10-CM

## 2023-01-01 DIAGNOSIS — I50.22 CHF (CONGESTIVE HEART FAILURE), NYHA CLASS II, CHRONIC, SYSTOLIC: ICD-10-CM

## 2023-01-01 DIAGNOSIS — M54.9 BACK PAIN: ICD-10-CM

## 2023-01-01 DIAGNOSIS — K92.1 BLOODY STOOLS: ICD-10-CM

## 2023-01-01 DIAGNOSIS — R53.83 FATIGUE: Primary | ICD-10-CM

## 2023-01-01 DIAGNOSIS — R79.89 ELEVATED TROPONIN: ICD-10-CM

## 2023-01-01 DIAGNOSIS — R06.02 SOB (SHORTNESS OF BREATH): ICD-10-CM

## 2023-01-01 DIAGNOSIS — I25.118 CORONARY ARTERY DISEASE WITH STABLE ANGINA PECTORIS, UNSPECIFIED VESSEL OR LESION TYPE, UNSPECIFIED WHETHER NATIVE OR TRANSPLANTED HEART: ICD-10-CM

## 2023-01-01 DIAGNOSIS — R63.0 POOR APPETITE: ICD-10-CM

## 2023-01-01 DIAGNOSIS — G25.0 ESSENTIAL TREMOR: ICD-10-CM

## 2023-01-01 DIAGNOSIS — N28.1 BILATERAL RENAL CYSTS: ICD-10-CM

## 2023-01-01 DIAGNOSIS — E11.42 DIABETIC POLYNEUROPATHY ASSOCIATED WITH TYPE 2 DIABETES MELLITUS: ICD-10-CM

## 2023-01-01 DIAGNOSIS — R91.8 LUNG MASS: Primary | ICD-10-CM

## 2023-01-01 DIAGNOSIS — D64.9 ANEMIA, UNSPECIFIED TYPE: Primary | ICD-10-CM

## 2023-01-01 DIAGNOSIS — R06.02 SHORTNESS OF BREATH: ICD-10-CM

## 2023-01-01 DIAGNOSIS — K80.20 GALLSTONES: ICD-10-CM

## 2023-01-01 DIAGNOSIS — R91.1 NODULE OF UPPER LOBE OF LEFT LUNG: Primary | ICD-10-CM

## 2023-01-01 DIAGNOSIS — F41.9 ANXIETY DISORDER, UNSPECIFIED TYPE: ICD-10-CM

## 2023-01-01 LAB
ABO + RH BLD: NORMAL
ABO + RH BLD: NORMAL
ABO GROUP BLD: NORMAL
ACID FAST MOD KINY STN SPEC: NORMAL
ALBUMIN SERPL BCP-MCNC: 2.5 G/DL (ref 3.5–5.2)
ALBUMIN SERPL BCP-MCNC: 2.6 G/DL (ref 3.5–5.2)
ALBUMIN SERPL BCP-MCNC: 2.8 G/DL (ref 3.5–5.2)
ALBUMIN SERPL BCP-MCNC: 2.8 G/DL (ref 3.5–5.2)
ALBUMIN SERPL BCP-MCNC: 2.9 G/DL (ref 3.5–5.2)
ALBUMIN SERPL BCP-MCNC: 3 G/DL (ref 3.5–5.2)
ALBUMIN SERPL BCP-MCNC: 3 G/DL (ref 3.5–5.2)
ALBUMIN SERPL BCP-MCNC: 3.3 G/DL (ref 3.5–5.2)
ALBUMIN SERPL BCP-MCNC: 3.4 G/DL (ref 3.5–5.2)
ALBUMIN SERPL BCP-MCNC: 4.1 G/DL (ref 3.5–5.2)
ALBUMIN/CREAT UR: 47.2 UG/MG (ref 0–30)
ALK PHOS ISOENZYNMES: 66 U/L (ref 34–104)
ALLENS TEST: ABNORMAL
ALP SERPL-CCNC: 40 U/L (ref 55–135)
ALP SERPL-CCNC: 46 U/L (ref 55–135)
ALP SERPL-CCNC: 46 U/L (ref 55–135)
ALP SERPL-CCNC: 48 U/L (ref 55–135)
ALP SERPL-CCNC: 71 U/L (ref 55–135)
ALP SERPL-CCNC: 74 U/L (ref 55–135)
ALP SERPL-CCNC: 75 U/L (ref 55–135)
ALP SERPL-CCNC: 77 U/L (ref 55–135)
ALP SERPL-CCNC: 80 U/L (ref 55–135)
ALP SERPL-CCNC: 80 U/L (ref 55–135)
ALP SERPL-CCNC: 82 U/L (ref 55–135)
ALP SERPL-CCNC: 90 U/L (ref 55–135)
ALT SERPL W/O P-5'-P-CCNC: 11 U/L (ref 10–44)
ALT SERPL W/O P-5'-P-CCNC: 13 U/L (ref 10–44)
ALT SERPL W/O P-5'-P-CCNC: 13 U/L (ref 10–44)
ALT SERPL W/O P-5'-P-CCNC: 17 U/L (ref 10–44)
ALT SERPL W/O P-5'-P-CCNC: 8 U/L (ref 10–44)
ALT SERPL W/O P-5'-P-CCNC: 9 U/L (ref 10–44)
ALT SERPL-CCNC: 11 U/L (ref 7–52)
ANION GAP SERPL CALC-SCNC: 10 MMOL/L (ref 8–16)
ANION GAP SERPL CALC-SCNC: 10 MMOL/L (ref 8–16)
ANION GAP SERPL CALC-SCNC: 13 MMOL/L (ref 8–16)
ANION GAP SERPL CALC-SCNC: 15 MMOL/L (ref 8–16)
ANION GAP SERPL CALC-SCNC: 15 MMOL/L (ref 8–16)
ANION GAP SERPL CALC-SCNC: 16 MMOL/L (ref 8–16)
ANION GAP SERPL CALC-SCNC: 6 MMOL/L (ref 8–16)
ANION GAP SERPL CALC-SCNC: 6 MMOL/L (ref 8–16)
ANION GAP SERPL CALC-SCNC: 7 MMOL/L (ref 8–16)
ANION GAP SERPL CALC-SCNC: 7 MMOL/L (ref 8–16)
ANION GAP SERPL CALC-SCNC: 8 MMOL/L (ref 8–16)
ANION GAP SERPL CALC-SCNC: 9 MMOL/L (ref 8–16)
APTT PPP: 21.4 SEC (ref 21–32)
APTT PPP: 23.9 SEC (ref 21–32)
APTT PPP: 27.3 SEC (ref 21–32)
ASCENDING AORTA: 3.13 CM
AST SERPL-CCNC: 10 U/L (ref 10–40)
AST SERPL-CCNC: 11 U/L (ref 10–40)
AST SERPL-CCNC: 12 U/L (ref 10–40)
AST SERPL-CCNC: 13 U/L (ref 10–40)
AST SERPL-CCNC: 14 U/L (ref 10–40)
AST SERPL-CCNC: 14 U/L (ref 10–40)
AST SERPL-CCNC: 14 U/L (ref 13–39)
AST SERPL-CCNC: 15 U/L (ref 10–40)
AST SERPL-CCNC: 17 U/L (ref 10–40)
AST SERPL-CCNC: 18 U/L (ref 10–40)
AST SERPL-CCNC: 8 U/L (ref 10–40)
AV INDEX (PROSTH): 0.8
AV MEAN GRADIENT: 4 MMHG
AV PEAK GRADIENT: 10 MMHG
AV VALVE AREA BY VELOCITY RATIO: 3 CM²
AV VALVE AREA: 3.17 CM²
AV VELOCITY RATIO: 0.75
BACTERIA #/AREA URNS HPF: ABNORMAL /HPF
BACTERIA #/AREA URNS HPF: NORMAL /HPF
BACTERIA BLD CULT: NORMAL
BACTERIA SPEC AEROBE CULT: ABNORMAL
BACTERIA SPEC AEROBE CULT: NO GROWTH
BASOPHILS # BLD AUTO: 0.01 K/UL (ref 0–0.2)
BASOPHILS # BLD AUTO: 0.05 K/UL (ref 0–0.2)
BASOPHILS # BLD AUTO: 0.07 K/UL (ref 0–0.2)
BASOPHILS # BLD AUTO: 0.08 K/UL (ref 0–0.2)
BASOPHILS # BLD AUTO: 0.09 K/UL (ref 0–0.2)
BASOPHILS # BLD AUTO: 0.1 K/UL (ref 0–0.2)
BASOPHILS # BLD AUTO: 0.11 K/UL (ref 0–0.2)
BASOPHILS # BLD AUTO: 0.12 K/UL (ref 0–0.2)
BASOPHILS # BLD AUTO: 0.12 K/UL (ref 0–0.2)
BASOPHILS NFR BLD: 0.1 % (ref 0–1.9)
BASOPHILS NFR BLD: 0.4 % (ref 0–1.9)
BASOPHILS NFR BLD: 0.5 % (ref 0–1.9)
BASOPHILS NFR BLD: 0.6 % (ref 0–1.9)
BASOPHILS NFR BLD: 0.8 % (ref 0–1.9)
BILIRUB SERPL-MCNC: 0.3 MG/DL (ref 0.1–1)
BILIRUB SERPL-MCNC: 0.4 MG/DL (ref 0.1–1)
BILIRUB SERPL-MCNC: 0.4 MG/DL (ref 0.1–1)
BILIRUB SERPL-MCNC: 0.5 MG/DL (ref 0.1–1)
BILIRUB SERPL-MCNC: 0.6 MG/DL (ref 0.1–1)
BILIRUB SERPL-MCNC: 0.7 MG/DL (ref 0.1–1)
BILIRUB SERPL-MCNC: 0.7 MG/DL (ref 0.1–1)
BILIRUB SERPL-MCNC: 0.9 MG/DL (ref 0.1–1)
BILIRUB SERPL-MCNC: 1 MG/DL (ref 0.1–1)
BILIRUB UR QL STRIP: NEGATIVE
BLD GP AB SCN CELLS X3 SERPL QL: ABNORMAL
BLD GP AB SCN CELLS X3 SERPL QL: NORMAL
BLD GP AB SCN CELLS X3 SERPL QL: NORMAL
BLD PROD TYP BPU: NORMAL
BLOOD GROUP ANTIBODIES SERPL: NORMAL
BLOOD UNIT EXPIRATION DATE: NORMAL
BLOOD UNIT TYPE CODE: 5100
BLOOD UNIT TYPE CODE: 6200
BLOOD UNIT TYPE: NORMAL
BNP SERPL-MCNC: 1200 PG/ML (ref 0–99)
BNP SERPL-MCNC: 399 PG/ML (ref 0–99)
BNP SERPL-MCNC: 652 PG/ML (ref 0–99)
BNP SERPL-MCNC: 697 PG/ML (ref 0–99)
BNP SERPL-MCNC: 709 PG/ML (ref 0–99)
BNP SERPL-MCNC: 726 PG/ML (ref 0–99)
BNP SERPL-MCNC: 811 PG/ML (ref 0–99)
BSA FOR ECHO PROCEDURE: 2.01 M2
BUN SERPL-MCNC: 18 MG/DL (ref 8–23)
BUN SERPL-MCNC: 19 MG/DL (ref 8–23)
BUN SERPL-MCNC: 21 MG/DL (ref 8–23)
BUN SERPL-MCNC: 24 MG/DL (ref 8–23)
BUN SERPL-MCNC: 24 MG/DL (ref 8–23)
BUN SERPL-MCNC: 25 MG/DL (ref 8–23)
BUN SERPL-MCNC: 27 MG/DL (ref 8–23)
BUN SERPL-MCNC: 28 MG/DL (ref 8–23)
BUN SERPL-MCNC: 31 MG/DL (ref 8–23)
BUN SERPL-MCNC: 33 MG/DL (ref 7–25)
BUN SERPL-MCNC: 33 MG/DL (ref 8–23)
BUN SERPL-MCNC: 42 MG/DL (ref 8–23)
BUN SERPL-MCNC: 48 MG/DL (ref 8–23)
BUN SERPL-MCNC: 51 MG/DL (ref 8–23)
BUN SERPL-MCNC: 52 MG/DL (ref 8–23)
CA-I BLDV-SCNC: 1.11 MMOL/L (ref 1.06–1.42)
CALCIUM SERPL-MCNC: 10 MG/DL (ref 8.7–10.5)
CALCIUM SERPL-MCNC: 7.6 MG/DL (ref 8.7–10.5)
CALCIUM SERPL-MCNC: 7.8 MG/DL (ref 8.7–10.5)
CALCIUM SERPL-MCNC: 8 MG/DL (ref 8.7–10.5)
CALCIUM SERPL-MCNC: 8.1 MG/DL (ref 8.7–10.5)
CALCIUM SERPL-MCNC: 8.2 MG/DL (ref 8.7–10.5)
CALCIUM SERPL-MCNC: 8.3 MG/DL (ref 8.7–10.5)
CALCIUM SERPL-MCNC: 8.6 MG/DL (ref 8.6–10.4)
CALCIUM SERPL-MCNC: 8.9 MG/DL (ref 8.7–10.5)
CALCIUM SERPL-MCNC: 8.9 MG/DL (ref 8.7–10.5)
CALCIUM SERPL-MCNC: 9 MG/DL (ref 8.7–10.5)
CALCIUM SERPL-MCNC: 9 MG/DL (ref 8.7–10.5)
CALCIUM SERPL-MCNC: 9.1 MG/DL (ref 8.7–10.5)
CALCIUM SERPL-MCNC: 9.3 MG/DL (ref 8.7–10.5)
CALCIUM SERPL-MCNC: 9.4 MG/DL (ref 8.7–10.5)
CHLORIDE BLOOD: 108 MEQ/L (ref 98–109)
CHLORIDE SERPL-SCNC: 101 MMOL/L (ref 95–110)
CHLORIDE SERPL-SCNC: 101 MMOL/L (ref 95–110)
CHLORIDE SERPL-SCNC: 102 MMOL/L (ref 95–110)
CHLORIDE SERPL-SCNC: 103 MMOL/L (ref 95–110)
CHLORIDE SERPL-SCNC: 105 MMOL/L (ref 95–110)
CHLORIDE SERPL-SCNC: 105 MMOL/L (ref 95–110)
CHLORIDE SERPL-SCNC: 106 MMOL/L (ref 95–110)
CHLORIDE SERPL-SCNC: 107 MMOL/L (ref 95–110)
CHLORIDE SERPL-SCNC: 107 MMOL/L (ref 95–110)
CHLORIDE SERPL-SCNC: 108 MMOL/L (ref 95–110)
CHOLEST SERPL-MCNC: 113 MG/DL (ref 120–199)
CHOLEST SERPL-MSCNC: 81 MG/DL (ref 0–200)
CHOLEST/HDLC SERPL: 2.1 MG/DL (ref 0–5)
CHOLEST/HDLC SERPL: 3.4 {RATIO} (ref 2–5)
CK SERPL-CCNC: 17 U/L (ref 20–200)
CK SERPL-CCNC: 18 U/L (ref 20–200)
CLARITY UR: CLEAR
CO2 SERPL-SCNC: 19 MMOL/L (ref 23–29)
CO2 SERPL-SCNC: 19 MMOL/L (ref 23–29)
CO2 SERPL-SCNC: 22 MMOL/L (ref 23–29)
CO2 SERPL-SCNC: 23 MMOL/L (ref 23–29)
CO2 SERPL-SCNC: 23 MMOL/L (ref 23–29)
CO2 SERPL-SCNC: 24 MMOL/L (ref 23–29)
CO2 SERPL-SCNC: 25 MMOL/L (ref 23–29)
CO2 SERPL-SCNC: 26 MMOL/L (ref 22–34)
CO2 SERPL-SCNC: 26 MMOL/L (ref 23–29)
CODING SYSTEM: NORMAL
COLOR UR: YELLOW
CREAT SERPL-MCNC: 1.3 MG/DL (ref 0.5–1.4)
CREAT SERPL-MCNC: 1.4 MG/DL (ref 0.5–1.4)
CREAT SERPL-MCNC: 1.5 MG/DL (ref 0.5–1.4)
CREAT SERPL-MCNC: 1.7 MG/DL (ref 0.5–1.4)
CREAT SERPL-MCNC: 1.8 MG/DL (ref 0.5–1.4)
CREAT SERPL-MCNC: 2.1 MG/DL (ref 0.5–1.4)
CREAT SERPL-MCNC: 2.1 MG/DL (ref 0.5–1.4)
CREAT SERPL-MCNC: 2.1 MG/DL (ref 0.6–1.3)
CREAT SERPL-MCNC: 2.2 MG/DL (ref 0.5–1.4)
CREAT SERPL-MCNC: 2.4 MG/DL (ref 0.5–1.4)
CREAT UR-MCNC: 193 MG/DL (ref 23–375)
CROSSMATCH INTERPRETATION: NORMAL
CV ECHO LV RWT: 0.29 CM
D DIMER PPP IA.FEU-MCNC: 2.29 MG/L FEU
DELSYS: ABNORMAL
DIFFERENTIAL METHOD: ABNORMAL
DISPENSE STATUS: NORMAL
DOP CALC AO PEAK VEL: 1.55 M/S
DOP CALC AO VTI: 37 CM
DOP CALC LVOT AREA: 4 CM2
DOP CALC LVOT DIAMETER: 2.25 CM
DOP CALC LVOT PEAK VEL: 1.17 M/S
DOP CALC LVOT STROKE VOLUME: 117.23 CM3
DOP CALC MV VTI: 68.7 CM
DOP CALCLVOT PEAK VEL VTI: 29.5 CM
ECHO LV POSTERIOR WALL: 0.96 CM (ref 0.6–1.1)
EGFR: 31.3
EOSINOPHIL # BLD AUTO: 0 K/UL (ref 0–0.5)
EOSINOPHIL # BLD AUTO: 0.2 K/UL (ref 0–0.5)
EOSINOPHIL # BLD AUTO: 0.3 K/UL (ref 0–0.5)
EOSINOPHIL # BLD AUTO: 0.3 K/UL (ref 0–0.5)
EOSINOPHIL # BLD AUTO: 0.4 K/UL (ref 0–0.5)
EOSINOPHIL # BLD AUTO: 0.6 K/UL (ref 0–0.5)
EOSINOPHIL # BLD AUTO: 0.7 K/UL (ref 0–0.5)
EOSINOPHIL # BLD AUTO: 1.5 K/UL (ref 0–0.5)
EOSINOPHIL # BLD AUTO: 1.5 K/UL (ref 0–0.5)
EOSINOPHIL # BLD AUTO: 1.6 K/UL (ref 0–0.5)
EOSINOPHIL # BLD AUTO: 1.6 K/UL (ref 0–0.5)
EOSINOPHIL NFR BLD: 0.1 % (ref 0–8)
EOSINOPHIL NFR BLD: 1.6 % (ref 0–8)
EOSINOPHIL NFR BLD: 1.9 % (ref 0–8)
EOSINOPHIL NFR BLD: 2.3 % (ref 0–8)
EOSINOPHIL NFR BLD: 4.3 % (ref 0–8)
EOSINOPHIL NFR BLD: 4.4 % (ref 0–8)
EOSINOPHIL NFR BLD: 5.3 % (ref 0–8)
EOSINOPHIL NFR BLD: 5.7 % (ref 0–8)
EOSINOPHIL NFR BLD: 6.1 % (ref 0–8)
EOSINOPHIL NFR BLD: 6.7 % (ref 0–8)
EOSINOPHIL NFR BLD: 7.8 % (ref 0–8)
EOSINOPHIL NFR BLD: 8 % (ref 0–8)
EOSINOPHIL NFR BLD: 8.7 % (ref 0–8)
ERYTHROCYTE [DISTWIDTH] IN BLOOD BY AUTOMATED COUNT: 14.6 % (ref 11.5–14.5)
ERYTHROCYTE [DISTWIDTH] IN BLOOD BY AUTOMATED COUNT: 14.7 % (ref 11.5–14.5)
ERYTHROCYTE [DISTWIDTH] IN BLOOD BY AUTOMATED COUNT: 14.7 % (ref 11.5–14.5)
ERYTHROCYTE [DISTWIDTH] IN BLOOD BY AUTOMATED COUNT: 14.8 % (ref 11.5–14.5)
ERYTHROCYTE [DISTWIDTH] IN BLOOD BY AUTOMATED COUNT: 15.8 % (ref 11.5–14.5)
ERYTHROCYTE [DISTWIDTH] IN BLOOD BY AUTOMATED COUNT: 15.9 % (ref 11.5–14.5)
ERYTHROCYTE [DISTWIDTH] IN BLOOD BY AUTOMATED COUNT: 16 % (ref 11.5–14.5)
ERYTHROCYTE [DISTWIDTH] IN BLOOD BY AUTOMATED COUNT: 16.2 % (ref 11.5–14.5)
ERYTHROCYTE [DISTWIDTH] IN BLOOD BY AUTOMATED COUNT: 16.2 % (ref 11.5–14.5)
ERYTHROCYTE [DISTWIDTH] IN BLOOD BY AUTOMATED COUNT: 16.3 % (ref 11.5–14.5)
ERYTHROCYTE [DISTWIDTH] IN BLOOD BY AUTOMATED COUNT: 16.5 % (ref 11.5–14.5)
ERYTHROCYTE [DISTWIDTH] IN BLOOD BY AUTOMATED COUNT: 16.6 % (ref 11.5–14.5)
ERYTHROCYTE [DISTWIDTH] IN BLOOD BY AUTOMATED COUNT: 17.1 % (ref 11.5–14.5)
EST. GFR  (NO RACE VARIABLE): 28 ML/MIN/1.73 M^2
EST. GFR  (NO RACE VARIABLE): 31 ML/MIN/1.73 M^2
EST. GFR  (NO RACE VARIABLE): 33 ML/MIN/1.73 M^2
EST. GFR  (NO RACE VARIABLE): 33 ML/MIN/1.73 M^2
EST. GFR  (NO RACE VARIABLE): 39 ML/MIN/1.73 M^2
EST. GFR  (NO RACE VARIABLE): 42 ML/MIN/1.73 M^2
EST. GFR  (NO RACE VARIABLE): 43 ML/MIN/1.73 M^2
EST. GFR  (NO RACE VARIABLE): 49 ML/MIN/1.73 M^2
EST. GFR  (NO RACE VARIABLE): 53 ML/MIN/1.73 M^2
EST. GFR  (NO RACE VARIABLE): 54 ML/MIN/1.73 M^2
EST. GFR  (NO RACE VARIABLE): 54 ML/MIN/1.73 M^2
EST. GFR  (NO RACE VARIABLE): 58 ML/MIN/1.73 M^2
ESTIMATED AVG GLUCOSE: 123 MG/DL (ref 68–131)
ESTIMATED AVG GLUCOSE: 126 MG/DL (ref 68–131)
FINAL PATHOLOGIC DIAGNOSIS: ABNORMAL
FINAL PATHOLOGIC DIAGNOSIS: ABNORMAL
FINAL PATHOLOGIC DIAGNOSIS: NORMAL
FIO2: 21 (ref 21–100)
FRACTIONAL SHORTENING: 12 % (ref 28–44)
FUNGUS SPEC CULT: NORMAL
GLUCOSE SERPL-MCNC: 114 MG/DL (ref 70–110)
GLUCOSE SERPL-MCNC: 124 MG/DL (ref 70–110)
GLUCOSE SERPL-MCNC: 128 MG/DL (ref 70–110)
GLUCOSE SERPL-MCNC: 175 MG/DL (ref 70–110)
GLUCOSE SERPL-MCNC: 180 MG/DL (ref 70–110)
GLUCOSE SERPL-MCNC: 180 MG/DL (ref 70–110)
GLUCOSE SERPL-MCNC: 185 MG/DL (ref 70–110)
GLUCOSE SERPL-MCNC: 205 MG/DL (ref 70–110)
GLUCOSE SERPL-MCNC: 240 MG/DL (ref 70–110)
GLUCOSE SERPL-MCNC: 250 MG/DL (ref 70–110)
GLUCOSE SERPL-MCNC: 254 MG/DL (ref 70–110)
GLUCOSE SERPL-MCNC: 262 MG/DL (ref 70–110)
GLUCOSE UR QL STRIP: NEGATIVE
GLUCOSE: 111 MG/DL (ref 70–106)
GRAM STN SPEC: ABNORMAL
GRAM STN SPEC: ABNORMAL
GRAM STN SPEC: NORMAL
GRAM STN SPEC: NORMAL
GRAN CASTS #/AREA URNS LPF: 0 /LPF
GROSS: NORMAL
HBA1C MFR BLD: 5.9 % (ref 4–5.6)
HBA1C MFR BLD: 6 % (ref 4–5.6)
HCO3 UR-SCNC: 31.1 MMOL/L
HCT VFR BLD AUTO: 11.7 % (ref 40–54)
HCT VFR BLD AUTO: 13.3 % (ref 40–54)
HCT VFR BLD AUTO: 17.6 % (ref 40–54)
HCT VFR BLD AUTO: 20.5 % (ref 40–54)
HCT VFR BLD AUTO: 21.6 % (ref 40–54)
HCT VFR BLD AUTO: 21.9 % (ref 40–54)
HCT VFR BLD AUTO: 21.9 % (ref 40–54)
HCT VFR BLD AUTO: 22.3 % (ref 40–54)
HCT VFR BLD AUTO: 23 % (ref 40–54)
HCT VFR BLD AUTO: 23.7 % (ref 40–54)
HCT VFR BLD AUTO: 24.5 % (ref 40–54)
HCT VFR BLD AUTO: 25.3 % (ref 40–54)
HCT VFR BLD AUTO: 26.8 % (ref 40–54)
HCT VFR BLD AUTO: 27 % (ref 40–54)
HCT VFR BLD AUTO: 27.2 % (ref 40–54)
HCT VFR BLD AUTO: 27.3 % (ref 40–54)
HCT VFR BLD AUTO: 27.5 % (ref 40–54)
HCT VFR BLD AUTO: 27.7 % (ref 40–54)
HCT VFR BLD AUTO: 27.7 % (ref 40–54)
HCT VFR BLD AUTO: 28 % (ref 40–54)
HCT VFR BLD AUTO: 29.1 % (ref 40–54)
HCT VFR BLD AUTO: 29.9 % (ref 40–54)
HCT VFR BLD AUTO: 30.6 % (ref 40–54)
HCT VFR BLD AUTO: 30.8 % (ref 40–54)
HCT VFR BLD AUTO: 31.1 % (ref 40–54)
HCT VFR BLD AUTO: 31.3 % (ref 40–54)
HCT VFR BLD AUTO: 32.7 % (ref 40–54)
HCT VFR BLD AUTO: 33.1 % (ref 40–54)
HCT VFR BLD AUTO: 35.4 % (ref 40–54)
HCT VFR BLD AUTO: 41.3 % (ref 40–54)
HDLC SERPL-MCNC: 33 MG/DL (ref 40–75)
HDLC SERPL-MCNC: 38 MG/DL (ref 30–85)
HDLC SERPL: 29.2 % (ref 20–50)
HGB BLD-MCNC: 10.3 G/DL (ref 14–18)
HGB BLD-MCNC: 10.3 G/DL (ref 14–18)
HGB BLD-MCNC: 10.6 G/DL (ref 14–18)
HGB BLD-MCNC: 12.9 G/DL (ref 14–18)
HGB BLD-MCNC: 3.6 G/DL (ref 14–18)
HGB BLD-MCNC: 4 G/DL (ref 14–18)
HGB BLD-MCNC: 5.7 G/DL (ref 14–18)
HGB BLD-MCNC: 6.8 G/DL (ref 14–18)
HGB BLD-MCNC: 6.9 G/DL (ref 14–18)
HGB BLD-MCNC: 7.1 G/DL (ref 14–18)
HGB BLD-MCNC: 7.1 G/DL (ref 14–18)
HGB BLD-MCNC: 7.3 G/DL (ref 14–18)
HGB BLD-MCNC: 7.3 G/DL (ref 14–18)
HGB BLD-MCNC: 7.6 G/DL (ref 14–18)
HGB BLD-MCNC: 8.2 G/DL (ref 14–18)
HGB BLD-MCNC: 8.6 G/DL (ref 14–18)
HGB BLD-MCNC: 8.7 G/DL (ref 14–18)
HGB BLD-MCNC: 9 G/DL (ref 14–18)
HGB BLD-MCNC: 9.1 G/DL (ref 14–18)
HGB BLD-MCNC: 9.1 G/DL (ref 14–18)
HGB BLD-MCNC: 9.2 G/DL (ref 14–18)
HGB BLD-MCNC: 9.4 G/DL (ref 14–18)
HGB BLD-MCNC: 9.4 G/DL (ref 14–18)
HGB BLD-MCNC: 9.5 G/DL (ref 14–18)
HGB BLD-MCNC: 9.7 G/DL (ref 14–18)
HGB BLD-MCNC: 9.7 G/DL (ref 14–18)
HGB BLD-MCNC: 9.9 G/DL (ref 14–18)
HGB UR QL STRIP: ABNORMAL
HGB UR QL STRIP: ABNORMAL
HGB UR QL STRIP: NEGATIVE
HR MV ECHO: 58 BPM
HYALINE CASTS #/AREA URNS LPF: 1 /LPF
HYALINE CASTS #/AREA URNS LPF: 2 /LPF
IMM GRANULOCYTES # BLD AUTO: 0.08 K/UL (ref 0–0.04)
IMM GRANULOCYTES # BLD AUTO: 0.08 K/UL (ref 0–0.04)
IMM GRANULOCYTES # BLD AUTO: 0.09 K/UL (ref 0–0.04)
IMM GRANULOCYTES # BLD AUTO: 0.09 K/UL (ref 0–0.04)
IMM GRANULOCYTES # BLD AUTO: 0.1 K/UL (ref 0–0.04)
IMM GRANULOCYTES # BLD AUTO: 0.17 K/UL (ref 0–0.04)
IMM GRANULOCYTES # BLD AUTO: 0.18 K/UL (ref 0–0.04)
IMM GRANULOCYTES # BLD AUTO: 0.22 K/UL (ref 0–0.04)
IMM GRANULOCYTES # BLD AUTO: 0.22 K/UL (ref 0–0.04)
IMM GRANULOCYTES # BLD AUTO: 0.26 K/UL (ref 0–0.04)
IMM GRANULOCYTES # BLD AUTO: 0.26 K/UL (ref 0–0.04)
IMM GRANULOCYTES # BLD AUTO: 0.28 K/UL (ref 0–0.04)
IMM GRANULOCYTES # BLD AUTO: 0.32 K/UL (ref 0–0.04)
IMM GRANULOCYTES NFR BLD AUTO: 0.6 % (ref 0–0.5)
IMM GRANULOCYTES NFR BLD AUTO: 0.6 % (ref 0–0.5)
IMM GRANULOCYTES NFR BLD AUTO: 0.8 % (ref 0–0.5)
IMM GRANULOCYTES NFR BLD AUTO: 0.9 % (ref 0–0.5)
IMM GRANULOCYTES NFR BLD AUTO: 1.1 % (ref 0–0.5)
IMM GRANULOCYTES NFR BLD AUTO: 1.3 % (ref 0–0.5)
IMM GRANULOCYTES NFR BLD AUTO: 1.3 % (ref 0–0.5)
IMM GRANULOCYTES NFR BLD AUTO: 1.4 % (ref 0–0.5)
IMM GRANULOCYTES NFR BLD AUTO: 1.5 % (ref 0–0.5)
IMM GRANULOCYTES NFR BLD AUTO: 1.7 % (ref 0–0.5)
IMM GRANULOCYTES NFR BLD AUTO: 1.8 % (ref 0–0.5)
INFLUENZA A, MOLECULAR: NEGATIVE
INFLUENZA A, MOLECULAR: NEGATIVE
INFLUENZA B, MOLECULAR: NEGATIVE
INFLUENZA B, MOLECULAR: NEGATIVE
INR PPP: 1.2 (ref 0.8–1.2)
INTERVENTRICULAR SEPTUM: 1.25 CM (ref 0.6–1.1)
IVC DIAMETER: 1.9 CM
KETONES UR QL STRIP: ABNORMAL
KETONES UR QL STRIP: ABNORMAL
KETONES UR QL STRIP: NEGATIVE
KOH PREP SPEC: NORMAL
LA MAJOR: 6.65 CM
LA MINOR: 6.61 CM
LA WIDTH: 4.9 CM
LACTATE SERPL-SCNC: 1 MMOL/L (ref 0.5–2.2)
LDL CHOLESTEROL DIRECT: 30 MG/DL (ref 0–130)
LDLC SERPL CALC-MCNC: 54.4 MG/DL (ref 63–159)
LEFT ATRIUM SIZE: 4.79 CM
LEFT ATRIUM VOLUME INDEX MOD: 54.5 ML/M2
LEFT ATRIUM VOLUME INDEX: 65.5 ML/M2
LEFT ATRIUM VOLUME MOD: 110.18 CM3
LEFT ATRIUM VOLUME: 132.27 CM3
LEFT INTERNAL DIMENSION IN SYSTOLE: 5.77 CM (ref 2.1–4)
LEFT VENTRICLE DIASTOLIC VOLUME INDEX: 109.68 ML/M2
LEFT VENTRICLE DIASTOLIC VOLUME: 221.56 ML
LEFT VENTRICLE MASS INDEX: 162 G/M2
LEFT VENTRICLE SYSTOLIC VOLUME INDEX: 81.6 ML/M2
LEFT VENTRICLE SYSTOLIC VOLUME: 164.86 ML
LEFT VENTRICULAR INTERNAL DIMENSION IN DIASTOLE: 6.57 CM (ref 3.5–6)
LEFT VENTRICULAR MASS: 327.78 G
LEUKOCYTE ESTERASE UR QL STRIP: NEGATIVE
LIPASE SERPL-CCNC: 6 U/L (ref 4–60)
LVOT MG: 2.81 MMHG
LVOT MV: 0.78 CM/S
LYMPHOCYTES # BLD AUTO: 0.5 K/UL (ref 1–4.8)
LYMPHOCYTES # BLD AUTO: 0.5 K/UL (ref 1–4.8)
LYMPHOCYTES # BLD AUTO: 0.6 K/UL (ref 1–4.8)
LYMPHOCYTES # BLD AUTO: 0.7 K/UL (ref 1–4.8)
LYMPHOCYTES # BLD AUTO: 0.7 K/UL (ref 1–4.8)
LYMPHOCYTES # BLD AUTO: 0.8 K/UL (ref 1–4.8)
LYMPHOCYTES # BLD AUTO: 0.9 K/UL (ref 1–4.8)
LYMPHOCYTES # BLD AUTO: 1 K/UL (ref 1–4.8)
LYMPHOCYTES # BLD AUTO: 1.1 K/UL (ref 1–4.8)
LYMPHOCYTES NFR BLD: 3.4 % (ref 18–48)
LYMPHOCYTES NFR BLD: 3.9 % (ref 18–48)
LYMPHOCYTES NFR BLD: 4.6 % (ref 18–48)
LYMPHOCYTES NFR BLD: 5 % (ref 18–48)
LYMPHOCYTES NFR BLD: 5.2 % (ref 18–48)
LYMPHOCYTES NFR BLD: 6.8 % (ref 18–48)
LYMPHOCYTES NFR BLD: 7 % (ref 18–48)
LYMPHOCYTES NFR BLD: 7.1 % (ref 18–48)
LYMPHOCYTES NFR BLD: 7.4 % (ref 18–48)
LYMPHOCYTES NFR BLD: 8.1 % (ref 18–48)
LYMPHOCYTES NFR BLD: 8.9 % (ref 18–48)
Lab: ABNORMAL
Lab: ABNORMAL
Lab: NORMAL
MAGNESIUM SERPL-MCNC: 2 MG/DL (ref 1.6–2.6)
MAGNESIUM SERPL-MCNC: 2.2 MG/DL (ref 1.6–2.6)
MCH RBC QN AUTO: 27.9 PG (ref 27–31)
MCH RBC QN AUTO: 28.2 PG (ref 27–31)
MCH RBC QN AUTO: 28.3 PG (ref 27–31)
MCH RBC QN AUTO: 28.5 PG (ref 27–31)
MCH RBC QN AUTO: 29.3 PG (ref 27–31)
MCH RBC QN AUTO: 29.3 PG (ref 27–31)
MCH RBC QN AUTO: 29.4 PG (ref 27–31)
MCH RBC QN AUTO: 29.7 PG (ref 27–31)
MCH RBC QN AUTO: 30 PG (ref 27–31)
MCH RBC QN AUTO: 30.3 PG (ref 27–31)
MCH RBC QN AUTO: 30.5 PG (ref 27–31)
MCH RBC QN AUTO: 30.7 PG (ref 27–31)
MCH RBC QN AUTO: 30.9 PG (ref 27–31)
MCH RBC QN AUTO: 31 PG (ref 27–31)
MCH RBC QN AUTO: 31 PG (ref 27–31)
MCH RBC QN AUTO: 31.1 PG (ref 27–31)
MCH RBC QN AUTO: 31.1 PG (ref 27–31)
MCH RBC QN AUTO: 31.2 PG (ref 27–31)
MCH RBC QN AUTO: 31.3 PG (ref 27–31)
MCH RBC QN AUTO: 31.4 PG (ref 27–31)
MCH RBC QN AUTO: 31.7 PG (ref 27–31)
MCHC RBC AUTO-ENTMCNC: 29.9 G/DL (ref 32–36)
MCHC RBC AUTO-ENTMCNC: 30.1 G/DL (ref 32–36)
MCHC RBC AUTO-ENTMCNC: 30.1 G/DL (ref 32–36)
MCHC RBC AUTO-ENTMCNC: 30.2 G/DL (ref 32–36)
MCHC RBC AUTO-ENTMCNC: 30.8 G/DL (ref 32–36)
MCHC RBC AUTO-ENTMCNC: 30.8 G/DL (ref 32–36)
MCHC RBC AUTO-ENTMCNC: 31 G/DL (ref 32–36)
MCHC RBC AUTO-ENTMCNC: 31.1 G/DL (ref 32–36)
MCHC RBC AUTO-ENTMCNC: 31.2 G/DL (ref 32–36)
MCHC RBC AUTO-ENTMCNC: 31.5 G/DL (ref 32–36)
MCHC RBC AUTO-ENTMCNC: 31.9 G/DL (ref 32–36)
MCHC RBC AUTO-ENTMCNC: 32.1 G/DL (ref 32–36)
MCHC RBC AUTO-ENTMCNC: 32.4 G/DL (ref 32–36)
MCHC RBC AUTO-ENTMCNC: 32.5 G/DL (ref 32–36)
MCHC RBC AUTO-ENTMCNC: 32.7 G/DL (ref 32–36)
MCHC RBC AUTO-ENTMCNC: 32.9 G/DL (ref 32–36)
MCHC RBC AUTO-ENTMCNC: 33.1 G/DL (ref 32–36)
MCHC RBC AUTO-ENTMCNC: 33.2 G/DL (ref 32–36)
MCHC RBC AUTO-ENTMCNC: 33.3 G/DL (ref 32–36)
MCHC RBC AUTO-ENTMCNC: 33.3 G/DL (ref 32–36)
MCHC RBC AUTO-ENTMCNC: 33.5 G/DL (ref 32–36)
MCHC RBC AUTO-ENTMCNC: 33.5 G/DL (ref 32–36)
MCHC RBC AUTO-ENTMCNC: 33.6 G/DL (ref 32–36)
MCHC RBC AUTO-ENTMCNC: 33.7 G/DL (ref 32–36)
MCHC RBC AUTO-ENTMCNC: 33.8 G/DL (ref 32–36)
MCHC RBC AUTO-ENTMCNC: 34 G/DL (ref 32–36)
MCV RBC AUTO: 101 FL (ref 82–98)
MCV RBC AUTO: 106 FL (ref 82–98)
MCV RBC AUTO: 91 FL (ref 82–98)
MCV RBC AUTO: 92 FL (ref 82–98)
MCV RBC AUTO: 93 FL (ref 82–98)
MCV RBC AUTO: 94 FL (ref 82–98)
MCV RBC AUTO: 95 FL (ref 82–98)
MCV RBC AUTO: 95 FL (ref 82–98)
MCV RBC AUTO: 97 FL (ref 82–98)
MCV RBC AUTO: 97 FL (ref 82–98)
MICROALBUMIN UR DL<=1MG/L-MCNC: 91 UG/ML
MICROSCOPIC COMMENT: ABNORMAL
MICROSCOPIC COMMENT: NORMAL
MONOCYTES # BLD AUTO: 0.6 K/UL (ref 0.3–1)
MONOCYTES # BLD AUTO: 0.7 K/UL (ref 0.3–1)
MONOCYTES # BLD AUTO: 0.8 K/UL (ref 0.3–1)
MONOCYTES # BLD AUTO: 0.8 K/UL (ref 0.3–1)
MONOCYTES # BLD AUTO: 0.9 K/UL (ref 0.3–1)
MONOCYTES # BLD AUTO: 1 K/UL (ref 0.3–1)
MONOCYTES # BLD AUTO: 1 K/UL (ref 0.3–1)
MONOCYTES # BLD AUTO: 1.1 K/UL (ref 0.3–1)
MONOCYTES # BLD AUTO: 1.1 K/UL (ref 0.3–1)
MONOCYTES # BLD AUTO: 1.3 K/UL (ref 0.3–1)
MONOCYTES NFR BLD: 4.8 % (ref 4–15)
MONOCYTES NFR BLD: 4.9 % (ref 4–15)
MONOCYTES NFR BLD: 5 % (ref 4–15)
MONOCYTES NFR BLD: 5.5 % (ref 4–15)
MONOCYTES NFR BLD: 5.6 % (ref 4–15)
MONOCYTES NFR BLD: 5.7 % (ref 4–15)
MONOCYTES NFR BLD: 5.7 % (ref 4–15)
MONOCYTES NFR BLD: 5.9 % (ref 4–15)
MONOCYTES NFR BLD: 6.4 % (ref 4–15)
MONOCYTES NFR BLD: 7.1 % (ref 4–15)
MONOCYTES NFR BLD: 7.8 % (ref 4–15)
MONOCYTES NFR BLD: 8.1 % (ref 4–15)
MONOCYTES NFR BLD: 9.7 % (ref 4–15)
MV MEAN GRADIENT: 4 MMHG
MV PEAK GRADIENT: 13 MMHG
MV VALVE AREA BY CONTINUITY EQUATION: 1.71 CM2
NEUTROPHILS # BLD AUTO: 10 K/UL (ref 1.8–7.7)
NEUTROPHILS # BLD AUTO: 10.4 K/UL (ref 1.8–7.7)
NEUTROPHILS # BLD AUTO: 10.7 K/UL (ref 1.8–7.7)
NEUTROPHILS # BLD AUTO: 11.5 K/UL (ref 1.8–7.7)
NEUTROPHILS # BLD AUTO: 14.9 K/UL (ref 1.8–7.7)
NEUTROPHILS # BLD AUTO: 15 K/UL (ref 1.8–7.7)
NEUTROPHILS # BLD AUTO: 15.7 K/UL (ref 1.8–7.7)
NEUTROPHILS # BLD AUTO: 19.4 K/UL (ref 1.8–7.7)
NEUTROPHILS # BLD AUTO: 6.3 K/UL (ref 1.8–7.7)
NEUTROPHILS # BLD AUTO: 8.2 K/UL (ref 1.8–7.7)
NEUTROPHILS # BLD AUTO: 9.6 K/UL (ref 1.8–7.7)
NEUTROPHILS NFR BLD: 77.5 % (ref 38–73)
NEUTROPHILS NFR BLD: 79.3 % (ref 38–73)
NEUTROPHILS NFR BLD: 80.5 % (ref 38–73)
NEUTROPHILS NFR BLD: 80.7 % (ref 38–73)
NEUTROPHILS NFR BLD: 80.8 % (ref 38–73)
NEUTROPHILS NFR BLD: 80.8 % (ref 38–73)
NEUTROPHILS NFR BLD: 80.9 % (ref 38–73)
NEUTROPHILS NFR BLD: 81.8 % (ref 38–73)
NEUTROPHILS NFR BLD: 81.8 % (ref 38–73)
NEUTROPHILS NFR BLD: 82.3 % (ref 38–73)
NEUTROPHILS NFR BLD: 82.5 % (ref 38–73)
NEUTROPHILS NFR BLD: 83.7 % (ref 38–73)
NEUTROPHILS NFR BLD: 84.9 % (ref 38–73)
NITRITE UR QL STRIP: NEGATIVE
NON HDL CHOL (CALC): 43 MG/DL (ref 0–129)
NONHDLC SERPL-MCNC: 80 MG/DL
NRBC BLD-RTO: 0 /100 WBC
NRBC BLD-RTO: 1 /100 WBC
NRBC BLD-RTO: 1 /100 WBC
NUM UNITS TRANS FFP: NORMAL
NUM UNITS TRANS PACKED RBC: NORMAL
OB PNL STL: NEGATIVE
OHS LV EJECTION FRACTION SIMPSONS BIPLANE MOD: 45 %
PCO2 BLDA: 49 MMHG (ref 35–45)
PH SMN: 7.41 [PH] (ref 7.35–7.45)
PH UR STRIP: 6 [PH] (ref 5–8)
PH UR STRIP: 6 [PH] (ref 5–8)
PH UR STRIP: 7 [PH] (ref 5–8)
PHOSPHATE SERPL-MCNC: 1.7 MG/DL (ref 2.7–4.5)
PHOSPHATE SERPL-MCNC: 2.9 MG/DL (ref 2.7–4.5)
PISA MRMAX VEL: 5 M/S
PISA TR MAX VEL: 2.41 M/S
PLATELET # BLD AUTO: 106 K/UL (ref 150–450)
PLATELET # BLD AUTO: 106 K/UL (ref 150–450)
PLATELET # BLD AUTO: 107 K/UL (ref 150–450)
PLATELET # BLD AUTO: 114 K/UL (ref 150–450)
PLATELET # BLD AUTO: 115 K/UL (ref 150–450)
PLATELET # BLD AUTO: 115 K/UL (ref 150–450)
PLATELET # BLD AUTO: 118 K/UL (ref 150–450)
PLATELET # BLD AUTO: 121 K/UL (ref 150–450)
PLATELET # BLD AUTO: 122 K/UL (ref 150–450)
PLATELET # BLD AUTO: 123 K/UL (ref 150–450)
PLATELET # BLD AUTO: 123 K/UL (ref 150–450)
PLATELET # BLD AUTO: 129 K/UL (ref 150–450)
PLATELET # BLD AUTO: 130 K/UL (ref 150–450)
PLATELET # BLD AUTO: 130 K/UL (ref 150–450)
PLATELET # BLD AUTO: 133 K/UL (ref 150–450)
PLATELET # BLD AUTO: 133 K/UL (ref 150–450)
PLATELET # BLD AUTO: 135 K/UL (ref 150–450)
PLATELET # BLD AUTO: 141 K/UL (ref 150–450)
PLATELET # BLD AUTO: 144 K/UL (ref 150–450)
PLATELET # BLD AUTO: 171 K/UL (ref 150–450)
PLATELET # BLD AUTO: 177 K/UL (ref 150–450)
PLATELET # BLD AUTO: 196 K/UL (ref 150–450)
PLATELET # BLD AUTO: 225 K/UL (ref 150–450)
PLATELET # BLD AUTO: 225 K/UL (ref 150–450)
PLATELET # BLD AUTO: 232 K/UL (ref 150–450)
PLATELET # BLD AUTO: 235 K/UL (ref 150–450)
PLATELET # BLD AUTO: 262 K/UL (ref 150–450)
PLATELET # BLD AUTO: 304 K/UL (ref 150–450)
PMV BLD AUTO: 10 FL (ref 9.2–12.9)
PMV BLD AUTO: 10.1 FL (ref 9.2–12.9)
PMV BLD AUTO: 10.2 FL (ref 9.2–12.9)
PMV BLD AUTO: 10.2 FL (ref 9.2–12.9)
PMV BLD AUTO: 10.3 FL (ref 9.2–12.9)
PMV BLD AUTO: 10.4 FL (ref 9.2–12.9)
PMV BLD AUTO: 10.5 FL (ref 9.2–12.9)
PMV BLD AUTO: 10.6 FL (ref 9.2–12.9)
PMV BLD AUTO: 10.7 FL (ref 9.2–12.9)
PMV BLD AUTO: 9.5 FL (ref 9.2–12.9)
PMV BLD AUTO: 9.6 FL (ref 9.2–12.9)
PMV BLD AUTO: 9.7 FL (ref 9.2–12.9)
PMV BLD AUTO: 9.7 FL (ref 9.2–12.9)
PMV BLD AUTO: 9.8 FL (ref 9.2–12.9)
PMV BLD AUTO: 9.8 FL (ref 9.2–12.9)
PMV BLD AUTO: 9.9 FL (ref 9.2–12.9)
PMV BLD AUTO: 9.9 FL (ref 9.2–12.9)
PO2 BLDA: 49 MMHG (ref 75–100)
POC BE: 5.6 MMOL/L (ref -2–2)
POC COHB: 2.7 % (ref 0–3)
POC METHB: 0.9 % (ref 0–1.5)
POC O2HB ARTERIAL: 81.4 % (ref 94–100)
POC SATURATED O2: 84.4 % (ref 90–100)
POC TCO2: 32.6 MMOL/L
POC THB: 9.8 G/DL (ref 12–18)
POCT GLUCOSE: 102 MG/DL (ref 70–110)
POCT GLUCOSE: 123 MG/DL (ref 70–110)
POCT GLUCOSE: 126 MG/DL (ref 70–110)
POCT GLUCOSE: 129 MG/DL (ref 70–110)
POCT GLUCOSE: 129 MG/DL (ref 70–110)
POCT GLUCOSE: 139 MG/DL (ref 70–110)
POCT GLUCOSE: 145 MG/DL (ref 70–110)
POCT GLUCOSE: 148 MG/DL (ref 70–110)
POCT GLUCOSE: 157 MG/DL (ref 70–110)
POCT GLUCOSE: 158 MG/DL (ref 70–110)
POCT GLUCOSE: 163 MG/DL (ref 70–110)
POCT GLUCOSE: 168 MG/DL (ref 70–110)
POCT GLUCOSE: 171 MG/DL (ref 70–110)
POCT GLUCOSE: 186 MG/DL (ref 70–110)
POCT GLUCOSE: 193 MG/DL (ref 70–110)
POCT GLUCOSE: 218 MG/DL (ref 70–110)
POCT GLUCOSE: 237 MG/DL (ref 70–110)
POCT GLUCOSE: 243 MG/DL (ref 70–110)
POCT GLUCOSE: 264 MG/DL (ref 70–110)
POTASSIUM BLOOD: 4.5 MEQ/L (ref 3.4–5)
POTASSIUM SERPL-SCNC: 4 MMOL/L (ref 3.5–5.1)
POTASSIUM SERPL-SCNC: 4 MMOL/L (ref 3.5–5.1)
POTASSIUM SERPL-SCNC: 4.1 MMOL/L (ref 3.5–5.1)
POTASSIUM SERPL-SCNC: 4.4 MMOL/L (ref 3.5–5.1)
POTASSIUM SERPL-SCNC: 4.4 MMOL/L (ref 3.5–5.1)
POTASSIUM SERPL-SCNC: 4.5 MMOL/L (ref 3.5–5.1)
POTASSIUM SERPL-SCNC: 4.6 MMOL/L (ref 3.5–5.1)
POTASSIUM SERPL-SCNC: 4.7 MMOL/L (ref 3.5–5.1)
POTASSIUM SERPL-SCNC: 4.8 MMOL/L (ref 3.5–5.1)
POTASSIUM SERPL-SCNC: 5 MMOL/L (ref 3.5–5.1)
PROT SERPL-MCNC: 4.5 G/DL (ref 6–8.4)
PROT SERPL-MCNC: 4.9 G/DL (ref 6–8.4)
PROT SERPL-MCNC: 5.1 G/DL (ref 6–8.4)
PROT SERPL-MCNC: 5.4 G/DL (ref 6–8.4)
PROT SERPL-MCNC: 6.5 G/DL (ref 6–8.4)
PROT SERPL-MCNC: 6.9 G/DL (ref 6–8.4)
PROT SERPL-MCNC: 7 G/DL (ref 6–8.4)
PROT SERPL-MCNC: 7.1 G/DL (ref 6–8.4)
PROT SERPL-MCNC: 7.3 G/DL (ref 6–8.4)
PROT SERPL-MCNC: 7.6 G/DL (ref 6–8.4)
PROT SERPL-MCNC: 7.9 G/DL (ref 6–8.4)
PROT SERPL-MCNC: 9 G/DL (ref 6–8.4)
PROT UR QL STRIP: ABNORMAL
PROT UR QL STRIP: ABNORMAL
PROT UR QL STRIP: NEGATIVE
PROTHROMBIN TIME: 12.6 SEC (ref 9–12.5)
PROTHROMBIN TIME: 13 SEC (ref 9–12.5)
PROTHROMBIN TIME: 13.1 SEC (ref 9–12.5)
PULM VEIN S/D RATIO: 0.99
PV MV: 0.72 M/S
PV PEAK D VEL: 0.69 M/S
PV PEAK GRADIENT: 4 MMHG
PV PEAK S VEL: 0.68 M/S
PV PEAK VELOCITY: 1 M/S
RA MAJOR: 5.29 CM
RA WIDTH: 3.2 CM
RBC # BLD AUTO: 1.16 M/UL (ref 4.6–6.2)
RBC # BLD AUTO: 1.26 M/UL (ref 4.6–6.2)
RBC # BLD AUTO: 1.82 M/UL (ref 4.6–6.2)
RBC # BLD AUTO: 2.23 M/UL (ref 4.6–6.2)
RBC # BLD AUTO: 2.28 M/UL (ref 4.6–6.2)
RBC # BLD AUTO: 2.37 M/UL (ref 4.6–6.2)
RBC # BLD AUTO: 2.37 M/UL (ref 4.6–6.2)
RBC # BLD AUTO: 2.39 M/UL (ref 4.6–6.2)
RBC # BLD AUTO: 2.56 M/UL (ref 4.6–6.2)
RBC # BLD AUTO: 2.64 M/UL (ref 4.6–6.2)
RBC # BLD AUTO: 2.77 M/UL (ref 4.6–6.2)
RBC # BLD AUTO: 2.9 M/UL (ref 4.6–6.2)
RBC # BLD AUTO: 2.91 M/UL (ref 4.6–6.2)
RBC # BLD AUTO: 2.95 M/UL (ref 4.6–6.2)
RBC # BLD AUTO: 2.96 M/UL (ref 4.6–6.2)
RBC # BLD AUTO: 2.98 M/UL (ref 4.6–6.2)
RBC # BLD AUTO: 2.98 M/UL (ref 4.6–6.2)
RBC # BLD AUTO: 2.99 M/UL (ref 4.6–6.2)
RBC # BLD AUTO: 3.14 M/UL (ref 4.6–6.2)
RBC # BLD AUTO: 3.22 M/UL (ref 4.6–6.2)
RBC # BLD AUTO: 3.3 M/UL (ref 4.6–6.2)
RBC # BLD AUTO: 3.3 M/UL (ref 4.6–6.2)
RBC # BLD AUTO: 3.33 M/UL (ref 4.6–6.2)
RBC # BLD AUTO: 3.33 M/UL (ref 4.6–6.2)
RBC # BLD AUTO: 3.43 M/UL (ref 4.6–6.2)
RBC # BLD AUTO: 3.51 M/UL (ref 4.6–6.2)
RBC # BLD AUTO: 3.75 M/UL (ref 4.6–6.2)
RBC # BLD AUTO: 4.41 M/UL (ref 4.6–6.2)
RBC #/AREA URNS HPF: 0 /HPF (ref 0–4)
RBC #/AREA URNS HPF: 1 /HPF (ref 0–4)
RH BLD: NORMAL
RIGHT VENTRICULAR END-DIASTOLIC DIMENSION: 3.28 CM
RSV AG SPEC QL IA: NEGATIVE
RSV AG SPEC QL IA: NEGATIVE
SARS-COV-2 RDRP RESP QL NAA+PROBE: NEGATIVE
SARS-COV-2 RDRP RESP QL NAA+PROBE: NEGATIVE
SITE: ABNORMAL
SODIUM BLOOD: 142 MEQ/L (ref 135–145)
SODIUM SERPL-SCNC: 137 MMOL/L (ref 136–145)
SODIUM SERPL-SCNC: 138 MMOL/L (ref 136–145)
SODIUM SERPL-SCNC: 139 MMOL/L (ref 136–145)
SODIUM SERPL-SCNC: 140 MMOL/L (ref 136–145)
SODIUM SERPL-SCNC: 141 MMOL/L (ref 136–145)
SP GR UR STRIP: 1.02 (ref 1–1.03)
SPECIMEN OUTDATE: NORMAL
SPECIMEN OUTDATE: NORMAL
SPECIMEN SOURCE: NORMAL
STJ: 2.43 CM
TDI LATERAL: 0.04 M/S
TDI SEPTAL: 0.05 M/S
TDI: 0.05 M/S
TR MAX PG: 23 MMHG
TRANS ERYTHROCYTES VOL PATIENT: NORMAL ML
TRICUSPID ANNULAR PLANE SYSTOLIC EXCURSION: 1.6 CM
TRIGL SERPL-MCNC: 128 MG/DL (ref 30–150)
TRIGL SERPL-MCNC: 82 MG/DL (ref 10–250)
TROPONIN I SERPL DL<=0.01 NG/ML-MCNC: 0.04 NG/ML (ref 0–0.03)
TROPONIN I SERPL DL<=0.01 NG/ML-MCNC: 0.05 NG/ML (ref 0–0.03)
TROPONIN I SERPL DL<=0.01 NG/ML-MCNC: 0.07 NG/ML (ref 0–0.03)
TROPONIN I SERPL DL<=0.01 NG/ML-MCNC: 0.07 NG/ML (ref 0–0.03)
TROPONIN I SERPL DL<=0.01 NG/ML-MCNC: 0.08 NG/ML (ref 0–0.03)
TROPONIN I SERPL DL<=0.01 NG/ML-MCNC: 0.09 NG/ML (ref 0–0.03)
TROPONIN I SERPL DL<=0.01 NG/ML-MCNC: 0.1 NG/ML (ref 0–0.03)
TROPONIN I SERPL DL<=0.01 NG/ML-MCNC: 0.2 NG/ML (ref 0–0.03)
TROPONIN I SERPL DL<=0.01 NG/ML-MCNC: 0.26 NG/ML (ref 0–0.03)
TROPONIN I SERPL DL<=0.01 NG/ML-MCNC: 0.34 NG/ML (ref 0–0.03)
UNIT NUMBER: NORMAL
URN SPEC COLLECT METH UR: ABNORMAL
URN SPEC COLLECT METH UR: ABNORMAL
URN SPEC COLLECT METH UR: NORMAL
UROBILINOGEN UR STRIP-ACNC: 1 EU/DL
UROBILINOGEN UR STRIP-ACNC: 1 EU/DL
UROBILINOGEN UR STRIP-ACNC: NEGATIVE EU/DL
VLDLC SERPL-MCNC: 16 MG/DL (ref 5–40)
WBC # BLD AUTO: 10.03 K/UL (ref 3.9–12.7)
WBC # BLD AUTO: 11.78 K/UL (ref 3.9–12.7)
WBC # BLD AUTO: 12.16 K/UL (ref 3.9–12.7)
WBC # BLD AUTO: 12.41 K/UL (ref 3.9–12.7)
WBC # BLD AUTO: 12.5 K/UL (ref 3.9–12.7)
WBC # BLD AUTO: 12.61 K/UL (ref 3.9–12.7)
WBC # BLD AUTO: 12.87 K/UL (ref 3.9–12.7)
WBC # BLD AUTO: 12.91 K/UL (ref 3.9–12.7)
WBC # BLD AUTO: 12.91 K/UL (ref 3.9–12.7)
WBC # BLD AUTO: 13.11 K/UL (ref 3.9–12.7)
WBC # BLD AUTO: 13.18 K/UL (ref 3.9–12.7)
WBC # BLD AUTO: 13.56 K/UL (ref 3.9–12.7)
WBC # BLD AUTO: 18.47 K/UL (ref 3.9–12.7)
WBC # BLD AUTO: 18.86 K/UL (ref 3.9–12.7)
WBC # BLD AUTO: 18.89 K/UL (ref 3.9–12.7)
WBC # BLD AUTO: 19.5 K/UL (ref 3.9–12.7)
WBC # BLD AUTO: 21.26 K/UL (ref 3.9–12.7)
WBC # BLD AUTO: 23.51 K/UL (ref 3.9–12.7)
WBC # BLD AUTO: 6.27 K/UL (ref 3.9–12.7)
WBC # BLD AUTO: 6.33 K/UL (ref 3.9–12.7)
WBC # BLD AUTO: 6.42 K/UL (ref 3.9–12.7)
WBC # BLD AUTO: 6.74 K/UL (ref 3.9–12.7)
WBC # BLD AUTO: 6.88 K/UL (ref 3.9–12.7)
WBC # BLD AUTO: 7.05 K/UL (ref 3.9–12.7)
WBC # BLD AUTO: 7.85 K/UL (ref 3.9–12.7)
WBC # BLD AUTO: 8.12 K/UL (ref 3.9–12.7)
WBC # BLD AUTO: 9.33 K/UL (ref 3.9–12.7)
WBC # BLD AUTO: 9.81 K/UL (ref 3.9–12.7)
WBC #/AREA URNS HPF: 3 /HPF (ref 0–5)
WBC #/AREA URNS HPF: 5 /HPF (ref 0–5)
Z-SCORE OF LEFT VENTRICULAR DIMENSION IN END DIASTOLE: 0.99
Z-SCORE OF LEFT VENTRICULAR DIMENSION IN END SYSTOLE: 3.62

## 2023-01-01 PROCEDURE — 63600175 PHARM REV CODE 636 W HCPCS: Mod: HCNC | Performed by: INTERNAL MEDICINE

## 2023-01-01 PROCEDURE — 99999 PR PBB SHADOW E&M-EST. PATIENT-LVL V: CPT | Mod: PBBFAC,HCNC,, | Performed by: FAMILY MEDICINE

## 2023-01-01 PROCEDURE — 99999 PR PBB SHADOW E&M-EST. PATIENT-LVL III: ICD-10-PCS | Mod: PBBFAC,HCNC,, | Performed by: FAMILY MEDICINE

## 2023-01-01 PROCEDURE — 63600175 PHARM REV CODE 636 W HCPCS: Performed by: STUDENT IN AN ORGANIZED HEALTH CARE EDUCATION/TRAINING PROGRAM

## 2023-01-01 PROCEDURE — 3288F FALL RISK ASSESSMENT DOCD: CPT | Mod: HCNC,CPTII,S$GLB, | Performed by: FAMILY MEDICINE

## 2023-01-01 PROCEDURE — 93010 EKG 12-LEAD: ICD-10-PCS | Mod: HCNC,,, | Performed by: INTERNAL MEDICINE

## 2023-01-01 PROCEDURE — 63600175 PHARM REV CODE 636 W HCPCS: Mod: HCNC | Performed by: SURGERY

## 2023-01-01 PROCEDURE — 85025 COMPLETE CBC W/AUTO DIFF WBC: CPT | Mod: HCNC | Performed by: EMERGENCY MEDICINE

## 2023-01-01 PROCEDURE — 3060F POS MICROALBUMINURIA REV: CPT | Mod: HCNC,CPTII,S$GLB, | Performed by: FAMILY MEDICINE

## 2023-01-01 PROCEDURE — 94799 UNLISTED PULMONARY SVC/PX: CPT | Mod: HCNC

## 2023-01-01 PROCEDURE — 20000000 HC ICU ROOM: Mod: HCNC

## 2023-01-01 PROCEDURE — 99232 PR SUBSEQUENT HOSPITAL CARE,LEVL II: ICD-10-PCS | Mod: HCNC,,, | Performed by: NURSE PRACTITIONER

## 2023-01-01 PROCEDURE — 86922 COMPATIBILITY TEST ANTIGLOB: CPT | Mod: HCNC | Performed by: INTERNAL MEDICINE

## 2023-01-01 PROCEDURE — 93005 ELECTROCARDIOGRAM TRACING: CPT | Mod: HCNC

## 2023-01-01 PROCEDURE — 99232 SBSQ HOSP IP/OBS MODERATE 35: CPT | Mod: HCNC,,, | Performed by: INTERNAL MEDICINE

## 2023-01-01 PROCEDURE — 36415 COLL VENOUS BLD VENIPUNCTURE: CPT | Mod: HCNC | Performed by: STUDENT IN AN ORGANIZED HEALTH CARE EDUCATION/TRAINING PROGRAM

## 2023-01-01 PROCEDURE — 86900 BLOOD TYPING SEROLOGIC ABO: CPT | Mod: HCNC | Performed by: HOSPITALIST

## 2023-01-01 PROCEDURE — 82272 OCCULT BLD FECES 1-3 TESTS: CPT | Mod: HCNC | Performed by: STUDENT IN AN ORGANIZED HEALTH CARE EDUCATION/TRAINING PROGRAM

## 2023-01-01 PROCEDURE — 86900 BLOOD TYPING SEROLOGIC ABO: CPT | Mod: HCNC | Performed by: INTERNAL MEDICINE

## 2023-01-01 PROCEDURE — 25000003 PHARM REV CODE 250: Mod: HCNC

## 2023-01-01 PROCEDURE — P9017 PLASMA 1 DONOR FRZ W/IN 8 HR: HCPCS | Mod: HCNC | Performed by: INTERNAL MEDICINE

## 2023-01-01 PROCEDURE — 25000003 PHARM REV CODE 250: Mod: HCNC | Performed by: PHYSICIAN ASSISTANT

## 2023-01-01 PROCEDURE — 99900035 HC TECH TIME PER 15 MIN (STAT): Mod: HCNC

## 2023-01-01 PROCEDURE — 3008F BODY MASS INDEX DOCD: CPT | Mod: HCNC,CPTII,S$GLB, | Performed by: FAMILY MEDICINE

## 2023-01-01 PROCEDURE — U0002 COVID-19 LAB TEST NON-CDC: HCPCS | Mod: HCNC | Performed by: STUDENT IN AN ORGANIZED HEALTH CARE EDUCATION/TRAINING PROGRAM

## 2023-01-01 PROCEDURE — 3066F PR DOCUMENTATION OF TREATMENT FOR NEPHROPATHY: ICD-10-PCS | Mod: HCNC,CPTII,S$GLB, | Performed by: FAMILY MEDICINE

## 2023-01-01 PROCEDURE — 87210 SMEAR WET MOUNT SALINE/INK: CPT | Mod: HCNC | Performed by: INTERNAL MEDICINE

## 2023-01-01 PROCEDURE — 96374 THER/PROPH/DIAG INJ IV PUSH: CPT | Mod: 59,HCNC

## 2023-01-01 PROCEDURE — 3078F PR MOST RECENT DIASTOLIC BLOOD PRESSURE < 80 MM HG: ICD-10-PCS | Mod: HCNC,CPTII,S$GLB, | Performed by: FAMILY MEDICINE

## 2023-01-01 PROCEDURE — 1159F PR MEDICATION LIST DOCUMENTED IN MEDICAL RECORD: ICD-10-PCS | Mod: HCNC,CPTII,S$GLB, | Performed by: FAMILY MEDICINE

## 2023-01-01 PROCEDURE — 99999 PR PBB SHADOW E&M-EST. PATIENT-LVL V: ICD-10-PCS | Mod: PBBFAC,HCNC,, | Performed by: FAMILY MEDICINE

## 2023-01-01 PROCEDURE — 1101F PT FALLS ASSESS-DOCD LE1/YR: CPT | Mod: HCNC,CPTII,S$GLB, | Performed by: FAMILY MEDICINE

## 2023-01-01 PROCEDURE — 3074F PR MOST RECENT SYSTOLIC BLOOD PRESSURE < 130 MM HG: ICD-10-PCS | Mod: HCNC,CPTII,S$GLB, | Performed by: FAMILY MEDICINE

## 2023-01-01 PROCEDURE — P9021 RED BLOOD CELLS UNIT: HCPCS | Mod: HCNC | Performed by: INTERNAL MEDICINE

## 2023-01-01 PROCEDURE — 85027 COMPLETE CBC AUTOMATED: CPT | Mod: 91,HCNC

## 2023-01-01 PROCEDURE — 80061 LIPID PANEL: CPT | Mod: HCNC | Performed by: FAMILY MEDICINE

## 2023-01-01 PROCEDURE — G0008 ADMIN INFLUENZA VIRUS VAC: HCPCS | Mod: HCNC,S$GLB,, | Performed by: FAMILY MEDICINE

## 2023-01-01 PROCEDURE — 99222 1ST HOSP IP/OBS MODERATE 55: CPT | Mod: HCNC,,, | Performed by: PHYSICIAN ASSISTANT

## 2023-01-01 PROCEDURE — 1160F PR REVIEW ALL MEDS BY PRESCRIBER/CLIN PHARMACIST DOCUMENTED: ICD-10-PCS | Mod: HCNC,CPTII,S$GLB, | Performed by: FAMILY MEDICINE

## 2023-01-01 PROCEDURE — 85025 COMPLETE CBC W/AUTO DIFF WBC: CPT | Mod: HCNC | Performed by: INTERNAL MEDICINE

## 2023-01-01 PROCEDURE — 3044F PR MOST RECENT HEMOGLOBIN A1C LEVEL <7.0%: ICD-10-PCS | Mod: HCNC,CPTII,S$GLB, | Performed by: FAMILY MEDICINE

## 2023-01-01 PROCEDURE — 93005 ELECTROCARDIOGRAM TRACING: CPT

## 2023-01-01 PROCEDURE — G0008 FLU VACCINE - QUADRIVALENT - ADJUVANTED: ICD-10-PCS | Mod: HCNC,S$GLB,, | Performed by: FAMILY MEDICINE

## 2023-01-01 PROCEDURE — 1160F RVW MEDS BY RX/DR IN RCRD: CPT | Mod: HCNC,CPTII,S$GLB, | Performed by: FAMILY MEDICINE

## 2023-01-01 PROCEDURE — 25000003 PHARM REV CODE 250: Mod: HCNC | Performed by: STUDENT IN AN ORGANIZED HEALTH CARE EDUCATION/TRAINING PROGRAM

## 2023-01-01 PROCEDURE — 27200945 HC CYTOLOGY BRUSH: Mod: HCNC | Performed by: INTERNAL MEDICINE

## 2023-01-01 PROCEDURE — C9113 INJ PANTOPRAZOLE SODIUM, VIA: HCPCS | Mod: HCNC | Performed by: INTERNAL MEDICINE

## 2023-01-01 PROCEDURE — 3044F HG A1C LEVEL LT 7.0%: CPT | Mod: HCNC,CPTII,S$GLB, | Performed by: FAMILY MEDICINE

## 2023-01-01 PROCEDURE — 3288F PR FALLS RISK ASSESSMENT DOCUMENTED: ICD-10-PCS | Mod: HCNC,CPTII,S$GLB, | Performed by: FAMILY MEDICINE

## 2023-01-01 PROCEDURE — 36415 COLL VENOUS BLD VENIPUNCTURE: CPT | Mod: HCNC | Performed by: INTERNAL MEDICINE

## 2023-01-01 PROCEDURE — 83735 ASSAY OF MAGNESIUM: CPT | Mod: HCNC | Performed by: INTERNAL MEDICINE

## 2023-01-01 PROCEDURE — 1101F PR PT FALLS ASSESS DOC 0-1 FALLS W/OUT INJ PAST YR: ICD-10-PCS | Mod: HCNC,CPTII,S$GLB, | Performed by: FAMILY MEDICINE

## 2023-01-01 PROCEDURE — 96365 THER/PROPH/DIAG IV INF INIT: CPT | Mod: HCNC

## 2023-01-01 PROCEDURE — 96376 TX/PRO/DX INJ SAME DRUG ADON: CPT | Mod: HCNC

## 2023-01-01 PROCEDURE — D9220A PRA ANESTHESIA: Mod: CRNA,,, | Performed by: NURSE ANESTHETIST, CERTIFIED REGISTERED

## 2023-01-01 PROCEDURE — 1125F AMNT PAIN NOTED PAIN PRSNT: CPT | Mod: HCNC,CPTII,S$GLB, | Performed by: FAMILY MEDICINE

## 2023-01-01 PROCEDURE — 80053 COMPREHEN METABOLIC PANEL: CPT | Mod: HCNC | Performed by: INTERNAL MEDICINE

## 2023-01-01 PROCEDURE — 84484 ASSAY OF TROPONIN QUANT: CPT | Mod: 91,HCNC | Performed by: STUDENT IN AN ORGANIZED HEALTH CARE EDUCATION/TRAINING PROGRAM

## 2023-01-01 PROCEDURE — 94761 N-INVAS EAR/PLS OXIMETRY MLT: CPT | Mod: HCNC

## 2023-01-01 PROCEDURE — 3074F SYST BP LT 130 MM HG: CPT | Mod: HCNC,CPTII,S$GLB, | Performed by: FAMILY MEDICINE

## 2023-01-01 PROCEDURE — 85379 FIBRIN DEGRADATION QUANT: CPT | Mod: HCNC | Performed by: STUDENT IN AN ORGANIZED HEALTH CARE EDUCATION/TRAINING PROGRAM

## 2023-01-01 PROCEDURE — 3066F NEPHROPATHY DOC TX: CPT | Mod: HCNC,CPTII,S$GLB, | Performed by: FAMILY MEDICINE

## 2023-01-01 PROCEDURE — 80053 COMPREHEN METABOLIC PANEL: CPT | Mod: HCNC | Performed by: STUDENT IN AN ORGANIZED HEALTH CARE EDUCATION/TRAINING PROGRAM

## 2023-01-01 PROCEDURE — 1157F PR ADVANCE CARE PLAN OR EQUIV PRESENT IN MEDICAL RECORD: ICD-10-PCS | Mod: HCNC,CPTII,S$GLB, | Performed by: FAMILY MEDICINE

## 2023-01-01 PROCEDURE — 82570 ASSAY OF URINE CREATININE: CPT | Mod: HCNC | Performed by: FAMILY MEDICINE

## 2023-01-01 PROCEDURE — 87206 SMEAR FLUORESCENT/ACID STAI: CPT | Mod: HCNC | Performed by: INTERNAL MEDICINE

## 2023-01-01 PROCEDURE — 85730 THROMBOPLASTIN TIME PARTIAL: CPT | Mod: HCNC | Performed by: STUDENT IN AN ORGANIZED HEALTH CARE EDUCATION/TRAINING PROGRAM

## 2023-01-01 PROCEDURE — 88305 TISSUE EXAM BY PATHOLOGIST: CPT | Mod: 59,HCNC | Performed by: PATHOLOGY

## 2023-01-01 PROCEDURE — 1125F PR PAIN SEVERITY QUANTIFIED, PAIN PRESENT: ICD-10-PCS | Mod: HCNC,CPTII,S$GLB, | Performed by: FAMILY MEDICINE

## 2023-01-01 PROCEDURE — 87015 SPECIMEN INFECT AGNT CONCNTJ: CPT | Mod: 59,HCNC | Performed by: INTERNAL MEDICINE

## 2023-01-01 PROCEDURE — 83880 ASSAY OF NATRIURETIC PEPTIDE: CPT | Mod: HCNC | Performed by: STUDENT IN AN ORGANIZED HEALTH CARE EDUCATION/TRAINING PROGRAM

## 2023-01-01 PROCEDURE — 85025 COMPLETE CBC W/AUTO DIFF WBC: CPT | Mod: HCNC | Performed by: STUDENT IN AN ORGANIZED HEALTH CARE EDUCATION/TRAINING PROGRAM

## 2023-01-01 PROCEDURE — 1159F MED LIST DOCD IN RCRD: CPT | Mod: HCNC,CPTII,S$GLB, | Performed by: FAMILY MEDICINE

## 2023-01-01 PROCEDURE — 99285 EMERGENCY DEPT VISIT HI MDM: CPT | Mod: 25,HCNC

## 2023-01-01 PROCEDURE — 99214 OFFICE O/P EST MOD 30 MIN: CPT | Mod: HCNC,S$GLB,, | Performed by: FAMILY MEDICINE

## 2023-01-01 PROCEDURE — 25000003 PHARM REV CODE 250: Performed by: STUDENT IN AN ORGANIZED HEALTH CARE EDUCATION/TRAINING PROGRAM

## 2023-01-01 PROCEDURE — 80053 COMPREHEN METABOLIC PANEL: CPT | Mod: HCNC | Performed by: FAMILY MEDICINE

## 2023-01-01 PROCEDURE — 84100 ASSAY OF PHOSPHORUS: CPT | Mod: HCNC | Performed by: INTERNAL MEDICINE

## 2023-01-01 PROCEDURE — D9220A PRA ANESTHESIA: Mod: ANES,,, | Performed by: UROLOGY

## 2023-01-01 PROCEDURE — 99232 PR SUBSEQUENT HOSPITAL CARE,LEVL II: ICD-10-PCS | Mod: HCNC,,, | Performed by: INTERNAL MEDICINE

## 2023-01-01 PROCEDURE — 63600175 PHARM REV CODE 636 W HCPCS: Mod: HCNC | Performed by: STUDENT IN AN ORGANIZED HEALTH CARE EDUCATION/TRAINING PROGRAM

## 2023-01-01 PROCEDURE — 43255 PR EGD, FLEX, W/CTRL BLEED, ANY METHOD: ICD-10-PCS | Mod: HCNC,,, | Performed by: INTERNAL MEDICINE

## 2023-01-01 PROCEDURE — 25000003 PHARM REV CODE 250: Mod: HCNC | Performed by: HOSPITALIST

## 2023-01-01 PROCEDURE — 85014 HEMATOCRIT: CPT | Mod: HCNC | Performed by: INTERNAL MEDICINE

## 2023-01-01 PROCEDURE — 25000003 PHARM REV CODE 250: Mod: HCNC | Performed by: EMERGENCY MEDICINE

## 2023-01-01 PROCEDURE — 84484 ASSAY OF TROPONIN QUANT: CPT | Mod: HCNC | Performed by: SURGERY

## 2023-01-01 PROCEDURE — 94760 N-INVAS EAR/PLS OXIMETRY 1: CPT | Mod: HCNC

## 2023-01-01 PROCEDURE — 97161 PT EVAL LOW COMPLEX 20 MIN: CPT | Mod: HCNC

## 2023-01-01 PROCEDURE — 82330 ASSAY OF CALCIUM: CPT | Mod: HCNC | Performed by: NURSE PRACTITIONER

## 2023-01-01 PROCEDURE — 25500020 PHARM REV CODE 255: Performed by: STUDENT IN AN ORGANIZED HEALTH CARE EDUCATION/TRAINING PROGRAM

## 2023-01-01 PROCEDURE — U0002 COVID-19 LAB TEST NON-CDC: HCPCS | Mod: HCNC | Performed by: EMERGENCY MEDICINE

## 2023-01-01 PROCEDURE — 36415 COLL VENOUS BLD VENIPUNCTURE: CPT | Mod: HCNC | Performed by: HOSPITALIST

## 2023-01-01 PROCEDURE — 88305 TISSUE EXAM BY PATHOLOGIST: ICD-10-PCS | Mod: 26,HCNC,, | Performed by: PATHOLOGY

## 2023-01-01 PROCEDURE — 96368 THER/DIAG CONCURRENT INF: CPT | Mod: HCNC

## 2023-01-01 PROCEDURE — 1126F PR PAIN SEVERITY QUANTIFIED, NO PAIN PRESENT: ICD-10-PCS | Mod: HCNC,CPTII,S$GLB, | Performed by: FAMILY MEDICINE

## 2023-01-01 PROCEDURE — 37000008 HC ANESTHESIA 1ST 15 MINUTES: Mod: HCNC | Performed by: INTERNAL MEDICINE

## 2023-01-01 PROCEDURE — 63600175 PHARM REV CODE 636 W HCPCS: Mod: HCNC | Performed by: NURSE ANESTHETIST, CERTIFIED REGISTERED

## 2023-01-01 PROCEDURE — 82550 ASSAY OF CK (CPK): CPT | Mod: HCNC | Performed by: SURGERY

## 2023-01-01 PROCEDURE — 36600 WITHDRAWAL OF ARTERIAL BLOOD: CPT | Mod: HCNC

## 2023-01-01 PROCEDURE — 99214 PR OFFICE/OUTPT VISIT, EST, LEVL IV, 30-39 MIN: ICD-10-PCS | Mod: HCNC,S$GLB,, | Performed by: FAMILY MEDICINE

## 2023-01-01 PROCEDURE — 86922 COMPATIBILITY TEST ANTIGLOB: CPT | Mod: HCNC

## 2023-01-01 PROCEDURE — G0378 HOSPITAL OBSERVATION PER HR: HCPCS | Mod: HCNC

## 2023-01-01 PROCEDURE — 36415 COLL VENOUS BLD VENIPUNCTURE: CPT | Mod: HCNC | Performed by: SURGERY

## 2023-01-01 PROCEDURE — 25500020 PHARM REV CODE 255: Mod: HCNC | Performed by: INTERNAL MEDICINE

## 2023-01-01 PROCEDURE — 63600175 PHARM REV CODE 636 W HCPCS: Mod: HCNC | Performed by: EMERGENCY MEDICINE

## 2023-01-01 PROCEDURE — 96361 HYDRATE IV INFUSION ADD-ON: CPT | Mod: HCNC

## 2023-01-01 PROCEDURE — 87186 SC STD MICRODIL/AGAR DIL: CPT | Mod: HCNC | Performed by: INTERNAL MEDICINE

## 2023-01-01 PROCEDURE — 99999 PR PBB SHADOW E&M-EST. PATIENT-LVL III: CPT | Mod: PBBFAC,HCNC,, | Performed by: FAMILY MEDICINE

## 2023-01-01 PROCEDURE — 80048 BASIC METABOLIC PNL TOTAL CA: CPT | Mod: HCNC,XB | Performed by: INTERNAL MEDICINE

## 2023-01-01 PROCEDURE — 99223 1ST HOSP IP/OBS HIGH 75: CPT | Mod: HCNC,25,, | Performed by: INTERNAL MEDICINE

## 2023-01-01 PROCEDURE — 1111F PR DISCHARGE MEDS RECONCILED W/ CURRENT OUTPATIENT MED LIST: ICD-10-PCS | Mod: HCNC,CPTII,S$GLB, | Performed by: FAMILY MEDICINE

## 2023-01-01 PROCEDURE — 27200997: Mod: HCNC | Performed by: INTERNAL MEDICINE

## 2023-01-01 PROCEDURE — 83605 ASSAY OF LACTIC ACID: CPT | Mod: HCNC | Performed by: STUDENT IN AN ORGANIZED HEALTH CARE EDUCATION/TRAINING PROGRAM

## 2023-01-01 PROCEDURE — 97165 OT EVAL LOW COMPLEX 30 MIN: CPT | Mod: HCNC

## 2023-01-01 PROCEDURE — 27201012 HC FORCEPS, HOT/COLD, DISP: Mod: HCNC | Performed by: INTERNAL MEDICINE

## 2023-01-01 PROCEDURE — 99223 1ST HOSP IP/OBS HIGH 75: CPT | Mod: HCNC,,, | Performed by: INTERNAL MEDICINE

## 2023-01-01 PROCEDURE — 51798 US URINE CAPACITY MEASURE: CPT | Mod: HCNC

## 2023-01-01 PROCEDURE — 86922 COMPATIBILITY TEST ANTIGLOB: CPT | Mod: HCNC | Performed by: NURSE PRACTITIONER

## 2023-01-01 PROCEDURE — 97530 THERAPEUTIC ACTIVITIES: CPT | Mod: HCNC,CQ

## 2023-01-01 PROCEDURE — 99496 TRANSITIONAL CARE MANAGE SERVICE 7 DAY DISCHARGE: ICD-10-PCS | Mod: HCNC,S$GLB,, | Performed by: FAMILY MEDICINE

## 2023-01-01 PROCEDURE — 36430 TRANSFUSION BLD/BLD COMPNT: CPT | Mod: HCNC

## 2023-01-01 PROCEDURE — 3008F PR BODY MASS INDEX (BMI) DOCUMENTED: ICD-10-PCS | Mod: HCNC,CPTII,S$GLB, | Performed by: FAMILY MEDICINE

## 2023-01-01 PROCEDURE — 31623 DX BRONCHOSCOPE/BRUSH: CPT | Mod: HCNC | Performed by: INTERNAL MEDICINE

## 2023-01-01 PROCEDURE — 99900031 HC PATIENT EDUCATION (STAT): Mod: HCNC

## 2023-01-01 PROCEDURE — 93010 ELECTROCARDIOGRAM REPORT: CPT | Mod: HCNC,,, | Performed by: INTERNAL MEDICINE

## 2023-01-01 PROCEDURE — 85027 COMPLETE CBC AUTOMATED: CPT | Mod: HCNC | Performed by: INTERNAL MEDICINE

## 2023-01-01 PROCEDURE — 83690 ASSAY OF LIPASE: CPT | Mod: HCNC | Performed by: STUDENT IN AN ORGANIZED HEALTH CARE EDUCATION/TRAINING PROGRAM

## 2023-01-01 PROCEDURE — 97530 THERAPEUTIC ACTIVITIES: CPT | Mod: HCNC

## 2023-01-01 PROCEDURE — 99999 PR PBB SHADOW E&M-EST. PATIENT-LVL IV: ICD-10-PCS | Mod: PBBFAC,HCNC,, | Performed by: FAMILY MEDICINE

## 2023-01-01 PROCEDURE — 80048 BASIC METABOLIC PNL TOTAL CA: CPT | Mod: HCNC | Performed by: INTERNAL MEDICINE

## 2023-01-01 PROCEDURE — 96375 TX/PRO/DX INJ NEW DRUG ADDON: CPT | Mod: HCNC

## 2023-01-01 PROCEDURE — 25000003 PHARM REV CODE 250: Mod: HCNC | Performed by: INTERNAL MEDICINE

## 2023-01-01 PROCEDURE — 84484 ASSAY OF TROPONIN QUANT: CPT | Mod: 91,HCNC | Performed by: EMERGENCY MEDICINE

## 2023-01-01 PROCEDURE — 43255 EGD CONTROL BLEEDING ANY: CPT | Mod: HCNC | Performed by: INTERNAL MEDICINE

## 2023-01-01 PROCEDURE — P9021 RED BLOOD CELLS UNIT: HCPCS | Mod: HCNC | Performed by: NURSE PRACTITIONER

## 2023-01-01 PROCEDURE — 80053 COMPREHEN METABOLIC PANEL: CPT | Mod: HCNC | Performed by: EMERGENCY MEDICINE

## 2023-01-01 PROCEDURE — 80053 COMPREHEN METABOLIC PANEL: CPT | Mod: HCNC | Performed by: SURGERY

## 2023-01-01 PROCEDURE — 87116 MYCOBACTERIA CULTURE: CPT | Mod: HCNC | Performed by: INTERNAL MEDICINE

## 2023-01-01 PROCEDURE — 83880 ASSAY OF NATRIURETIC PEPTIDE: CPT | Mod: HCNC | Performed by: SURGERY

## 2023-01-01 PROCEDURE — 87502 INFLUENZA DNA AMP PROBE: CPT | Mod: HCNC | Performed by: STUDENT IN AN ORGANIZED HEALTH CARE EDUCATION/TRAINING PROGRAM

## 2023-01-01 PROCEDURE — 84484 ASSAY OF TROPONIN QUANT: CPT | Mod: HCNC | Performed by: STUDENT IN AN ORGANIZED HEALTH CARE EDUCATION/TRAINING PROGRAM

## 2023-01-01 PROCEDURE — 36415 COLL VENOUS BLD VENIPUNCTURE: CPT | Mod: HCNC

## 2023-01-01 PROCEDURE — 87070 CULTURE OTHR SPECIMN AEROBIC: CPT | Mod: HCNC | Performed by: INTERNAL MEDICINE

## 2023-01-01 PROCEDURE — 96367 TX/PROPH/DG ADDL SEQ IV INF: CPT | Mod: HCNC

## 2023-01-01 PROCEDURE — D9220A PRA ANESTHESIA: ICD-10-PCS | Mod: CRNA,,, | Performed by: NURSE ANESTHETIST, CERTIFIED REGISTERED

## 2023-01-01 PROCEDURE — 81003 URINALYSIS AUTO W/O SCOPE: CPT | Mod: HCNC | Performed by: STUDENT IN AN ORGANIZED HEALTH CARE EDUCATION/TRAINING PROGRAM

## 2023-01-01 PROCEDURE — 82803 BLOOD GASES ANY COMBINATION: CPT | Mod: HCNC | Performed by: EMERGENCY MEDICINE

## 2023-01-01 PROCEDURE — 84484 ASSAY OF TROPONIN QUANT: CPT | Mod: 91,HCNC | Performed by: SURGERY

## 2023-01-01 PROCEDURE — 99499 RISK ADDL DX/OHS AUDIT: ICD-10-PCS | Mod: HCNC,S$GLB,, | Performed by: FAMILY MEDICINE

## 2023-01-01 PROCEDURE — 93010 ELECTROCARDIOGRAM REPORT: CPT | Mod: HCNC,76,, | Performed by: INTERNAL MEDICINE

## 2023-01-01 PROCEDURE — 87205 SMEAR GRAM STAIN: CPT | Mod: HCNC | Performed by: INTERNAL MEDICINE

## 2023-01-01 PROCEDURE — 99213 OFFICE O/P EST LOW 20 MIN: CPT | Mod: HCNC,S$GLB,, | Performed by: FAMILY MEDICINE

## 2023-01-01 PROCEDURE — 3060F PR POS MICROALBUMINURIA RESULT DOCUMENTED/REVIEW: ICD-10-PCS | Mod: HCNC,CPTII,S$GLB, | Performed by: FAMILY MEDICINE

## 2023-01-01 PROCEDURE — 87634 RSV DNA/RNA AMP PROBE: CPT | Mod: HCNC | Performed by: STUDENT IN AN ORGANIZED HEALTH CARE EDUCATION/TRAINING PROGRAM

## 2023-01-01 PROCEDURE — 99499 UNLISTED E&M SERVICE: CPT | Mod: HCNC,S$GLB,, | Performed by: FAMILY MEDICINE

## 2023-01-01 PROCEDURE — 87634 RSV DNA/RNA AMP PROBE: CPT | Mod: HCNC | Performed by: EMERGENCY MEDICINE

## 2023-01-01 PROCEDURE — 94640 AIRWAY INHALATION TREATMENT: CPT | Mod: HCNC

## 2023-01-01 PROCEDURE — P9035 PLATELET PHERES LEUKOREDUCED: HCPCS | Mod: HCNC | Performed by: INTERNAL MEDICINE

## 2023-01-01 PROCEDURE — 97116 GAIT TRAINING THERAPY: CPT | Mod: HCNC

## 2023-01-01 PROCEDURE — 83880 ASSAY OF NATRIURETIC PEPTIDE: CPT | Mod: HCNC | Performed by: EMERGENCY MEDICINE

## 2023-01-01 PROCEDURE — 63600175 PHARM REV CODE 636 W HCPCS: Mod: HCNC | Performed by: PHYSICIAN ASSISTANT

## 2023-01-01 PROCEDURE — 83036 HEMOGLOBIN GLYCOSYLATED A1C: CPT | Mod: HCNC | Performed by: INTERNAL MEDICINE

## 2023-01-01 PROCEDURE — 90694 FLU VACCINE - QUADRIVALENT - ADJUVANTED: ICD-10-PCS | Mod: HCNC,S$GLB,, | Performed by: FAMILY MEDICINE

## 2023-01-01 PROCEDURE — 85027 COMPLETE CBC AUTOMATED: CPT | Mod: 91,HCNC | Performed by: INTERNAL MEDICINE

## 2023-01-01 PROCEDURE — 86905 BLOOD TYPING RBC ANTIGENS: CPT | Mod: HCNC | Performed by: STUDENT IN AN ORGANIZED HEALTH CARE EDUCATION/TRAINING PROGRAM

## 2023-01-01 PROCEDURE — 99291 CRITICAL CARE FIRST HOUR: CPT | Mod: HCNC

## 2023-01-01 PROCEDURE — 1126F AMNT PAIN NOTED NONE PRSNT: CPT | Mod: HCNC,CPTII,S$GLB, | Performed by: FAMILY MEDICINE

## 2023-01-01 PROCEDURE — 99223 PR INITIAL HOSPITAL CARE,LEVL III: ICD-10-PCS | Mod: HCNC,25,, | Performed by: INTERNAL MEDICINE

## 2023-01-01 PROCEDURE — 87102 FUNGUS ISOLATION CULTURE: CPT | Mod: HCNC | Performed by: INTERNAL MEDICINE

## 2023-01-01 PROCEDURE — 93010 EKG 12-LEAD: ICD-10-PCS | Mod: HCNC,76,, | Performed by: INTERNAL MEDICINE

## 2023-01-01 PROCEDURE — 84100 ASSAY OF PHOSPHORUS: CPT | Mod: HCNC

## 2023-01-01 PROCEDURE — 27000221 HC OXYGEN, UP TO 24 HOURS: Mod: HCNC

## 2023-01-01 PROCEDURE — 85610 PROTHROMBIN TIME: CPT | Mod: HCNC | Performed by: STUDENT IN AN ORGANIZED HEALTH CARE EDUCATION/TRAINING PROGRAM

## 2023-01-01 PROCEDURE — C9113 INJ PANTOPRAZOLE SODIUM, VIA: HCPCS | Mod: HCNC | Performed by: STUDENT IN AN ORGANIZED HEALTH CARE EDUCATION/TRAINING PROGRAM

## 2023-01-01 PROCEDURE — 1157F ADVNC CARE PLAN IN RCRD: CPT | Mod: HCNC,CPTII,S$GLB, | Performed by: FAMILY MEDICINE

## 2023-01-01 PROCEDURE — 36415 COLL VENOUS BLD VENIPUNCTURE: CPT | Mod: HCNC,59 | Performed by: EMERGENCY MEDICINE

## 2023-01-01 PROCEDURE — 88112 PR  CYTOPATH, CELL ENHANCE TECH: ICD-10-PCS | Mod: 26,HCNC,, | Performed by: PATHOLOGY

## 2023-01-01 PROCEDURE — 86870 RBC ANTIBODY IDENTIFICATION: CPT | Mod: HCNC | Performed by: STUDENT IN AN ORGANIZED HEALTH CARE EDUCATION/TRAINING PROGRAM

## 2023-01-01 PROCEDURE — 86900 BLOOD TYPING SEROLOGIC ABO: CPT | Mod: 91,HCNC | Performed by: STUDENT IN AN ORGANIZED HEALTH CARE EDUCATION/TRAINING PROGRAM

## 2023-01-01 PROCEDURE — 96375 TX/PRO/DX INJ NEW DRUG ADDON: CPT | Mod: HCNC,59

## 2023-01-01 PROCEDURE — 1111F DSCHRG MED/CURRENT MED MERGE: CPT | Mod: HCNC,CPTII,S$GLB, | Performed by: FAMILY MEDICINE

## 2023-01-01 PROCEDURE — 3078F DIAST BP <80 MM HG: CPT | Mod: HCNC,CPTII,S$GLB, | Performed by: FAMILY MEDICINE

## 2023-01-01 PROCEDURE — 99232 SBSQ HOSP IP/OBS MODERATE 35: CPT | Mod: HCNC,,, | Performed by: NURSE PRACTITIONER

## 2023-01-01 PROCEDURE — 90694 VACC AIIV4 NO PRSRV 0.5ML IM: CPT | Mod: HCNC,S$GLB,, | Performed by: FAMILY MEDICINE

## 2023-01-01 PROCEDURE — 96374 THER/PROPH/DIAG INJ IV PUSH: CPT | Mod: HCNC

## 2023-01-01 PROCEDURE — 99222 PR INITIAL HOSPITAL CARE,LEVL II: ICD-10-PCS | Mod: HCNC,,, | Performed by: PHYSICIAN ASSISTANT

## 2023-01-01 PROCEDURE — 86850 RBC ANTIBODY SCREEN: CPT | Mod: 91,HCNC | Performed by: STUDENT IN AN ORGANIZED HEALTH CARE EDUCATION/TRAINING PROGRAM

## 2023-01-01 PROCEDURE — 25000003 PHARM REV CODE 250: Mod: HCNC | Performed by: SURGERY

## 2023-01-01 PROCEDURE — 85025 COMPLETE CBC W/AUTO DIFF WBC: CPT | Mod: HCNC | Performed by: SURGERY

## 2023-01-01 PROCEDURE — 85027 COMPLETE CBC AUTOMATED: CPT | Mod: HCNC

## 2023-01-01 PROCEDURE — 36415 COLL VENOUS BLD VENIPUNCTURE: CPT | Mod: HCNC | Performed by: NURSE PRACTITIONER

## 2023-01-01 PROCEDURE — 37000009 HC ANESTHESIA EA ADD 15 MINS: Mod: HCNC | Performed by: INTERNAL MEDICINE

## 2023-01-01 PROCEDURE — 88112 CYTOPATH CELL ENHANCE TECH: CPT | Mod: 26,HCNC,, | Performed by: PATHOLOGY

## 2023-01-01 PROCEDURE — 87040 BLOOD CULTURE FOR BACTERIA: CPT | Mod: 59,HCNC | Performed by: STUDENT IN AN ORGANIZED HEALTH CARE EDUCATION/TRAINING PROGRAM

## 2023-01-01 PROCEDURE — 96360 HYDRATION IV INFUSION INIT: CPT | Mod: HCNC

## 2023-01-01 PROCEDURE — 96372 THER/PROPH/DIAG INJ SC/IM: CPT | Mod: 59 | Performed by: PHYSICIAN ASSISTANT

## 2023-01-01 PROCEDURE — 85018 HEMOGLOBIN: CPT | Mod: HCNC | Performed by: INTERNAL MEDICINE

## 2023-01-01 PROCEDURE — 99284 EMERGENCY DEPT VISIT MOD MDM: CPT | Mod: 25,HCNC

## 2023-01-01 PROCEDURE — 43255 EGD CONTROL BLEEDING ANY: CPT | Mod: HCNC,,, | Performed by: INTERNAL MEDICINE

## 2023-01-01 PROCEDURE — 87040 BLOOD CULTURE FOR BACTERIA: CPT | Mod: 59,HCNC | Performed by: EMERGENCY MEDICINE

## 2023-01-01 PROCEDURE — 93010 ELECTROCARDIOGRAM REPORT: CPT | Mod: 76,HCNC,, | Performed by: INTERNAL MEDICINE

## 2023-01-01 PROCEDURE — 99999 PR PBB SHADOW E&M-EST. PATIENT-LVL IV: CPT | Mod: PBBFAC,HCNC,, | Performed by: FAMILY MEDICINE

## 2023-01-01 PROCEDURE — 96365 THER/PROPH/DIAG IV INF INIT: CPT | Mod: HCNC,59

## 2023-01-01 PROCEDURE — 99233 SBSQ HOSP IP/OBS HIGH 50: CPT | Mod: HCNC,,, | Performed by: INTERNAL MEDICINE

## 2023-01-01 PROCEDURE — 96366 THER/PROPH/DIAG IV INF ADDON: CPT | Mod: HCNC

## 2023-01-01 PROCEDURE — 99496 TRANSJ CARE MGMT HIGH F2F 7D: CPT | Mod: HCNC,S$GLB,, | Performed by: FAMILY MEDICINE

## 2023-01-01 PROCEDURE — 99213 PR OFFICE/OUTPT VISIT, EST, LEVL III, 20-29 MIN: ICD-10-PCS | Mod: HCNC,S$GLB,, | Performed by: FAMILY MEDICINE

## 2023-01-01 PROCEDURE — 87077 CULTURE AEROBIC IDENTIFY: CPT | Mod: HCNC | Performed by: INTERNAL MEDICINE

## 2023-01-01 PROCEDURE — 93010 EKG 12-LEAD: ICD-10-PCS | Mod: 76,HCNC,, | Performed by: INTERNAL MEDICINE

## 2023-01-01 PROCEDURE — 99233 PR SUBSEQUENT HOSPITAL CARE,LEVL III: ICD-10-PCS | Mod: HCNC,,, | Performed by: INTERNAL MEDICINE

## 2023-01-01 PROCEDURE — 25000003 PHARM REV CODE 250: Mod: HCNC | Performed by: NURSE ANESTHETIST, CERTIFIED REGISTERED

## 2023-01-01 PROCEDURE — 85730 THROMBOPLASTIN TIME PARTIAL: CPT | Mod: HCNC | Performed by: EMERGENCY MEDICINE

## 2023-01-01 PROCEDURE — 99223 PR INITIAL HOSPITAL CARE,LEVL III: ICD-10-PCS | Mod: HCNC,,, | Performed by: INTERNAL MEDICINE

## 2023-01-01 PROCEDURE — 85025 COMPLETE CBC W/AUTO DIFF WBC: CPT | Mod: HCNC | Performed by: FAMILY MEDICINE

## 2023-01-01 PROCEDURE — 83036 HEMOGLOBIN GLYCOSYLATED A1C: CPT | Mod: HCNC | Performed by: FAMILY MEDICINE

## 2023-01-01 PROCEDURE — 81000 URINALYSIS NONAUTO W/SCOPE: CPT | Mod: HCNC | Performed by: STUDENT IN AN ORGANIZED HEALTH CARE EDUCATION/TRAINING PROGRAM

## 2023-01-01 PROCEDURE — 36415 COLL VENOUS BLD VENIPUNCTURE: CPT | Mod: HCNC | Performed by: FAMILY MEDICINE

## 2023-01-01 PROCEDURE — 11000001 HC ACUTE MED/SURG PRIVATE ROOM: Mod: HCNC

## 2023-01-01 PROCEDURE — 27000404 HC TUBE ASPIRATING LUKEN 3-1/4IN: Mod: HCNC | Performed by: INTERNAL MEDICINE

## 2023-01-01 PROCEDURE — 25500020 PHARM REV CODE 255: Mod: HCNC | Performed by: STUDENT IN AN ORGANIZED HEALTH CARE EDUCATION/TRAINING PROGRAM

## 2023-01-01 PROCEDURE — 81000 URINALYSIS NONAUTO W/SCOPE: CPT | Mod: HCNC | Performed by: SURGERY

## 2023-01-01 PROCEDURE — D9220A PRA ANESTHESIA: ICD-10-PCS | Mod: ANES,,, | Performed by: UROLOGY

## 2023-01-01 PROCEDURE — 96372 THER/PROPH/DIAG INJ SC/IM: CPT | Performed by: STUDENT IN AN ORGANIZED HEALTH CARE EDUCATION/TRAINING PROGRAM

## 2023-01-01 PROCEDURE — 87206 SMEAR FLUORESCENT/ACID STAI: CPT | Mod: 91,HCNC | Performed by: INTERNAL MEDICINE

## 2023-01-01 PROCEDURE — 31628 BRONCHOSCOPY/LUNG BX EACH: CPT | Mod: HCNC | Performed by: INTERNAL MEDICINE

## 2023-01-01 PROCEDURE — 25000242 PHARM REV CODE 250 ALT 637 W/ HCPCS: Mod: HCNC | Performed by: EMERGENCY MEDICINE

## 2023-01-01 PROCEDURE — 85610 PROTHROMBIN TIME: CPT | Mod: HCNC | Performed by: NURSE PRACTITIONER

## 2023-01-01 PROCEDURE — 85610 PROTHROMBIN TIME: CPT | Mod: HCNC | Performed by: EMERGENCY MEDICINE

## 2023-01-01 PROCEDURE — 88112 CYTOPATH CELL ENHANCE TECH: CPT | Mod: 59,HCNC | Performed by: PATHOLOGY

## 2023-01-01 PROCEDURE — 88305 TISSUE EXAM BY PATHOLOGIST: CPT | Mod: 26,HCNC,, | Performed by: PATHOLOGY

## 2023-01-01 PROCEDURE — 85730 THROMBOPLASTIN TIME PARTIAL: CPT | Mod: HCNC | Performed by: NURSE PRACTITIONER

## 2023-01-01 PROCEDURE — 87502 INFLUENZA DNA AMP PROBE: CPT | Mod: HCNC | Performed by: EMERGENCY MEDICINE

## 2023-01-01 RX ORDER — ALPRAZOLAM 0.25 MG/1
0.25 TABLET ORAL 2 TIMES DAILY PRN
Status: DISCONTINUED | OUTPATIENT
Start: 2023-01-01 | End: 2023-01-01 | Stop reason: HOSPADM

## 2023-01-01 RX ORDER — HYDROMORPHONE HYDROCHLORIDE 1 MG/ML
0.5 INJECTION, SOLUTION INTRAMUSCULAR; INTRAVENOUS; SUBCUTANEOUS
Status: COMPLETED | OUTPATIENT
Start: 2023-01-01 | End: 2023-01-01

## 2023-01-01 RX ORDER — SODIUM CHLORIDE 9 MG/ML
1000 INJECTION, SOLUTION INTRAVENOUS
Status: DISCONTINUED | OUTPATIENT
Start: 2023-01-01 | End: 2023-01-01

## 2023-01-01 RX ORDER — HYDROCODONE BITARTRATE AND ACETAMINOPHEN 10; 325 MG/1; MG/1
1 TABLET ORAL DAILY
Status: DISCONTINUED | OUTPATIENT
Start: 2023-01-01 | End: 2023-01-01 | Stop reason: HOSPADM

## 2023-01-01 RX ORDER — HYDROCODONE BITARTRATE AND ACETAMINOPHEN 500; 5 MG/1; MG/1
TABLET ORAL
Status: DISCONTINUED | OUTPATIENT
Start: 2023-01-01 | End: 2023-01-01

## 2023-01-01 RX ORDER — HYDROCODONE BITARTRATE AND ACETAMINOPHEN 10; 325 MG/1; MG/1
1 TABLET ORAL EVERY 12 HOURS PRN
Qty: 60 TABLET | Refills: 0 | Status: SHIPPED | OUTPATIENT
Start: 2023-01-01 | End: 2023-01-01 | Stop reason: SDUPTHER

## 2023-01-01 RX ORDER — GLUCAGON 1 MG
1 KIT INJECTION
Status: DISCONTINUED | OUTPATIENT
Start: 2023-01-01 | End: 2023-01-01 | Stop reason: HOSPADM

## 2023-01-01 RX ORDER — ATENOLOL 25 MG/1
25 TABLET ORAL DAILY
Status: DISCONTINUED | OUTPATIENT
Start: 2023-01-01 | End: 2023-01-01

## 2023-01-01 RX ORDER — NAPROXEN SODIUM 220 MG/1
81 TABLET, FILM COATED ORAL DAILY
Qty: 30 TABLET | Refills: 0 | Status: SHIPPED | OUTPATIENT
Start: 2023-01-01

## 2023-01-01 RX ORDER — ONDANSETRON 2 MG/ML
4 INJECTION INTRAMUSCULAR; INTRAVENOUS
Status: COMPLETED | OUTPATIENT
Start: 2023-01-01 | End: 2023-01-01

## 2023-01-01 RX ORDER — ICOSAPENT ETHYL 1000 MG/1
CAPSULE ORAL
Qty: 360 CAPSULE | Refills: 1 | Status: SHIPPED | OUTPATIENT
Start: 2023-01-01 | End: 2023-01-01 | Stop reason: SDUPTHER

## 2023-01-01 RX ORDER — INSULIN ASPART 100 [IU]/ML
0-5 INJECTION, SOLUTION INTRAVENOUS; SUBCUTANEOUS
Status: DISCONTINUED | OUTPATIENT
Start: 2023-01-01 | End: 2023-01-01 | Stop reason: HOSPADM

## 2023-01-01 RX ORDER — HYDRALAZINE HYDROCHLORIDE 20 MG/ML
10 INJECTION INTRAMUSCULAR; INTRAVENOUS EVERY 6 HOURS PRN
Status: DISCONTINUED | OUTPATIENT
Start: 2023-01-01 | End: 2023-01-01 | Stop reason: HOSPADM

## 2023-01-01 RX ORDER — IBUPROFEN 200 MG
24 TABLET ORAL
Status: DISCONTINUED | OUTPATIENT
Start: 2023-01-01 | End: 2023-01-01 | Stop reason: HOSPADM

## 2023-01-01 RX ORDER — ALBUTEROL SULFATE 0.83 MG/ML
SOLUTION RESPIRATORY (INHALATION)
COMMUNITY
Start: 2023-01-01

## 2023-01-01 RX ORDER — PROPOFOL 10 MG/ML
VIAL (ML) INTRAVENOUS
Status: DISCONTINUED | OUTPATIENT
Start: 2023-01-01 | End: 2023-01-01

## 2023-01-01 RX ORDER — LORAZEPAM 1 MG/1
1 TABLET ORAL
Status: COMPLETED | OUTPATIENT
Start: 2023-01-01 | End: 2023-01-01

## 2023-01-01 RX ORDER — AMIODARONE HYDROCHLORIDE 200 MG/1
100 TABLET ORAL DAILY
Qty: 15 TABLET | Refills: 5 | Status: ON HOLD | OUTPATIENT
Start: 2023-01-01 | End: 2023-01-01 | Stop reason: SDUPTHER

## 2023-01-01 RX ORDER — EZETIMIBE 10 MG/1
10 TABLET ORAL DAILY
Qty: 90 TABLET | Refills: 1 | Status: SHIPPED | OUTPATIENT
Start: 2023-01-01

## 2023-01-01 RX ORDER — FUROSEMIDE 10 MG/ML
40 INJECTION INTRAMUSCULAR; INTRAVENOUS
Status: COMPLETED | OUTPATIENT
Start: 2023-01-01 | End: 2023-01-01

## 2023-01-01 RX ORDER — ACETAMINOPHEN 325 MG/1
650 TABLET ORAL EVERY 4 HOURS PRN
Status: DISCONTINUED | OUTPATIENT
Start: 2023-01-01 | End: 2023-01-01 | Stop reason: HOSPADM

## 2023-01-01 RX ORDER — DOCUSATE SODIUM 100 MG/1
200 CAPSULE, LIQUID FILLED ORAL EVERY MORNING
Status: DISCONTINUED | OUTPATIENT
Start: 2023-01-01 | End: 2023-01-01 | Stop reason: HOSPADM

## 2023-01-01 RX ORDER — SODIUM CHLORIDE 0.9 % (FLUSH) 0.9 %
10 SYRINGE (ML) INJECTION
Status: DISCONTINUED | OUTPATIENT
Start: 2023-01-01 | End: 2023-01-01 | Stop reason: HOSPADM

## 2023-01-01 RX ORDER — HYDROCODONE BITARTRATE AND ACETAMINOPHEN 10; 325 MG/1; MG/1
1 TABLET ORAL EVERY 12 HOURS PRN
Qty: 60 TABLET | Refills: 0 | Status: SHIPPED | OUTPATIENT
Start: 2023-01-01 | End: 2023-01-01

## 2023-01-01 RX ORDER — PHENYLEPHRINE HYDROCHLORIDE 10 MG/ML
INJECTION INTRAVENOUS
Status: DISCONTINUED | OUTPATIENT
Start: 2023-01-01 | End: 2023-01-01

## 2023-01-01 RX ORDER — AMIODARONE HYDROCHLORIDE 200 MG/1
200 TABLET ORAL DAILY
Status: DISCONTINUED | OUTPATIENT
Start: 2023-01-01 | End: 2023-01-01

## 2023-01-01 RX ORDER — NOREPINEPHRINE BITARTRATE/D5W 4MG/250ML
PLASTIC BAG, INJECTION (ML) INTRAVENOUS
Status: COMPLETED
Start: 2023-01-01 | End: 2023-01-01

## 2023-01-01 RX ORDER — INDOMETHACIN 50 MG/1
50 CAPSULE ORAL EVERY 6 HOURS PRN
COMMUNITY
Start: 2023-01-01

## 2023-01-01 RX ORDER — TRAMADOL HYDROCHLORIDE 50 MG/1
50 TABLET ORAL
Status: COMPLETED | OUTPATIENT
Start: 2023-01-01 | End: 2023-01-01

## 2023-01-01 RX ORDER — PRIMIDONE 50 MG/1
50 TABLET ORAL 2 TIMES DAILY
Status: ON HOLD | COMMUNITY
End: 2023-01-01 | Stop reason: HOSPADM

## 2023-01-01 RX ORDER — EZETIMIBE 10 MG/1
10 TABLET ORAL DAILY
Status: DISCONTINUED | OUTPATIENT
Start: 2023-01-01 | End: 2023-01-01 | Stop reason: HOSPADM

## 2023-01-01 RX ORDER — ATENOLOL 25 MG/1
25 TABLET ORAL DAILY
Qty: 30 TABLET | Refills: 5 | Status: ON HOLD | OUTPATIENT
Start: 2023-01-01 | End: 2023-01-01

## 2023-01-01 RX ORDER — ICOSAPENT ETHYL 1000 MG/1
2 CAPSULE ORAL 2 TIMES DAILY
Qty: 360 CAPSULE | Refills: 1 | Status: SHIPPED | OUTPATIENT
Start: 2023-01-01

## 2023-01-01 RX ORDER — AMIODARONE HYDROCHLORIDE 200 MG/1
100 TABLET ORAL DAILY
Qty: 15 TABLET | Refills: 5 | Status: SHIPPED | OUTPATIENT
Start: 2023-01-01 | End: 2023-01-01 | Stop reason: SDUPTHER

## 2023-01-01 RX ORDER — VASOPRESSIN 20 [USP'U]/ML
INJECTION, SOLUTION INTRAMUSCULAR; SUBCUTANEOUS
Status: DISCONTINUED | OUTPATIENT
Start: 2023-01-01 | End: 2023-01-01

## 2023-01-01 RX ORDER — MORPHINE SULFATE 2 MG/ML
4 INJECTION, SOLUTION INTRAMUSCULAR; INTRAVENOUS
Status: COMPLETED | OUTPATIENT
Start: 2023-01-01 | End: 2023-01-01

## 2023-01-01 RX ORDER — ONDANSETRON 2 MG/ML
4 INJECTION INTRAMUSCULAR; INTRAVENOUS ONCE
Status: COMPLETED | OUTPATIENT
Start: 2023-01-01 | End: 2023-01-01

## 2023-01-01 RX ORDER — MORPHINE SULFATE 2 MG/ML
6 INJECTION, SOLUTION INTRAMUSCULAR; INTRAVENOUS
Status: COMPLETED | OUTPATIENT
Start: 2023-01-01 | End: 2023-01-01

## 2023-01-01 RX ORDER — GABAPENTIN 100 MG/1
200 CAPSULE ORAL 3 TIMES DAILY
Status: DISCONTINUED | OUTPATIENT
Start: 2023-01-01 | End: 2023-01-01

## 2023-01-01 RX ORDER — HYDROCODONE BITARTRATE AND ACETAMINOPHEN 500; 5 MG/1; MG/1
TABLET ORAL
Status: DISCONTINUED | OUTPATIENT
Start: 2023-01-01 | End: 2023-01-01 | Stop reason: HOSPADM

## 2023-01-01 RX ORDER — TALC
6 POWDER (GRAM) TOPICAL NIGHTLY PRN
Status: DISCONTINUED | OUTPATIENT
Start: 2023-01-01 | End: 2023-01-01 | Stop reason: HOSPADM

## 2023-01-01 RX ORDER — ONDANSETRON 2 MG/ML
4 INJECTION INTRAMUSCULAR; INTRAVENOUS EVERY 8 HOURS PRN
Status: DISCONTINUED | OUTPATIENT
Start: 2023-01-01 | End: 2023-01-01 | Stop reason: HOSPADM

## 2023-01-01 RX ORDER — CYPROHEPTADINE HYDROCHLORIDE 4 MG/1
4 TABLET ORAL 2 TIMES DAILY PRN
Qty: 30 TABLET | Refills: 5 | Status: SHIPPED | OUTPATIENT
Start: 2023-01-01

## 2023-01-01 RX ORDER — PANTOPRAZOLE SODIUM 40 MG/10ML
40 INJECTION, POWDER, LYOPHILIZED, FOR SOLUTION INTRAVENOUS 2 TIMES DAILY
Status: DISCONTINUED | OUTPATIENT
Start: 2023-01-01 | End: 2023-01-01 | Stop reason: HOSPADM

## 2023-01-01 RX ORDER — MORPHINE SULFATE 2 MG/ML
2 INJECTION, SOLUTION INTRAMUSCULAR; INTRAVENOUS EVERY 4 HOURS PRN
Status: DISCONTINUED | OUTPATIENT
Start: 2023-01-01 | End: 2023-01-01 | Stop reason: HOSPADM

## 2023-01-01 RX ORDER — MIDAZOLAM HYDROCHLORIDE 1 MG/ML
2 INJECTION INTRAMUSCULAR; INTRAVENOUS
Status: DISCONTINUED | OUTPATIENT
Start: 2023-01-01 | End: 2023-01-01 | Stop reason: HOSPADM

## 2023-01-01 RX ORDER — METRONIDAZOLE 500 MG/100ML
500 INJECTION, SOLUTION INTRAVENOUS
Status: COMPLETED | OUTPATIENT
Start: 2023-01-01 | End: 2023-01-01

## 2023-01-01 RX ORDER — HYDROCODONE BITARTRATE AND ACETAMINOPHEN 10; 325 MG/1; MG/1
TABLET ORAL
Qty: 60 TABLET | Refills: 0 | Status: SHIPPED | OUTPATIENT
Start: 2023-01-01 | End: 2023-01-01 | Stop reason: SDUPTHER

## 2023-01-01 RX ORDER — AMIODARONE HYDROCHLORIDE 200 MG/1
200 TABLET ORAL 2 TIMES DAILY
Status: DISCONTINUED | OUTPATIENT
Start: 2023-01-01 | End: 2023-01-01 | Stop reason: HOSPADM

## 2023-01-01 RX ORDER — OXYCODONE AND ACETAMINOPHEN 10; 325 MG/1; MG/1
1 TABLET ORAL EVERY 4 HOURS PRN
Qty: 30 TABLET | Refills: 0 | Status: SHIPPED | OUTPATIENT
Start: 2023-01-01

## 2023-01-01 RX ORDER — FUROSEMIDE 10 MG/ML
80 INJECTION INTRAMUSCULAR; INTRAVENOUS
Status: COMPLETED | OUTPATIENT
Start: 2023-01-01 | End: 2023-01-01

## 2023-01-01 RX ORDER — PIOGLITAZONEHYDROCHLORIDE 15 MG/1
TABLET ORAL
Qty: 30 TABLET | Refills: 5 | Status: SHIPPED | OUTPATIENT
Start: 2023-01-01 | End: 2023-01-01 | Stop reason: SDUPTHER

## 2023-01-01 RX ORDER — CARVEDILOL 3.12 MG/1
6.25 TABLET ORAL 2 TIMES DAILY
Status: DISCONTINUED | OUTPATIENT
Start: 2023-01-01 | End: 2023-01-01 | Stop reason: HOSPADM

## 2023-01-01 RX ORDER — ENOXAPARIN SODIUM 100 MG/ML
1 INJECTION SUBCUTANEOUS
Status: COMPLETED | OUTPATIENT
Start: 2023-01-01 | End: 2023-01-01

## 2023-01-01 RX ORDER — EZETIMIBE 10 MG/1
10 TABLET ORAL DAILY
Status: DISCONTINUED | OUTPATIENT
Start: 2023-01-01 | End: 2023-01-01

## 2023-01-01 RX ORDER — ISOSORBIDE MONONITRATE 60 MG/1
60 TABLET, EXTENDED RELEASE ORAL DAILY
Status: DISCONTINUED | OUTPATIENT
Start: 2023-01-01 | End: 2023-01-01 | Stop reason: HOSPADM

## 2023-01-01 RX ORDER — IPRATROPIUM BROMIDE AND ALBUTEROL SULFATE 2.5; .5 MG/3ML; MG/3ML
3 SOLUTION RESPIRATORY (INHALATION)
Status: COMPLETED | OUTPATIENT
Start: 2023-01-01 | End: 2023-01-01

## 2023-01-01 RX ORDER — LIDOCAINE HYDROCHLORIDE 20 MG/ML
2 JELLY TOPICAL ONCE
Status: DISCONTINUED | OUTPATIENT
Start: 2023-01-01 | End: 2023-01-01 | Stop reason: HOSPADM

## 2023-01-01 RX ORDER — ONDANSETRON 4 MG/1
4 TABLET, FILM COATED ORAL EVERY 12 HOURS PRN
COMMUNITY
Start: 2023-01-01

## 2023-01-01 RX ORDER — ALPRAZOLAM 0.25 MG/1
0.25 TABLET ORAL NIGHTLY
Status: DISCONTINUED | OUTPATIENT
Start: 2023-01-01 | End: 2023-01-01 | Stop reason: HOSPADM

## 2023-01-01 RX ORDER — SODIUM CHLORIDE 9 MG/ML
INJECTION, SOLUTION INTRAVENOUS CONTINUOUS
Status: DISCONTINUED | OUTPATIENT
Start: 2023-01-01 | End: 2023-01-01 | Stop reason: HOSPADM

## 2023-01-01 RX ORDER — IBUPROFEN 200 MG
16 TABLET ORAL
Status: DISCONTINUED | OUTPATIENT
Start: 2023-01-01 | End: 2023-01-01 | Stop reason: HOSPADM

## 2023-01-01 RX ORDER — ONDANSETRON 2 MG/ML
8 INJECTION INTRAMUSCULAR; INTRAVENOUS
Status: DISCONTINUED | OUTPATIENT
Start: 2023-01-01 | End: 2023-01-01 | Stop reason: HOSPADM

## 2023-01-01 RX ORDER — INSULIN ASPART 100 [IU]/ML
0-5 INJECTION, SOLUTION INTRAVENOUS; SUBCUTANEOUS EVERY 6 HOURS PRN
Status: DISCONTINUED | OUTPATIENT
Start: 2023-01-01 | End: 2023-01-01 | Stop reason: HOSPADM

## 2023-01-01 RX ORDER — ICOSAPENT ETHYL 1000 MG/1
CAPSULE ORAL
Qty: 360 CAPSULE | Refills: 0 | Status: SHIPPED | OUTPATIENT
Start: 2023-01-01 | End: 2023-01-01

## 2023-01-01 RX ORDER — LIDOCAINE HYDROCHLORIDE 40 MG/ML
4 INJECTION, SOLUTION RETROBULBAR ONCE
Status: COMPLETED | OUTPATIENT
Start: 2023-01-01 | End: 2023-01-01

## 2023-01-01 RX ORDER — ALPRAZOLAM 0.25 MG/1
0.5 TABLET ORAL 2 TIMES DAILY PRN
Status: DISCONTINUED | OUTPATIENT
Start: 2023-01-01 | End: 2023-01-01

## 2023-01-01 RX ORDER — NOREPINEPHRINE BITARTRATE/D5W 4MG/250ML
0-3 PLASTIC BAG, INJECTION (ML) INTRAVENOUS CONTINUOUS
Status: DISCONTINUED | OUTPATIENT
Start: 2023-01-01 | End: 2023-01-01

## 2023-01-01 RX ORDER — PIOGLITAZONEHYDROCHLORIDE 15 MG/1
15 TABLET ORAL DAILY
Qty: 30 TABLET | Refills: 5 | Status: SHIPPED | OUTPATIENT
Start: 2023-01-01

## 2023-01-01 RX ORDER — HEPARIN SODIUM,PORCINE/D5W 25000/250
0-40 INTRAVENOUS SOLUTION INTRAVENOUS CONTINUOUS
Status: DISCONTINUED | OUTPATIENT
Start: 2023-01-01 | End: 2023-01-01 | Stop reason: HOSPADM

## 2023-01-01 RX ORDER — ROSUVASTATIN CALCIUM 10 MG/1
10 TABLET, COATED ORAL NIGHTLY
COMMUNITY
Start: 2023-01-01 | End: 2023-01-01 | Stop reason: SINTOL

## 2023-01-01 RX ORDER — FENTANYL CITRATE 50 UG/ML
25 INJECTION, SOLUTION INTRAMUSCULAR; INTRAVENOUS
Status: DISCONTINUED | OUTPATIENT
Start: 2023-01-01 | End: 2023-01-01 | Stop reason: HOSPADM

## 2023-01-01 RX ORDER — ASPIRIN 325 MG
325 TABLET ORAL
Status: COMPLETED | OUTPATIENT
Start: 2023-01-01 | End: 2023-01-01

## 2023-01-01 RX ORDER — GABAPENTIN 100 MG/1
200 CAPSULE ORAL 3 TIMES DAILY
Status: DISCONTINUED | OUTPATIENT
Start: 2023-01-01 | End: 2023-01-01 | Stop reason: HOSPADM

## 2023-01-01 RX ORDER — ALPRAZOLAM 0.25 MG/1
TABLET ORAL
Qty: 60 TABLET | Refills: 2 | Status: SHIPPED | OUTPATIENT
Start: 2023-01-01

## 2023-01-01 RX ORDER — PROPOFOL 10 MG/ML
VIAL (ML) INTRAVENOUS CONTINUOUS PRN
Status: DISCONTINUED | OUTPATIENT
Start: 2023-01-01 | End: 2023-01-01

## 2023-01-01 RX ORDER — ATENOLOL 25 MG/1
25 TABLET ORAL DAILY
Qty: 30 TABLET | Refills: 5 | Status: SHIPPED | OUTPATIENT
Start: 2023-01-01 | End: 2023-01-01 | Stop reason: SDUPTHER

## 2023-01-01 RX ORDER — HYDROCODONE BITARTRATE AND ACETAMINOPHEN 10; 325 MG/1; MG/1
1 TABLET ORAL EVERY 6 HOURS PRN
Qty: 60 TABLET | Refills: 0 | Status: SHIPPED | OUTPATIENT
Start: 2023-01-01

## 2023-01-01 RX ORDER — FUROSEMIDE 20 MG/1
20 TABLET ORAL DAILY
Start: 2023-01-01

## 2023-01-01 RX ORDER — CARVEDILOL 3.12 MG/1
6.25 TABLET ORAL 2 TIMES DAILY
Status: DISCONTINUED | OUTPATIENT
Start: 2023-01-01 | End: 2023-01-01

## 2023-01-01 RX ORDER — CARVEDILOL 6.25 MG/1
6.25 TABLET ORAL 2 TIMES DAILY
COMMUNITY
Start: 2023-01-01

## 2023-01-01 RX ORDER — EVOLOCUMAB 140 MG/ML
INJECTION, SOLUTION SUBCUTANEOUS
COMMUNITY
Start: 2022-08-25

## 2023-01-01 RX ORDER — MUPIROCIN 20 MG/G
OINTMENT TOPICAL 2 TIMES DAILY
Status: DISCONTINUED | OUTPATIENT
Start: 2023-01-01 | End: 2023-01-01 | Stop reason: HOSPADM

## 2023-01-01 RX ORDER — LIDOCAINE HYDROCHLORIDE 20 MG/ML
INJECTION, SOLUTION EPIDURAL; INFILTRATION; INTRACAUDAL; PERINEURAL
Status: DISCONTINUED | OUTPATIENT
Start: 2023-01-01 | End: 2023-01-01

## 2023-01-01 RX ORDER — GABAPENTIN 100 MG/1
200 CAPSULE ORAL 2 TIMES DAILY
Status: DISCONTINUED | OUTPATIENT
Start: 2023-01-01 | End: 2023-01-01 | Stop reason: HOSPADM

## 2023-01-01 RX ORDER — AMIODARONE HYDROCHLORIDE 200 MG/1
100 TABLET ORAL DAILY
Start: 2023-01-01

## 2023-01-01 RX ORDER — LIDOCAINE HYDROCHLORIDE 20 MG/ML
JELLY TOPICAL
Status: DISCONTINUED | OUTPATIENT
Start: 2023-01-01 | End: 2023-01-01 | Stop reason: HOSPADM

## 2023-01-01 RX ORDER — DEXAMETHASONE SODIUM PHOSPHATE 4 MG/ML
8 INJECTION, SOLUTION INTRA-ARTICULAR; INTRALESIONAL; INTRAMUSCULAR; INTRAVENOUS; SOFT TISSUE
Status: COMPLETED | OUTPATIENT
Start: 2023-01-01 | End: 2023-01-01

## 2023-01-01 RX ORDER — GABAPENTIN 100 MG/1
CAPSULE ORAL
COMMUNITY
Start: 2022-08-16

## 2023-01-01 RX ORDER — POLYETHYLENE GLYCOL 3350, SODIUM SULFATE ANHYDROUS, SODIUM BICARBONATE, SODIUM CHLORIDE, POTASSIUM CHLORIDE 236; 22.74; 6.74; 5.86; 2.97 G/4L; G/4L; G/4L; G/4L; G/4L
4000 POWDER, FOR SOLUTION ORAL ONCE
Status: COMPLETED | OUTPATIENT
Start: 2023-01-01 | End: 2023-01-01

## 2023-01-01 RX ORDER — FUROSEMIDE 10 MG/ML
20 INJECTION INTRAMUSCULAR; INTRAVENOUS ONCE
Status: COMPLETED | OUTPATIENT
Start: 2023-01-01 | End: 2023-01-01

## 2023-01-01 RX ORDER — SODIUM CHLORIDE 9 MG/ML
1000 INJECTION, SOLUTION INTRAVENOUS
Status: COMPLETED | OUTPATIENT
Start: 2023-01-01 | End: 2023-01-01

## 2023-01-01 RX ORDER — ALPRAZOLAM 0.25 MG/1
TABLET ORAL
Qty: 60 TABLET | Refills: 2 | Status: SHIPPED | OUTPATIENT
Start: 2023-01-01 | End: 2023-01-01 | Stop reason: SDUPTHER

## 2023-01-01 RX ORDER — LIDOCAINE HYDROCHLORIDE 40 MG/ML
4 INJECTION, SOLUTION RETROBULBAR ONCE
Status: DISCONTINUED | OUTPATIENT
Start: 2023-01-01 | End: 2023-01-01 | Stop reason: HOSPADM

## 2023-01-01 RX ORDER — PANTOPRAZOLE SODIUM 40 MG/10ML
80 INJECTION, POWDER, LYOPHILIZED, FOR SOLUTION INTRAVENOUS
Status: COMPLETED | OUTPATIENT
Start: 2023-01-01 | End: 2023-01-01

## 2023-01-01 RX ORDER — ISOSORBIDE MONONITRATE 60 MG/1
60 TABLET, EXTENDED RELEASE ORAL DAILY
Status: DISCONTINUED | OUTPATIENT
Start: 2023-01-01 | End: 2023-01-01

## 2023-01-01 RX ORDER — OXYCODONE AND ACETAMINOPHEN 5; 325 MG/1; MG/1
2 TABLET ORAL
Status: COMPLETED | OUTPATIENT
Start: 2023-01-01 | End: 2023-01-01

## 2023-01-01 RX ORDER — PRAVASTATIN SODIUM 10 MG/1
20 TABLET ORAL DAILY
Status: DISCONTINUED | OUTPATIENT
Start: 2023-01-01 | End: 2023-01-01 | Stop reason: HOSPADM

## 2023-01-01 RX ORDER — FENTANYL CITRATE 50 UG/ML
INJECTION, SOLUTION INTRAMUSCULAR; INTRAVENOUS
Status: DISCONTINUED | OUTPATIENT
Start: 2023-01-01 | End: 2023-01-01 | Stop reason: HOSPADM

## 2023-01-01 RX ORDER — IPRATROPIUM BROMIDE 0.5 MG/2.5ML
SOLUTION RESPIRATORY (INHALATION) 4 TIMES DAILY
COMMUNITY
Start: 2023-01-01

## 2023-01-01 RX ORDER — OXYCODONE AND ACETAMINOPHEN 10; 325 MG/1; MG/1
1 TABLET ORAL
Status: COMPLETED | OUTPATIENT
Start: 2023-01-01 | End: 2023-01-01

## 2023-01-01 RX ORDER — AMIODARONE HYDROCHLORIDE 100 MG/1
100 TABLET ORAL DAILY
Status: DISCONTINUED | OUTPATIENT
Start: 2023-01-01 | End: 2023-01-01 | Stop reason: HOSPADM

## 2023-01-01 RX ORDER — HYDROMORPHONE HYDROCHLORIDE 1 MG/ML
1 INJECTION, SOLUTION INTRAMUSCULAR; INTRAVENOUS; SUBCUTANEOUS
Status: COMPLETED | OUTPATIENT
Start: 2023-01-01 | End: 2023-01-01

## 2023-01-01 RX ADMIN — ONDANSETRON 4 MG: 2 INJECTION INTRAMUSCULAR; INTRAVENOUS at 11:12

## 2023-01-01 RX ADMIN — MUPIROCIN: 20 OINTMENT TOPICAL at 08:08

## 2023-01-01 RX ADMIN — ONDANSETRON 4 MG: 2 INJECTION INTRAMUSCULAR; INTRAVENOUS at 04:12

## 2023-01-01 RX ADMIN — PANTOPRAZOLE SODIUM 40 MG: 40 INJECTION, POWDER, LYOPHILIZED, FOR SOLUTION INTRAVENOUS at 08:08

## 2023-01-01 RX ADMIN — GABAPENTIN 200 MG: 100 CAPSULE ORAL at 09:08

## 2023-01-01 RX ADMIN — PHENYLEPHRINE HYDROCHLORIDE 200 MCG: 10 INJECTION INTRAVENOUS at 03:08

## 2023-01-01 RX ADMIN — GABAPENTIN 200 MG: 100 CAPSULE ORAL at 03:08

## 2023-01-01 RX ADMIN — LIDOCAINE HYDROCHLORIDE 4 ML: 40 INJECTION, SOLUTION RETROBULBAR; TOPICAL at 01:11

## 2023-01-01 RX ADMIN — GLYCOPYRROLATE 0.1 MG: 0.2 INJECTION, SOLUTION INTRAMUSCULAR; INTRAVITREAL at 02:08

## 2023-01-01 RX ADMIN — EZETIMIBE 10 MG: 10 TABLET ORAL at 09:08

## 2023-01-01 RX ADMIN — MORPHINE SULFATE 6 MG: 2 INJECTION, SOLUTION INTRAMUSCULAR; INTRAVENOUS at 09:12

## 2023-01-01 RX ADMIN — PRAVASTATIN SODIUM 20 MG: 20 TABLET ORAL at 08:08

## 2023-01-01 RX ADMIN — POLYETHYLENE GLYCOL 3350, SODIUM SULFATE ANHYDROUS, SODIUM BICARBONATE, SODIUM CHLORIDE, POTASSIUM CHLORIDE 4000 ML: 236; 22.74; 6.74; 5.86; 2.97 POWDER, FOR SOLUTION ORAL at 05:08

## 2023-01-01 RX ADMIN — GABAPENTIN 200 MG: 100 CAPSULE ORAL at 08:08

## 2023-01-01 RX ADMIN — LIDOCAINE HYDROCHLORIDE 65 MG: 20 INJECTION, SOLUTION EPIDURAL; INFILTRATION; INTRACAUDAL; PERINEURAL at 03:08

## 2023-01-01 RX ADMIN — ENOXAPARIN SODIUM 80 MG: 80 INJECTION SUBCUTANEOUS at 11:12

## 2023-01-01 RX ADMIN — HYDROMORPHONE HYDROCHLORIDE 0.5 MG: 1 INJECTION, SOLUTION INTRAMUSCULAR; INTRAVENOUS; SUBCUTANEOUS at 10:12

## 2023-01-01 RX ADMIN — MORPHINE SULFATE 4 MG: 2 INJECTION, SOLUTION INTRAMUSCULAR; INTRAVENOUS at 02:11

## 2023-01-01 RX ADMIN — INSULIN ASPART 2 UNITS: 100 INJECTION, SOLUTION INTRAVENOUS; SUBCUTANEOUS at 12:11

## 2023-01-01 RX ADMIN — ONDANSETRON HYDROCHLORIDE 4 MG: 2 SOLUTION INTRAMUSCULAR; INTRAVENOUS at 06:08

## 2023-01-01 RX ADMIN — EZETIMIBE 10 MG: 10 TABLET ORAL at 10:11

## 2023-01-01 RX ADMIN — PROPOFOL 10 MG: 10 INJECTION, EMULSION INTRAVENOUS at 03:08

## 2023-01-01 RX ADMIN — ASPIRIN 325 MG ORAL TABLET 325 MG: 325 PILL ORAL at 10:12

## 2023-01-01 RX ADMIN — SODIUM CHLORIDE 500 ML: 9 INJECTION, SOLUTION INTRAVENOUS at 04:12

## 2023-01-01 RX ADMIN — FUROSEMIDE 80 MG: 10 INJECTION, SOLUTION INTRAVENOUS at 04:12

## 2023-01-01 RX ADMIN — PANTOPRAZOLE SODIUM 40 MG: 40 INJECTION, POWDER, LYOPHILIZED, FOR SOLUTION INTRAVENOUS at 09:08

## 2023-01-01 RX ADMIN — ONDANSETRON HYDROCHLORIDE 4 MG: 2 SOLUTION INTRAMUSCULAR; INTRAVENOUS at 02:08

## 2023-01-01 RX ADMIN — ASPIRIN 325 MG ORAL TABLET 325 MG: 325 PILL ORAL at 12:12

## 2023-01-01 RX ADMIN — ONDANSETRON 4 MG: 2 INJECTION INTRAMUSCULAR; INTRAVENOUS at 06:11

## 2023-01-01 RX ADMIN — HEPARIN SODIUM 12 UNITS/KG/HR: 10000 INJECTION, SOLUTION INTRAVENOUS at 12:12

## 2023-01-01 RX ADMIN — APIXABAN 5 MG: 5 TABLET, FILM COATED ORAL at 10:11

## 2023-01-01 RX ADMIN — EZETIMIBE 10 MG: 10 TABLET ORAL at 08:08

## 2023-01-01 RX ADMIN — MORPHINE SULFATE 4 MG: 2 INJECTION, SOLUTION INTRAMUSCULAR; INTRAVENOUS at 02:12

## 2023-01-01 RX ADMIN — IOHEXOL 75 ML: 350 INJECTION, SOLUTION INTRAVENOUS at 03:11

## 2023-01-01 RX ADMIN — OXYCODONE HYDROCHLORIDE AND ACETAMINOPHEN 1 TABLET: 10; 325 TABLET ORAL at 06:12

## 2023-01-01 RX ADMIN — SODIUM CHLORIDE, POTASSIUM CHLORIDE, SODIUM LACTATE AND CALCIUM CHLORIDE 1000 ML: 600; 310; 30; 20 INJECTION, SOLUTION INTRAVENOUS at 02:08

## 2023-01-01 RX ADMIN — DOCUSATE SODIUM 200 MG: 100 CAPSULE, LIQUID FILLED ORAL at 10:11

## 2023-01-01 RX ADMIN — NOREPINEPHRINE BITARTRATE 0.02 MCG/KG/MIN: 4 INJECTION, SOLUTION INTRAVENOUS at 10:08

## 2023-01-01 RX ADMIN — PANTOPRAZOLE SODIUM 80 MG: 40 INJECTION, POWDER, FOR SOLUTION INTRAVENOUS at 08:08

## 2023-01-01 RX ADMIN — VASOPRESSIN 2 UNITS: 20 INJECTION INTRAVENOUS at 03:08

## 2023-01-01 RX ADMIN — ONDANSETRON HYDROCHLORIDE 4 MG: 2 SOLUTION INTRAMUSCULAR; INTRAVENOUS at 04:08

## 2023-01-01 RX ADMIN — PRAVASTATIN SODIUM 20 MG: 20 TABLET ORAL at 09:08

## 2023-01-01 RX ADMIN — MUPIROCIN: 20 OINTMENT TOPICAL at 09:08

## 2023-01-01 RX ADMIN — IPRATROPIUM BROMIDE AND ALBUTEROL SULFATE 3 ML: 2.5; .5 SOLUTION RESPIRATORY (INHALATION) at 12:12

## 2023-01-01 RX ADMIN — DEXAMETHASONE SODIUM PHOSPHATE 8 MG: 4 INJECTION, SOLUTION INTRA-ARTICULAR; INTRALESIONAL; INTRAMUSCULAR; INTRAVENOUS; SOFT TISSUE at 10:12

## 2023-01-01 RX ADMIN — FUROSEMIDE 20 MG: 10 INJECTION, SOLUTION INTRAMUSCULAR; INTRAVENOUS at 09:08

## 2023-01-01 RX ADMIN — SODIUM CHLORIDE 1000 ML: 9 INJECTION, SOLUTION INTRAVENOUS at 06:08

## 2023-01-01 RX ADMIN — AZITHROMYCIN MONOHYDRATE 500 MG: 500 INJECTION, POWDER, LYOPHILIZED, FOR SOLUTION INTRAVENOUS at 06:11

## 2023-01-01 RX ADMIN — ISOSORBIDE MONONITRATE 60 MG: 60 TABLET, EXTENDED RELEASE ORAL at 10:11

## 2023-01-01 RX ADMIN — HYDROMORPHONE HYDROCHLORIDE 1 MG: 1 INJECTION, SOLUTION INTRAMUSCULAR; INTRAVENOUS; SUBCUTANEOUS at 11:12

## 2023-01-01 RX ADMIN — FUROSEMIDE 80 MG: 10 INJECTION, SOLUTION INTRAVENOUS at 12:12

## 2023-01-01 RX ADMIN — AMIODARONE HYDROCHLORIDE 100 MG: 100 TABLET ORAL at 08:08

## 2023-01-01 RX ADMIN — IOHEXOL 75 ML: 350 INJECTION, SOLUTION INTRAVENOUS at 10:12

## 2023-01-01 RX ADMIN — SODIUM CHLORIDE 250 ML: 9 INJECTION, SOLUTION INTRAVENOUS at 07:08

## 2023-01-01 RX ADMIN — ONDANSETRON 4 MG: 2 INJECTION INTRAMUSCULAR; INTRAVENOUS at 09:12

## 2023-01-01 RX ADMIN — ALPRAZOLAM 0.25 MG: 0.25 TABLET ORAL at 09:08

## 2023-01-01 RX ADMIN — PANTOPRAZOLE SODIUM 8 MG/HR: 40 INJECTION, POWDER, FOR SOLUTION INTRAVENOUS at 10:08

## 2023-01-01 RX ADMIN — METRONIDAZOLE 500 MG: 500 INJECTION, SOLUTION INTRAVENOUS at 01:12

## 2023-01-01 RX ADMIN — Medication 0.02 MCG/KG/MIN: at 10:08

## 2023-01-01 RX ADMIN — ASPIRIN 325 MG ORAL TABLET 325 MG: 325 PILL ORAL at 06:11

## 2023-01-01 RX ADMIN — MORPHINE SULFATE 4 MG: 2 INJECTION, SOLUTION INTRAMUSCULAR; INTRAVENOUS at 04:12

## 2023-01-01 RX ADMIN — FUROSEMIDE 40 MG: 10 INJECTION, SOLUTION INTRAVENOUS at 11:12

## 2023-01-01 RX ADMIN — GABAPENTIN 200 MG: 100 CAPSULE ORAL at 10:11

## 2023-01-01 RX ADMIN — OXYCODONE HYDROCHLORIDE AND ACETAMINOPHEN 2 TABLET: 5; 325 TABLET ORAL at 12:12

## 2023-01-01 RX ADMIN — NITROGLYCERIN 0.5 INCH: 20 OINTMENT TOPICAL at 11:12

## 2023-01-01 RX ADMIN — INSULIN ASPART 3 UNITS: 100 INJECTION, SOLUTION INTRAVENOUS; SUBCUTANEOUS at 04:08

## 2023-01-01 RX ADMIN — AMIODARONE HYDROCHLORIDE 100 MG: 100 TABLET ORAL at 09:08

## 2023-01-01 RX ADMIN — CEFTRIAXONE SODIUM 1 G: 1 INJECTION, POWDER, FOR SOLUTION INTRAMUSCULAR; INTRAVENOUS at 05:11

## 2023-01-01 RX ADMIN — CARVEDILOL 6.25 MG: 3.12 TABLET, FILM COATED ORAL at 10:11

## 2023-01-01 RX ADMIN — TRAMADOL HYDROCHLORIDE 50 MG: 50 TABLET, COATED ORAL at 06:11

## 2023-01-01 RX ADMIN — MORPHINE SULFATE 4 MG: 2 INJECTION, SOLUTION INTRAMUSCULAR; INTRAVENOUS at 07:11

## 2023-01-01 RX ADMIN — FUROSEMIDE 40 MG: 10 INJECTION, SOLUTION INTRAMUSCULAR; INTRAVENOUS at 05:11

## 2023-01-01 RX ADMIN — PIPERACILLIN AND TAZOBACTAM 4.5 G: 4; .5 INJECTION, POWDER, LYOPHILIZED, FOR SOLUTION INTRAVENOUS; PARENTERAL at 02:12

## 2023-01-01 RX ADMIN — SODIUM CHLORIDE 1000 ML: 9 INJECTION, SOLUTION INTRAVENOUS at 11:12

## 2023-01-01 RX ADMIN — HYDROCODONE BITARTRATE AND ACETAMINOPHEN 1 TABLET: 10; 325 TABLET ORAL at 10:11

## 2023-01-01 RX ADMIN — SODIUM PHOSPHATE, MONOBASIC, MONOHYDRATE AND SODIUM PHOSPHATE, DIBASIC, ANHYDROUS 15 MMOL: 142; 276 INJECTION, SOLUTION INTRAVENOUS at 09:08

## 2023-01-01 RX ADMIN — PROPOFOL 20 MG: 10 INJECTION, EMULSION INTRAVENOUS at 03:08

## 2023-01-01 RX ADMIN — SODIUM CHLORIDE 500 ML: 9 INJECTION, SOLUTION INTRAVENOUS at 04:11

## 2023-01-01 RX ADMIN — IOHEXOL 130 ML: 350 INJECTION, SOLUTION INTRAVENOUS at 12:08

## 2023-01-01 RX ADMIN — AMIODARONE HYDROCHLORIDE 200 MG: 200 TABLET ORAL at 10:11

## 2023-01-01 RX ADMIN — ONDANSETRON 4 MG: 2 INJECTION INTRAMUSCULAR; INTRAVENOUS at 02:11

## 2023-01-01 RX ADMIN — PROPOFOL 100 MCG/KG/MIN: 10 INJECTION, EMULSION INTRAVENOUS at 03:08

## 2023-01-01 RX ADMIN — LORAZEPAM 1 MG: 1 TABLET ORAL at 06:12

## 2023-01-03 NOTE — TELEPHONE ENCOUNTER
Refill Decision Note   Wesley Bernal  is requesting a refill authorization.  Brief Assessment and Rationale for Refill:  Approve    -Medication-Related Problems Identified:   Requires labs  Requires appointment  Medication Therapy Plan:       Medication Reconciliation Completed: No   Comments:     Provider Staff:     Action is required for this patient.   Please see care gap opportunities below in Care Due Message.     Thanks!  [facility]     Appointments      Date Provider   Last Visit   11/12/2021 Lalo Sweet MD   Next Visit   Visit date not found Lalo Sweet MD     Note composed:2:02 PM 01/03/2023           Note composed:2:02 PM 01/03/2023

## 2023-01-03 NOTE — TELEPHONE ENCOUNTER
Care Due:                  Date            Visit Type   Department     Provider  --------------------------------------------------------------------------------                                [facility] FAMILY Lalo Orellana  Last Visit: 11-      [facility]   [facility]  Next Visit: None Scheduled  None         None Found                                                            Last  Test          Frequency    Reason                     Performed    Due Date  --------------------------------------------------------------------------------    Office Visit  12 months..  CLARK ezetimibe,        11- 11-                             icosapent, pioglitazone..    HBA1C.......  6 months...  CLARK, pioglitazone....  11- 05-    [facility] Embedded Care Gaps. Reference number: [id number]. 1/03/2023   8:01:53 AM CST

## 2023-01-26 NOTE — TELEPHONE ENCOUNTER
----- Message from Simone Valadez sent at 2023  9:07 AM CST -----  Contact: SO - Tierra  Wesley Bernal  MRN: [id number]  : 1950  PCP: Lalo Sweet  Home Phone      [phone]  Work Phone      Not on file.  Mobile          [phone]  Home Phone      [phone]      MESSAGE: needs annual exam - Tierra Jacobson has appt 23 @ 2:45 - wanting to come with her -- please advise    Call Tierra @ [phone]    PCP: Micki

## 2023-02-06 NOTE — PROGRESS NOTES
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 male here for a checkup.    Completed chemo and RXT for metestatic anal cancer.  His fatigue is much better.  He is back to working.  Had ct abd and chest recently in [place].  No cancer seen.  Gets bleeding with BM's.  This is very concerning to his wife.  He has familial intention tremor.  Gait is getting worse.  Starting to shuffle.  His tremor is better on Primadone.  Seen by Dr. Chaidez.  His weight is stable.  Patient has a history of peripheral vascular disease and lumbar spinal stenosis.  Lately, this seems to be stable.  Patient has iliac artery and femoral artery stents.  His pain is much better.  He developed severe myalgias from Crestor and Lipitor.  He lost muscle mass.   His cholesterol increased so cardiology now has him on Zetia and Fish oil.  He is now taking and tolerating Repatha.  He has cardiac stents, peripheral stents.  He probably has familial hypercholesterolemia.  He also sees Cardiology for paroxysmal atrial fibrillation.  He is taking amiodarone and aspirin.  Patient has a history of CRI 3, but creatinine is much better.   Renal ultrasound showed chronic kidney disease.  He no longer sees nephrology.  He is now on Januvia, Actos.  He tolerates it well.  His blood sugars are controlled decently.  Patient also has a history of mitral valve replacement, atrial fib, hypertension.  He has a pacemaker and defibrillator in place.  This has not been a problem for him.  He is now seeing Dr. Leigh.  His cardiologist was in [place].  That was his brother-in-law.  2018 patient had bilateral stents placed in his legs and is doing better.  He denies signs or symptoms of claudications.  He tolerates his blood pressure medication well and is not having side effects such as chronic cough or  dizziness.    Clinically his back pain has been stable.  He takes 1 or 2 Norco's per day which helps.  Saw neurology and was started on Gabapentin and is doing better.    ROS:   Gen.:  No fever, chills.  + weight loss   Fatigued.  Not interested in doing things  HEENT: No headaches, sinus congestion, sore throat, change in vision.  Voice getting shakey  CV: No chest pain  RESP: No shortness of breath, wheezing, cough  GI: No change in bowel habits, no diarrhea, no abdominal pain, no hematochezia  : No dysuria, frequency  MSK: Significant muscle pain, joint pain, back pain  NEURO: No numbness, weakness.  Worsening tremor, shuffling gait from back pain  ENDO: No polyuria, polydipsia, heat or cold intolerance    PHYSICAL EXAM;   Vitals:    02/06/23 1510   BP: 96/60   Pulse: 60   Resp: 16     APPEARANCE: Well nourished, well developed, in no acute distress.    HEAD: Normocephalic, atraumatic.  EYES: PERRL. EOMI.      EARS: TM's intact. Light reflex normal. No retraction or perforation.    NOSE: Mucosa pink. Airway clear.  MOUTH & THROAT: No tonsillar enlargement. No pharyngeal erythema or exudate. No stridor.  NECK: Supple.  Bilateral carotid endarterectomy scars  NODES: No cervical, axillary or inguinal lymph node enlargement.  CHEST: Lungs clear to auscultation.  CARDIOVASCULAR: Normal S1, S2. No rubs, murmurs or gallops.  Poor DP pulses bilaterally  ABDOMEN: Bowel sounds normal. Not distended. Soft. No tenderness or masses.  Abdominal bruits are present  Rectal exam:  Atrophic mucosa, friable.  RXT changes  MUSCULOSKELETAL:  No CCE.  Very poor distal pulses in the left leg.  Palpable pulses in the right leg  SKIN: No rashes or pigmented moles.  NEUROLOGIC:       Cranial Nerves: II-XII grossly intact.      Motor: 5/5 strength major flexors/extensors.  Cogwheel rigidity.        DTR's: Knees, Ankles 2+ and equal bilaterally; downgoing toes.      Sensory: Intact to light touch distally.      Gait & Posture: Shuffling  gait.  Pill rolling tremor?  Mild cogwheeling?  MENTAL STATUS: Normal orientation to person, place and time, normal affect, normal flow thought, normal memory.    Protective Sensation (w/ 10 gram monofilament):  Right: Decreased  Left: Decreased    Visual Inspection:  Normal -  Bilateral    Pedal Pulses:   Right: Diminished  Left: Diminished    Posterior tibialis:   Right:Diminished  Left: Diminished      Lab Results   Component Value Date    HGBA1C 6.3 (H) 11/12/2021     Lab Results   Component Value Date    LDLCALC 154.6 11/12/2021     Lab Results   Component Value Date    CREATININE 1.6 (H) 03/21/2022     Lab Results   Component Value Date    PSA 2.1 05/04/2017    PSA 2.0 09/03/2014     Lab Results   Component Value Date    WBC 5.89 03/21/2022    HGB 11.7 (L) 03/21/2022    HCT 34.6 (L) 03/21/2022    MCV 97 03/21/2022     03/21/2022     BMP  Lab Results   Component Value Date     03/21/2022    K 4.1 03/21/2022     03/21/2022    CO2 29 03/21/2022    BUN 27 (H) 03/21/2022    CREATININE 1.6 (H) 03/21/2022    CALCIUM 9.0 03/21/2022    ANIONGAP 10 03/21/2022    ESTGFRAFRICA 50 (A) 03/21/2022    EGFRNONAA 43 (A) 03/21/2022       ASSESSMENT/PLAN:    1. Type 2 diabetes mellitus with hyperglycemia, without long-term current use of insulin  Assessment & Plan:  Lab Results   Component Value Date    HGBA1C 6.3 (H) 11/12/2021   I have recommended the following steps for improving diabetic care and outcome to patient::   Referral to Diabetic Education department if necessary.  Diabetic diet discussed in detail, written exchange diet given, low cholesterol diet, weight control and daily exercise discussed.    All medications, side effects and compliance issues discussed.    Long term complications of diabetes discussed.  Home glucose monitoring emphasized. Fasting morning glucose goals should be less than 140.  2 hour postprandial goal should be less than 180.  Annual eye examinations at Ophthalmology  discussed,   Glycohemoglobin and other lab monitoring discussed.  Medications for this patient will be:  - sitagliptin (JANUVIA) 100 MG Tab; Take 1 tablet (100 mg total) by mouth once daily.                Actos 15 mg by mouth daily    Orders:  -     SITagliptin phosphate (JANUVIA) 100 MG Tab; Take 1 tablet (100 mg total) by mouth once daily.  Dispense: 30 tablet; Refill: 5  -     Comprehensive Metabolic Panel; Future; Expected date: 02/06/2023  -     Lipid Panel; Future; Expected date: 02/06/2023  -     Hemoglobin A1C; Future; Expected date: 02/06/2023    2. Paroxysmal atrial fibrillation  Assessment & Plan:  Keep appointment with cardiology   Continue Eliquis 5 mg twice daily  Continue amiodarone 100 mg daily  Control rate with atenolol 25 mg daily    Orders:  -     amiodarone (PACERONE) 200 MG Tab; Take 0.5 tablets (100 mg total) by mouth once daily.  Dispense: 15 tablet; Refill: 5    3. Essential hypertension  Assessment & Plan:  Two gram sodium diet.    Weight loss discussed.    Try to walk 2 miles per day.    Quit smoking.    Current medications will be:  - atenolol (TENORMIN) 25 MG tablet; Take 1 tablet (25 mg total) by mouth once daily.    - furosemide (LASIX) 20 MG tablet; Take 1 tablet (20 mg total) by mouth 2 (two) times daily.   - lisinopril 10 MG tablet; Take 1 tablet (10 mg total) by mouth twice daily.               ASA 81 mg po daily              Patient told to take 20 mg of Lasix if he does not have edema.  He can increase to 40 mg daily when he does have some swelling.    Orders:  -     atenoloL (TENORMIN) 25 MG tablet; Take 1 tablet (25 mg total) by mouth once daily.  Dispense: 30 tablet; Refill: 5    4. Lumbar disc disease with radiculopathy  Assessment & Plan:  Continue hydrocodone p.r.n. pain   Continue gabapentin 100 mg 2 capsules 3 times daily  Continue Cymbalta 60 mg daily    Orders:  -     HYDROcodone-acetaminophen (NORCO)  mg per tablet; Take 1 tablet by mouth every 12 (twelve) hours  as needed for Pain.  Dispense: 60 tablet; Refill: 0    5. Squamous cell cancer, anus  Assessment & Plan:  Patient underwent radiation, chemo, surgery.  Now in remission.      6. CHF (congestive heart failure), NYHA class II, chronic, systolic  Assessment & Plan:  Patient had aortic valve replacement  Keep appointment Cardiology  Continue atenolol 25 mg daily  Continue amiodarone 100 mg daily  Continue Eliquis 5 mg daily   Continue aspirin 81 mg daily  Continue Lasix 20 mg daily  Continue Imdur 60 mg daily      7. Type 2 diabetes mellitus with diabetic peripheral angiopathy without gangrene, without long-term current use of insulin  Assessment & Plan:  Lab Results   Component Value Date    HGBA1C 6.3 (H) 11/12/2021   I have recommended the following steps for improving diabetic care and outcome to patient::   Referral to Diabetic Education department if necessary.  Diabetic diet discussed in detail, written exchange diet given, low cholesterol diet, weight control and daily exercise discussed.    All medications, side effects and compliance issues discussed.    Long term complications of diabetes discussed.  Home glucose monitoring emphasized. Fasting morning glucose goals should be less than 140.  2 hour postprandial goal should be less than 180.  Annual eye examinations at Ophthalmology discussed,   Glycohemoglobin and other lab monitoring discussed.  Medications for this patient will be:  - sitagliptin (JANUVIA) 100 MG Tab; Take 1 tablet (100 mg total) by mouth once daily.                Actos 15 mg by mouth daily      8. Stage 3a chronic kidney disease  Assessment & Plan:  Maintain good control blood pressure and cholesterol.    Avoid nephrotoxic drugs.        9. Mixed hyperlipidemia  Assessment & Plan:  Low fat diet.  Avoid sweets.  A 10 pound weight loss by the next visit as a  good goal.  Increase consumption of fruits and vegetables, fish and chicken.  Use medications below:  Statin intolerant  Continue  Lovaza  Continues Zetia  Continue Repatha.        10. Anxiety disorder, unspecified type  Assessment & Plan:  Continue Cymbalta 60 mg daily   Continue Xanax 0.25 mg twice daily as needed      11. Essential tremor  Assessment & Plan:  Continue my slim 50 mg twice daily           Next appointment will be in 6 months.

## 2023-02-07 PROBLEM — G25.0 ESSENTIAL TREMOR: Status: ACTIVE | Noted: 2023-01-01

## 2023-02-07 PROBLEM — C21.0 SQUAMOUS CELL CANCER, ANUS: Status: ACTIVE | Noted: 2023-01-01

## 2023-02-07 NOTE — ASSESSMENT & PLAN NOTE
Patient had aortic valve replacement  Keep appointment Cardiology  Continue atenolol 25 mg daily  Continue amiodarone 100 mg daily  Continue Eliquis 5 mg daily   Continue aspirin 81 mg daily  Continue Lasix 20 mg daily  Continue Imdur 60 mg daily

## 2023-02-07 NOTE — ASSESSMENT & PLAN NOTE
Two gram sodium diet.    Weight loss discussed.    Try to walk 2 miles per day.    Quit smoking.    Current medications will be:  - atenolol (TENORMIN) 25 MG tablet; Take 1 tablet (25 mg total) by mouth once daily.    - furosemide (LASIX) 20 MG tablet; Take 1 tablet (20 mg total) by mouth 2 (two) times daily.   - lisinopril 10 MG tablet; Take 1 tablet (10 mg total) by mouth twice daily.               ASA 81 mg po daily              Patient told to take 20 mg of Lasix if he does not have edema.  He can increase to 40 mg daily when he does have some swelling.

## 2023-02-07 NOTE — ASSESSMENT & PLAN NOTE
Lab Results   Component Value Date    HGBA1C 6.3 (H) 11/12/2021   I have recommended the following steps for improving diabetic care and outcome to patient::   Referral to Diabetic Education department if necessary.  Diabetic diet discussed in detail, written exchange diet given, low cholesterol diet, weight control and daily exercise discussed.    All medications, side effects and compliance issues discussed.    Long term complications of diabetes discussed.  Home glucose monitoring emphasized. Fasting morning glucose goals should be less than 140.  2 hour postprandial goal should be less than 180.  Annual eye examinations at Ophthalmology discussed,   Glycohemoglobin and other lab monitoring discussed.  Medications for this patient will be:  - sitagliptin (JANUVIA) 100 MG Tab; Take 1 tablet (100 mg total) by mouth once daily.                Actos 15 mg by mouth daily

## 2023-02-07 NOTE — ASSESSMENT & PLAN NOTE
Low fat diet.  Avoid sweets.  A 10 pound weight loss by the next visit as a  good goal.  Increase consumption of fruits and vegetables, fish and chicken.  Use medications below:  Statin intolerant  Continue Lovaza  Continues Zetia  Continue Repatha.

## 2023-02-07 NOTE — ASSESSMENT & PLAN NOTE
Keep appointment with cardiology   Continue Eliquis 5 mg twice daily  Continue amiodarone 100 mg daily  Control rate with atenolol 25 mg daily

## 2023-02-07 NOTE — ASSESSMENT & PLAN NOTE
I have recommended the following steps for improving diabetic care and outcome to patient::   Referral to Diabetic Education department if necessary.  Diabetic diet discussed in detail, written exchange diet given, low cholesterol diet, weight control and daily exercise discussed.    All medications, side effects and compliance issues discussed.    Long term complications of diabetes discussed.  Home glucose monitoring emphasized. Fasting morning glucose goals should be less than 140.  2 hour postprandial goal should be less than 180.  Annual eye examinations at Ophthalmology discussed,   Glycohemoglobin and other lab monitoring discussed.  Medications for this patient will be:  No medication is necessary at this time

## 2023-02-07 NOTE — ASSESSMENT & PLAN NOTE
Continue hydrocodone p.r.n. pain   Continue gabapentin 100 mg 2 capsules 3 times daily  Continue Cymbalta 60 mg daily

## 2023-05-22 NOTE — TELEPHONE ENCOUNTER
----- Message from Gloria Buchanan sent at 2023  9:07 AM CDT -----  Contact: rangel/pharmacist  Wesley Bernal  MRN: [id number]  : 1950  PCP: Lalo Sweet  Home Phone      [phone]  Work Phone      Not on file.  [employer]          [phone]  Home Phone      [phone]      MESSAGE:   HYDROcodone-acetaminophen (NORCO)  mg per tablet RX missing greater than 7 days    [facility]  [phone]

## 2023-05-22 NOTE — TELEPHONE ENCOUNTER
Care Due:                  Date            Visit Type   Department     Provider  --------------------------------------------------------------------------------                                EP -                              [facility] FAMILY Lalo Orellana  Last Visit: 02-      CARE ([facility])   MEDICINE       Hesylvia                              [facility] FAMILY Lalo Orellana  Next Visit: 07-      [facility] ([facility])   MEDICINE       Heidenreich                                                            Last  Test          Frequency    Reason                     Performed    Due Date  --------------------------------------------------------------------------------    CMP.........  12 months..  SITagliptin, ezetimibe,    03- 03-                             pioglitazone.............    HBA1C.......  6 months...  SITagliptin, pioglitazone  11- 05-    [facility] Care Due Messages. Reference number: [id number].   5/22/2023 9:33:05 AM CDT

## 2023-06-22 NOTE — TELEPHONE ENCOUNTER
Got a call from pt friend who called and said the pt needed to see Dr May because he used to be passing blood in his stolls but not the bleeding has increased and is constant now. Recommended that she take pt to the er. She VU

## 2023-06-23 NOTE — TELEPHONE ENCOUNTER
Care Due:                  Date            Visit Type   Department     Provider  --------------------------------------------------------------------------------                                EP -                              [facility] FAMILY Lalo Orellana  Last Visit: 02-      CARE ([facility])   MEDICINE       Hesylvia                               -                              [facility] FAMILY Lalo Orellana  Next Visit: 07-      CARE ([facility])   MEDICINE       Heidenreich                                                            Last  Test          Frequency    Reason                     Performed    Due Date  --------------------------------------------------------------------------------    Lipid Panel.  12 months..  ezetimibe................  09- 08-    [facility] Embedded Care Due Messages. Reference number: [id number].   6/23/2023 1:48:02 PM CDT

## 2023-06-23 NOTE — TELEPHONE ENCOUNTER
Refill Routing Note   Medication(s) are not appropriate for processing by [facility] for the following reason(s):      Medication outside of protocol : hydrocodone 10 mg-acetaminophen 325 mg  Required labs outdated    [facility] action(s):  Defer  Route Labs due            Appointments  past 12m or future 3m with PCP    Date Provider   Last Visit   2/6/2023 Lalo Sweet MD   Next Visit   7/31/2023 Lalo Sweet MD   ED visits in past 90 days: 0        Note composed:2:54 PM 06/23/2023

## 2023-06-27 NOTE — TELEPHONE ENCOUNTER
No care due was identified.  [facility] Embedded Care Due Messages. Reference number: [id number].   6/27/2023 3:42:46 PM CDT

## 2023-06-27 NOTE — TELEPHONE ENCOUNTER
----- Message from Simone Kearney sent at 2023 12:46 PM CDT -----  Contact: self  Wesley Bernal  MRN: [id number]  : 1950  PCP: Lalo Sweet  Home Phone      [phone]  Work Phone      Not on file.  [employer]          [phone]  Home Phone      [phone]      MESSAGE:     [facility] called and stated they need a new RX for HYDROcodone-acetaminophen (NORCO)  mg per tablet RX is missing legal notation.      [phone]

## 2023-06-27 NOTE — TELEPHONE ENCOUNTER
"RX for Norco re pending with legal notation added for "necessary for greater than 7 days"    Please resend in NORCO, so pharmacy can fill RX  "

## 2023-07-18 NOTE — PROGRESS NOTES
Spoke to Gokul Dax he was unsure of the name to the eye dr. I asked he to bring the name of the dr with him if he could find the information  He stated that he had anal cancer and had a colonoscopy at [facility] sent

## 2023-07-18 NOTE — LETTER
AUTHORIZATION FOR RELEASE OF   CONFIDENTIAL INFORMATION      We are seeing Wesley Bernal, date of birth 1950, in the clinic at [facility]. Lalo Sweet MD is the patient's PCP. Wesley Bernal has an outstanding lab/procedure at the time we reviewed his chart. In order to help keep his health information updated, he has authorized us to request the following medical record(s):        (  )  MAMMOGRAM                                      (  )  COLONOSCOPY      (  )  PAP SMEAR                                          (  )  OUTSIDE LAB RESULTS     (  )  DEXA SCAN                                          (  )  EYE EXAM            (  )  FOOT EXAM                                          (  )  ENTIRE RECORD     (  )  OUTSIDE IMMUNIZATIONS                 ( X )  Surgery Report______________         Please fax records to [facility], Jack W Heidenreich, MD, [phone]    Patient Name: Wesley Bernal  : 1950  Patient Phone #: [phone]

## 2023-07-18 NOTE — LETTER
AUTHORIZATION FOR RELEASE OF   CONFIDENTIAL INFORMATION      We are seeing Wesley Bernal, date of birth 1950, in the clinic at [facility]. Lalo Sweet MD is the patient's PCP. Wesley Bernal has an outstanding lab/procedure at the time we reviewed his chart. In order to help keep his health information updated, he has authorized us to request the following medical record(s):        (  )  MAMMOGRAM                                      ( X )  COLONOSCOPY      (  )  PAP SMEAR                                          (  )  OUTSIDE LAB RESULTS     (  )  DEXA SCAN                                          (  )  EYE EXAM            (  )  FOOT EXAM                                          (  )  ENTIRE RECORD     (  )  OUTSIDE IMMUNIZATIONS                 (  )  _______________         Please fax records to [facility], Jack W Heidenreich, MD, [phone]    Patient Name: Wesley Bernal  : 1950  Patient Phone #: [phone]

## 2023-07-19 ENCOUNTER — TELEPHONE (OUTPATIENT)
Dept: FAMILY MEDICINE | Facility: CLINIC | Age: 73
End: 2023-07-19

## 2023-07-19 NOTE — PROGRESS NOTES
Received notification from [facility] in [place] stating 2019 Colonoscopy canceled, no records on file.

## 2023-07-23 NOTE — LETTER
AUTHORIZATION FOR RELEASE OF   CONFIDENTIAL INFORMATION      We are seeing Wesley Bernal, date of birth 1950, in the clinic at [facility]. Lalo Sweet MD is the patient's PCP. Wesley Bernal has an outstanding lab/procedure at the time we reviewed his chart. In order to help keep his health information updated, he has authorized us to request the following medical record(s):        (  )  MAMMOGRAM                                      (  )  COLONOSCOPY      (  )  PAP SMEAR                                          ( X )  OUTSIDE LAB RESULTS     (  )  DEXA SCAN                                          (  )  EYE EXAM            (  )  FOOT EXAM                                          (  )  ENTIRE RECORD     (  )  OUTSIDE IMMUNIZATIONS                 (  )  _______________         Please fax records to [facility], Jack W Heidenreich, MD, [phone]    Patient Name: Welsey Bernal  : 1950  Patient Phone #: [phone]     
Patient/Caregiver provided printed discharge information.

## 2023-08-02 NOTE — PROGRESS NOTES
Chart reviewed, immunization record updated.  No new results noted on [facility] or [facility] web site.  Care Everywhere updated.   Patient care coordination note  LOV with PCP 2/06/2023.  Attempted to contact patient to discuss Eye Exam, Colorectal Cancer Screening and scheduling DM lab and PCP visit, unable to contact.

## 2023-08-07 NOTE — TELEPHONE ENCOUNTER
----- Message from Simone Valadez sent at 2023  9:38 AM CDT -----  Contact: JOSE M Tierra Bernal  MRN: [id number]  : 1950  PCP: Lalo Sweet  Home Phone      [phone]  Work Phone      Not on file.  Mobile          [phone]  Home Phone      [phone]      MESSAGE:   Pt requesting refill or new Rx.   Is this a refill or new RX:  refill  RX name and strength: Alprazolam 0.25 mg  Last office visit: 23  Is this a 30-day or 90-day RX:  30 day  Pharmacy name and location:  [facility]  Comments:      Phone:  Tierra @ [phone]    PCP: Micki

## 2023-08-07 NOTE — TELEPHONE ENCOUNTER
No care due was identified.  [facility] Embedded Care Due Messages. Reference number: [id number].   8/07/2023 9:58:38 AM CDT

## 2023-08-11 NOTE — TELEPHONE ENCOUNTER
No care due was identified.  [facility] Embedded Care Due Messages. Reference number: [id number].   8/11/2023 8:04:30 AM CDT

## 2023-08-11 NOTE — TELEPHONE ENCOUNTER
Refill Routing Note   Medication(s) are not appropriate for processing by [facility] for the following reason(s):      Required labs outdated    [facility] action(s):  Defer Care Due:  None identified              Appointments  past 12m or future 3m with PCP    Date Provider   Last Visit   2/6/2023 Lalo Sweet MD   Next Visit   Visit date not found Lalo Sweet MD   ED visits in past 90 days: 0        Note composed:3:14 PM 08/11/2023

## 2023-08-15 PROBLEM — N18.9 ACUTE ON CHRONIC RENAL FAILURE: Status: ACTIVE | Noted: 2020-09-18

## 2023-08-15 PROBLEM — D64.9 ACUTE ON CHRONIC ANEMIA: Status: ACTIVE | Noted: 2023-01-01

## 2023-08-15 NOTE — ED PROVIDER NOTES
Encounter Date: 8/15/2023       History     Chief Complaint   Patient presents with    Weakness     PT TO ER VIA [facility] WITH C/O WEAKNESS AND NOT EATING X 4 DAYS. PT HAS DARK BROWN VOMIT ON SHIRT ON ARRIVAL TO ED.      72 year old male with a PMHx of Afib on eliquis, anxiety, rectal cancer in remission currently, CAD, DM, HLD, HTN presents to the ED with his wife via EMS for fatigue, paleness, not eating for 4 days. Patient states he's noticed blood in his stool as well but not when he wipes. Has drank water but hasn't been able to eat. Normally able to ambulate around the house on his own but hasn't even been able to get out of bed. Wife agrees he's pale as well and states is blood in his stool. Had dark brown vomit but not coffee ground or black.        Review of patient's allergies indicates:   Allergen Reactions    Statins-hmg-coa reductase inhibitors      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    Cardiomyopathy     Carotid artery disease     Coronary artery disease     Diabetes mellitus type II     Encounter for blood transfusion     Heart disease     Hyperlipidemia     Hypertension     PAD (peripheral artery disease)      Past Surgical History:   Procedure Laterality Date    ABDOMINAL AORTA STENT      BRACHIOCEPHALIC VEIN ANGIOPLASTY / STENTING      CARDIAC DEFIBRILLATOR PLACEMENT      1/2011    CARDIAC PACEMAKER PLACEMENT      1-2011    CARDIAC SURGERY      open heart    CARDIAC VALVE SURGERY      pig valve replacement -    CAROTID STENT      LASIK SX Bilateral     LYMPH NODE BIOPSY Left 6/17/2019    Procedure: BIOPSY, LYMPH NODE (GROIN);  Surgeon: Adelfo Yin MD;  Location: [facility] OR;  Service: General;  Laterality: Left;    REPLACEMENT OF IMPLANTABLE CARDIOVERTER-DEFIBRILLATOR (ICD) GENERATOR N/A 7/29/2020    Procedure: REPLACEMENT, ICD GENERATOR;  Surgeon: Marko Hartley MD;  Location: [facility] CATH;  Service:  Cardiology;  Laterality: N/A;     Family History   Problem Relation Age of Onset    Diabetes Mother     Heart disease Father     Cancer Father         melanoma, prostate    Heart disease Paternal Grandfather      Social History     Tobacco Use    Smoking status: Former     Current packs/day: 0.00     Types: Cigarettes, Cigars     Quit date: 4/10/2015     Years since quittin.3    Smokeless tobacco: Never   Substance Use Topics    Alcohol use: No     Alcohol/week: 0.0 standard drinks of alcohol    Drug use: No     Review of Systems   Constitutional:  Positive for fatigue. Negative for chills and fever.   HENT:  Negative for congestion, rhinorrhea and sneezing.    Eyes:  Negative for discharge and redness.   Respiratory:  Positive for shortness of breath. Negative for cough.    Cardiovascular:  Negative for chest pain and palpitations.   Gastrointestinal:  Positive for blood in stool. Negative for abdominal pain, diarrhea and vomiting.   Genitourinary:  Negative for difficulty urinating, flank pain and urgency.   Musculoskeletal:  Negative for back pain and neck pain.   Skin:  Positive for pallor. Negative for rash and wound.   Neurological:  Negative for weakness, numbness and headaches.       Physical Exam     Initial Vitals [08/15/23 1824]   BP Pulse Resp Temp SpO2   (!) 138/56 (!) 50 16 96.2 °F (35.7 °C) 100 %      MAP       --         Physical Exam    Nursing note and vitals reviewed.  Constitutional: He appears well-developed. He is not diaphoretic. No distress.   HENT:   Head: Normocephalic and atraumatic.   Right Ear: External ear normal.   Left Ear: External ear normal.   Nose: Nose normal.   Eyes: Conjunctivae are normal. Right eye exhibits no discharge. Left eye exhibits no discharge. No scleral icterus.   Cardiovascular:  Regular rhythm.   Bradycardia present.         Murmur heard.  Pulmonary/Chest: Breath sounds normal. No stridor. No respiratory distress. He has no wheezes. He has no rhonchi. He has  no rales.   Abdominal: Abdomen is soft. He exhibits no distension. There is no abdominal tenderness. There is no guarding.   Genitourinary:    Genitourinary Comments: No stool on ROSALINO exam     Musculoskeletal:         General: No edema.     Neurological: He is alert and oriented to person, place, and time. No cranial nerve deficit or sensory deficit. GCS score is 15. GCS eye subscore is 4. GCS verbal subscore is 5. GCS motor subscore is 6.   Unable to lift arms, legs off bed due to bilateral weakness   Skin: Skin is warm and dry. Capillary refill takes more than 3 seconds. There is pallor.   Psychiatric: He has a normal mood and affect.         ED Course   Critical Care    Date/Time: 8/15/2023 9:39 PM    Performed by: Matthew Burt DO  Authorized by: Matthew Burt DO  Direct patient critical care time: 23 minutes  Additional history critical care time: 4 minutes  Ordering / reviewing critical care time: 5 minutes  Documentation critical care time: 4 minutes  Consulting other physicians critical care time: 3 minutes  Consult with family critical care time: 3 minutes  Total critical care time (exclusive of procedural time) : 42 minutes  Critical care time was exclusive of separately billable procedures and treating other patients and teaching time.  Critical care was necessary to treat or prevent imminent or life-threatening deterioration of the following conditions: circulatory failure.  Critical care was time spent personally by me on the following activities: development of treatment plan with patient or surrogate, discussions with consultants, evaluation of patient's response to treatment, examination of patient, obtaining history from patient or surrogate, ordering and performing treatments and interventions, ordering and review of laboratory studies, ordering and review of radiographic studies, pulse oximetry, re-evaluation of patient's condition and review of old charts.        Labs Reviewed   COMPREHENSIVE  METABOLIC PANEL - Abnormal; Notable for the following components:       Result Value    CO2 19 (*)     Glucose 240 (*)     BUN 48 (*)     Creatinine 2.2 (*)     Calcium 8.3 (*)     Total Protein 5.4 (*)     Albumin 2.9 (*)     Alkaline Phosphatase 48 (*)     eGFR 31 (*)     All other components within normal limits   CBC W/ AUTO DIFFERENTIAL - Abnormal; Notable for the following components:    RBC 1.26 (*)     Hemoglobin 4.0 (*)     Hematocrit 13.3 (*)      (*)     MCH 31.7 (*)     MCHC 30.1 (*)     RDW 16.0 (*)     Immature Granulocytes 1.8 (*)     Gran # (ANC) 10.0 (*)     Immature Grans (Abs) 0.22 (*)     Gran % 80.9 (*)     Lymph % 8.9 (*)     All other components within normal limits    Narrative:     Hgb, Hct critical result(s) called and verbal readback obtained from   Dr. Burt by RS6 08/15/2023 19:41   PROTIME-INR - Abnormal; Notable for the following components:    Prothrombin Time 13.1 (*)     All other components within normal limits   B-TYPE NATRIURETIC PEPTIDE - Abnormal; Notable for the following components:     (*)     All other components within normal limits   TROPONIN I - Abnormal; Notable for the following components:    Troponin I 0.049 (*)     All other components within normal limits   INDIRECT ANTIGLOBULIN TEST - Abnormal; Notable for the following components:    Antibody Screen POS (*)     All other components within normal limits   APTT   URINALYSIS, REFLEX TO URINE CULTURE    Narrative:     Specimen Source->Urine   OCCULT BLOOD X 1, STOOL   TROPONIN I   OCCULT BLOOD X 1, STOOL   GROUP & RH   ANTIBODY IDENTIFICATION   PREPARE RBC SOFT     EKG Readings: (Independently Interpreted)   Previous EKG: Compared with most recent EKG Previous EKG Date: 9/17/2020.   Atrial-paced rhythm at 50 bpm. Nonspecific intraventricular block. LAD. No STEMI.       Imaging Results              CT Abdomen Pelvis  Without Contrast (Final result)  Result time 08/15/23 20:59:47      Final result by  Ilene Zuñiga MD (08/15/23 20:59:47)                   Impression:      Evaluation for metastatic disease is limited without intravenous contrast.    Indeterminate renal lesions.  Recommend follow-up ultrasound.    Low-attenuation blood within the heart, compatible with anemia.  Please confirm with laboratory values.    Cholelithiasis.    Circumferential bladder wall thickening.  Correlate for cystitis.      Electronically signed by: Ilene Zuñiga  Date:    08/15/2023  Time:    20:59               Narrative:    EXAMINATION:  CT ABDOMEN PELVIS WITHOUT CONTRAST    CLINICAL HISTORY:  Metastatic disease evaluation;    TECHNIQUE:  Low dose axial images, sagittal and coronal reformations were obtained from the lung bases to the pubic symphysis.  Oral contrast was not administered.    COMPARISON:  None    FINDINGS:  Evaluation for metastatic disease is limited without intravenous contrast.    Mild bibasilar atelectasis.  Mild cardiomegaly.  Low-attenuation blood within the heart, compatible with anemia.    Unremarkable liver.  Cholelithiasis.  2.3 x 1.5 cm exophytic lesion at the inferior pole left kidney is indeterminate.  2.6 x 1.9 cm exophytic lesion at the inferior pole right kidney is also indeterminate.    Unremarkable adrenal glands.    Unremarkable spleen and pancreas.    Colonic diverticulosis, without diverticulitis.  No small bowel obstruction.    Aortoiliac stent.    Circumferential bladder wall thickening.    No free fluid, free air, or lymphadenopathy.    No aggressive osseous lesion.                                       CT Head Without Contrast (Final result)  Result time 08/15/23 20:44:00      Final result by Ilene Zuñiga MD (08/15/23 20:44:00)                   Impression:      No acute abnormality.      Electronically signed by: Ilene Zuñiga  Date:    08/15/2023  Time:    20:44               Narrative:    EXAMINATION:  CT HEAD WITHOUT CONTRAST    CLINICAL HISTORY:  Syncope,  recurrent;    TECHNIQUE:  Low dose axial CT images obtained throughout the head without intravenous contrast. Sagittal and coronal reconstructions were performed.    COMPARISON:  3/23/22.    FINDINGS:  Intracranial compartment:    Ventricles and sulci are normal in size for age without evidence of hydrocephalus. No extra-axial blood or fluid collections.    Mild periventricular and deep white matter changes of chronic microvascular ischemia.  No parenchymal mass, hemorrhage, edema or major vascular distribution infarct.    Skull/extracranial contents (limited evaluation): No fracture. Small right maxillary sinus polyp or mucous retention cyst.                                       X-Ray Chest AP Portable (Final result)  Result time 08/15/23 19:59:46      Final result by Ilene Zuñiga MD (08/15/23 19:59:46)                   Impression:      As above.      Electronically signed by: Ilene Zuñiga  Date:    08/15/2023  Time:    19:59               Narrative:    EXAMINATION:  XR CHEST AP PORTABLE    CLINICAL HISTORY:  fatigue, shortness of breath;    TECHNIQUE:  Single frontal view of the chest was performed.    COMPARISON:  09/17/2020    FINDINGS:  Enlarged cardiac silhouette with pulmonary vascular congestion.  Mild diffuse interstitial prominence may reflect edema, pneumonitis, or interstitial scarring.  Small left pleural effusion versus scarring..  Left chest wall AICD.  Right chest wall port.                                       Medications   sodium chloride 0.9% flush 10 mL (has no administration in time range)   0.9%  NaCl infusion (for blood administration) (has no administration in time range)   pantoprazole (PROTONIX) 40 mg in sodium chloride 0.9 % 100 mL IVPB (MB+) (has no administration in time range)   0.9%  NaCl infusion (0 mLs Intravenous Stopped 8/15/23 1943)   ondansetron injection 4 mg (4 mg Intravenous Given 8/15/23 1838)   sodium chloride 0.9% bolus 250 mL 250 mL (0 mLs Intravenous  Stopped 8/15/23 1943)   pantoprazole injection 80 mg (80 mg Intravenous Given 8/15/23 2057)     Medical Decision Making:   Differential Diagnosis:   Ddx: anemia, GI bleed, melena, ACS, cancer  ED Management:  Based on the patient's evaluation - patient will need transfer for presumed GI bleed, anticoagulant use, severe anemia. 4 units of pRBCs were ordered but patient is having issues with antibody testing requiring blood be send to [facility] for further testing. Emergency release blood is available but with BP stable, will hold to ensure safe blood transfusion. Protonix bolus and drip started. ROSALINO didn't have any stool and FOBT was negative. Patient doesn't need to have a BM so awaiting stool for a repeat hemoccult.     Patient targeting [facility] or [place] ICU.                          Clinical Impression:   Final diagnoses:  [R53.83] Fatigue  [D64.9] Anemia, unspecified type (Primary)  [K92.1] Bloody stools  [N28.9] Renal lesion  [K80.20] Calculus of gallbladder without cholecystitis without obstruction        ED Disposition Condition    Transfer to Another Facility Stable                   Matthew Burt DO  08/16/23 0103

## 2023-08-16 NOTE — ASSESSMENT & PLAN NOTE
Patient with acute kidney injury/acute renal failure likely due to pre-renal azotemia due to dehydration CARLOS is currently stable. Baseline creatinine 1.6-1.8 - Labs reviewed- Renal function/electrolytes with Estimated Creatinine Clearance: 35.8 mL/min (A) (based on SCr of 2.1 mg/dL (H)). according to latest data. Monitor urine output and serial BMP and adjust therapy as needed. Avoid nephrotoxins and renally dose meds for GFR listed above.

## 2023-08-16 NOTE — EICU
Brief Note     72 yr old male   PMH - CA Rectum     Acute ?  chronic blood loss anemia   Possible PUD   AFIB  CHF - EF 33%   CARLOS on CKD     Received 1250 ml IVF   Hemodynamically stable   No further bloody bm   No vomitting   Hb 4.0   Awaiting blood from outside due to +ve ab screen   Continue to monitor   May need iron infusion, will hold off till he gets blood   Eliquis/ ASAA on hold   Heart rate 52   Labs repeated for now     Continue to monitor

## 2023-08-16 NOTE — TELEPHONE ENCOUNTER
Sara spoke with Jalyn Fuentes and told her that she would have to call the on-call provider. Bernardo's number given

## 2023-08-16 NOTE — SUBJECTIVE & OBJECTIVE
Past Medical History:   Diagnosis Date    Anxiety     Atrial fibrillation     Cancer     RECTAL AND ANAL CANCER- CHEMO AND RADIATION    Cardiomyopathy     Carotid artery disease     Coronary artery disease     Diabetes mellitus type II     Encounter for blood transfusion     Heart disease     Hyperlipidemia     Hypertension     PAD (peripheral artery disease)        Past Surgical History:   Procedure Laterality Date    ABDOMINAL AORTA STENT      BRACHIOCEPHALIC VEIN ANGIOPLASTY / STENTING      CARDIAC DEFIBRILLATOR PLACEMENT      2011    CARDIAC PACEMAKER PLACEMENT          CARDIAC SURGERY      open heart    CARDIAC VALVE SURGERY      pig valve replacement -    CAROTID STENT      LASIK SX Bilateral     LYMPH NODE BIOPSY Left 2019    Procedure: BIOPSY, LYMPH NODE (GROIN);  Surgeon: Adelfo Yin MD;  Location: [facility] OR;  Service: General;  Laterality: Left;    REPLACEMENT OF IMPLANTABLE CARDIOVERTER-DEFIBRILLATOR (ICD) GENERATOR N/A 2020    Procedure: REPLACEMENT, ICD GENERATOR;  Surgeon: Marko Hartley MD;  Location: [facility] CATH;  Service: Cardiology;  Laterality: N/A;       Review of patient's allergies indicates:   Allergen Reactions    Statins-hmg-coa reductase inhibitors      Family History       Problem Relation (Age of Onset)    Cancer Father    Diabetes Mother    Heart disease Father, Paternal Grandfather          Tobacco Use    Smoking status: Former     Current packs/day: 0.00     Types: Cigarettes, Cigars     Quit date: 4/10/2015     Years since quittin.3    Smokeless tobacco: Never   Substance and Sexual Activity    Alcohol use: No     Alcohol/week: 0.0 standard drinks of alcohol    Drug use: No    Sexual activity: Yes     Partners: Female     Review of Systems   Constitutional:  Positive for fatigue.   Gastrointestinal:  Positive for blood in stool, diarrhea, nausea and vomiting. Negative for abdominal pain.   Neurological:  Positive for weakness.     Objective:     Vital  Signs (Most Recent):  Temp: 97.8 °F (36.6 °C) (08/16/23 1530)  Pulse: (!) 56 (08/16/23 1600)  Resp: (!) 24 (08/16/23 1600)  BP: 136/63 (08/16/23 1600)  SpO2: 100 % (08/16/23 1600) Vital Signs (24h Range):  Temp:  [96.2 °F (35.7 °C)-98.8 °F (37.1 °C)] 97.8 °F (36.6 °C)  Pulse:  [50-82] 56  Resp:  [11-35] 24  SpO2:  [82 %-100 %] 100 %  BP: ()/() 136/63     Weight: 78.5 kg (173 lb 1 oz) (08/16/23 0830)  Body mass index is 22.83 kg/m².      Intake/Output Summary (Last 24 hours) at 8/16/2023 1632  Last data filed at 8/16/2023 1407  Gross per 24 hour   Intake 1903.66 ml   Output 1260 ml   Net 643.66 ml       Lines/Drains/Airways       Drain  Duration             Male External Urinary Catheter 08/16/23 0000 Medium <1 day              Peripheral Intravenous Line  Duration                  Peripheral IV - Single Lumen 08/15/23 2200 18 G Left Antecubital <1 day         Peripheral IV - Single Lumen 08/15/23 2200 18 G Posterior;Right Forearm <1 day                     Physical Exam  Vitals and nursing note reviewed.   Constitutional:       Appearance: He is well-developed.   HENT:      Head: Normocephalic and atraumatic.   Eyes:      General: No scleral icterus.     Pupils: Pupils are equal, round, and reactive to light.   Cardiovascular:      Rate and Rhythm: Normal rate. Rhythm irregular.      Heart sounds: Normal heart sounds.   Pulmonary:      Effort: Pulmonary effort is normal. No respiratory distress.      Breath sounds: Normal breath sounds.   Abdominal:      General: Bowel sounds are normal. There is no distension.      Palpations: Abdomen is soft.      Tenderness: There is no abdominal tenderness.   Musculoskeletal:         General: Normal range of motion.      Cervical back: Normal range of motion and neck supple.   Skin:     General: Skin is warm and dry.      Findings: No erythema or rash.   Neurological:      Mental Status: He is alert and oriented to person, place, and time.      Comments: No asterixis    Psychiatric:         Behavior: Behavior normal.          Significant Labs:  Recent Lab Results  (Last 5 results in the past 24 hours)        08/16/23  1304   08/16/23  1149   08/16/23  1105   08/16/23  0834   08/16/23  0755        Ascending aorta       3.13         Ao peak pilo       1.55         Ao VTI       37.00         AV valve area       3.17         CLIF by Velocity Ratio       3.00         AV mean gradient       4         AV index (prosthetic)       0.80         AV peak gradient       10         AV Velocity Ratio       0.75         BSA       2.01         Left Ventricle Relative Wall Thickness       0.29         FS       12         Hematocrit 23.0   21.6       17.6  Comment: HGB/HCT critical result(s) called and verbal readback obtained   from DAMIEN DC RN. by [facility] 08/16/2023 08:27         Hemoglobin 7.3   6.9       5.7  Comment: Patient received blood.  HGB/HCT critical result(s) called and verbal readback obtained   from DAMIEN DC RN. by [facility] 08/16/2023 08:27         Mitral Valve Heart Rate       58         IVC diameter       1.90         IVSd       1.25         LA WIDTH       4.9         Left Atrium Major Axis       6.65         Left Atrium Minor Axis       6.61         LA size       4.79         LA volume       132.27                110.18         LA vol index       65.5         LA Volume Index (Mod)       54.5         LVOT area       4.0         LV EDV BP       221.56         LV Diastolic Volume Index       109.68         LVIDd       6.57         LVIDs       5.77         LV mass       327.78         LV Mass Index       162         Left Ventricular Outflow Tract Mean Gradient       2.81         Left Ventricular Outflow Tract Mean Velocity       0.78         LVOT diameter       2.25         LVOT peak pilo       1.17         LVOT stroke volume       117.23         LVOT peak VTI       29.50         LV ESV BP       164.86         LV Systolic Volume Index       81.6         MCH   30.3       31.3       MCHC    31.9       32.4       MCV   95       97       Mean e'       0.05         MPV   10.4       10.1       Mr max pop       5.00         MV valve area by continuity eq       1.71         MV mean gradient       4         MV peak gradient       13         MV VTI       68.7         Lawson's Biplane MOD Ejection Fraction       45         Platelets   129       141       POCT Glucose     163           Pulmonary Valve Mean Velocity       0.72         PV peak gradient       4         PV Peak D Pop       0.69         PV Peak S Pop       0.68         PV PEAK VELOCITY       1.00         Pulm vein S/D ratio       0.99         Posterior Wall       0.96         RA Major Axis       5.29         RA Width       3.2         RBC   2.28       1.82  Comment: Patient received blood.       RDW   16.5       16.6       RVDD       3.28         STJ       2.43         TAPSE       1.60         TDI SEPTAL       0.05         TDI LATERAL       0.04         Triscuspid Valve Regurgitation Peak Gradient       23         TR Max Pop       2.41         WBC   18.89       21.26       ZLVIDD       0.99         ZLVIDS       3.62                                Significant Imaging:  Imaging results within the past 24 hours have been reviewed.

## 2023-08-16 NOTE — ASSESSMENT & PLAN NOTE
Patient with acute kidney injury/acute renal failure likely due to pre-renal azotemia due to dehydration CARLOS is currently stable. Baseline creatinine 1.6-1.8 - Labs reviewed- Renal function/electrolytes with Estimated Creatinine Clearance: 34.2 mL/min (A) (based on SCr of 2.2 mg/dL (H)). according to latest data. Monitor urine output and serial BMP and adjust therapy as needed. Avoid nephrotoxins and renally dose meds for GFR listed above.

## 2023-08-16 NOTE — ASSESSMENT & PLAN NOTE
Patient with Paroxysmal (<7 days) atrial fibrillation which is controlled currently with Beta Blocker and Amiodarone. Patient is currently in sinus rhythm.[id number] Score: 3.   - AC indicated but Eliquis being held due to GIB and hgb 3.6

## 2023-08-16 NOTE — HPI
Wesley Bernal is a 71 yo male with a ICM HFrEF 30-35% s/p ICD, bioprosthetic MVR 2013 for severe MR, AF on amio/Eliquis. Patient presented to the ED with fatigue and bloody stools with episodes of constipation. Patient has progressively limited his normal ADLs 2/2 worsening RAMIREZ and fatigue. Patient denies any dizziness, syncope, palpitations, orthopnea, PND, swelling, abdominal pain. Patient with episode of hematemesis overnight and chest pain. Hgb was 3.6/17. Initially emergent transfusion was held off due to antibodies and hemodynamic stability. After this event he was emergently transfused with 2 U PRBCs. Plan for additional PRBC transfusion today.   Troponin 0.04- noted elevated at baseline   EKG SR without acute ischemic changes  TTE in progress  GI consult pending

## 2023-08-16 NOTE — PROGRESS NOTES
[facility] Medicine  Progress Note    Patient Name: Wesley Bernal  MRN: [id number]  Patient Class: IP- Inpatient   Admission Date: 8/15/2023  Length of Stay: 1 days  Attending Physician: Sung Brumfield MD  Primary Care Provider: Lalo Sweet MD        Subjective:     Principal Problem:<principal problem not specified>        HPI:  73 yo male with a PMHX of ICM HFrEF 30-35% s/p ICD, bioprosthetic MVR 2013 for severe MR, AF on amio/Eliquis here for worsening fatigue and bloody stools.    Patient states that the last 3-4 days he has become significantly weaker with associated constipation.  He has only had 1 or 2 bowel movements a day which is unusual for him.  The stools have been very hard and brown but with blood around it.  Says his stools for the last month have been relatively normal and denies any melena or codie hematochezia until 2 days ago.  When pressed further about symptoms, he has slowly limited his ADLs due to worsening dyspnea on exertion and extreme fatigue.  This has been ongoing for about a month.  For example, he no longer goes to the grocery store due to his dyspnea.  Denies any dizziness, syncope, chest pain, palpitations, orthopnea, PND, swelling, hematemesis, abdominal pain.    At the outside ED, labs showed hemoglobin of 4 with and CARLOS.  SBP ranged in the 140s to 160s with EKG showing sinus bradycardia in the 50s.  Rectal exam showed no stool in the rectal vault.  CT abdomen was unremarkable.  Type and screen unfortunately showed an unknown antibody at the outside hospital's lab.  Therefore, he has not received blood.      Upon transfer here, he is comfortable and well-appearing.  Hemodynamically stable.  A repeat type and screen was sent to ascertain the specific antibody. Will hold on transfusion unless hemodynamically unstable or when antibody testing is complete.      Overview/Hospital Course:  No notes on file    Interval History:  Patient awake alert in  no acute distress.  Remains afebrile.  Denies chest pain shortness a breath, nausea vomiting abdominal pain.  No complaints at this time apart from feeling tired.  Patient did have an episode of hematemesis earlier this morning.  Patient seen and assessed along with cardiologist Dr. Monteiro by bedside.    Review of Systems   Constitutional:  Positive for fatigue.   HENT: Negative.     Eyes: Negative.    Respiratory: Negative.     Cardiovascular: Negative.    Gastrointestinal: Negative.    Endocrine: Negative.    Genitourinary: Negative.    Musculoskeletal: Negative.    Skin: Negative.    Allergic/Immunologic: Negative.    Neurological: Negative.    Hematological: Negative.    Psychiatric/Behavioral: Negative.     All other systems reviewed and are negative.    Objective:     Vital Signs (Most Recent):  Temp: 98.2 °F (36.8 °C) (08/16/23 0715)  Pulse: (!) 55 (08/16/23 0730)  Resp: (!) 25 (08/16/23 0730)  BP: (!) 132/56 (08/16/23 0730)  SpO2: 100 % (08/16/23 0730) Vital Signs (24h Range):  Temp:  [96.2 °F (35.7 °C)-98.4 °F (36.9 °C)] 98.2 °F (36.8 °C)  Pulse:  [50-82] 55  Resp:  [11-35] 25  SpO2:  [95 %-100 %] 100 %  BP: ()/(46-71) 132/56     Weight: 78.5 kg (173 lb 1 oz)  Body mass index is 22.83 kg/m².    Intake/Output Summary (Last 24 hours) at 8/16/2023 0741  Last data filed at 8/16/2023 0728  Gross per 24 hour   Intake 931.16 ml   Output 410 ml   Net 521.16 ml         Physical Exam  Vitals and nursing note reviewed.   Constitutional:       General: He is not in acute distress.     Appearance: Normal appearance.   HENT:      Head: Normocephalic and atraumatic.      Nose: Nose normal.      Mouth/Throat:      Mouth: Mucous membranes are moist.   Eyes:      Extraocular Movements: Extraocular movements intact.      Pupils: Pupils are equal, round, and reactive to light.   Cardiovascular:      Rate and Rhythm: Normal rate and regular rhythm.      Pulses: Normal pulses.      Heart sounds: Murmur heard.   Pulmonary:       Effort: Pulmonary effort is normal. No respiratory distress.      Breath sounds: Normal breath sounds.   Abdominal:      General: Bowel sounds are normal. There is no distension.      Palpations: Abdomen is soft.      Tenderness: There is no abdominal tenderness.   Musculoskeletal:         General: No deformity. Normal range of motion.      Cervical back: Normal range of motion and neck supple.   Skin:     General: Skin is warm and dry.      Coloration: Skin is pale.   Neurological:      General: No focal deficit present.      Mental Status: He is alert and oriented to person, place, and time. Mental status is at baseline.   Psychiatric:         Mood and Affect: Mood normal.             Significant Labs: All pertinent labs within the past 24 hours have been reviewed.    Significant Imaging: I have reviewed all pertinent imaging results/findings within the past 24 hours.      Assessment/Plan:      Acute on chronic anemia 2/2 GIB  - Presumed etiology is GIB. Likely chronic blood loss while on Eliquis. Blood around stools with associated constipation and brown stools sounds like hemorrhoids  - No signs of acute bleed and hemodynamically stable  - Does have hx of rectal cancer with radiation making GIB high risk  --------------------  - Holding Eliquis and ASA  - GI consulted= follow recommendation  - NPO until cleared by GI  - Type and screen with ab test pending. Will need to get blood from [facility] likely  - Telemetry. Will use emergency blood transfusions if acute bleeding starts or patient is unstable  - IV PPI BID for now  -transfuse 4 units PRBC on 08/16/2023  -per cardiology on 08/16/2023=Patient cleared from CV perspective for GI procedures.      Acute on chronic Stage 3B kidney disease  Patient with acute kidney injury/acute renal failure likely due to pre-renal azotemia due to dehydration CARLOS is currently stable. Baseline creatinine 1.6-1.8 - Labs reviewed- Renal function/electrolytes with  "Estimated Creatinine Clearance: 35.8 mL/min (A) (based on SCr of 2.1 mg/dL (H)). according to latest data. Monitor urine output and serial BMP and adjust therapy as needed. Avoid nephrotoxins and renally dose meds for GFR listed above.    CHF (congestive heart failure), NYHA class II, chronic, systolic  - Last TTE showing LVEF 33%  - Looks dry on exam and labs consistent with hypovolemia from GIB  - Will hold on lasix. May need some after multiple pRBC  - Continue BB  - Holding Imdur  - Not on RAASi likely due to CKD but would benefit from it as outpatient  - Repeat TTE due to valve    CAD (coronary artery disease)  -patient denies chest pain or shortness of breath currently   -EKG on 08/16/2023=Sinus rhythm with 1st degree A-V block. Nonspecific intraventricular block. Nonspecific T wave abnormality  -cardiac enzymes on 08/15/2023= elevated x1 (likely due to demand ischemia due to severe anemia)  -cardiology consulted= follow recommendations      Bioprosthetic mitral valve replacement  - S/p bio MVR in 2013  - Repeat TTE on 08/16/2023 to eval LVEF and MV function= pending results  - Holding Eliquis (on for AF) and ASA due to GIB      Hyperlipidemia  - Continue statin      Type 2 diabetes mellitus  Patient's FSGs are controlled on current medication regimen.  Last A1c reviewed-   Lab Results   Component Value Date    HGBA1C 5.9 (H) 08/16/2023     Most recent fingerstick glucose reviewed- No results for input(s): "POCTGLUCOSE" in the last 24 hours.  Current correctional scale  Low  Maintain anti-hyperglycemic dose as follows-   Antihyperglycemics (From admission, onward)      Start     Stop Route Frequency Ordered    08/16/23 0125  insulin aspart U-100 pen 0-5 Units         -- SubQ Every 6 hours PRN 08/16/23 0025          Hold Oral hypoglycemics while patient is in the hospital.    Essential hypertension  -monitor blood pressure   -cover with hydralazine 10 mg IV q.4 hours p.r.n. for systolic blood pressure above 160 " or diastolic greater than 100  -adjust BP medication as needed  -resume oral BP medications when tolerating oral diet      Atrial fibrillation  Patient with Paroxysmal (<7 days) atrial fibrillation which is controlled currently with Beta Blocker and Amiodarone. Patient is currently in sinus rhythm.[id number] Score: 3.   - AC indicated but Eliquis being held due to GIB and hgb 3.6    VTE Risk Mitigation (From admission, onward)           Ordered     IP VTE HIGH RISK PATIENT  Once         08/15/23 2325     Place sequential compression device  Until discontinued         08/15/23 2325                    Discharge Planning   THU:      Code Status: Full Code   Is the patient medically ready for discharge?:     Reason for patient still in hospital (select all that apply): Patient trending condition, Treatment and Consult recommendations             Sung Brumfield MD  Department of Hospital Medicine   [facility] - Intensive Care

## 2023-08-16 NOTE — CONSULTS
[facility] - Intensive Care  Gastroenterology  Consult Note    Patient Name: Wesley Bernal  MRN: [id number]  Admission Date: 8/15/2023  Hospital Length of Stay: 1 days  Code Status: Full Code   Attending Provider: Sung Brumfield MD   Consulting Provider: Javier Chang MD  Primary Care Physician: Lalo Sweet MD  Principal Problem:<principal problem not specified>    Gastroenterology  Consult performed by: Javier Chang MD  Consult ordered by: Rodo Georges MD        Subjective:     HPI:  72-year-old gentleman past medical history significant for atrial fibrillation on Eliquis, ischemic cardiomyopathy, and rectal cancer who presented to the emergency department on 08/15 with complaints fatigue, dark stool, and decreased appetite.  In the ED patient was found to have a hemoglobin of 4 with elevated BUN/creatinine ratio.  Transfusion process was initiated and patient was transferred to [facility] in escalation of care.  Patient was found to have issue with antibodies of so procurement blood was somewhat delayed.  He is since received 4 units of packed red blood cells with appropriate response.  He apparently had some hematemesis overnight but has not had any further overt bleeding.      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    Cardiomyopathy     Carotid artery disease     Coronary artery disease     Diabetes mellitus type II     Encounter for blood transfusion     Heart disease     Hyperlipidemia     Hypertension     PAD (peripheral artery disease)        Past Surgical History:   Procedure Laterality Date    ABDOMINAL AORTA STENT      BRACHIOCEPHALIC VEIN ANGIOPLASTY / STENTING      CARDIAC DEFIBRILLATOR PLACEMENT      1/2011    CARDIAC PACEMAKER PLACEMENT      1-2011    CARDIAC SURGERY      open heart    CARDIAC VALVE SURGERY      pig valve replacement -     CAROTID STENT      LASIK SX Bilateral     LYMPH NODE BIOPSY Left 2019    Procedure: BIOPSY, LYMPH NODE (GROIN);  Surgeon: Adelfo Yin MD;  Location: [facility] OR;  Service: General;  Laterality: Left;    REPLACEMENT OF IMPLANTABLE CARDIOVERTER-DEFIBRILLATOR (ICD) GENERATOR N/A 2020    Procedure: REPLACEMENT, ICD GENERATOR;  Surgeon: Marko Hartley MD;  Location: [facility] CATH;  Service: Cardiology;  Laterality: N/A;       Review of patient's allergies indicates:   Allergen Reactions    Statins-hmg-coa reductase inhibitors      Family History       Problem Relation (Age of Onset)    Cancer Father    Diabetes Mother    Heart disease Father, Paternal Grandfather          Tobacco Use    Smoking status: Former     Current packs/day: 0.00     Types: Cigarettes, Cigars     Quit date: 4/10/2015     Years since quittin.3    Smokeless tobacco: Never   Substance and Sexual Activity    Alcohol use: No     Alcohol/week: 0.0 standard drinks of alcohol    Drug use: No    Sexual activity: Yes     Partners: Female     Review of Systems   Constitutional:  Positive for fatigue.   Gastrointestinal:  Positive for blood in stool, diarrhea, nausea and vomiting. Negative for abdominal pain.   Neurological:  Positive for weakness.     Objective:     Vital Signs (Most Recent):  Temp: 97.8 °F (36.6 °C) (23 1530)  Pulse: (!) 56 (23 1600)  Resp: (!) 24 (23 1600)  BP: 136/63 (23 1600)  SpO2: 100 % (23 1600) Vital Signs (24h Range):  Temp:  [96.2 °F (35.7 °C)-98.8 °F (37.1 °C)] 97.8 °F (36.6 °C)  Pulse:  [50-82] 56  Resp:  [11-35] 24  SpO2:  [82 %-100 %] 100 %  BP: ()/() 136/63     Weight: 78.5 kg (173 lb 1 oz) (23 0830)  Body mass index is 22.83 kg/m².      Intake/Output Summary (Last 24 hours) at 2023 1632  Last data filed at 2023 1407  Gross per 24 hour   Intake 1903.66 ml   Output 1260 ml   Net 643.66 ml       Lines/Drains/Airways       Drain  Duration              Male External Urinary Catheter 08/16/23 0000 Medium <1 day              Peripheral Intravenous Line  Duration                  Peripheral IV - Single Lumen 08/15/23 2200 18 G Left Antecubital <1 day         Peripheral IV - Single Lumen 08/15/23 2200 18 G Posterior;Right Forearm <1 day                     Physical Exam  Vitals and nursing note reviewed.   Constitutional:       Appearance: He is well-developed.   HENT:      Head: Normocephalic and atraumatic.   Eyes:      General: No scleral icterus.     Pupils: Pupils are equal, round, and reactive to light.   Cardiovascular:      Rate and Rhythm: Normal rate. Rhythm irregular.      Heart sounds: Normal heart sounds.   Pulmonary:      Effort: Pulmonary effort is normal. No respiratory distress.      Breath sounds: Normal breath sounds.   Abdominal:      General: Bowel sounds are normal. There is no distension.      Palpations: Abdomen is soft.      Tenderness: There is no abdominal tenderness.   Musculoskeletal:         General: Normal range of motion.      Cervical back: Normal range of motion and neck supple.   Skin:     General: Skin is warm and dry.      Findings: No erythema or rash.   Neurological:      Mental Status: He is alert and oriented to person, place, and time.      Comments: No asterixis   Psychiatric:         Behavior: Behavior normal.          Significant Labs:  Recent Lab Results  (Last 5 results in the past 24 hours)        08/16/23  1304   08/16/23  1149   08/16/23  1105   08/16/23  0834   08/16/23  0755        Ascending aorta       3.13         Ao peak pilo       1.55         Ao VTI       37.00         AV valve area       3.17         CLIF by Velocity Ratio       3.00         AV mean gradient       4         AV index (prosthetic)       0.80         AV peak gradient       10         AV Velocity Ratio       0.75         BSA       2.01         Left Ventricle Relative Wall Thickness       0.29         FS       12         Hematocrit 23.0   21.6        17.6  Comment: HGB/HCT critical result(s) called and verbal readback obtained   from DAMIEN DC RN. by [facility] 08/16/2023 08:27         Hemoglobin 7.3   6.9       5.7  Comment: Patient received blood.  HGB/HCT critical result(s) called and verbal readback obtained   from DAMIEN DC RN. by [facility] 08/16/2023 08:27         Mitral Valve Heart Rate       58         IVC diameter       1.90         IVSd       1.25         LA WIDTH       4.9         Left Atrium Major Axis       6.65         Left Atrium Minor Axis       6.61         LA size       4.79         LA volume       132.27                110.18         LA vol index       65.5         LA Volume Index (Mod)       54.5         LVOT area       4.0         LV EDV BP       221.56         LV Diastolic Volume Index       109.68         LVIDd       6.57         LVIDs       5.77         LV mass       327.78         LV Mass Index       162         Left Ventricular Outflow Tract Mean Gradient       2.81         Left Ventricular Outflow Tract Mean Velocity       0.78         LVOT diameter       2.25         LVOT peak pop       1.17         LVOT stroke volume       117.23         LVOT peak VTI       29.50         LV ESV BP       164.86         LV Systolic Volume Index       81.6         MCH   30.3       31.3       MCHC   31.9       32.4       MCV   95       97       Mean e'       0.05         MPV   10.4       10.1       Mr max pop       5.00         MV valve area by continuity eq       1.71         MV mean gradient       4         MV peak gradient       13         MV VTI       68.7         Lawson's Biplane MOD Ejection Fraction       45         Platelets   129       141       POCT Glucose     163           Pulmonary Valve Mean Velocity       0.72         PV peak gradient       4         PV Peak D Pop       0.69         PV Peak S Pop       0.68         PV PEAK VELOCITY       1.00         Pulm vein S/D ratio       0.99         Posterior Wall       0.96         RA Major Axis        5.29         RA Width       3.2         RBC   2.28       1.82  Comment: Patient received blood.       RDW   16.5       16.6       RVDD       3.28         STJ       2.43         TAPSE       1.60         TDI SEPTAL       0.05         TDI LATERAL       0.04         Triscuspid Valve Regurgitation Peak Gradient       23         TR Max Pop       2.41         WBC   18.89       21.26       ZLVIDD       0.99         ZLVIDS       3.62                                Significant Imaging:  Imaging results within the past 24 hours have been reviewed.    Assessment/Plan:     Oncology  Acute on chronic anemia 2/2 GIB  Reported blood in stool + h/o rectal cancer s/p chemo/XRT. However reported coffee ground emesis  Responded appropriately to transfusion. HD stable. No further overt bleeding today  Trend hgb  Continue PPI IV BID  Plan for EGD/colonoscopy tomorrow  CLD today. NPO after midnight  Prep ordered.        Thank you for your consult. I will follow-up with patient. Please contact us if you have any additional questions.    Javier Chang MD  Gastroenterology  [facility] - Intensive Care

## 2023-08-16 NOTE — PROVIDER TRANSFER
Outside Transfer Acceptance Note / [facility]    Referring facility: [facility]   Referring provider: KALEB FERNANDO NIXON  Accepting facility: [facility]  Accepting provider: Dr. Maciej Durant  Admitting provider: Dr. Rodo Georges   Reason for transfer:  higher level of care  Transfer diagnosis: GIB   Transfer specialty requested: Gastroenterology   Transfer specialty notified: No  Transfer level: NUMBER 1-5: 2  Bed type requested: ICU  Isolation status: No active isolations   Admission class or status: IP- Inpatient  IP- Inpatient      Narrative     72 year old male with PMHx of afib on eliquis, AICD in place, CAD on aspirin, hx of rectal CA in remission, HTN, DMII presented to the ED with a four day duration of fatigue and shortness of breath. Patient notes he had red stools but no brbpr or melena. Denies chest pain.     Upon arrival to the ED vitals with /56, HR 50, RR 16, afebrile on room air. Labs significant for Hb of 4.0 (down from 11.7 in March 2022) as well ask CARLOS with sCr of 2.2 (up from 1.6). Troponin elevated to 0.049 likely 2/2 to GIB as EKG without any ischemic changes.     Patient appeared very pale and weak (unable to life arms/legs) so CT Head obtained and negative. CT A/P also obtained due to history of rectal cancer. ROSALINO with nothing in anal vault. Hemoccult negative. Denies any alcohol use.     Patient types & screened however resulted with antibody test +, has to send blood off to [place]. Given IV PPI and IVF bolus. Patient HDS and has not been transfused yet, as waiting on blood products however emergency PRBC are available if patient is to decompensate.    Transfer request for ICU with GI consultation.     Objective     Vitals: Temp: 96.2 °F (35.7 °C) (08/15/23 1824)  Pulse: (!) 50 (08/15/23 2118)  Resp: 16 (08/15/23 2118)  BP: (!) 145/63 (08/15/23 2118)  SpO2: 99 % (08/15/23 2118)  Recent Labs: All pertinent labs within the  "past 24 hours have been reviewed.  CBC:   Recent Labs   Lab 08/15/23  1900   WBC 12.41   HGB 4.0*   HCT 13.3*        CMP:   Recent Labs   Lab 08/15/23  1900      K 4.8      CO2 19*   *   BUN 48*   CREATININE 2.2*   CALCIUM 8.3*   PROT 5.4*   ALBUMIN 2.9*   BILITOT 0.3   ALKPHOS 48*   AST 12   ALT 11   ANIONGAP 16     Cardiac Markers:   Recent Labs   Lab 08/15/23  1900   *     Coagulation:   Recent Labs   Lab 08/15/23  1900   INR 1.2   APTT 21.4     Lactic Acid: No results for input(s): "LACTATE" in the last 48 hours.  Troponin:   Recent Labs   Lab 08/15/23  1900   TROPONINI 0.049*     Urine Studies:   Recent Labs   Lab 08/15/23  2117   COLORU Yellow   APPEARANCEUA Clear   PHUR 6.0   SPECGRAV 1.020   PROTEINUA Negative   GLUCUA Negative   KETONESU Negative   BILIRUBINUA Negative   OCCULTUA Negative   NITRITE Negative   UROBILINOGEN Negative   LEUKOCYTESUR Negative     Recent imaging:   CT Head: No acute abnormality.    CT A/P: Evaluation for metastatic disease is limited without intravenous contrast.     Indeterminate renal lesions.  Recommend follow-up ultrasound.     Low-attenuation blood within the heart, compatible with anemia.  Please confirm with laboratory values.     Cholelithiasis.     Circumferential bladder wall thickening.  Correlate for cystitis.    CXR: Enlarged cardiac silhouette with pulmonary vascular congestion.  Mild diffuse interstitial prominence may reflect edema, pneumonitis, or interstitial scarring.  Small left pleural effusion versus scarring..  Left chest wall AICD.  Right chest wall port.    Airway:     Vent settings:         IV access:        Peripheral IV - Single Lumen 08/15/23 1827 18 G Left Antecubital (Active)   Site Assessment Dry;Intact;Clean;No redness;No swelling 08/15/23 1827            Peripheral IV - Single Lumen 08/15/23 1902 18 G Anterior;Right Forearm (Active)   Site Assessment Clean;Dry;Intact;No redness;No swelling 08/15/23 1902 "   Extremity Assessment Distal to IV No abnormal discoloration;No redness;No swelling;No warmth 08/15/23 1902   Line Status Blood return noted;Flushed;Saline locked 08/15/23 1902   Dressing Status Clean;Dry;Intact 08/15/23 1902   Dressing Intervention First dressing 08/15/23 1902     Infusions: IV PPI  Allergies:   Review of patient's allergies indicates:   Allergen Reactions    Statins-hmg-coa reductase inhibitors       NPO: Yes    Anticoagulation:   Anticoagulants       None             Instructions      Upon patient arrival to the ICU, please contact Critical Care Medicine on call.  Consult GI, NPO at midnight

## 2023-08-16 NOTE — HOSPITAL COURSE
08/16/2023 Per HPI   08/17/2023 LVEF 45%. HH 7.3/22. NPO for EGD/Colonoscopy today. No reports of CP or SOB overnight.   08/18/2023 s/p EGD- Dieulafoy lesion of stomach clipped. HH trended down overnight- PRBC transfusion in progress. No cardiac complaints. SR on tele. [facility] on hold.

## 2023-08-16 NOTE — ASSESSMENT & PLAN NOTE
- S/p bio MVR in 2013  - Exam with harsh systolic murmur and possible S3 or diastolic murmur?   - Repeat TTE now to eval LVEF and MV function  - Holding Eliquis (on for AF) and ASA due to GIB

## 2023-08-16 NOTE — CONSULTS
[facility] - [facility]  Cardiology  Consult Note    Patient Name: Wesley Bernal  MRN: [id number]  Admission Date: 8/15/2023  Hospital Length of Stay: 1 days  Code Status: Full Code   Attending Provider: Sung Brumfield MD   Consulting Provider: Jomar Lew NP  Primary Care Physician: Lalo Sweet MD  Principal Problem:<principal problem not specified>    Patient information was obtained from patient, past medical records and ER records.     Inpatient consult to [facility]  Consult performed by: Jomar Lew NP  Consult ordered by: Rodo Georges MD        Subjective:     Chief Complaint:  Fatigue, bloody stools     HPI:   Wesley Bernal is a 71 yo male with a ICM HFrEF 30-35% s/p ICD, bioprosthetic MVR 2013 for severe MR, AF on amio/Eliquis. Patient presented to the ED with fatigue and bloody stools with episodes of constipation. Patient has progressively limited his normal ADLs 2/2 worsening RAMIREZ and fatigue. Patient denies any dizziness, syncope, palpitations, orthopnea, PND, swelling, abdominal pain. Patient with episode of hematemesis overnight and chest pain. Hgb was 3.6/17. Initially emergent transfusion was held off due to antibodies and hemodynamic stability. After this event he was emergently transfused with 2 U PRBCs. Plan for additional PRBC transfusion today.   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    Cardiomyopathy     Carotid artery disease     Coronary artery disease     Diabetes mellitus type II     Encounter for blood transfusion     Heart disease     Hyperlipidemia     Hypertension     PAD (peripheral artery disease)        Past Surgical History:   Procedure Laterality Date    ABDOMINAL AORTA STENT       BRACHIOCEPHALIC VEIN ANGIOPLASTY / STENTING      CARDIAC DEFIBRILLATOR PLACEMENT      1/2011    CARDIAC PACEMAKER PLACEMENT      1-2011    CARDIAC SURGERY      open heart    CARDIAC VALVE SURGERY      pig valve replacement -    CAROTID STENT      LASIK SX Bilateral     LYMPH NODE BIOPSY Left 6/17/2019    Procedure: BIOPSY, LYMPH NODE (GROIN);  Surgeon: Adelfo Yin MD;  [facility];  Service: General;  Laterality: Left;    REPLACEMENT OF IMPLANTABLE CARDIOVERTER-DEFIBRILLATOR (ICD) GENERATOR N/A 7/29/2020    Procedure: REPLACEMENT, ICD GENERATOR;  Surgeon: Marko Hartley MD;  Location: [facility] CATH;  Service: Cardiology;  Laterality: N/A;       Review of patient's allergies indicates:   Allergen Reactions    Statins-hmg-coa reductase inhibitors        Current Facility-Administered Medications on File Prior to Encounter   Medication    [COMPLETED] 0.9%  NaCl infusion    [COMPLETED] ondansetron injection 4 mg    [COMPLETED] pantoprazole (PROTONIX) 40 mg in sodium chloride 0.9 % 100 mL IVPB (MB+)    [COMPLETED] pantoprazole injection 80 mg    [COMPLETED] sodium chloride 0.9% bolus 250 mL 250 mL    [DISCONTINUED] 0.9%  NaCl infusion (for blood administration)    [DISCONTINUED] sodium chloride 0.9% bolus 1,000 mL 1,000 mL    [DISCONTINUED] sodium chloride 0.9% flush 10 mL     Current Outpatient Medications on File Prior to Encounter   Medication Sig    ALPRAZolam (XANAX) 0.25 MG tablet TAKE ONE TABLET BY MOUTH TWICE DAILY AS NEEDED    amiodarone (PACERONE) 200 MG Tab Take 0.5 tablets (100 mg total) by mouth once daily.    apixaban (ELIQUIS) 5 mg Tab Take 5 mg by mouth 2 (two) times daily.    aspirin (ECOTRIN) 81 MG EC tablet Take by mouth once daily.    atenoloL (TENORMIN) 25 MG tablet Take 1 tablet (25 mg total) by mouth once daily.    gabapentin (NEURONTIN) 100 MG capsule TAKE TWO CAPSULES BY MOUTH THREE TIMES DAILY (DOSE INCREASED    HYDROcodone-acetaminophen (NORCO)  mg per  tablet Take 1 tablet by mouth every 12 (twelve) hours as needed for Pain.    isosorbide mononitrate (IMDUR) 60 MG 24 hr tablet Take 60 mg by mouth once daily.     pravastatin (PRAVACHOL) 20 MG tablet Take 20 mg by mouth once daily.    carvediloL (COREG) 6.25 MG tablet Take 6.25 mg by mouth 2 (two) times daily.    docusate sodium (COLACE) 100 MG capsule Take 200 mg by mouth every morning.    DULoxetine (CYMBALTA) 60 MG capsule Take 1 capsule (60 mg total) by mouth once daily.    ezetimibe (ZETIA) 10 mg tablet TAKE ONE TABLET BY MOUTH ONCE A DAY    furosemide (LASIX) 20 MG tablet Take 20 mg by mouth once daily.     icosapent ethyL (VASCEPA) 1 gram Cap TAKE TWO CAPSULES BY MOUTH TWICE DAILY    pioglitazone (ACTOS) 15 MG tablet TAKE ONE TABLET BY MOUTH ONCE A DAY    primidone (MYSOLINE) 50 MG Tab Take 50 mg by mouth 2 (two) times a day.    REPATHA SURECLICK 140 mg/mL PnIj INJECT ONE SYRINGE SUBCUTANEOUSLY ONCE EVERY 14 DAYS    SITagliptin phosphate (JANUVIA) 100 MG Tab Take 1 tablet (100 mg total) by mouth once daily.     Family History       Problem Relation (Age of Onset)    Cancer Father    Diabetes Mother    Heart disease Father, Paternal Grandfather          Tobacco Use    Smoking status: Former     Current packs/day: 0.00     Types: Cigarettes, Cigars     Quit date: 4/10/2015     Years since quittin.3    Smokeless tobacco: Never   Substance and Sexual Activity    Alcohol use: No     Alcohol/week: 0.0 standard drinks of alcohol    Drug use: No    Sexual activity: Yes     Partners: Female     Review of Systems   Constitutional: Positive for malaise/fatigue.   HENT: Negative.     Eyes: Negative.    Cardiovascular:  Positive for chest pain and dyspnea on exertion. Negative for irregular heartbeat, leg swelling, near-syncope, orthopnea, palpitations, paroxysmal nocturnal dyspnea and syncope.   Respiratory:  Negative for cough and shortness of breath.    Endocrine: Negative.     Hematologic/Lymphatic: Negative.    Skin: Negative.    Musculoskeletal: Negative.    Gastrointestinal:  Positive for hematemesis, nausea and vomiting.   Neurological:  Positive for weakness.   Psychiatric/Behavioral: Negative.     Allergic/Immunologic: Negative.      Objective:     Vital Signs (Most Recent):  Temp: 98.2 °F (36.8 °C) (08/16/23 0845)  Pulse: (!) 55 (08/16/23 0945)  Resp: 12 (08/16/23 0945)  BP: (!) 116/58 (08/16/23 0945)  SpO2: 97 % (08/16/23 0945) Vital Signs (24h Range):  Temp:  [96.2 °F (35.7 °C)-98.4 °F (36.9 °C)] 98.2 °F (36.8 °C)  Pulse:  [50-82] 55  Resp:  [11-35] 12  SpO2:  [95 %-100 %] 97 %  BP: ()/() 116/58     Weight: 78.5 kg (173 lb 1 oz)  Body mass index is 22.83 kg/m².    SpO2: 97 %         Intake/Output Summary (Last 24 hours) at 8/16/2023 1026  Last data filed at 8/16/2023 0838  Gross per 24 hour   Intake 1051.16 ml   Output 660 ml   Net 391.16 ml       Lines/Drains/Airways       Drain  Duration             Male External Urinary Catheter 08/16/23 0000 Medium <1 day              Peripheral Intravenous Line  Duration                  Peripheral IV - Single Lumen 08/15/23 2200 18 G Left Antecubital <1 day         Peripheral IV - Single Lumen 08/15/23 2200 18 G Posterior;Right Forearm <1 day                     Physical Exam  Constitutional:       Appearance: He is ill-appearing. He is not diaphoretic.   HENT:      Head: Atraumatic.   Eyes:      General:         Right eye: No discharge.         Left eye: No discharge.   Cardiovascular:      Rate and Rhythm: Normal rate and regular rhythm.      Heart sounds: Murmur heard.   Abdominal:      Palpations: Abdomen is soft.   Musculoskeletal:      Right lower leg: No edema.      Left lower leg: No edema.   Skin:     General: Skin is warm and dry.      Capillary Refill: Capillary refill takes 2 to 3 seconds.   Neurological:      Mental Status: He is alert. Mental status is at baseline.          Significant Labs: BMP:   Recent Labs  "  Lab 08/15/23  1900 08/16/23  0239   * 175*  175*    139  139   K 4.8 4.6  4.6    108  108   CO2 19* 24  24   BUN 48* 51*  52*   CREATININE 2.2* 2.1*  2.1*   CALCIUM 8.3* 8.0*  8.1*   MG  --  2.2   , CMP   Recent Labs   Lab 08/15/23  1900 08/16/23  0239    139  139   K 4.8 4.6  4.6    108  108   CO2 19* 24  24   * 175*  175*   BUN 48* 51*  52*   CREATININE 2.2* 2.1*  2.1*   CALCIUM 8.3* 8.0*  8.1*   PROT 5.4* 5.1*   ALBUMIN 2.9* 2.8*   BILITOT 0.3 0.4   ALKPHOS 48* 46*   AST 12 10   ALT 11 9*   ANIONGAP 16 7*  7*   , CBC   Recent Labs   Lab 08/15/23  1900 08/16/23  0239 08/16/23  0755   WBC 12.41 9.81 21.26*   HGB 4.0* 3.6* 5.7*   HCT 13.3* 11.7* 17.6*    144* 141*   , INR   Recent Labs   Lab 08/15/23  1900   INR 1.2   , Lipid Panel No results for input(s): "CHOL", "HDL", "LDLCALC", "TRIG", "CHOLHDL" in the last 48 hours., Troponin   Recent Labs   Lab 08/15/23  1900   TROPONINI 0.049*   , and All pertinent lab results from the last 24 hours have been reviewed.    Significant Imaging: Echocardiogram: Transthoracic echo (TTE) complete (Cupid Only):   Results for orders placed or performed during the hospital encounter of 08/15/23   Echo   Result Value Ref Range    BSA 2.01 m2    Lawson's Biplane MOD Ejection Fraction 45 %    LVOT stroke volume 117.23 cm3    LVIDd 6.57 (A) 3.5 - 6.0 cm    LV Systolic Volume 164.86 mL    LV Systolic Volume Index 81.6 mL/m2    LVIDs 5.77 (A) 2.1 - 4.0 cm    LV Diastolic Volume 221.56 mL    LV Diastolic Volume Index 109.68 mL/m2    IVS 1.25 (A) 0.6 - 1.1 cm    LVOT diameter 2.25 cm    LVOT area 4.0 cm2    FS 12 (A) 28 - 44 %    Left Ventricle Relative Wall Thickness 0.29 cm    Posterior Wall 0.96 0.6 - 1.1 cm    LV mass 327.78 g    LV Mass Index 162 g/m2    TDI LATERAL 0.04 m/s    TDI SEPTAL 0.05 m/s    TR Max Pop 2.41 m/s    PV Peak S Pop 0.68 m/s    PV Peak D Pop 0.69 m/s    Pulm vein S/D ratio 0.99     LVOT peak pop 1.17 " m/s    Left Ventricular Outflow Tract Mean Velocity 0.78 cm/s    Left Ventricular Outflow Tract Mean Gradient 2.81 mmHg    LA volume (mod) 110.18 cm3    LA Volume Index (Mod) 54.5 mL/m2    LA size 4.79 cm    Left Atrium Major Axis 6.65 cm    Left Atrium Minor Axis 6.61 cm    RVDD 3.28 cm    TAPSE 1.60 cm    RA Major Axis 5.29 cm    AV mean gradient 4 mmHg    AV peak gradient 10 mmHg    Ao peak pilo 1.55 m/s    Ao VTI 37.00 cm    LVOT peak VTI 29.50 cm    AV valve area 3.17 cm²    AV Velocity Ratio 0.75     AV index (prosthetic) 0.80     CLIF by Velocity Ratio 3.00 cm²    Mr max pilo 5.00 m/s    MV mean gradient 4 mmHg    MV peak gradient 13 mmHg    MV valve area by continuity eq 1.71 cm2    MV VTI 68.7 cm    Triscuspid Valve Regurgitation Peak Gradient 23 mmHg    PV PEAK VELOCITY 1.00 m/s    PV peak gradient 4 mmHg    Pulmonary Valve Mean Velocity 0.72 m/s    STJ 2.43 cm    Ascending aorta 3.13 cm    IVC diameter 1.90 cm    Mean e' 0.05 m/s    ZLVIDS 3.62     ZLVIDD 0.99     LA Volume Index 65.5 mL/m2    LA volume 132.27 cm3    LA WIDTH 4.9 cm    RA Width 3.2 cm     Assessment and Plan:     CHF (congestive heart failure), NYHA class II, chronic, systolic   09/18/20    Echo Color Flow Doppler? Yes; Bubble Contrast? No    Interpretation Summary  · Mild left ventricular enlargement.  · Moderately decreased left ventricular systolic function. The estimated ejection fraction is 33%.  · Eccentric left ventricular hypertrophy.  · Grade I (mild) left ventricular diastolic dysfunction consistent with impaired relaxation.  · Local segmental wall motion abnormalities.  · Low normal right ventricular systolic function.  · Mild right atrial enlargement.  · There is a mechanical mitral valve prosthesis.  · The mean diastolic gradient across the mitral valve is 3 mmHg ; MVA 1.57; mild MR  · Normal central venous pressure (3 mmHg).  · The estimated PA systolic pressure is 11 mmHg ( THIS MAY NOT BE TRUE PEAK??).  · Mild mitral  regurgitation.    Recent Labs   Lab 08/15/23  1900   *   .    Patient HH 3.6/11- no volume on board- getting blood- will monitor volume status closely    Hold home Lasix  Continue BB as tolerated   S/p ICD    Repeat TTE pending     CAD (coronary artery disease)  Unknown coronary anatomy   Chest pain during episode of vomiting felt to be in setting of severe anemia and CARLOS   EKG without acute ischemic changes  Troponin mildly elevated at baseline 0.04  Holding asa given GIB  Consider resumption of Imdur   Continue BB, Repatha (as outpatient)   TTE pending for completeness  Transfuse PRBCs to goal >8  Patient cleared from CV perspective for GI procedures. Cardiology will follow.     Bioprosthetic mitral valve replacement  Porcine valve  No need for anticoagulation   TTE pending     Hyperlipidemia  Resume Repatha as outpatient     Essential hypertension  SBP 120s-130s  Continue BB  Consider resumption of Imdur once stable from GIB perspective     Atrial fibrillation  Currently SR/paced  Continue Amiodarone and BB  Hold Eliquis given active GIB, will discuss resumption once GI work up is complete         VTE Risk Mitigation (From admission, onward)         Ordered     IP VTE HIGH RISK PATIENT  Once         08/15/23 2325     Place sequential compression device  Until discontinued         08/15/23 2325                Thank you for your consult. I will follow-up with patient. Please contact us if you have any additional questions.    Jomar Lew NP  Cardiology   [place] - Intensive Care

## 2023-08-16 NOTE — PLAN OF CARE
"[facility] - [facility]  Initial Discharge Assessment       Primary Care Provider: Lalo Sweet MD    Admission Diagnosis: GIB (gastrointestinal bleeding) [K92.2]    Admission Date: 8/15/2023  Expected Discharge Date: 8/18/2023    Consult: GI, cards, & PT/OT    Payor: [employer] MANAGED MEDICARE / Plan: [employer] MEDICARE HMO / Product Type: Capitation /     Extended Emergency Contact Information  Primary Emergency Contact: Tierra Jacobson  Address: [address] [place] [address] [address]           [facility] [place] [address] [facility]  Home Phone: [phone]  Mobile Phone: [phone]  Relation: Significant other  Secondary Emergency Contact: Keyon Bernal "Gary"  Mobile Phone: [phone]  Relation: Brother    Discharge Plan A: (P) Home with family  Discharge Plan B: (P) Home Health      [facility], [place] - [address]  [address] [facility] [address] [facility] [address]  [facility] [address]  Phone: [phone] Fax: [phone]      Initial Assessment (most recent)       Adult Discharge Assessment - 08/16/23 1130          Discharge Assessment    Assessment Type Discharge Planning Assessment     Confirmed/corrected address, phone number and insurance Yes     Confirmed Demographics Correct on Facesheet     Source of Information patient;family   brotherKeyon "Gary" Gabe ([phone])    Communicated HTU with patient/caregiver Date not available/Unable to determine     People in Home significant other;other relative(s)   s.o., Tierra Jacobson ([phone]), s.o.'s brother & son    Do you expect to return to your current living situation? Yes     Do you have help at home or someone to help you manage your care at home? Yes     Prior to hospitilization cognitive status: Alert/Oriented     Current cognitive status: Alert/Oriented     Walking or Climbing Stairs ambulation difficulty, requires equipment     Dressing/Bathing bathing difficulty, requires equipment     Equipment Currently Used at " Home cane, straight;crutches;walker, rolling;shower chair;grab bar;other (see comments)   BP machine    Readmission within 30 days? No (P)      Patient currently being followed by outpatient case management? No (P)      Do you currently have service(s) that help you manage your care at home? No (P)      Do you take prescription medications? Yes (P)      Do you have prescription coverage? Yes (P)      Do you have any problems affording any of your prescribed medications? No (P)      Is the patient taking medications as prescribed? yes (P)      How do you get to doctors appointments? car, drives self (P)      Are you on dialysis? No (P)      Do you take coumadin? No (P)      DME Needed Upon Discharge  other (see comments) (P)    tbd    Discharge Plan discussed with: Patient;Sibling (P)      Discharge Plan A Home with family (P)      Discharge Plan B Home Health (P)         Physical Activity    On average, how many days per week do you engage in moderate to strenuous exercise (like a brisk walk)? 7 days (P)      On average, how many minutes do you engage in exercise at this level? 30 min (P)         Financial Resource Strain    How hard is it for you to pay for the very basics like food, housing, medical care, and heating? Not hard at all (P)         Housing Stability    In the last 12 months, was there a time when you were not able to pay the mortgage or rent on time? No (P)      In the last 12 months, was there a time when you did not have a steady place to sleep or slept in a shelter (including now)? No (P)         Transportation Needs    In the past 12 months, has lack of transportation kept you from medical appointments or from getting medications? No (P)      In the past 12 months, has lack of transportation kept you from meetings, work, or from getting things needed for daily living? No (P)         Food Insecurity    Within the past 12 months, you worried that your food would run out before you got the money to  "buy more. Never true (P)      Within the past 12 months, the food you bought just didn't last and you didn't have money to get more. Never true (P)         Stress    Do you feel stress - tense, restless, nervous, or anxious, or unable to sleep at night because your mind is troubled all the time - these days? Not at all (P)         Social Connections    In a typical week, how many times do you talk on the phone with family, friends, or neighbors? More than three times a week (P)      How often do you get together with friends or relatives? More than three times a week (P)      How often do you attend Mandaeism or Episcopal services? More than 4 times per year (P)      Do you belong to any clubs or organizations such as Mandaeism groups, unions, fraternal or athletic groups, or school groups? Yes (P)      How often do you attend meetings of the clubs or organizations you belong to? More than 4 times per year (P)      Are you , , , , never , or living with a partner? Living with partner (P)         Alcohol Use    Q1: How often do you have a drink containing alcohol? Never (P)      Q2: How many drinks containing alcohol do you have on a typical day when you are drinking? Patient does not drink (P)      Q3: How often do you have six or more drinks on one occasion? Never (P)                       1130  Patient resting quietly in bed with brother, Keyon "Gary" Gabe, & margaux at the bedside when CM rounded. Patient was admitted with GIB, is being followed by GI, cards, & PT/OT, and is currently receiving 1U PRBC. H&H 3.6/11.7 at time of admit.     Patient lives with his s.o., Tierra Jacobson, & his s.o.'s brother & son. Pt has equipment to assist with ADLs, & denied the need for assistance with transportation at time of discharge.     CM updated patient's whiteboard with CM name & contact information.     1720  Message sent to the schedulers requesting a hospital follow up appointment with " "Dr Lalo Sweet (PCP). Awaiting response.    Per GI note, "Plan for EGD/colonoscopy tomorrow:      Will continue to follow.               "

## 2023-08-16 NOTE — ASSESSMENT & PLAN NOTE
-patient denies chest pain or shortness of breath currently   -EKG on 08/16/2023=Sinus rhythm with 1st degree A-V block. Nonspecific intraventricular block. Nonspecific T wave abnormality  -cardiac enzymes on 08/15/2023= elevated x1 (likely due to demand ischemia due to severe anemia)  -cardiology consulted= follow recommendations

## 2023-08-16 NOTE — SIGNIFICANT EVENT
Called to bedside due to patient starting to have chest pain with left arm numbness.  Repeat CBC showed hemoglobin of 3.6.  Decision made to emergently transfuse 2 units packed red blood cells despite the anti P1 antibody issue.    As the blood was getting prep, the patient became nauseous and vomited up about 100-150 cc of dark blood mixed with significant clots.  He has not had any further episodes since then.    After finishing 2 units of blood, the patient states his chest pain has resolved and he is already feeling better.      At this time, patient's correctly crossed and matched blood arrived from [facility].  They sent over 5 units.  Decision made to prep 2 more units and start transfusion when ready.        ELIU Georges MD  Hospitalist  [facility]

## 2023-08-16 NOTE — SUBJECTIVE & OBJECTIVE
Interval History:  Patient awake alert in no acute distress.  Remains afebrile.  Denies chest pain shortness a breath, nausea vomiting abdominal pain.  No complaints at this time apart from feeling tired.  Patient seen and assessed along with cardiologist Dr. Monteiro by bedside.    Review of Systems   Constitutional:  Positive for fatigue.   HENT: Negative.     Eyes: Negative.    Respiratory: Negative.     Cardiovascular: Negative.    Gastrointestinal: Negative.    Endocrine: Negative.    Genitourinary: Negative.    Musculoskeletal: Negative.    Skin: Negative.    Allergic/Immunologic: Negative.    Neurological: Negative.    Hematological: Negative.    Psychiatric/Behavioral: Negative.     All other systems reviewed and are negative.    Objective:     Vital Signs (Most Recent):  Temp: 98.2 °F (36.8 °C) (08/16/23 0715)  Pulse: (!) 55 (08/16/23 0730)  Resp: (!) 25 (08/16/23 0730)  BP: (!) 132/56 (08/16/23 0730)  SpO2: 100 % (08/16/23 0730) Vital Signs (24h Range):  Temp:  [96.2 °F (35.7 °C)-98.4 °F (36.9 °C)] 98.2 °F (36.8 °C)  Pulse:  [50-82] 55  Resp:  [11-35] 25  SpO2:  [95 %-100 %] 100 %  BP: ()/(46-71) 132/56     Weight: 78.5 kg (173 lb 1 oz)  Body mass index is 22.83 kg/m².    Intake/Output Summary (Last 24 hours) at 8/16/2023 0741  Last data filed at 8/16/2023 0728  Gross per 24 hour   Intake 931.16 ml   Output 410 ml   Net 521.16 ml         Physical Exam  Vitals and nursing note reviewed.   Constitutional:       General: He is not in acute distress.     Appearance: Normal appearance.   HENT:      Head: Normocephalic and atraumatic.      Nose: Nose normal.      Mouth/Throat:      Mouth: Mucous membranes are moist.   Eyes:      Extraocular Movements: Extraocular movements intact.      Pupils: Pupils are equal, round, and reactive to light.   Cardiovascular:      Rate and Rhythm: Normal rate and regular rhythm.      Pulses: Normal pulses.      Heart sounds: Murmur heard.   Pulmonary:      Effort: Pulmonary  effort is normal. No respiratory distress.      Breath sounds: Normal breath sounds.   Abdominal:      General: Bowel sounds are normal. There is no distension.      Palpations: Abdomen is soft.      Tenderness: There is no abdominal tenderness.   Musculoskeletal:         General: No deformity. Normal range of motion.      Cervical back: Normal range of motion and neck supple.   Skin:     General: Skin is warm and dry.      Coloration: Skin is pale.   Neurological:      General: No focal deficit present.      Mental Status: He is alert and oriented to person, place, and time. Mental status is at baseline.   Psychiatric:         Mood and Affect: Mood normal.             Significant Labs: All pertinent labs within the past 24 hours have been reviewed.    Significant Imaging: I have reviewed all pertinent imaging results/findings within the past 24 hours.

## 2023-08-16 NOTE — ASSESSMENT & PLAN NOTE
"Patient's FSGs are controlled on current medication regimen.  Last A1c reviewed-   Lab Results   Component Value Date    HGBA1C 5.9 (H) 08/16/2023     Most recent fingerstick glucose reviewed- No results for input(s): "POCTGLUCOSE" in the last 24 hours.  Current correctional scale  Low  Maintain anti-hyperglycemic dose as follows-   Antihyperglycemics (From admission, onward)    Start     Stop Route Frequency Ordered    08/16/23 0125  insulin aspart U-100 pen 0-5 Units         -- SubQ Every 6 hours PRN 08/16/23 0025        Hold Oral hypoglycemics while patient is in the hospital.  "

## 2023-08-16 NOTE — ASSESSMENT & PLAN NOTE
-monitor blood pressure   -cover with hydralazine 10 mg IV q.4 hours p.r.n. for systolic blood pressure above 160 or diastolic greater than 100  -adjust BP medication as needed  -resume oral BP medications when tolerating oral diet

## 2023-08-16 NOTE — ASSESSMENT & PLAN NOTE
09/18/20    Echo Color Flow Doppler? Yes; Bubble Contrast? No    Interpretation Summary  · Mild left ventricular enlargement.  · Moderately decreased left ventricular systolic function. The estimated ejection fraction is 33%.  · Eccentric left ventricular hypertrophy.  · Grade I (mild) left ventricular diastolic dysfunction consistent with impaired relaxation.  · Local segmental wall motion abnormalities.  · Low normal right ventricular systolic function.  · Mild right atrial enlargement.  · There is a mechanical mitral valve prosthesis.  · The mean diastolic gradient across the mitral valve is 3 mmHg ; MVA 1.57; mild MR  · Normal central venous pressure (3 mmHg).  · The estimated PA systolic pressure is 11 mmHg ( THIS MAY NOT BE TRUE PEAK??).  · Mild mitral regurgitation.    Recent Labs   Lab 08/15/23  1900   *   .    Patient HH 3.6/11- no volume on board- getting blood- will monitor volume status closely    Hold home Lasix  Continue BB as tolerated   S/p ICD    Repeat TTE pending

## 2023-08-16 NOTE — H&P
[facility] Medicine  History & Physical    Patient Name: Wesley Bernal  MRN: [id number]  Patient Class: IP- Inpatient  Admission Date: 8/15/2023  Attending Physician: Nicholas Weber,*   Primary Care Provider: Lalo Sweet MD         Patient information was obtained from patient, spouse/SO, past medical records and ER records.     Subjective:     Principal Problem: GIB with Hgb 4    Chief Complaint: Weakness     HPI: 73 yo male with a PMHX of ICM HFrEF 30-35% s/p ICD, bioprosthetic MVR 2013 for severe MR, AF on amio/Eliquis here for worsening fatigue and bloody stools.    Patient states that the last 3-4 days he has become significantly weaker with associated constipation.  He has only had 1 or 2 bowel movements a day which is unusual for him.  The stools have been very hard and brown but with blood around it.  Says his stools for the last month have been relatively normal and denies any melena or codie hematochezia until 2 days ago.  When pressed further about symptoms, he has slowly limited his ADLs due to worsening dyspnea on exertion and extreme fatigue.  This has been ongoing for about a month.  For example, he no longer goes to the grocery store due to his dyspnea.  Denies any dizziness, syncope, chest pain, palpitations, orthopnea, PND, swelling, hematemesis, abdominal pain.    At the outside ED, labs showed hemoglobin of 4 with and CARLOS.  SBP ranged in the 140s to 160s with EKG showing sinus bradycardia in the 50s.  Rectal exam showed no stool in the rectal vault.  CT abdomen was unremarkable.  Type and screen unfortunately showed an unknown antibody at the outside hospital's lab.  Therefore, he has not received blood.      Upon transfer here, he is comfortable and well-appearing.  Hemodynamically stable.  A repeat type and screen was sent to ascertain the specific antibody. Will hold on transfusion unless hemodynamically unstable or when antibody testing is  complete.      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    Cardiomyopathy     Carotid artery disease     Coronary artery disease     Diabetes mellitus type II     Encounter for blood transfusion     Heart disease     Hyperlipidemia     Hypertension     PAD (peripheral artery disease)        Past Surgical History:   Procedure Laterality Date    ABDOMINAL AORTA STENT      BRACHIOCEPHALIC VEIN ANGIOPLASTY / STENTING      CARDIAC DEFIBRILLATOR PLACEMENT      1/2011    CARDIAC PACEMAKER PLACEMENT      1-2011    CARDIAC SURGERY      open heart    CARDIAC VALVE SURGERY      pig valve replacement -    CAROTID STENT      LASIK SX Bilateral     LYMPH NODE BIOPSY Left 6/17/2019    Procedure: BIOPSY, LYMPH NODE (GROIN);  Surgeon: Adelfo Yin MD;  Location: [facility] OR;  Service: General;  Laterality: Left;    REPLACEMENT OF IMPLANTABLE CARDIOVERTER-DEFIBRILLATOR (ICD) GENERATOR N/A 7/29/2020    Procedure: REPLACEMENT, ICD GENERATOR;  Surgeon: Marko Hartley MD;  Location: [facility] CATH;  Service: Cardiology;  Laterality: N/A;       Review of patient's allergies indicates:   Allergen Reactions    Statins-hmg-coa reductase inhibitors        Current Facility-Administered Medications on File Prior to Encounter   Medication    [COMPLETED] 0.9%  NaCl infusion    [COMPLETED] ondansetron injection 4 mg    [COMPLETED] pantoprazole (PROTONIX) 40 mg in sodium chloride 0.9 % 100 mL IVPB (MB+)    [COMPLETED] pantoprazole injection 80 mg    [COMPLETED] sodium chloride 0.9% bolus 250 mL 250 mL    [DISCONTINUED] 0.9%  NaCl infusion (for blood administration)    [DISCONTINUED] sodium chloride 0.9% bolus 1,000 mL 1,000 mL    [DISCONTINUED] sodium chloride 0.9% flush 10 mL     Current Outpatient Medications on File Prior to Encounter   Medication Sig    ALPRAZolam (XANAX) 0.25 MG tablet TAKE ONE TABLET BY MOUTH TWICE  DAILY AS NEEDED    amiodarone (PACERONE) 200 MG Tab Take 0.5 tablets (100 mg total) by mouth once daily.    apixaban (ELIQUIS) 5 mg Tab Take 5 mg by mouth 2 (two) times daily.    aspirin (ECOTRIN) 81 MG EC tablet Take by mouth once daily.    atenoloL (TENORMIN) 25 MG tablet Take 1 tablet (25 mg total) by mouth once daily.    gabapentin (NEURONTIN) 100 MG capsule TAKE TWO CAPSULES BY MOUTH THREE TIMES DAILY (DOSE INCREASED    HYDROcodone-acetaminophen (NORCO)  mg per tablet Take 1 tablet by mouth every 12 (twelve) hours as needed for Pain.    isosorbide mononitrate (IMDUR) 60 MG 24 hr tablet Take 60 mg by mouth once daily.     pravastatin (PRAVACHOL) 20 MG tablet Take 20 mg by mouth once daily.    docusate sodium (COLACE) 100 MG capsule Take 200 mg by mouth every morning.    DULoxetine (CYMBALTA) 60 MG capsule Take 1 capsule (60 mg total) by mouth once daily.    ezetimibe (ZETIA) 10 mg tablet TAKE ONE TABLET BY MOUTH ONCE A DAY    furosemide (LASIX) 20 MG tablet Take 20 mg by mouth once daily.     icosapent ethyL (VASCEPA) 1 gram Cap TAKE TWO CAPSULES BY MOUTH TWICE DAILY    pioglitazone (ACTOS) 15 MG tablet TAKE ONE TABLET BY MOUTH ONCE A DAY    primidone (MYSOLINE) 50 MG Tab Take 50 mg by mouth 2 (two) times a day.    REPATHA SURECLICK 140 mg/mL PnIj INJECT ONE SYRINGE SUBCUTANEOUSLY ONCE EVERY 14 DAYS    SITagliptin phosphate (JANUVIA) 100 MG Tab Take 1 tablet (100 mg total) by mouth once daily.     Family History       Problem Relation (Age of Onset)    Cancer Father    Diabetes Mother    Heart disease Father, Paternal Grandfather          Tobacco Use    Smoking status: Former     Current packs/day: 0.00     Types: Cigarettes, Cigars     Quit date: 4/10/2015     Years since quittin.3    Smokeless tobacco: Never   Substance and Sexual Activity    Alcohol use: No     Alcohol/week: 0.0 standard drinks of alcohol    Drug use: No    Sexual activity: Yes     Partners: Female     Review  of Systems   All other systems reviewed and are negative.    Objective:     Vital Signs (Most Recent):  Temp: 98.2 °F (36.8 °C) (08/15/23 2326)  Pulse: 60 (08/15/23 2345)  Resp: 12 (08/15/23 2345)  BP: (!) 149/66 (08/15/23 2345)  SpO2: 97 % (08/15/23 2345) Vital Signs (24h Range):  Temp:  [96.2 °F (35.7 °C)-98.2 °F (36.8 °C)] 98.2 °F (36.8 °C)  Pulse:  [50-66] 60  Resp:  [12-31] 12  SpO2:  [96 %-100 %] 97 %  BP: (123-161)/(56-70) 149/66     Weight: 78.5 kg (173 lb 1 oz)  Body mass index is 22.83 kg/m².     Physical Exam  Vitals and nursing note reviewed.   Constitutional:       General: He is not in acute distress.     Appearance: He is normal weight. He is ill-appearing.   HENT:      Head: Normocephalic and atraumatic.      Mouth/Throat:      Mouth: Mucous membranes are dry.   Eyes:      Extraocular Movements: Extraocular movements intact.      Pupils: Pupils are equal, round, and reactive to light.   Neck:      Comments: No JVD  Cardiovascular:      Rate and Rhythm: Regular rhythm. Bradycardia present.      Pulses: Normal pulses.      Heart sounds: Murmur heard.      Systolic murmur is present with a grade of 3/6.   Pulmonary:      Effort: Pulmonary effort is normal.      Breath sounds: Normal breath sounds. No wheezing or rales.   Abdominal:      General: Abdomen is flat. Bowel sounds are normal. There is no distension.      Palpations: Abdomen is soft.      Tenderness: There is no abdominal tenderness.   Musculoskeletal:      Right lower leg: No edema.      Left lower leg: No edema.   Skin:     General: Skin is warm.      Capillary Refill: Capillary refill takes more than 3 seconds.      Coloration: Skin is pale.   Neurological:      General: No focal deficit present.      Mental Status: He is alert and oriented to person, place, and time. Mental status is at baseline.              CRANIAL NERVES     CN III, IV, VI   Pupils are equal, round, and reactive to light.       Significant Labs: All pertinent labs  within the past 24 hours have been reviewed.    Significant Imaging: I have reviewed all pertinent imaging results/findings within the past 24 hours.    Assessment/Plan:     Acute on chronic anemia 2/2 GIB  - Presumed etiology is GIB. Likely chronic blood loss while on Eliquis. Blood around stools with associated constipation and brown stools sounds like hemorrhoids  - No signs of acute bleed and hemodynamically stable  - Does have hx of rectal cancer with radiation making GIB high risk  --------------------  - Holding Eliquis and ASA  - GI consult  - NPO  - Type and screen with ab test pending. Will need to get blood from [facility] likely  - Telemetry. Will use emergency blood transfusions if acute bleeding starts or patient is unstable  - IV PPI BID for now      Acute on chronic Stage 3B kidney disease  Patient with acute kidney injury/acute renal failure likely due to pre-renal azotemia due to dehydration CARLOS is currently stable. Baseline creatinine 1.6-1.8 - Labs reviewed- Renal function/electrolytes with Estimated Creatinine Clearance: 34.2 mL/min (A) (based on SCr of 2.2 mg/dL (H)). according to latest data. Monitor urine output and serial BMP and adjust therapy as needed. Avoid nephrotoxins and renally dose meds for GFR listed above.    Bioprosthetic mitral valve replacement  - S/p bio MVR in 2013  - Exam with harsh systolic murmur and possible S3 or diastolic murmur?   - Repeat TTE now to eval LVEF and MV function  - Holding Eliquis (on for AF) and ASA due to GIB      CHF (congestive heart failure), NYHA class II, chronic, systolic  - Last TTE showing LVEF 33%  - Looks dry on exam and labs consistent with hypovolemia from GIB  - Will hold on lasix. May need some after multiple pRBC  - Continue BB  - Holding Imdur  - Not on RAASi likely due to CKD but would benefit from it as outpatient  - Repeat TTE due to valve    Hyperlipidemia  - Continue statin      Type 2 diabetes mellitus  Patient's FSGs are  "controlled on current medication regimen.  Last A1c reviewed-   Lab Results   Component Value Date    HGBA1C 6.3 (H) 11/12/2021     Most recent fingerstick glucose reviewed- No results for input(s): "POCTGLUCOSE" in the last 24 hours.  Current correctional scale  Low  Maintain anti-hyperglycemic dose as follows-   Antihyperglycemics (From admission, onward)    None        Hold Oral hypoglycemics while patient is in the hospital.    Essential hypertension  - Monitor; BB  - Holding Imdur due to GIB      Atrial fibrillation  Patient with Paroxysmal (<7 days) atrial fibrillation which is controlled currently with Beta Blocker and Amiodarone. Patient is currently in sinus rhythm.[id number] Score: 3.   - AC indicated but Eliquis being held due to GIB and hgb 4      VTE Risk Mitigation (From admission, onward)         Ordered     IP VTE HIGH RISK PATIENT  Once         08/15/23 2325     Place sequential compression device  Until discontinued         08/15/23 2325              Critical care time spent on the evaluation and treatment of severe organ dysfunction, review of pertinent labs and imaging studies, discussions with consulting providers and discussions with patient/family: 65 minutes.             Rodo Georges MD  Department of Hospital Medicine  [facility]  "

## 2023-08-16 NOTE — SUBJECTIVE & OBJECTIVE
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    Cardiomyopathy     Carotid artery disease     Coronary artery disease     Diabetes mellitus type II     Encounter for blood transfusion     Heart disease     Hyperlipidemia     Hypertension     PAD (peripheral artery disease)        Past Surgical History:   Procedure Laterality Date    ABDOMINAL AORTA STENT      BRACHIOCEPHALIC VEIN ANGIOPLASTY / STENTING      CARDIAC DEFIBRILLATOR PLACEMENT      1/2011    CARDIAC PACEMAKER PLACEMENT      1-2011    CARDIAC SURGERY      open heart    CARDIAC VALVE SURGERY      pig valve replacement -    CAROTID STENT      LASIK SX Bilateral     LYMPH NODE BIOPSY Left 6/17/2019    Procedure: BIOPSY, LYMPH NODE (GROIN);  Surgeon: Adelfo Yin MD;  [facility];  Service: General;  Laterality: Left;    REPLACEMENT OF IMPLANTABLE CARDIOVERTER-DEFIBRILLATOR (ICD) GENERATOR N/A 7/29/2020    Procedure: REPLACEMENT, ICD GENERATOR;  Surgeon: Marko Hartley MD;  Location: [facility] CATH;  Service: Cardiology;  Laterality: N/A;       Review of patient's allergies indicates:   Allergen Reactions    Statins-hmg-coa reductase inhibitors        Current Facility-Administered Medications on File Prior to Encounter   Medication    [COMPLETED] 0.9%  NaCl infusion    [COMPLETED] ondansetron injection 4 mg    [COMPLETED] pantoprazole (PROTONIX) 40 mg in sodium chloride 0.9 % 100 mL IVPB (MB+)    [COMPLETED] pantoprazole injection 80 mg    [COMPLETED] sodium chloride 0.9% bolus 250 mL 250 mL    [DISCONTINUED] 0.9%  NaCl infusion (for blood administration)    [DISCONTINUED] sodium chloride 0.9% bolus 1,000 mL 1,000 mL    [DISCONTINUED] sodium chloride 0.9% flush 10 mL     Current Outpatient Medications on File Prior to Encounter   Medication Sig    ALPRAZolam (XANAX) 0.25 MG tablet TAKE ONE TABLET BY MOUTH TWICE DAILY AS NEEDED    amiodarone (PACERONE) 200 MG Tab  Take 0.5 tablets (100 mg total) by mouth once daily.    apixaban (ELIQUIS) 5 mg Tab Take 5 mg by mouth 2 (two) times daily.    aspirin (ECOTRIN) 81 MG EC tablet Take by mouth once daily.    atenoloL (TENORMIN) 25 MG tablet Take 1 tablet (25 mg total) by mouth once daily.    gabapentin (NEURONTIN) 100 MG capsule TAKE TWO CAPSULES BY MOUTH THREE TIMES DAILY (DOSE INCREASED    HYDROcodone-acetaminophen (NORCO)  mg per tablet Take 1 tablet by mouth every 12 (twelve) hours as needed for Pain.    isosorbide mononitrate (IMDUR) 60 MG 24 hr tablet Take 60 mg by mouth once daily.     pravastatin (PRAVACHOL) 20 MG tablet Take 20 mg by mouth once daily.    docusate sodium (COLACE) 100 MG capsule Take 200 mg by mouth every morning.    DULoxetine (CYMBALTA) 60 MG capsule Take 1 capsule (60 mg total) by mouth once daily.    ezetimibe (ZETIA) 10 mg tablet TAKE ONE TABLET BY MOUTH ONCE A DAY    furosemide (LASIX) 20 MG tablet Take 20 mg by mouth once daily.     icosapent ethyL (VASCEPA) 1 gram Cap TAKE TWO CAPSULES BY MOUTH TWICE DAILY    pioglitazone (ACTOS) 15 MG tablet TAKE ONE TABLET BY MOUTH ONCE A DAY    primidone (MYSOLINE) 50 MG Tab Take 50 mg by mouth 2 (two) times a day.    REPATHA SURECLICK 140 mg/mL PnIj INJECT ONE SYRINGE SUBCUTANEOUSLY ONCE EVERY 14 DAYS    SITagliptin phosphate (JANUVIA) 100 MG Tab Take 1 tablet (100 mg total) by mouth once daily.     Family History       Problem Relation (Age of Onset)    Cancer Father    Diabetes Mother    Heart disease Father, Paternal Grandfather          Tobacco Use    Smoking status: Former     Current packs/day: 0.00     Types: Cigarettes, Cigars     Quit date: 4/10/2015     Years since quittin.3    Smokeless tobacco: Never   Substance and Sexual Activity    Alcohol use: No     Alcohol/week: 0.0 standard drinks of alcohol    Drug use: No    Sexual activity: Yes     Partners: Female     Review of Systems   All other systems reviewed and are negative.    Objective:      Vital Signs (Most Recent):  Temp: 98.2 °F (36.8 °C) (08/15/23 2326)  Pulse: 60 (08/15/23 2345)  Resp: 12 (08/15/23 2345)  BP: (!) 149/66 (08/15/23 2345)  SpO2: 97 % (08/15/23 2345) Vital Signs (24h Range):  Temp:  [96.2 °F (35.7 °C)-98.2 °F (36.8 °C)] 98.2 °F (36.8 °C)  Pulse:  [50-66] 60  Resp:  [12-31] 12  SpO2:  [96 %-100 %] 97 %  BP: (123-161)/(56-70) 149/66     Weight: 78.5 kg (173 lb 1 oz)  Body mass index is 22.83 kg/m².     Physical Exam  Vitals and nursing note reviewed.   Constitutional:       General: He is not in acute distress.     Appearance: He is normal weight. He is ill-appearing.   HENT:      Head: Normocephalic and atraumatic.      Mouth/Throat:      Mouth: Mucous membranes are dry.   Eyes:      Extraocular Movements: Extraocular movements intact.      Pupils: Pupils are equal, round, and reactive to light.   Neck:      Comments: No JVD  Cardiovascular:      Rate and Rhythm: Regular rhythm. Bradycardia present.      Pulses: Normal pulses.      Heart sounds: Murmur heard.      Systolic murmur is present with a grade of 3/6.   Pulmonary:      Effort: Pulmonary effort is normal.      Breath sounds: Normal breath sounds. No wheezing or rales.   Abdominal:      General: Abdomen is flat. Bowel sounds are normal. There is no distension.      Palpations: Abdomen is soft.      Tenderness: There is no abdominal tenderness.   Musculoskeletal:      Right lower leg: No edema.      Left lower leg: No edema.   Skin:     General: Skin is warm.      Capillary Refill: Capillary refill takes more than 3 seconds.      Coloration: Skin is pale.   Neurological:      General: No focal deficit present.      Mental Status: He is alert and oriented to person, place, and time. Mental status is at baseline.              CRANIAL NERVES     CN III, IV, VI   Pupils are equal, round, and reactive to light.       Significant Labs: All pertinent labs within the past 24 hours have been reviewed.    Significant Imaging: I have  reviewed all pertinent imaging results/findings within the past 24 hours.

## 2023-08-16 NOTE — ASSESSMENT & PLAN NOTE
- Presumed etiology is GIB. Likely chronic blood loss while on Eliquis. Blood around stools with associated constipation and brown stools sounds like hemorrhoids  - No signs of acute bleed and hemodynamically stable  - Does have hx of rectal cancer with radiation making GIB high risk  --------------------  - Holding Eliquis and ASA  - GI consulted= follow recommendation  - NPO until cleared by GI  - Type and screen with ab test pending. Will need to get blood from [facility] likely  - Telemetry. Will use emergency blood transfusions if acute bleeding starts or patient is unstable  - IV PPI BID for now  -transfuse 4 units PRBC on 08/16/2023  -per cardiology on 08/16/2023=Patient cleared from CV perspective for GI procedures.

## 2023-08-16 NOTE — ASSESSMENT & PLAN NOTE
Unknown coronary anatomy   Chest pain during episode of vomiting felt to be in setting of severe anemia and CARLOS   EKG without acute ischemic changes  Troponin mildly elevated at baseline 0.04  Holding asa given GIB  Consider resumption of Imdur   Continue BB, Repatha (as outpatient)   TTE pending for completeness  Transfuse PRBCs to goal >8  Patient cleared from CV perspective for GI procedures. Cardiology will follow.

## 2023-08-16 NOTE — ASSESSMENT & PLAN NOTE
- Presumed etiology is GIB. Likely chronic blood loss while on Eliquis. Blood around stools with associated constipation and brown stools sounds like hemorrhoids  - No signs of acute bleed and hemodynamically stable  - Does have hx of rectal cancer with radiation making GIB high risk  --------------------  - Holding Eliquis and ASA  - GI consult  - NPO  - Type and screen with ab test pending. Will need to get blood from [facility] likely  - Telemetry. Will use emergency blood transfusions if acute bleeding starts or patient is unstable  - IV PPI BID for now

## 2023-08-16 NOTE — ASSESSMENT & PLAN NOTE
Patient with Paroxysmal (<7 days) atrial fibrillation which is controlled currently with Beta Blocker and Amiodarone. Patient is currently in sinus rhythm.[id number] Score: 3.   - AC indicated but Eliquis being held due to GIB and hgb 4

## 2023-08-16 NOTE — TELEPHONE ENCOUNTER
----- Message from Lacy Boggs sent at 8/16/2023  2:21 PM CDT -----  Type:  Needs Medical Advice    Who Called: larissa vigil    Would the patient rather a call back or a response via [facility]? call  Best Call Back Number: [phone]  Additional Information: requesting a call back to discuss scope.

## 2023-08-16 NOTE — CONSULTS
[facility] Pulmonary/Critical Care Consult Note      Patient:Wesley Bernal  Age:72 y.o.  MRN:[id number]  Admit date:8/15/2023   LOS:1 day(s)     Primary Attending: Dr. Brumfield  Consult Attending: Dr. Ibarra  Consult Fellow: Dr. Srinivasan  Intern: Dr. Salazar  Reason for Consult: Acute on chronic anemia 2/2 GI  bleed     HPI:       Mr. Dax Bernal is a 72 y.o. M with a PMHx of a fib on Eliquis, ICM, HFrEF (30-35%), MR s/p MVR in 2013, s/p AICD placement, rectal cancer 2019 (in remission), CKD3b, HTN, HLD, T2DM, and anxiety who presented to the [facility] ED on 8/15 for fatigue/RAMIREZ, pallor, bloody-appearing stool, constipation, and decreased appetite for 4 days. He also reports dark brown vomit and weakness to the point where he hasn't been able to get out of bed.     On exam in the ED, he was bradycardic to the 50s, unable to lift arms and legs from bed, and had delayed capillary refill. Labs were remarkable for Hb 4, BUN 52, creatinine 2.2, and positive IAT. CT abdomen was unremarkable. Blood was not given d/t positive IAT. He received Protonix 80 prior to being transferred to [facility] MICU.        Interval Hx:  Received 1 L bolus of LR. Antibody testing resulted positive for anti-PL1 Ab. Began having L sided chest pain and LUE numbness this morning at 0400. Hb was 3.6, given 2 units pRBCs. Vomitted 100-150 mL dark blood with clots while blood was getting prepped. Chest pain relieved s/p pRBCs. Currently receiving 2 more units pRBCs. Not complaining of N/V, lightheadedness, dizziness, or abdominal pain at this moment. Still feeling weak throughout. Admits to taking Xanax twice daily at home.       MEDICAL HISTORY:      Past Medical History:  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    Cardiomyopathy     Carotid artery disease     Coronary artery disease     Diabetes mellitus type II     Encounter for blood transfusion     Heart disease      Hyperlipidemia     Hypertension     PAD (peripheral artery disease)         Past Surgical History:  Past Surgical History:   Procedure Laterality Date    ABDOMINAL AORTA STENT      BRACHIOCEPHALIC VEIN ANGIOPLASTY / STENTING      CARDIAC DEFIBRILLATOR PLACEMENT      2011    CARDIAC PACEMAKER PLACEMENT          CARDIAC SURGERY      open heart    CARDIAC VALVE SURGERY      pig valve replacement -    CAROTID STENT      LASIK SX Bilateral     LYMPH NODE BIOPSY Left 2019    Procedure: BIOPSY, LYMPH NODE (GROIN);  Surgeon: Adelfo Yin MD;  Location: [facility] OR;  Service: General;  Laterality: Left;    REPLACEMENT OF IMPLANTABLE CARDIOVERTER-DEFIBRILLATOR (ICD) GENERATOR N/A 2020    Procedure: REPLACEMENT, ICD GENERATOR;  Surgeon: Marko Hartley MD;  [facility];  Service: Cardiology;  Laterality: N/A;         Family History:   Family History   Problem Relation Age of Onset    Diabetes Mother     Heart disease Father     Cancer Father         melanoma, prostate    Heart disease Paternal Grandfather          Social History:  Social History     Socioeconomic History    Marital status:    Tobacco Use    Smoking status: Former     Current packs/day: 0.00     Types: Cigarettes, Cigars     Quit date: 4/10/2015     Years since quittin.3    Smokeless tobacco: Never   Substance and Sexual Activity    Alcohol use: No     Alcohol/week: 0.0 standard drinks of alcohol    Drug use: No    Sexual activity: Yes     Partners: Female     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, 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     Allergies:  Review of patient's allergies indicates:   Allergen Reactions    Statins-hmg-coa reductase inhibitors        Home Medications:  Current Outpatient Medications   Medication Instructions    ALPRAZolam (XANAX) 0.25 MG tablet TAKE ONE TABLET BY MOUTH TWICE DAILY AS NEEDED    amiodarone (PACERONE) 100 mg, Oral, Daily    apixaban (ELIQUIS) 5 mg, Oral, 2 times daily    aspirin (ECOTRIN) 81  MG EC tablet Daily    atenoloL (TENORMIN) 25 mg, Oral, Daily    docusate sodium (COLACE) 200 mg, Oral, Every morning    DULoxetine (CYMBALTA) 60 mg, Oral, Daily    ezetimibe (ZETIA) 10 mg tablet TAKE ONE TABLET BY MOUTH ONCE A DAY    furosemide (LASIX) 20 mg, Oral, Daily    gabapentin (NEURONTIN) 100 MG capsule TAKE TWO CAPSULES BY MOUTH THREE TIMES DAILY (DOSE INCREASED    HYDROcodone-acetaminophen (NORCO)  mg per tablet 1 tablet, Oral, Every 12 hours PRN    icosapent ethyL (VASCEPA) 1 gram Cap TAKE TWO CAPSULES BY MOUTH TWICE DAILY    isosorbide mononitrate (IMDUR) 60 mg, Oral, Daily    pioglitazone (ACTOS) 15 MG tablet TAKE ONE TABLET BY MOUTH ONCE A DAY    pravastatin (PRAVACHOL) 20 mg, Oral, Daily    primidone (MYSOLINE) 50 mg, Oral, 2 times daily    REPATHA SURECLICK 140 mg/mL PnIj INJECT ONE SYRINGE SUBCUTANEOUSLY ONCE EVERY 14 DAYS    SITagliptin phosphate (JANUVIA) 100 mg, Oral, Daily        Review of Systems:  Pertinent items are noted in HPI. All other systems are reviewed and are negative.     OBJECTIVE DATA:      Vital Signs:  Vitals:    08/16/23 0615   BP: (!) 115/56   Pulse: (!) 58   Resp: (!) 23   Temp:        Intake/Output:    Intake/Output Summary (Last 24 hours) at 8/16/2023 0645  Last data filed at 8/16/2023 0509  Gross per 24 hour   Intake 689.33 ml   Output 410 ml   Net 279.33 ml        Physical Exam:  Constitutional: cooperative, NAD, thin, fatigued  HEENT: NCAT, pale conjunctiva, dry MM  Neck: Supple, no masses, trachea not deviated.   Resp: Symmetrical, clear to auscultation bilaterally, No wheezes, rhonchi, or crackles.   Cardio:  3/6 systolic murmur noted, consistent with replaced mitral valve  GI: Soft, non-tender, non-distended, bowel sounds active.  Extremities: No edema, peripheral pulses 2+ and symmetric, extremities warm.  Skin:  Purpura on BLEs. Pallor noted  Neurologic: Alert and oriented x3, follows commands, moves extremities spontaneously. 2/5 strength throughout      Laboratory/Imaging:  Recent Labs   Lab 08/15/23  1900 08/16/23  0239   WBC 12.41 9.81   HGB 4.0* 3.6*   HCT 13.3* 11.7*    144*    139  139   K 4.8 4.6  4.6    108  108   CREATININE 2.2* 2.1*  2.1*   BUN 48* 51*  52*   CO2 19* 24  24   ALT 11 9*   AST 12 10     Significant labs, micro and imaging have been personally reviewed.       Microbiology:  N/A     Imaging:  TTE (2020)  Mild left ventricular enlargement.  Moderately decreased left ventricular systolic function. The estimated ejection fraction is 33%.  Eccentric left ventricular hypertrophy.  Grade I (mild) left ventricular diastolic dysfunction consistent with impaired relaxation.  Local segmental wall motion abnormalities.  Low normal right ventricular systolic function.  Mild right atrial enlargement.  There is a mechanical mitral valve prosthesis.  The mean diastolic gradient across the mitral valve is 3 mmHg ; MVA 1.57; mild MR  Normal central venous pressure (3 mmHg).  The estimated PA systolic pressure is 11 mmHg ( THIS MAY NOT BE TRUE PEAK??).  Mild mitral regurgitation.    CXR 8/15:  FINDINGS:  Enlarged cardiac silhouette with pulmonary vascular congestion.  Mild diffuse interstitial prominence may reflect edema, pneumonitis, or interstitial scarring.  Small left pleural effusion versus scarring.  Left chest wall AICD.  Right chest wall port.     CT Head w/o Contrast 8/15:  Impression:  No acute abnormality.     CT Abdomen Pelvis w/o Contrast 8/15:  Impression:  Evaluation for metastatic disease is limited without intravenous contrast.  Indeterminate renal lesions.  Recommend follow-up ultrasound.  Low-attenuation blood within the heart, compatible with anemia.  Please confirm with laboratory values.  Cholelithiasis.  Circumferential bladder wall thickening.  Correlate for cystitis.    Medications:   amiodarone  100 mg Oral Daily    atenoloL  25 mg Oral Daily    ezetimibe  10 mg Oral Daily    gabapentin  200 mg Oral TID     mupirocin   Nasal BID    pantoprazole  40 mg Intravenous BID    pravastatin  20 mg Oral Daily            0.9%  NaCl infusion (for blood administration), 0.9%  NaCl infusion (for blood administration), 0.9%  NaCl infusion (for blood administration), 0.9%  NaCl infusion (for blood administration), 0.9%  NaCl infusion (for blood administration), acetaminophen, ALPRAZolam, dextrose 10%, dextrose 10%, glucagon (human recombinant), insulin aspart U-100, melatonin, morphine, ondansetron, sodium chloride 0.9%     Problem List:  Patient Active Problem List   Diagnosis    Atrial fibrillation    Essential hypertension    Type 2 diabetes mellitus    Hyperlipidemia    Anxiety disorder    Bioprosthetic mitral valve replacement    Stage 3a chronic kidney disease    Hyperkalemia    Statin intolerance    Mass of anus    Lumbar disc disease with radiculopathy    Aortic atherosclerosis    CAD (coronary artery disease)    Left groin mass    Implantable cardioverter-defibrillator (ICD) at end of battery life    CHF (congestive heart failure), NYHA class II, chronic, systolic    Ischemic cardiomyopathy    Shingles outbreak    Acute on chronic Stage 3B kidney disease    Herpes zoster iritis    Posterior subcapsular polar age-related cataract of both eyes    Squamous cell cancer, anus    Essential tremor    Acute on chronic anemia 2/2 GIB       Assessment/Plan:     Mr. Bernal is a 71 yo M with PMHx of rectal CA in remission, DM2, CKD3b, prosthetic MVR, CAD, ICM HFrEF (LVEF 33% + grade I DD) s/p AICD, Afib on Eliquis, HLD, HTN who was transferred to [facility] on 8/15 for evaluation of dyspnea/weakness/fatigue in the setting of bloody bowel movements found to have a hgb of 3.6. Patient did have an episode of hematemesis overnight in the ICU. Hospitalization complicated by CARLOS.  Gastric ulcer plausible given episode of hematemesis this morning, though lower GI bleed cannot be ruled out given history of bright red blood in stool. Will  consult GI for further management.     Neuro  #History of anxiety with chronic benzo use  - Per pt takes xanax .25 mg daily AM and PM  - PRN xanax .25 mg BID to prevent withdrawal    #Generalized weakness  - in setting of blood loss anemia  - uses walker at home  - PT/OT eval after scope    #Diabetic Neuropathy  - resume gabapentin 200 TID    Cardiovascular  #Atrial Fibrillation  - HR slightly bradycardic ~50-60s  - controlled currently with atenolol 25 daily and Amiodarone 100 daily  - EKG NSR with 1st deg AV block  - [id number] Score: 3  - Hold Eliquis/ ASA in setting of GIB  - continue telemetry     #ICM HFrEF, NHYA class II, chronic, systolic  - Last TTE in 2020 showing LVEF 33%   - s/p AICD  - Hypovolemic on exam 2/2 GIB  - CXR: Enlarged cardiac silhouette with pulmonary vascular congestion.  Mild diffuse interstitial prominence may reflect edema, pneumonitis, or interstitial scarring.    Small left pleural effusion versus scarring. Left chest wall AICD.  Right chest wall port.  - CT A/P: Mild bibasilar atelectasis.  Mild cardiomegaly.   -  (2 yr ago 1098)  - PT 13.1, INR 1.2, aPTT 21.4  - Holding lasix for now. However may need to resume after multiple pRBC.  - Holding Imdur and BB for now  - would benefit from RAASi outpatient    #Bioprosthetic mitral valve replacement  - S/p bio MVR in 2013 for severe MR  - Exam appreciable for 3/6 systolic murmur heard best LLSB  - Repeat TTE to eval LVEF and MV function    #Elevated Troponin  -Troponin elevated to 0.049 likely 2/2 to GIB as EKG without ischemic changes.     #CAD  #Hyperlipidemia  - continue pravastatin 20 and ezetimibe 10   - hold ASA    #Essential Hypertension  - holding Imdur and BB   - Controlled BP, MAPs 66-97       Respiratory  - No acute issues    #History Tobacco Use  - Pt endorses ~40 pk yr hx but quit years ago  - On Room Air, satting 100%.   - Tachypnea RR 14-33      GI/FEN  #Acute on chronic blood loss anemia 2/2 upper vs. lower  GIB  #Possible PUD   - Risk factors GIB: on Eliquis, hx of rectal CA, significant daily NSAID use  - Pt reports BRB for awhile with associated constipation and hemorrhoids.  - No hx alcoholism or hepatitis   - VSS. Hgb 3.6 on admit--> 5.7 after 2U pRBC.  - Type and screen with positive anti-P1  - Receiving 2 more units this AM  - stool occult blood negative  - holding eliquis/ASA  - GI consult for EGD +/- colonoscopy; appreciate recs  - May need iron infusion  - transfuse if Hgb <7 and active bleeding  - Received loading dose Protonix ED  - resume IV PPI BID   - CBC q8h  - zofran 4 IV q8h PRN nausea (Qtc <500)    #Hx rectal cancer   - in remission s/p radiation and chemo in 2019   - CT A/P w/o cont: Cholelithiasis. Colonic diverticulosis, without diverticulitis.  No small bowel obstruction.   Evaluation for metastatic disease is limited without intravenous contrast. Low-attenuation blood within the heart, compatible with anemia. Aortoiliac stent.    F: s/p 1250 ml LR  E: see above; goal K>4, Mg>2  N: NPO    Renal  #CARLOS on CKD3b  - likely due to pre-renal azotemia due to dehydration and GIB  - Baseline Cr 1.6-1.8  - BUN/creatinine 51/2.2 this AM  -  Estimated Creatinine Clearance: 34.2 mL/min (A) (based on SCr of 2.2 mg/dL)  - expected improvement with IVF and RBC  Intake/Output Summary (Last 24 hours) at 8/16/2023 0731  Last data filed at 8/16/2023 0728  Gross per 24 hour   Intake 931.16 ml   Output 410 ml   Net 521.16 ml      - strict I/Os  - daily CMP  -  Avoid nephrotoxins and renally dose meds for GFR     #Possible Cystitis  - CT showed Circumferential bladder wall thickening.    - U/A unremarkable; WBC 21.26  - No clx symptoms    #Indeterminate renal lesions  -2.3 x 1.5 cm exophytic lesion at the inferior pole left kidney is indeterminate.  2.6 x 1.9 cm exophytic lesion at the inferior pole right kidney is also indeterminate.  - Recommend follow-up ultrasound outpatient    Heme  #Acute on chronic blood loss  anemia 2/2 GIB  - see above    #Hypoalbuminemia with low total protein  - likely due to malnutrition/decreased PO intake  - albumin 2.8, total protein 5.1  - consult nutrition tomorrow    Infectious Disease  #Leukocytosis  - Afebrile, No S&S systemic infection  - WBC 21.26  - likely stress reaction  - U/A unremarkable  - daily CBC      Endocrine  #T2DM   - Last A1c 6.3 11/2021 -> 5.9 today  - serum glc 175  - Hold Oral hypoglycemics while patient is in the hospital  - Low dose SSI + Accuchecks  - Goal glucose 140-180 while in ICU      Feeding: NPO since midnight  Analgesia: PRN tylenol, morphine, alprazolam   Sedation: n/a  Thrombo PPX: SCD  Head of Bed: elevated >300  Stress Ulcer PPX: IV PPI BID   Glucose: low dose SSI and as above  SBT/SAT:  n/a  Bowels: Last Bowel Movement: 08/15/23 none   Indwelling Lines: 2 PIVs  Deescalation Abx: none    Code: Full Code  Dispo: Continue to monitor in ICU until H/H stable.     Thank you for allowing us to participate in the care of this patient. We will continue to follow.     Baljeet Benavides, MS4  [employer]    I personally evaluated the patient, reviewed/updated the student's note and agree with the findings and plan as written.     Kanchan Salazar MD  [facility]  [facility]

## 2023-08-16 NOTE — ASSESSMENT & PLAN NOTE
Reported blood in stool + h/o rectal cancer s/p chemo/XRT. However reported coffee ground emesis  Responded appropriately to transfusion. HD stable. No further overt bleeding today  Trend hgb  Continue PPI IV BID  Plan for EGD/colonoscopy tomorrow  CLD today. NPO after midnight  Prep ordered.

## 2023-08-16 NOTE — ASSESSMENT & PLAN NOTE
- S/p bio MVR in 2013  - Repeat TTE on 08/16/2023 to eval LVEF and MV function= pending results  - Holding Eliquis (on for AF) and ASA due to GIB

## 2023-08-16 NOTE — HPI
73 yo male with a PMHX of ICM HFrEF 30-35% s/p ICD, bioprosthetic MVR 2013 for severe MR, AF on amio/Eliquis here for worsening fatigue and bloody stools.    Patient states that the last 3-4 days he has become significantly weaker with associated constipation.  He has only had 1 or 2 bowel movements a day which is unusual for him.  The stools have been very hard and brown but with blood around it.  Says his stools for the last month have been relatively normal and denies any melena or codie hematochezia until 2 days ago.  When pressed further about symptoms, he has slowly limited his ADLs due to worsening dyspnea on exertion and extreme fatigue.  This has been ongoing for about a month.  For example, he no longer goes to the grocery store due to his dyspnea.  Denies any dizziness, syncope, chest pain, palpitations, orthopnea, PND, swelling, hematemesis, abdominal pain.    At the outside ED, labs showed hemoglobin of 4 with and CARLOS.  SBP ranged in the 140s to 160s with EKG showing sinus bradycardia in the 50s.  Rectal exam showed no stool in the rectal vault.  CT abdomen was unremarkable.  Type and screen unfortunately showed an unknown antibody at the outside hospital's lab.  Therefore, he has not received blood.      Upon transfer here, he is comfortable and well-appearing.  Hemodynamically stable.  A repeat type and screen was sent to ascertain the specific antibody. Will hold on transfusion unless hemodynamically unstable or when antibody testing is complete.

## 2023-08-16 NOTE — ASSESSMENT & PLAN NOTE
"Patient's FSGs are controlled on current medication regimen.  Last A1c reviewed-   Lab Results   Component Value Date    HGBA1C 6.3 (H) 11/12/2021     Most recent fingerstick glucose reviewed- No results for input(s): "POCTGLUCOSE" in the last 24 hours.  Current correctional scale  Low  Maintain anti-hyperglycemic dose as follows-   Antihyperglycemics (From admission, onward)    None        Hold Oral hypoglycemics while patient is in the hospital.  "

## 2023-08-16 NOTE — PLAN OF CARE
Pt is aaox4, clear speech. Per report pt received 2 units of blood overnight and has two more units pending. Ordered stat h&h during shift change to reassess level post 2 units. Pt appears without distress, no c/o pain or discomfort voiced, vss during assessment.   0755- h&h 5.7/17.6   0830- 3rd unit of blood initiated at this time. Consent in chart, vs obtained and documented per protocol. One more unit of blood pending.  0835- echo being done at bedside. Dr. Arguello with cardiology and team at bedside.   0904- contacted Dr. Cruz with GI via telephone but unable to leave vm, sent secure chat to notify of consult.   0934- contacted Dr. Sevilla with GI to notify of consult    1030- 3rd unit of blood transfusion completed at this time.   1040- 4th unit of blood transfusion initiated. Vs documented per protocol.   1243- one more unit of blood ordered by Dr. Brumfield.  1245- 4th unit of blood transfusion completed at this time. Repeat h&h in place.   1304- h&h 7.3/23.0. instructed to hold off on transfusing unit of blood that was ordered Per Dr. Brumfield.   1315- spoke with Dr. Cruz with GI who gave ok for clear liquid diet at this time. GI team will round on pt later today.   1600- Dr. Chang with GI at bedside. Plan to scope tomorrow. Pt is currently on Clear liquids and will be NPO starting midnight.   1725- bowel prep initiated     Problem: Bleeding (Gastrointestinal Bleeding)  Goal: Hemostasis  Outcome: Ongoing, Progressing     Problem: Adjustment to Illness (Gastrointestinal Bleeding)  Goal: Optimal Coping with Acute Illness  Outcome: Ongoing, Progressing     Problem: Adult Inpatient Plan of Care  Goal: Plan of Care Review  8/16/2023 1049 by Maricel Lombardi, YARIEL  Outcome: Ongoing, Progressing  8/16/2023 1049 by Maricel Lombardi, YARIEL  Outcome: Ongoing, Progressing  Goal: Patient-Specific Goal (Individualized)  8/16/2023 1049 by Maricel Lombardi, RN  Outcome: Ongoing, Progressing  8/16/2023 1049 by Maricel Lombardi  YARIEL Blankenship  Outcome: Ongoing, Progressing  Goal: Absence of Hospital-Acquired Illness or Injury  8/16/2023 1049 by Maricel Lombardi RN  Outcome: Ongoing, Progressing  8/16/2023 1049 by Maricel Lombardi RN  Outcome: Ongoing, Progressing  Goal: Optimal Comfort and Wellbeing  8/16/2023 1049 by Maricel Lombardi RN  Outcome: Ongoing, Progressing  8/16/2023 1049 by Maricel Lombardi RN  Outcome: Ongoing, Progressing  Goal: Readiness for Transition of Care  8/16/2023 1049 by Maricel Lombardi RN  Outcome: Ongoing, Progressing  8/16/2023 1049 by Maricel Lombardi RN  Outcome: Ongoing, Progressing     Problem: Diabetes Comorbidity  Goal: Blood Glucose Level Within Targeted Range  8/16/2023 1049 by Maricel Lombardi RN  Outcome: Ongoing, Progressing  8/16/2023 1049 by Maricel Lombardi RN  Outcome: Ongoing, Progressing     Problem: Fluid and Electrolyte Imbalance (Acute Kidney Injury/Impairment)  Goal: Fluid and Electrolyte Balance  8/16/2023 1049 by Maricel Lombardi RN  Outcome: Ongoing, Progressing  8/16/2023 1049 by Maricel Lombardi RN  Outcome: Ongoing, Progressing     Problem: Oral Intake Inadequate (Acute Kidney Injury/Impairment)  Goal: Optimal Nutrition Intake  8/16/2023 1049 by Maricel Lombardi RN  Outcome: Ongoing, Progressing  8/16/2023 1049 by Maricel Lombardi RN  Outcome: Ongoing, Progressing     Problem: Renal Function Impairment (Acute Kidney Injury/Impairment)  Goal: Effective Renal Function  8/16/2023 1049 by Maricel Lombardi RN  Outcome: Ongoing, Progressing  8/16/2023 1049 by Maricel Lombardi RN  Outcome: Ongoing, Progressing     Problem: Fall Injury Risk  Goal: Absence of Fall and Fall-Related Injury  8/16/2023 1049 by Maircel Lombardi RN  Outcome: Ongoing, Progressing  8/16/2023 1049 by Maricel Lombardi RN  Outcome: Ongoing, Progressing

## 2023-08-16 NOTE — ASSESSMENT & PLAN NOTE
- Last TTE showing LVEF 33%  - Looks dry on exam and labs consistent with hypovolemia from GIB  - Will hold on lasix. May need some after multiple pRBC  - Continue BB  - Holding Imdur  - Not on RAASi likely due to CKD but would benefit from it as outpatient  - Repeat TTE due to valve

## 2023-08-16 NOTE — ASSESSMENT & PLAN NOTE
Currently SR/paced  Continue Amiodarone and BB  Hold Eliquis given active GIB, will discuss resumption once GI work up is complete

## 2023-08-16 NOTE — NURSING
Before starting the unit of blood, patient started c/o severe chest pain and numbness in L arm; MD at bedside. Blood started as well as another order for unit of blood ordered. Patient states chest pain is subsiding after blood started.

## 2023-08-16 NOTE — HPI
72-year-old gentleman past medical history significant for atrial fibrillation on Eliquis, ischemic cardiomyopathy, and rectal cancer who presented to the emergency department on 08/15 with complaints fatigue, dark stool, and decreased appetite.  In the ED patient was found to have a hemoglobin of 4 with elevated BUN/creatinine ratio.  Transfusion process was initiated and patient was transferred to [facility] in escalation of care.  Patient was found to have issue with antibodies of so procurement blood was somewhat delayed.  He is since received 4 units of packed red blood cells with appropriate response.  He apparently had some hematemesis overnight but has not had any further overt bleeding.

## 2023-08-16 NOTE — SUBJECTIVE & OBJECTIVE
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    Cardiomyopathy     Carotid artery disease     Coronary artery disease     Diabetes mellitus type II     Encounter for blood transfusion     Heart disease     Hyperlipidemia     Hypertension     PAD (peripheral artery disease)        Past Surgical History:   Procedure Laterality Date    ABDOMINAL AORTA STENT      BRACHIOCEPHALIC VEIN ANGIOPLASTY / STENTING      CARDIAC DEFIBRILLATOR PLACEMENT      1/2011    CARDIAC PACEMAKER PLACEMENT      1-2011    CARDIAC SURGERY      open heart    CARDIAC VALVE SURGERY      pig valve replacement -    CAROTID STENT      LASIK SX Bilateral     LYMPH NODE BIOPSY Left 6/17/2019    Procedure: BIOPSY, LYMPH NODE (GROIN);  Surgeon: Adelfo Yin MD;  Location: [facility] OR;  Service: General;  Laterality: Left;    REPLACEMENT OF IMPLANTABLE CARDIOVERTER-DEFIBRILLATOR (ICD) GENERATOR N/A 7/29/2020    Procedure: REPLACEMENT, ICD GENERATOR;  Surgeon: Marko Hartley MD;  Location: [facility] CATH;  Service: Cardiology;  Laterality: N/A;       Review of patient's allergies indicates:   Allergen Reactions    Statins-hmg-coa reductase inhibitors        Current Facility-Administered Medications on File Prior to Encounter   Medication    [COMPLETED] 0.9%  NaCl infusion    [COMPLETED] ondansetron injection 4 mg    [COMPLETED] pantoprazole (PROTONIX) 40 mg in sodium chloride 0.9 % 100 mL IVPB (MB+)    [COMPLETED] pantoprazole injection 80 mg    [COMPLETED] sodium chloride 0.9% bolus 250 mL 250 mL    [DISCONTINUED] 0.9%  NaCl infusion (for blood administration)    [DISCONTINUED] sodium chloride 0.9% bolus 1,000 mL 1,000 mL    [DISCONTINUED] sodium chloride 0.9% flush 10 mL     Current Outpatient Medications on File Prior to Encounter   Medication Sig    ALPRAZolam (XANAX) 0.25 MG tablet TAKE ONE TABLET BY MOUTH TWICE DAILY AS NEEDED    amiodarone (PACERONE) 200 MG Tab  Take 0.5 tablets (100 mg total) by mouth once daily.    apixaban (ELIQUIS) 5 mg Tab Take 5 mg by mouth 2 (two) times daily.    aspirin (ECOTRIN) 81 MG EC tablet Take by mouth once daily.    atenoloL (TENORMIN) 25 MG tablet Take 1 tablet (25 mg total) by mouth once daily.    gabapentin (NEURONTIN) 100 MG capsule TAKE TWO CAPSULES BY MOUTH THREE TIMES DAILY (DOSE INCREASED    HYDROcodone-acetaminophen (NORCO)  mg per tablet Take 1 tablet by mouth every 12 (twelve) hours as needed for Pain.    isosorbide mononitrate (IMDUR) 60 MG 24 hr tablet Take 60 mg by mouth once daily.     pravastatin (PRAVACHOL) 20 MG tablet Take 20 mg by mouth once daily.    carvediloL (COREG) 6.25 MG tablet Take 6.25 mg by mouth 2 (two) times daily.    docusate sodium (COLACE) 100 MG capsule Take 200 mg by mouth every morning.    DULoxetine (CYMBALTA) 60 MG capsule Take 1 capsule (60 mg total) by mouth once daily.    ezetimibe (ZETIA) 10 mg tablet TAKE ONE TABLET BY MOUTH ONCE A DAY    furosemide (LASIX) 20 MG tablet Take 20 mg by mouth once daily.     icosapent ethyL (VASCEPA) 1 gram Cap TAKE TWO CAPSULES BY MOUTH TWICE DAILY    pioglitazone (ACTOS) 15 MG tablet TAKE ONE TABLET BY MOUTH ONCE A DAY    primidone (MYSOLINE) 50 MG Tab Take 50 mg by mouth 2 (two) times a day.    REPATHA SURECLICK 140 mg/mL PnIj INJECT ONE SYRINGE SUBCUTANEOUSLY ONCE EVERY 14 DAYS    SITagliptin phosphate (JANUVIA) 100 MG Tab Take 1 tablet (100 mg total) by mouth once daily.     Family History       Problem Relation (Age of Onset)    Cancer Father    Diabetes Mother    Heart disease Father, Paternal Grandfather          Tobacco Use    Smoking status: Former     Current packs/day: 0.00     Types: Cigarettes, Cigars     Quit date: 4/10/2015     Years since quittin.3    Smokeless tobacco: Never   Substance and Sexual Activity    Alcohol use: No     Alcohol/week: 0.0 standard drinks of alcohol    Drug use: No    Sexual activity: Yes     Partners: Female      Review of Systems   Constitutional: Positive for malaise/fatigue.   HENT: Negative.     Eyes: Negative.    Cardiovascular:  Positive for chest pain and dyspnea on exertion. Negative for irregular heartbeat, leg swelling, near-syncope, orthopnea, palpitations, paroxysmal nocturnal dyspnea and syncope.   Respiratory:  Negative for cough and shortness of breath.    Endocrine: Negative.    Hematologic/Lymphatic: Negative.    Skin: Negative.    Musculoskeletal: Negative.    Gastrointestinal:  Positive for hematemesis, nausea and vomiting.   Neurological:  Positive for weakness.   Psychiatric/Behavioral: Negative.     Allergic/Immunologic: Negative.      Objective:     Vital Signs (Most Recent):  Temp: 98.2 °F (36.8 °C) (08/16/23 0845)  Pulse: (!) 55 (08/16/23 0945)  Resp: 12 (08/16/23 0945)  BP: (!) 116/58 (08/16/23 0945)  SpO2: 97 % (08/16/23 0945) Vital Signs (24h Range):  Temp:  [96.2 °F (35.7 °C)-98.4 °F (36.9 °C)] 98.2 °F (36.8 °C)  Pulse:  [50-82] 55  Resp:  [11-35] 12  SpO2:  [95 %-100 %] 97 %  BP: ()/() 116/58     Weight: 78.5 kg (173 lb 1 oz)  Body mass index is 22.83 kg/m².    SpO2: 97 %         Intake/Output Summary (Last 24 hours) at 8/16/2023 1026  Last data filed at 8/16/2023 0838  Gross per 24 hour   Intake 1051.16 ml   Output 660 ml   Net 391.16 ml       Lines/Drains/Airways       Drain  Duration             Male External Urinary Catheter 08/16/23 0000 Medium <1 day              Peripheral Intravenous Line  Duration                  Peripheral IV - Single Lumen 08/15/23 2200 18 G Left Antecubital <1 day         Peripheral IV - Single Lumen 08/15/23 2200 18 G Posterior;Right Forearm <1 day                     Physical Exam  Constitutional:       Appearance: He is ill-appearing. He is not diaphoretic.   HENT:      Head: Atraumatic.   Eyes:      General:         Right eye: No discharge.         Left eye: No discharge.   Cardiovascular:      Rate and Rhythm: Normal rate and regular rhythm.      " Heart sounds: Murmur heard.   Abdominal:      Palpations: Abdomen is soft.   Musculoskeletal:      Right lower leg: No edema.      Left lower leg: No edema.   Skin:     General: Skin is warm and dry.      Capillary Refill: Capillary refill takes 2 to 3 seconds.   Neurological:      Mental Status: He is alert. Mental status is at baseline.          Significant Labs: BMP:   Recent Labs   Lab 08/15/23  1900 08/16/23  0239   * 175*  175*    139  139   K 4.8 4.6  4.6    108  108   CO2 19* 24  24   BUN 48* 51*  52*   CREATININE 2.2* 2.1*  2.1*   CALCIUM 8.3* 8.0*  8.1*   MG  --  2.2   , CMP   Recent Labs   Lab 08/15/23  1900 08/16/23  0239    139  139   K 4.8 4.6  4.6    108  108   CO2 19* 24  24   * 175*  175*   BUN 48* 51*  52*   CREATININE 2.2* 2.1*  2.1*   CALCIUM 8.3* 8.0*  8.1*   PROT 5.4* 5.1*   ALBUMIN 2.9* 2.8*   BILITOT 0.3 0.4   ALKPHOS 48* 46*   AST 12 10   ALT 11 9*   ANIONGAP 16 7*  7*   , CBC   Recent Labs   Lab 08/15/23  1900 08/16/23  0239 08/16/23  0755   WBC 12.41 9.81 21.26*   HGB 4.0* 3.6* 5.7*   HCT 13.3* 11.7* 17.6*    144* 141*   , INR   Recent Labs   Lab 08/15/23  1900   INR 1.2   , Lipid Panel No results for input(s): "CHOL", "HDL", "LDLCALC", "TRIG", "CHOLHDL" in the last 48 hours., Troponin   Recent Labs   Lab 08/15/23  1900   TROPONINI 0.049*   , and All pertinent lab results from the last 24 hours have been reviewed.    Significant Imaging: Echocardiogram: Transthoracic echo (TTE) complete (Cupid Only):   Results for orders placed or performed during the hospital encounter of 08/15/23   Echo   Result Value Ref Range    BSA 2.01 m2    Lawson's Biplane MOD Ejection Fraction 45 %    LVOT stroke volume 117.23 cm3    LVIDd 6.57 (A) 3.5 - 6.0 cm    LV Systolic Volume 164.86 mL    LV Systolic Volume Index 81.6 mL/m2    LVIDs 5.77 (A) 2.1 - 4.0 cm    LV Diastolic Volume 221.56 mL    LV Diastolic Volume Index 109.68 mL/m2    IVS 1.25 (A) " 0.6 - 1.1 cm    LVOT diameter 2.25 cm    LVOT area 4.0 cm2    FS 12 (A) 28 - 44 %    Left Ventricle Relative Wall Thickness 0.29 cm    Posterior Wall 0.96 0.6 - 1.1 cm    LV mass 327.78 g    LV Mass Index 162 g/m2    TDI LATERAL 0.04 m/s    TDI SEPTAL 0.05 m/s    TR Max Pop 2.41 m/s    PV Peak S Pop 0.68 m/s    PV Peak D Pop 0.69 m/s    Pulm vein S/D ratio 0.99     LVOT peak pop 1.17 m/s    Left Ventricular Outflow Tract Mean Velocity 0.78 cm/s    Left Ventricular Outflow Tract Mean Gradient 2.81 mmHg    LA volume (mod) 110.18 cm3    LA Volume Index (Mod) 54.5 mL/m2    LA size 4.79 cm    Left Atrium Major Axis 6.65 cm    Left Atrium Minor Axis 6.61 cm    RVDD 3.28 cm    TAPSE 1.60 cm    RA Major Axis 5.29 cm    AV mean gradient 4 mmHg    AV peak gradient 10 mmHg    Ao peak pop 1.55 m/s    Ao VTI 37.00 cm    LVOT peak VTI 29.50 cm    AV valve area 3.17 cm²    AV Velocity Ratio 0.75     AV index (prosthetic) 0.80     CLIF by Velocity Ratio 3.00 cm²    Mr max pop 5.00 m/s    MV mean gradient 4 mmHg    MV peak gradient 13 mmHg    MV valve area by continuity eq 1.71 cm2    MV VTI 68.7 cm    Triscuspid Valve Regurgitation Peak Gradient 23 mmHg    PV PEAK VELOCITY 1.00 m/s    PV peak gradient 4 mmHg    Pulmonary Valve Mean Velocity 0.72 m/s    STJ 2.43 cm    Ascending aorta 3.13 cm    IVC diameter 1.90 cm    Mean e' 0.05 m/s    ZLVIDS 3.62     ZLVIDD 0.99     LA Volume Index 65.5 mL/m2    LA volume 132.27 cm3    LA WIDTH 4.9 cm    RA Width 3.2 cm

## 2023-08-17 PROBLEM — J96.01 ACUTE HYPOXEMIC RESPIRATORY FAILURE: Status: ACTIVE | Noted: 2023-01-01

## 2023-08-17 NOTE — ASSESSMENT & PLAN NOTE
Porcine valve  No need for anticoagulation       Mitral Valve: There is a bioprosthetic valve in the mitral position   that is well-seated. The mean pressure gradient across the mitral valve is   4 mmHg at a heart rate of 58 bpm. Very small mobile echodense shadow 0.2 x   0.4 cm in the submitral apparatus. Differential is detached cord related   to  prior mitral surgery(more likely), thrombus or vegetations.    Patient on OAC as OP for AF. Thrombus unlikely. Afebrile- vegetation unlikely

## 2023-08-17 NOTE — ANESTHESIA PREPROCEDURE EVALUATION
08/17/2023  Wesley Bernal is a 72 y.o., male with ICM with EF 40%, MR s/p MVR, multiple PCIs    Afib on Eliquis -- currently held  Currently paced via AICD as per interrogation  No AICD discharges in >3 years  No MI >5 years  Tolerated CPB surgery without complications    H+H 8.6 / 25     Patient Active Problem List   Diagnosis    Atrial fibrillation    Essential hypertension    Type 2 diabetes mellitus    Hyperlipidemia    Anxiety disorder    Bioprosthetic mitral valve replacement    Stage 3a chronic kidney disease    Hyperkalemia    Statin intolerance    Mass of anus    Lumbar disc disease with radiculopathy    Aortic atherosclerosis    CAD (coronary artery disease)    Left groin mass    Implantable cardioverter-defibrillator (ICD) at end of battery life    CHF (congestive heart failure), NYHA class II, chronic, systolic    Ischemic cardiomyopathy    Shingles outbreak    Acute on chronic Stage 3B kidney disease    Herpes zoster iritis    Posterior subcapsular polar age-related cataract of both eyes    Squamous cell cancer, anus    Essential tremor    Acute on chronic anemia 2/2 GIB    Acute hypoxemic respiratory failure       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    Cardiomyopathy     Carotid artery disease     Coronary artery disease     Diabetes mellitus type II     Encounter for blood transfusion     Heart disease     Hyperlipidemia     Hypertension     PAD (peripheral artery disease)        ECHO: Results for orders placed during the hospital encounter of 08/15/23    Echo    Interpretation Summary    Left Ventricle: The left ventricle is mildly dilated. regional wall motion abnormalities present. There is reduced systolic function. Biplane (2D) method of discs  ejection fraction is 45%.    Right Ventricle: Normal right ventricular cavity size. Wall thickness is normal. Right ventricle wall motion  is normal. Systolic function is normal. Pacemaker lead present in the ventricle.    Left Atrium: Left atrium is severely dilated.    Aortic Valve: There is mild aortic valve sclerosis.    Mitral Valve: There is a bioprosthetic valve in the mitral position that is well-seated. The mean pressure gradient across the mitral valve is 4 mmHg at a heart rate of 58 bpm. Very small mobile echodense shadow 0.2 x 0.4 cm in the submitral apparatus. Differential is detached cord related to  prior mitral surgery(more likely), thrombus or vegetations.    Pulmonic Valve: There is mild regurgitation.      Body mass index is 24.58 kg/m².    Tobacco Use: Medium Risk (2/7/2023)    Patient History     Smoking Tobacco Use: Former     Smokeless Tobacco Use: Never     Passive Exposure: Not on file       Social History     Substance and Sexual Activity   Drug Use No        Alcohol Use: Not At Risk (8/16/2023)    AUDIT-C     Frequency of Alcohol Consumption: Never     Average Number of Drinks: Patient does not drink     Frequency of Binge Drinking: Never       Review of patient's allergies indicates:   Allergen Reactions    Statins-hmg-coa reductase inhibitors          Airway:  No value filed.         Pre-op Assessment    I have reviewed the Patient Summary Reports.     I have reviewed the Nursing Notes. I have reviewed the NPO Status.   I have reviewed the Medications.     Review of Systems  Anesthesia Hx:  No problems with previous Anesthesia  History of prior surgery of interest to airway management or planning: Denies Family Hx of Anesthesia complications.   Denies Personal Hx of Anesthesia complications.   Cardiovascular:  Coronary Artery Disease: S/P Percutaneous Coronary Intervention (PCI)  Valvular Heart Disease: Mitral Regurgitation (MR), s/p replacement   Congestive Heart Failure  (CHF)  Cardiomyopathy, Ischemic Cardiomyopathy Decreased Myocardial Function/Contractility, mildly decreased LVEF (40 - 50 %), determined by 2D echo  Hypertension  Disorder of Cardiac Rhythm, Atrial Fibrillation, Chronic Atrial Fibrillation, Paroxysmal Atrial Fibrillation        Physical Exam  General: Well nourished, Cooperative, Alert and Oriented    Airway:  Mallampati: II / II  Mouth Opening: Normal  Tongue: Normal  Neck ROM: Normal ROM    Dental:  Edentulous        Anesthesia Plan  Type of Anesthesia, risks & benefits discussed:    Anesthesia Type: Gen Natural Airway  Intra-op Monitoring Plan: Standard ASA Monitors  Post Op Pain Control Plan: multimodal analgesia and IV/PO Opioids PRN  Induction:  IV  Informed Consent: Informed consent signed with the Patient and all parties understand the risks and agree with anesthesia plan.  All questions answered. Patient consented to blood products? No  ASA Score: 4    Ready For Surgery From Anesthesia Perspective.     .

## 2023-08-17 NOTE — PT/OT/SLP PROGRESS
Physical Therapy      Patient Name:  Wesley Bernal   MRN:  [id number]    Patient not seen today secondary to Off the floor for procedure/surgery (Pt NEYDA for EGD). Will follow-up as able.    8/17/2023

## 2023-08-17 NOTE — SUBJECTIVE & OBJECTIVE
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    Cardiomyopathy     Carotid artery disease     Coronary artery disease     Diabetes mellitus type II     Encounter for blood transfusion     Heart disease     Hyperlipidemia     Hypertension     PAD (peripheral artery disease)        Past Surgical History:   Procedure Laterality Date    ABDOMINAL AORTA STENT      BRACHIOCEPHALIC VEIN ANGIOPLASTY / STENTING      CARDIAC DEFIBRILLATOR PLACEMENT      1/2011    CARDIAC PACEMAKER PLACEMENT      1-2011    CARDIAC SURGERY      open heart    CARDIAC VALVE SURGERY      pig valve replacement -    CAROTID STENT      LASIK SX Bilateral     LYMPH NODE BIOPSY Left 6/17/2019    Procedure: BIOPSY, LYMPH NODE (GROIN);  Surgeon: Adelfo Yin MD;  Location: [facility] OR;  Service: General;  Laterality: Left;    REPLACEMENT OF IMPLANTABLE CARDIOVERTER-DEFIBRILLATOR (ICD) GENERATOR N/A 7/29/2020    Procedure: REPLACEMENT, ICD GENERATOR;  Surgeon: Marko Hartley MD;  Location: [facility] CATH;  Service: Cardiology;  Laterality: N/A;       Review of patient's allergies indicates:   Allergen Reactions    Statins-hmg-coa reductase inhibitors        No current facility-administered medications on file prior to encounter.     Current Outpatient Medications on File Prior to Encounter   Medication Sig    ALPRAZolam (XANAX) 0.25 MG tablet TAKE ONE TABLET BY MOUTH TWICE DAILY AS NEEDED    amiodarone (PACERONE) 200 MG Tab Take 0.5 tablets (100 mg total) by mouth once daily.    apixaban (ELIQUIS) 5 mg Tab Take 5 mg by mouth 2 (two) times daily.    aspirin (ECOTRIN) 81 MG EC tablet Take by mouth once daily.    atenoloL (TENORMIN) 25 MG tablet Take 1 tablet (25 mg total) by mouth once daily.    gabapentin (NEURONTIN) 100 MG capsule TAKE TWO CAPSULES BY MOUTH THREE TIMES DAILY (DOSE INCREASED    HYDROcodone-acetaminophen (NORCO)  mg per tablet Take 1 tablet by  mouth every 12 (twelve) hours as needed for Pain.    isosorbide mononitrate (IMDUR) 60 MG 24 hr tablet Take 60 mg by mouth once daily.     pravastatin (PRAVACHOL) 20 MG tablet Take 20 mg by mouth once daily.    carvediloL (COREG) 6.25 MG tablet Take 6.25 mg by mouth 2 (two) times daily.    docusate sodium (COLACE) 100 MG capsule Take 200 mg by mouth every morning.    DULoxetine (CYMBALTA) 60 MG capsule Take 1 capsule (60 mg total) by mouth once daily.    ezetimibe (ZETIA) 10 mg tablet TAKE ONE TABLET BY MOUTH ONCE A DAY    furosemide (LASIX) 20 MG tablet Take 20 mg by mouth once daily.     icosapent ethyL (VASCEPA) 1 gram Cap TAKE TWO CAPSULES BY MOUTH TWICE DAILY    pioglitazone (ACTOS) 15 MG tablet TAKE ONE TABLET BY MOUTH ONCE A DAY    primidone (MYSOLINE) 50 MG Tab Take 50 mg by mouth 2 (two) times a day.    REPATHA SURECLICK 140 mg/mL PnIj INJECT ONE SYRINGE SUBCUTANEOUSLY ONCE EVERY 14 DAYS    SITagliptin phosphate (JANUVIA) 100 MG Tab Take 1 tablet (100 mg total) by mouth once daily.     Family History       Problem Relation (Age of Onset)    Cancer Father    Diabetes Mother    Heart disease Father, Paternal Grandfather          Tobacco Use    Smoking status: Former     Current packs/day: 0.00     Types: Cigarettes, Cigars     Quit date: 4/10/2015     Years since quittin.3    Smokeless tobacco: Never   Substance and Sexual Activity    Alcohol use: No     Alcohol/week: 0.0 standard drinks of alcohol    Drug use: No    Sexual activity: Yes     Partners: Female     Review of Systems   Constitutional: Positive for malaise/fatigue.   HENT: Negative.     Eyes: Negative.    Cardiovascular:  Positive for dyspnea on exertion. Negative for chest pain, irregular heartbeat, leg swelling, near-syncope, orthopnea, palpitations, paroxysmal nocturnal dyspnea and syncope.   Respiratory:  Negative for cough and shortness of breath.    Endocrine: Negative.    Hematologic/Lymphatic: Negative.    Skin: Negative.     Musculoskeletal: Negative.    Gastrointestinal:  Positive for hematemesis, nausea and vomiting.   Neurological:  Positive for weakness.   Psychiatric/Behavioral: Negative.     Allergic/Immunologic: Negative.      Objective:     Vital Signs (Most Recent):  Temp: 98.4 °F (36.9 °C) (08/17/23 0830)  Pulse: (!) 55 (08/17/23 0730)  Resp: 14 (08/17/23 0730)  BP: (!) 109/55 (08/17/23 0830)  SpO2: (!) 93 % (08/17/23 0730) Vital Signs (24h Range):  Temp:  [97.8 °F (36.6 °C)-99 °F (37.2 °C)] 98.4 °F (36.9 °C)  Pulse:  [50-67] 55  Resp:  [8-34] 14  SpO2:  [82 %-100 %] 93 %  BP: ()/() 109/55     Weight: 84.5 kg (186 lb 4.6 oz)  Body mass index is 24.58 kg/m².    SpO2: (!) 93 %         Intake/Output Summary (Last 24 hours) at 8/17/2023 0920  Last data filed at 8/17/2023 0841  Gross per 24 hour   Intake 1949.83 ml   Output 2275 ml   Net -325.17 ml         Lines/Drains/Airways       Peripheral Intravenous Line  Duration                  Peripheral IV - Single Lumen 08/15/23 2200 18 G Posterior;Right Forearm 1 day         Peripheral IV - Single Lumen 08/17/23 0015 18 G Anterior;Right Upper Arm <1 day                     Physical Exam  Constitutional:       Appearance: He is ill-appearing. He is not diaphoretic.   HENT:      Head: Atraumatic.   Eyes:      General:         Right eye: No discharge.         Left eye: No discharge.   Cardiovascular:      Rate and Rhythm: Normal rate and regular rhythm.      Heart sounds: Murmur heard.   Abdominal:      Palpations: Abdomen is soft.   Musculoskeletal:      Right lower leg: No edema.      Left lower leg: No edema.   Skin:     General: Skin is warm and dry.      Capillary Refill: Capillary refill takes 2 to 3 seconds.   Neurological:      Mental Status: He is alert. Mental status is at baseline.          Significant Labs: BMP:   Recent Labs   Lab 08/15/23  1900 08/16/23  0239 08/17/23  0204   * 175*  175* 128*    139  139 137   K 4.8 4.6  4.6 4.4    108   "108 108   CO2 19* 24  24 23   BUN 48* 51*  52* 42*   CREATININE 2.2* 2.1*  2.1* 1.5*   CALCIUM 8.3* 8.0*  8.1* 7.6*   MG  --  2.2 2.0     , CMP   Recent Labs   Lab 08/15/23  1900 08/16/23  0239 08/17/23  0204    139  139 137   K 4.8 4.6  4.6 4.4    108  108 108   CO2 19* 24  24 23   * 175*  175* 128*   BUN 48* 51*  52* 42*   CREATININE 2.2* 2.1*  2.1* 1.5*   CALCIUM 8.3* 8.0*  8.1* 7.6*   PROT 5.4* 5.1* 4.5*   ALBUMIN 2.9* 2.8* 2.5*   BILITOT 0.3 0.4 0.9   ALKPHOS 48* 46* 40*   AST 12 10 17   ALT 11 9* 9*   ANIONGAP 16 7*  7* 6*     , CBC   Recent Labs   Lab 08/16/23  1149 08/16/23  1304 08/16/23  1931 08/17/23  0204   WBC 18.89*  --  12.16 12.87*  12.91*  12.91*   HGB 6.9* 7.3* 6.8* 7.3*  7.1*  7.1*   HCT 21.6* 23.0* 20.5* 22.3*  21.9*  21.9*   *  --  114* 107*  106*  106*     , INR   Recent Labs   Lab 08/15/23  1900 08/16/23  2046   INR 1.2 1.2     , Lipid Panel No results for input(s): "CHOL", "HDL", "LDLCALC", "TRIG", "CHOLHDL" in the last 48 hours., Troponin   Recent Labs   Lab 08/15/23  1900   TROPONINI 0.049*     , and All pertinent lab results from the last 24 hours have been reviewed.    Significant Imaging: Echocardiogram: Transthoracic echo (TTE) complete (Cupid Only):   Results for orders placed or performed during the hospital encounter of 08/15/23   Echo   Result Value Ref Range    BSA 2.01 m2    Lawson's Biplane MOD Ejection Fraction 45 %    LVOT stroke volume 117.23 cm3    LVIDd 6.57 (A) 3.5 - 6.0 cm    LV Systolic Volume 164.86 mL    LV Systolic Volume Index 81.6 mL/m2    LVIDs 5.77 (A) 2.1 - 4.0 cm    LV Diastolic Volume 221.56 mL    LV Diastolic Volume Index 109.68 mL/m2    IVS 1.25 (A) 0.6 - 1.1 cm    LVOT diameter 2.25 cm    LVOT area 4.0 cm2    FS 12 (A) 28 - 44 %    Left Ventricle Relative Wall Thickness 0.29 cm    Posterior Wall 0.96 0.6 - 1.1 cm    LV mass 327.78 g    LV Mass Index 162 g/m2    TDI LATERAL 0.04 m/s    TDI SEPTAL 0.05 m/s    TR " Max Pop 2.41 m/s    PV Peak S Pop 0.68 m/s    PV Peak D Pop 0.69 m/s    Pulm vein S/D ratio 0.99     LVOT peak pop 1.17 m/s    Left Ventricular Outflow Tract Mean Velocity 0.78 cm/s    Left Ventricular Outflow Tract Mean Gradient 2.81 mmHg    LA volume (mod) 110.18 cm3    LA Volume Index (Mod) 54.5 mL/m2    LA size 4.79 cm    Left Atrium Major Axis 6.65 cm    Left Atrium Minor Axis 6.61 cm    RVDD 3.28 cm    TAPSE 1.60 cm    RA Major Axis 5.29 cm    AV mean gradient 4 mmHg    AV peak gradient 10 mmHg    Ao peak pop 1.55 m/s    Ao VTI 37.00 cm    LVOT peak VTI 29.50 cm    AV valve area 3.17 cm²    AV Velocity Ratio 0.75     AV index (prosthetic) 0.80     CLIF by Velocity Ratio 3.00 cm²    Mr max pop 5.00 m/s    MV mean gradient 4 mmHg    MV peak gradient 13 mmHg    MV valve area by continuity eq 1.71 cm2    MV VTI 68.7 cm    Triscuspid Valve Regurgitation Peak Gradient 23 mmHg    PV PEAK VELOCITY 1.00 m/s    PV peak gradient 4 mmHg    Pulmonary Valve Mean Velocity 0.72 m/s    STJ 2.43 cm    Ascending aorta 3.13 cm    IVC diameter 1.90 cm    Mean e' 0.05 m/s    ZLVIDS 3.62     ZLVIDD 0.99     LA Volume Index 65.5 mL/m2    LA volume 132.27 cm3    LA WIDTH 4.9 cm    RA Width 3.2 cm    Mitral Valve Heart Rate 58 bpm    Narrative      Left Ventricle: The left ventricle is mildly dilated. regional wall   motion abnormalities present. There is reduced systolic function. Biplane   (2D) method of discs ejection fraction is 45%.    Right Ventricle: Normal right ventricular cavity size. Wall thickness   is normal. Right ventricle wall motion  is normal. Systolic function is   normal. Pacemaker lead present in the ventricle.    Left Atrium: Left atrium is severely dilated.    Aortic Valve: There is mild aortic valve sclerosis.    Mitral Valve: There is a bioprosthetic valve in the mitral position   that is well-seated. The mean pressure gradient across the mitral valve is   4 mmHg at a heart rate of 58 bpm. Very small mobile  echodense shadow 0.2 x   0.4 cm in the submitral apparatus. Differential is detached cord related   to  prior mitral surgery(more likely), thrombus or vegetations.    Pulmonic Valve: There is mild regurgitation.

## 2023-08-17 NOTE — ASSESSMENT & PLAN NOTE
TTE    Left Ventricle: The left ventricle is mildly dilated. regional wall   motion abnormalities present. There is reduced systolic function. Biplane   (2D) method of discs ejection fraction is 45%.    Right Ventricle: Normal right ventricular cavity size. Wall thickness   is normal. Right ventricle wall motion  is normal. Systolic function is   normal. Pacemaker lead present in the ventricle.    Left Atrium: Left atrium is severely dilated.    Aortic Valve: There is mild aortic valve sclerosis.    Mitral Valve: There is a bioprosthetic valve in the mitral position   that is well-seated. The mean pressure gradient across the mitral valve is   4 mmHg at a heart rate of 58 bpm. Very small mobile echodense shadow 0.2 x   0.4 cm in the submitral apparatus. Differential is detached cord related   to  prior mitral surgery(more likely), thrombus or vegetations.    Pulmonic Valve: There is mild regurgitation.    Recent Labs   Lab 08/15/23  1900   *   .    Patient HH 3.6/11 on admit, up to 7.3/22  Hold home Lasix  Resume BB as tolerated   S/p ICD  Euvolemic on exam     Repeat TTE pending

## 2023-08-17 NOTE — ASSESSMENT & PLAN NOTE
SBP 80s-120s  BB on hold given hypotension overnight- if SBP >100- resume BB  Consider resumption of Imdur once stable from GIB perspective

## 2023-08-17 NOTE — ASSESSMENT & PLAN NOTE
Patient's FSGs are controlled on current medication regimen.  Last A1c reviewed-   Lab Results   Component Value Date    HGBA1C 5.9 (H) 08/16/2023     Most recent fingerstick glucose reviewed-   Recent Labs   Lab 08/16/23  1105 08/16/23  1645 08/17/23  0059 08/17/23  0640   POCTGLUCOSE 163* 264* 129* 157*     Current correctional scale  Low  Maintain anti-hyperglycemic dose as follows-   Antihyperglycemics (From admission, onward)    Start     Stop Route Frequency Ordered    08/16/23 0125  insulin aspart U-100 pen 0-5 Units         -- SubQ Every 6 hours PRN 08/16/23 0025        Hold Oral hypoglycemics while patient is in the hospital.

## 2023-08-17 NOTE — ASSESSMENT & PLAN NOTE
Unknown coronary anatomy   Chest pain during episode of vomiting felt to be in setting of severe anemia and CARLOS   EKG without acute ischemic changes  Troponin mildly elevated at baseline 0.04  Holding asa given GIB  Consider resumption of Imdur, BB   Repatha (as outpatient)   Transfuse PRBCs to goal >8  Patient cleared from CV perspective for GI procedures. Cardiology will follow.

## 2023-08-17 NOTE — ASSESSMENT & PLAN NOTE
Currently SR/paced  Continue Amiodarone, resume BB once BP is stable  Hold Eliquis given active GIB, will discuss resumption once GI work up is complete  Follow up with primary cardiologist as OP to discuss ELIZABETH closure

## 2023-08-17 NOTE — PROGRESS NOTES
[facility] Medicine  Progress Note    Patient Name: Wesley Bernal  MRN: [id number]  Patient Class: IP- Inpatient   Admission Date: 8/15/2023  Length of Stay: 2 days  Attending Physician: Sung Brumfield MD  Primary Care Provider: Lalo Sweet MD        Subjective:     Principal Problem:Acute on chronic anemia        HPI:  73 yo male with a PMHX of ICM HFrEF 30-35% s/p ICD, bioprosthetic MVR 2013 for severe MR, AF on amio/Eliquis here for worsening fatigue and bloody stools.    Patient states that the last 3-4 days he has become significantly weaker with associated constipation.  He has only had 1 or 2 bowel movements a day which is unusual for him.  The stools have been very hard and brown but with blood around it.  Says his stools for the last month have been relatively normal and denies any melena or codie hematochezia until 2 days ago.  When pressed further about symptoms, he has slowly limited his ADLs due to worsening dyspnea on exertion and extreme fatigue.  This has been ongoing for about a month.  For example, he no longer goes to the grocery store due to his dyspnea.  Denies any dizziness, syncope, chest pain, palpitations, orthopnea, PND, swelling, hematemesis, abdominal pain.    At the outside ED, labs showed hemoglobin of 4 with and CARLOS.  SBP ranged in the 140s to 160s with EKG showing sinus bradycardia in the 50s.  Rectal exam showed no stool in the rectal vault.  CT abdomen was unremarkable.  Type and screen unfortunately showed an unknown antibody at the outside hospital's lab.  Therefore, he has not received blood.      Upon transfer here, he is comfortable and well-appearing.  Hemodynamically stable.  A repeat type and screen was sent to ascertain the specific antibody. Will hold on transfusion unless hemodynamically unstable or when antibody testing is complete.      Overview/Hospital Course:  No notes on file    Interval History:  Patient awake alert and in no  acute distress.  Remains afebrile.  Denies chest pain shortness in bed.  Discussed plan of care today.  Patient agreeable to plan of care.  Patient agreeable to PRBC transfusion as needed.  Awaiting endoscopy today    Review of Systems   Constitutional: Negative.    HENT: Negative.     Eyes: Negative.    Respiratory: Negative.     Cardiovascular: Negative.    Gastrointestinal: Negative.    Endocrine: Negative.    Genitourinary: Negative.    Musculoskeletal: Negative.    Skin: Negative.    Allergic/Immunologic: Negative.    Neurological: Negative.    Hematological: Negative.    Psychiatric/Behavioral: Negative.     All other systems reviewed and are negative.    Objective:     Vital Signs (Most Recent):  Temp: 98.4 °F (36.9 °C) (08/17/23 0349)  Pulse: (!) 56 (08/17/23 0700)  Resp: 17 (08/17/23 0700)  BP: (!) 140/65 (08/17/23 0700)  SpO2: 96 % (08/17/23 0700) Vital Signs (24h Range):  Temp:  [97.8 °F (36.6 °C)-99 °F (37.2 °C)] 98.4 °F (36.9 °C)  Pulse:  [50-67] 56  Resp:  [8-34] 17  SpO2:  [82 %-100 %] 96 %  BP: ()/() 140/65     Weight: 84.5 kg (186 lb 4.6 oz)  Body mass index is 24.58 kg/m².    Intake/Output Summary (Last 24 hours) at 8/17/2023 0734  Last data filed at 8/17/2023 0637  Gross per 24 hour   Intake 1804.83 ml   Output 1975 ml   Net -170.17 ml         Physical Exam  Vitals and nursing note reviewed.   Constitutional:       General: He is not in acute distress.     Appearance: Normal appearance.   HENT:      Head: Normocephalic and atraumatic.      Nose: Nose normal.      Mouth/Throat:      Mouth: Mucous membranes are moist.   Eyes:      Extraocular Movements: Extraocular movements intact.      Pupils: Pupils are equal, round, and reactive to light.   Cardiovascular:      Rate and Rhythm: Normal rate and regular rhythm.      Pulses: Normal pulses.      Heart sounds: Normal heart sounds.   Pulmonary:      Effort: Pulmonary effort is normal. No respiratory distress.      Breath sounds: Normal  breath sounds.   Abdominal:      General: Bowel sounds are normal. There is no distension.      Palpations: Abdomen is soft.      Tenderness: There is no abdominal tenderness.   Musculoskeletal:         General: Normal range of motion.      Cervical back: Normal range of motion and neck supple.   Skin:     General: Skin is warm and dry.      Coloration: Skin is pale.   Neurological:      General: No focal deficit present.      Mental Status: He is alert and oriented to person, place, and time. Mental status is at baseline.   Psychiatric:         Mood and Affect: Mood normal.         Behavior: Behavior normal.             Significant Labs: All pertinent labs within the past 24 hours have been reviewed.    Significant Imaging: I have reviewed all pertinent imaging results/findings within the past 24 hours.      Assessment/Plan:      * Acute on chronic anemia 2/2 GIB  - Presumed etiology is GIB. Likely chronic blood loss while on Eliquis. Blood around stools with associated constipation and brown stools sounds like hemorrhoids  - No signs of acute bleed and hemodynamically stable  - Does have hx of rectal cancer with radiation making GIB high risk  --------------------  - monitor H&H= decrease  - Holding Eliquis and ASA  - GI consulted= follow recommendation  - NPO until cleared by GI  - Telemetry.   - IV PPI BID for now  -transfuse 4 units PRBC on 08/16/2023  -consider transfusion of 1 unit PRBC if and when hemoglobin less than 7  -per cardiology on 08/16/2023=Patient cleared from CV perspective for GI procedures.  -EGD/colonoscopy on 08/17/2023 by GI= pending      Acute hypoxemic respiratory failure  -O2 sats low 90s on 2 L nasal cannula currently.  Wean as tolerated   -incentive spirometry q.2 hours while awake   -supplemental O2 as needed to keep sats greater than 92%  -chest x-ray on 08/17/2023= pending (suspect some volume overload after receiving 4 units of PRBC)    CHF (congestive heart failure), NYHA class II,  chronic, systolic  - Last TTE showing LVEF 33%  -on admit looked dry on exam and labs consistent with hypovolemia from GIB  - Will hold on lasix. May need some after multiple pRBC  - Continue BB  - Holding Imdur  - Not on RAASi likely due to CKD but would benefit from it as outpatient  - Repeat TTE due to valve  -chest x-ray on 08/17/2023= pending  -strict ins and outs/daily weights  -Lasix 20 mg IV once on 08/17/2023.  Suspect some volume overload after receiving 4 units PRBC    CAD (coronary artery disease)  -patient denies chest pain or shortness of breath currently   -EKG on 08/16/2023=Sinus rhythm with 1st degree A-V block. Nonspecific intraventricular block. Nonspecific T wave abnormality  -cardiac enzymes on 08/15/2023= elevated x1 (likely due to demand ischemia due to severe anemia)  -cardiology consulted= follow recommendations      Bioprosthetic mitral valve replacement  - S/p bio MVR in 2013  - Repeat TTE on 08/16/2023 to eval LVEF and MV function=   Left Ventricle: The left ventricle is mildly dilated. regional wall motion abnormalities present. There is reduced systolic function. Biplane (2D) method of discs ejection fraction is 45%.    Right Ventricle: Normal right ventricular cavity size. Wall thickness is normal. Right ventricle wall motion  is normal. Systolic function is normal. Pacemaker lead present in the ventricle.    Left Atrium: Left atrium is severely dilated.    Aortic Valve: There is mild aortic valve sclerosis.    Mitral Valve: There is a bioprosthetic valve in the mitral position that is well-seated. The mean pressure gradient across the mitral valve is 4 mmHg at a heart rate of 58 bpm. Very small mobile echodense shadow 0.2 x 0.4 cm in the submitral apparatus. Differential is detached cord related to  prior mitral surgery(more likely), thrombus or vegetations.    Pulmonic Valve: There is mild regurgitation.  - Holding Eliquis (on for AF) and ASA due to GIB  - follow Cardiology  recommendations for mobile echodense shadow and some mitral apparatus which is likely a detached cord related to prior mitral surgery.    Atrial fibrillation  Patient with Paroxysmal (<7 days) atrial fibrillation which is controlled currently with Beta Blocker and Amiodarone. Patient is currently in sinus rhythm.[id number] Score: 3.   - AC indicated but Eliquis being held due to GIB and hgb 3.6    Acute on chronic Stage 3B kidney disease  -monitor creatinine= improved   -Patient with acute kidney injury/acute renal failure likely due to pre-renal azotemia due to dehydration CARLOS is currently stable. Baseline creatinine 1.6-1.8 - Labs reviewed- Renal function/electrolytes with Estimated Creatinine Clearance: 51 mL/min (A) (based on SCr of 1.5 mg/dL (H)). according to latest data. Monitor urine output and serial BMP and adjust therapy as needed. Avoid nephrotoxins and renally dose meds for GFR listed above.    Essential hypertension  -monitor blood pressure = stable range  -cover with hydralazine 10 mg IV q.4 hours p.r.n. for systolic blood pressure above 160 or diastolic greater than 100  -adjust BP medication as needed  -resume oral BP medications when tolerating oral diet      Type 2 diabetes mellitus  Patient's FSGs are controlled on current medication regimen.  Last A1c reviewed-   Lab Results   Component Value Date    HGBA1C 5.9 (H) 08/16/2023     Most recent fingerstick glucose reviewed-   Recent Labs   Lab 08/16/23  1105 08/16/23  1645 08/17/23  0059 08/17/23  0640   POCTGLUCOSE 163* 264* 129* 157*     Current correctional scale  Low  Maintain anti-hyperglycemic dose as follows-   Antihyperglycemics (From admission, onward)      Start     Stop Route Frequency Ordered    08/16/23 0125  insulin aspart U-100 pen 0-5 Units         -- SubQ Every 6 hours PRN 08/16/23 0025          Hold Oral hypoglycemics while patient is in the hospital.    Hyperlipidemia  - Continue statin      VTE Risk Mitigation (From admission,  onward)           Ordered     IP VTE HIGH RISK PATIENT  Once         08/15/23 2325     Place sequential compression device  Until discontinued         08/15/23 2325                    Discharge Planning   THU:      Code Status: Full Code   Is the patient medically ready for discharge?:     Reason for patient still in hospital (select all that apply): Patient trending condition, Treatment and Consult recommendations  Discharge Plan A: Home with family          Sung Brumfield MD  Department of Hospital Medicine   [place] [facility]

## 2023-08-17 NOTE — PROVATION PATIENT INSTRUCTIONS
Discharge Summary/Instructions after an Endoscopic Procedure  Patient Name: Wesley Bernal  Patient MRN: [id number]  Patient YOB: 1950  Thursday, August 17, 2023  Javier Chang MD  Dear patient,  As a result of recent federal legislation (The Federal Cures Act), you may   receive lab or pathology results from your procedure in your [facility]   account before your physician is able to contact you. Your physician or   their representative will relay the results to you with their   recommendations at their soonest availability.  Thank you,  Your health is very important to us during the Covid Crisis. Following your   procedure today, you will receive a daily text for 2 weeks asking about   signs or symptoms of Covid 19.  Please respond to this text when you   receive it so we can follow up and keep you as safe as possible.   RESTRICTIONS:  During your procedure today, you received medications for sedation.  These   medications may affect your judgment, balance and coordination.  Therefore,   for 24 hours, you have the following restrictions:   - DO NOT drive a car, operate machinery, make legal/financial decisions,   sign important papers or drink alcohol.    ACTIVITY:  Today: no heavy lifting, straining or running due to procedural   sedation/anesthesia.  The following day: return to full activity including work.  DIET:  Eat and drink normally unless instructed otherwise.     TREATMENT FOR COMMON SIDE EFFECTS:  - Mild abdominal pain, nausea, belching, bloating or excessive gas:  rest,   eat lightly and use a heating pad.  - Sore Throat: treat with throat lozenges and/or gargle with warm salt   water.  - Because air was used during the procedure, expelling large amounts of air   from your rectum or belching is normal.  - If a bowel prep was taken, you may not have a bowel movement for 1-3 days.    This is normal.  SYMPTOMS TO WATCH FOR AND REPORT TO YOUR PHYSICIAN:  1. Abdominal pain or bloating,  other than gas cramps.  2. Chest pain.  3. Back pain.  4. Signs of infection such as: chills or fever occurring within 24 hours   after the procedure.  5. Rectal bleeding, which would show as bright red, maroon, or black stools.   (A tablespoon of blood from the rectum is not serious, especially if   hemorrhoids are present.)  6. Vomiting.  7. Weakness or dizziness.  GO DIRECTLY TO THE NEAREST EMERGENCY ROOM IF YOU HAVE ANY OF THE FOLLOWING:      Difficulty breathing              Chills and/or fever over 101 F   Persistent vomiting and/or vomiting blood   Severe abdominal pain   Severe chest pain   Black, tarry stools   Bleeding- more than one tablespoon   Any other symptom or condition that you feel may need urgent attention  Your doctor recommends these additional instructions:  If any biopsies were taken, your doctors clinic will contact you in 1 to 2   weeks with any results.  - [facility].   - Resume previous diet.   - Continue present medications.   - Observe patient's clinical course.  For questions, problems or results please call your physician - Javier Chang MD.  EMERGENCY PHONE NUMBER: [phone],  LAB RESULTS: [phone]  IF A COMPLICATION OR EMERGENCY SITUATION ARISES AND YOU ARE UNABLE TO REACH   YOUR PHYSICIAN - GO DIRECTLY TO THE EMERGENCY ROOM.  Javier Chang MD  8/17/2023 3:31:51 PM  This report has been verified and signed electronically.  Dear patient,  As a result of recent federal legislation (The Federal Cures Act), you may   receive lab or pathology results from your procedure in your [facility]   account before your physician is able to contact you. Your physician or   their representative will relay the results to you with their   recommendations at their soonest availability.  Thank you,  PROVATION

## 2023-08-17 NOTE — ASSESSMENT & PLAN NOTE
-monitor creatinine= improved   -Patient with acute kidney injury/acute renal failure likely due to pre-renal azotemia due to dehydration CARLOS is currently stable. Baseline creatinine 1.6-1.8 - Labs reviewed- Renal function/electrolytes with Estimated Creatinine Clearance: 51 mL/min (A) (based on SCr of 1.5 mg/dL (H)). according to latest data. Monitor urine output and serial BMP and adjust therapy as needed. Avoid nephrotoxins and renally dose meds for GFR listed above.

## 2023-08-17 NOTE — EICU
D/w NP - PRBC being gven for low Hb - plan for GI bleed workup with endoscopy  BP ok  Clarified with NP to check INR,PTT, ionized calcium and treat if needed  Also clarified no need for platelets unless over 5 units PRBC needed- mild platelet drop noted

## 2023-08-17 NOTE — ASSESSMENT & PLAN NOTE
-O2 sats low 90s on 2 L nasal cannula currently.  Wean as tolerated   -incentive spirometry q.2 hours while awake   -supplemental O2 as needed to keep sats greater than 92%  -chest x-ray on 08/17/2023= pending (suspect some volume overload after receiving 4 units of PRBC)

## 2023-08-17 NOTE — ASSESSMENT & PLAN NOTE
- Presumed etiology is GIB. Likely chronic blood loss while on Eliquis. Blood around stools with associated constipation and brown stools sounds like hemorrhoids  - No signs of acute bleed and hemodynamically stable  - Does have hx of rectal cancer with radiation making GIB high risk  --------------------  - monitor H&H= decrease  - Holding Eliquis and ASA  - GI consulted= follow recommendation  - NPO until cleared by GI  - Telemetry.   - IV PPI BID for now  -transfuse 4 units PRBC on 08/16/2023  -consider transfusion of 1 unit PRBC if and when hemoglobin less than 7  -per cardiology on 08/16/2023=Patient cleared from CV perspective for GI procedures.  -EGD/colonoscopy on 08/17/2023 by GI= pending

## 2023-08-17 NOTE — ASSESSMENT & PLAN NOTE
Patient with Paroxysmal (<7 days) atrial fibrillation which is controlled currently with Beta Blocker and Amiodarone. Patient is currently in sinus rhythm.[id number] Score: 3.   - AC indicated but Eliquis being held due to GIB and hgb 3.6

## 2023-08-17 NOTE — PROGRESS NOTES
[facility]/Critical Care Progress Note      Patient: Wesley Bernal  Age:72 y.o.   MRN:[id number]  Admit date:8/15/2023    LOS:2 day(s)     SUBJECTIVE:        Interval Hx 8/17: Overnight, Bps soft. Patient asymptomatic; no c/o dizziness, lightheadedness, CP, SOB. S/p 4U pRBC yesterday and s/p 2U pRBC, 1 plt, 1 FFP overnight. HD stable currently. Last H/H stable 7.3. No further overt bleeding. EGD/colonoscopy with GI today.      OBJECTIVE DATA:      Vital Signs:  Vitals:    08/17/23 0638   BP: 127/61   Pulse: (!) 55   Resp: 18   Temp:        Intake/Output:    Intake/Output Summary (Last 24 hours) at 8/17/2023 0648  Last data filed at 8/17/2023 0637  Gross per 24 hour   Intake 2046.66 ml   Output 2225 ml   Net -178.34 ml        Physical Exam:  Constitutional: cooperative, NAD, thin, tired  HEENT: NCAT, pale conjunctiva, dry MM  Neck: Supple, no masses, trachea not deviated.   Resp: On Room Air, satting well. Symmetrical, clear to auscultation bilaterally, No wheezes, rhonchi, or crackles.   Cardio:  3/6 systolic murmur noted, consistent with replaced mitral valve  GI: Soft, non-tender, non-distended, bowel sounds hypoactive.  Extremities: No edema, peripheral pulses 2+ and symmetric, extremities warm.  Skin: SCDs in place, Pallor noted, capillary refill takes >3 seconds  Neurologic: Alert and oriented x3, follows commands, moves extremities spontaneously. 3/5 strength throughout. No asterixis.     Laboratory/Imaging:  Recent Labs   Lab 08/15/23  1900 08/16/23  0239 08/16/23  0755 08/16/23  1149 08/16/23  1304 08/16/23  1931 08/17/23  0204   WBC 12.41 9.81   < > 18.89*  --  12.16 12.87*  12.91*  12.91*   HGB 4.0* 3.6*   < > 6.9* 7.3* 6.8* 7.3*  7.1*  7.1*   HCT 13.3* 11.7*   < > 21.6* 23.0* 20.5* 22.3*  21.9*  21.9*    144*   < > 129*  --  114* 107*  106*  106*    139  139  --   --   --   --  137   K 4.8 4.6  4.6  --   --   --   --  4.4    108  108  --   --   --   --  108   CREATININE  2.2* 2.1*  2.1*  --   --   --   --  1.5*   BUN 48* 51*  52*  --   --   --   --  42*   CO2 19* 24  24  --   --   --   --  23   ALT 11 9*  --   --   --   --  9*   AST 12 10  --   --   --   --  17    < > = values in this interval not displayed.     Significant labs, micro and imaging have been personally reviewed by me.        Microbiology:  N/A     Imaging:  TTE (2020)  Mild left ventricular enlargement.  Moderately decreased left ventricular systolic function. The estimated ejection fraction is 33%.  Eccentric left ventricular hypertrophy.  Grade I (mild) left ventricular diastolic dysfunction consistent with impaired relaxation.  Local segmental wall motion abnormalities.  Low normal right ventricular systolic function.  Mild right atrial enlargement.  There is a mechanical mitral valve prosthesis.  The mean diastolic gradient across the mitral valve is 3 mmHg ; MVA 1.57; mild MR  Normal central venous pressure (3 mmHg).  The estimated PA systolic pressure is 11 mmHg ( THIS MAY NOT BE TRUE PEAK??).  Mild mitral regurgitation.     TTE (8/15/2023)    Left Ventricle: The left ventricle is mildly dilated. regional wall motion abnormalities present. There is reduced systolic function. Biplane (2D) method of discs ejection fraction is 45%.    Right Ventricle: Normal right ventricular cavity size. Wall thickness is normal. Right ventricle wall motion  is normal. Systolic function is normal. Pacemaker lead present in the ventricle.    Left Atrium: Left atrium is severely dilated.    Aortic Valve: There is mild aortic valve sclerosis.    Mitral Valve: There is a bioprosthetic valve in the mitral position that is well-seated. The mean pressure gradient across the mitral valve is 4 mmHg at a heart rate of 58 bpm. Very small mobile echodense shadow 0.2 x 0.4 cm in the submitral apparatus. Differential is detached cord related to  prior mitral surgery(more likely), thrombus or vegetations.    Pulmonic Valve: There is mild  regurgitation.    CXR 8/15:  FINDINGS:  Enlarged cardiac silhouette with pulmonary vascular congestion.  Mild diffuse interstitial prominence may reflect edema, pneumonitis, or interstitial scarring.  Small left pleural effusion versus scarring.  Left chest wall AICD.  Right chest wall port.     CT Head w/o Contrast 8/15:  Impression:  No acute abnormality.     CT Abdomen Pelvis w/o Contrast 8/15:  Impression:  Evaluation for metastatic disease is limited without intravenous contrast.  Indeterminate renal lesions.  Recommend follow-up ultrasound.  Low-attenuation blood within the heart, compatible with anemia.  Please confirm with laboratory values.  Cholelithiasis.  Circumferential bladder wall thickening.  Correlate for cystitis.       Medications:   amiodarone  100 mg Oral Daily    ezetimibe  10 mg Oral Daily    gabapentin  200 mg Oral TID    mupirocin   Nasal BID    pantoprazole  40 mg Intravenous BID    pravastatin  20 mg Oral Daily         NORepinephrine bitartrate-D5W Stopped (08/17/23 0315)        0.9%  NaCl infusion (for blood administration), 0.9%  NaCl infusion (for blood administration), 0.9%  NaCl infusion (for blood administration), 0.9%  NaCl infusion (for blood administration), 0.9%  NaCl infusion (for blood administration), acetaminophen, ALPRAZolam, dextrose 10%, dextrose 10%, glucagon (human recombinant), hydrALAZINE, insulin aspart U-100, melatonin, morphine, ondansetron, sodium chloride 0.9%    Problem List:  Patient Active Problem List   Diagnosis    Atrial fibrillation    Essential hypertension    Type 2 diabetes mellitus    Hyperlipidemia    Anxiety disorder    Bioprosthetic mitral valve replacement    Stage 3a chronic kidney disease    Hyperkalemia    Statin intolerance    Mass of anus    Lumbar disc disease with radiculopathy    Aortic atherosclerosis    CAD (coronary artery disease)    Left groin mass    Implantable cardioverter-defibrillator (ICD) at end of battery life    CHF (congestive  heart failure), NYHA class II, chronic, systolic    Ischemic cardiomyopathy    Shingles outbreak    Acute on chronic Stage 3B kidney disease    Herpes zoster iritis    Posterior subcapsular polar age-related cataract of both eyes    Squamous cell cancer, anus    Essential tremor    Acute on chronic anemia 2/2 GIB        Assessment/Plan:     Mr. Bernal is a 71 yo M with PMHx of rectal CA in remission, DM2, CKD3b, prosthetic MVR, CAD, ICM HFrEF (LVEF 33% + grade I DD) s/p AICD, Afib on Eliquis, HLD, HTN who was transferred to [facility] on 8/15 for evaluation of dyspnea/weakness/fatigue in the setting of bloody bowel movements found to have a hgb of 3.6 with hematemesis episode in the ICU. S/p 6U RBCs, 1 plt, 1 FFP. H/H stable. CARLOS improving.  Gastric ulcer plausible given episode of hematemesis, though lower GI bleed cannot be ruled out given history of BRB in stool.  GI consulted for further management w/ EGD/colonscopy     Neuro  #History of anxiety with chronic benzo use  - Per pt takes xanax .25 mg daily AM and PM  - PRN xanax .25 mg BID to prevent withdrawal  - no PRNs needed during stay thus far     #Generalized weakness  - in setting of blood loss anemia  - uses walker at home  - PT/OT eval after scope     #Diabetic Neuropathy  - resume gabapentin 200 TID     Cardiovascular  #Atrial Fibrillation  - controlled at home with atenolol 25 daily and Amiodarone 100 daily  - EKG NSR with 1st deg AV block  - resume amiodarone 100 daily  - holding Eliquis/ASA/BP/HF meds in setting of soft BPs and GIB  - continue telemetry      #ICM HFrEF, NHYA class II, chronic, systolic  - s/p AICD  - Hypovolemic on exam 2/2 GIB  - Last TTE in 2020 showing LVEF 33%   - Repeat TTE 8/15: EF 45%, LA severe dilation, LV mild dilated, mild AV sclerosis, mild PV regurg  - Given lasix 20 mg IV once this AM. Holding home lasix 20 qd PO.  - Holding Imdur and BB for now     #Bioprosthetic mitral valve replacement  - S/p porcine valve in 2013  for severe MR  - Exam appreciable for 3/6 systolic murmur heard best LLSB     #CAD  #Hyperlipidemia  - continue pravastatin 20 and ezetimibe 10   - hold ASA     #Hypotension in setting of acute on chronic blood loss  #History of Essential Hypertension  - holding Imdur and BB   - Soft Bps overnight 80-90s/40-50s, MAP 59-88  - s/p 6u pRBC      Respiratory  - No acute issues     #History Tobacco Use  - Pt endorses ~40 pk yr hx but quit years ago  - On Room Air, satting high 90s         GI/FEN  #Acute on chronic blood loss anemia 2/2 upper vs. lower GIB  #Possible PUD   - Risk factors GIB: on Eliquis, hx of rectal CA, significant daily NSAID use  - Pt reports BRB for awhile with associated constipation and hemorrhoids.   - No hx alcoholism or hepatitis   - Type and screen with positive anti-P1  - VSS. Hgb 3.6 on admit--> today H/H 8.6/25.3 after 6U pRBC, 1 dose plt and FFP  - Ionized calcium 8/15 1.11   - holding eliquis/ASA  - GI consult for EGD +/- colonoscopy today; appreciate recs  - recommend transfusion goal Hgb>7   - resume IV PPI BID   - CBC q8h  - zofran 4 IV q8h PRN nausea      #Hx rectal cancer   - in remission s/p radiation and chemo in 2019   - CT A/P w/o cont: Cholelithiasis. Colonic diverticulosis, without diverticulitis.  No small bowel obstruction.  Evaluation for metastatic disease is limited without intravenous contrast. Aortoiliac stent.     #Hypophosphatemia  - phos 1.7  - replace w/ NaPhos 15mmol  - Mg 2 WNL  - K 4.4 WNL    F: none  E: see above; goal K>4, Mg>2  N: NPO     Renal  #CARLOS on CKD3b, improving  - likely pre-renal azotemia due to dehydration and GIB  - Baseline Cr 1.6-1.8; admit Cr 2.2  - BUN/creatinine down trending 42/1.5 with eGFR 49 this AM  - improvement s/p IVF and RBC    Intake/Output Summary (Last 24 hours) at 8/17/2023 0652  Last data filed at 8/17/2023 0637  Gross per 24 hour   Intake 2046.66 ml   Output 2225 ml   Net -178.34 ml      I/O last 3 completed shifts:  In: 2043.7  [P.O.:500; Blood:1065.5; IV Piggyback:478.2]  Out: 1610 [Urine:1510; Emesis/NG output:100]  I/O this shift:  In: 692.3 [Blood:692.3]  Out: 1125 [Urine:1125]     - strict I/Os  - daily CMP  -  Avoid nephrotoxins and renally dose meds for GFR      Heme  #Acute on chronic blood loss anemia 2/2 GIB, stable  - see above    #Thrombocytopenia  - mild drop plt 107 today  - plt transfusion done d/t >5 units pRBC         Infectious Disease  #Leukocytosis, resolved  - Afebrile, No S&S systemic infection  - WBC 21.26-> today down trend to 12.87  - gran % stable 80.8  - likely stress reaction  - U/A unremarkable  - daily CBC        Endocrine  #T2DM   - 8/15 A1c 5.9   - serum glc 128  - Hold Oral hypoglycemics while patient is in the hospital  - Low dose SSI + Accuchecks  - Goal glucose 140-180 while in ICU        Feeding: NPO since midnight  Analgesia: PRN tylenol, morphine, alprazolam   Sedation: n/a  Thrombo PPX: SCD  Head of Bed: elevated >300  Stress Ulcer PPX: IV PPI BID   Glucose: low dose SSI and as above  SBT/SAT:  n/a  Bowels: Last Bowel Movement: 08/15/23 none   Indwelling Lines: 2 PIVs  Deescalation Abx: none     Code: Full Code  Dispo: Continue to monitor in ICU until H/H stable. Plan for EGD/colonoscopy today.     Thank you for allowing us to participate in the care of this patient. We will continue to follow.      Kanchan Salazar MD  [facility]  [facility]

## 2023-08-17 NOTE — NURSING
Patient's blood pressure running soft. STAT CBC ordered. Patient asymptomatic; no c/o weakness. No bloody vomit or stool.

## 2023-08-17 NOTE — SUBJECTIVE & OBJECTIVE
Interval History:  Patient awake alert and in no acute distress.  Remains afebrile.  Denies chest pain shortness in bed.  Discussed plan of care today.  Patient agreeable to plan of care.  Patient agreeable to PRBC transfusion as needed.  Awaiting endoscopy today    Review of Systems   Constitutional: Negative.    HENT: Negative.     Eyes: Negative.    Respiratory: Negative.     Cardiovascular: Negative.    Gastrointestinal: Negative.    Endocrine: Negative.    Genitourinary: Negative.    Musculoskeletal: Negative.    Skin: Negative.    Allergic/Immunologic: Negative.    Neurological: Negative.    Hematological: Negative.    Psychiatric/Behavioral: Negative.     All other systems reviewed and are negative.    Objective:     Vital Signs (Most Recent):  Temp: 98.4 °F (36.9 °C) (08/17/23 0349)  Pulse: (!) 56 (08/17/23 0700)  Resp: 17 (08/17/23 0700)  BP: (!) 140/65 (08/17/23 0700)  SpO2: 96 % (08/17/23 0700) Vital Signs (24h Range):  Temp:  [97.8 °F (36.6 °C)-99 °F (37.2 °C)] 98.4 °F (36.9 °C)  Pulse:  [50-67] 56  Resp:  [8-34] 17  SpO2:  [82 %-100 %] 96 %  BP: ()/() 140/65     Weight: 84.5 kg (186 lb 4.6 oz)  Body mass index is 24.58 kg/m².    Intake/Output Summary (Last 24 hours) at 8/17/2023 0734  Last data filed at 8/17/2023 0637  Gross per 24 hour   Intake 1804.83 ml   Output 1975 ml   Net -170.17 ml         Physical Exam  Vitals and nursing note reviewed.   Constitutional:       General: He is not in acute distress.     Appearance: Normal appearance.   HENT:      Head: Normocephalic and atraumatic.      Nose: Nose normal.      Mouth/Throat:      Mouth: Mucous membranes are moist.   Eyes:      Extraocular Movements: Extraocular movements intact.      Pupils: Pupils are equal, round, and reactive to light.   Cardiovascular:      Rate and Rhythm: Normal rate and regular rhythm.      Pulses: Normal pulses.      Heart sounds: Normal heart sounds.   Pulmonary:      Effort: Pulmonary effort is normal. No  respiratory distress.      Breath sounds: Normal breath sounds.   Abdominal:      General: Bowel sounds are normal. There is no distension.      Palpations: Abdomen is soft.      Tenderness: There is no abdominal tenderness.   Musculoskeletal:         General: Normal range of motion.      Cervical back: Normal range of motion and neck supple.   Skin:     General: Skin is warm and dry.      Coloration: Skin is pale.   Neurological:      General: No focal deficit present.      Mental Status: He is alert and oriented to person, place, and time. Mental status is at baseline.   Psychiatric:         Mood and Affect: Mood normal.         Behavior: Behavior normal.             Significant Labs: All pertinent labs within the past 24 hours have been reviewed.    Significant Imaging: I have reviewed all pertinent imaging results/findings within the past 24 hours.

## 2023-08-17 NOTE — ASSESSMENT & PLAN NOTE
- Last TTE showing LVEF 33%  -on admit looked dry on exam and labs consistent with hypovolemia from GIB  - Will hold on lasix. May need some after multiple pRBC  - Continue BB  - Holding Imdur  - Not on RAASi likely due to CKD but would benefit from it as outpatient  - Repeat TTE due to valve  -chest x-ray on 08/17/2023= pending  -strict ins and outs/daily weights  -Lasix 20 mg IV once on 08/17/2023.  Suspect some volume overload after receiving 4 units PRBC

## 2023-08-17 NOTE — PLAN OF CARE
1600  CM was informed by  Lalita of a [facility] appt scheduled for the patient with Dr Sweet (PCP) on 8/25/2023 at 1445. Information added to the patient's discharge paperwork.

## 2023-08-17 NOTE — ASSESSMENT & PLAN NOTE
-monitor blood pressure = stable range  -cover with hydralazine 10 mg IV q.4 hours p.r.n. for systolic blood pressure above 160 or diastolic greater than 100  -adjust BP medication as needed  -resume oral BP medications when tolerating oral diet

## 2023-08-17 NOTE — ANESTHESIA POSTPROCEDURE EVALUATION
Anesthesia Post Evaluation    Patient: Wesley Bernal    Procedure(s) Performed: Procedure(s) (LRB):  EGD (ESOPHAGOGASTRODUODENOSCOPY) (N/A)  COLONOSCOPY (N/A)    Final Anesthesia Type: general      Patient location during evaluation: ICU  Patient participation: Yes- Able to Participate  Level of consciousness: awake and alert and oriented  Post-procedure vital signs: reviewed and stable  Pain management: adequate  Airway patency: patent    PONV status at discharge: No PONV  Anesthetic complications: no      Cardiovascular status: hemodynamically stable  Respiratory status: unassisted  Hydration status: euvolemic  Follow-up not needed.          Vitals Value Taken Time   /59 08/17/23 1552   Temp 98.5 08/17/23 1554   Pulse 50 08/17/23 1554   Resp 49 08/17/23 1554   SpO2 100 % 08/17/23 1554   Vitals shown include unvalidated device data.      No case tracking events are documented in the log.      Pain/Alex Score: No data recorded

## 2023-08-17 NOTE — PT/OT/SLP PROGRESS
Occupational Therapy  Visit Attempt     Patient Name:  Wesley Bernal   MRN:  [id number]    Patient not seen today secondary to  (NEYDA in endo). Will follow-up as available.    8/17/2023

## 2023-08-17 NOTE — PROGRESS NOTES
[place] - Intensive Care  Cardiology  Progress Note    Patient Name: Wesley Bernal  MRN: [id number]  Admission Date: 8/15/2023  Hospital Length of Stay: 2 days  Code Status: Full Code   Attending Physician: Sung Brumfield MD   Primary Care Physician: Lalo Sweet MD  Expected Discharge Date:   Principal Problem:Acute on chronic anemia    Subjective:     Hospital Course:   08/16/2023 Per HPI   08/17/2023 LVEF 45%. HH 7.3/22. NPO for EGD/Colonoscopy today. No reports of CP or SOB overnight.       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    Cardiomyopathy     Carotid artery disease     Coronary artery disease     Diabetes mellitus type II     Encounter for blood transfusion     Heart disease     Hyperlipidemia     Hypertension     PAD (peripheral artery disease)        Past Surgical History:   Procedure Laterality Date    ABDOMINAL AORTA STENT      BRACHIOCEPHALIC VEIN ANGIOPLASTY / STENTING      CARDIAC DEFIBRILLATOR PLACEMENT      1/2011    CARDIAC PACEMAKER PLACEMENT      1-2011    CARDIAC SURGERY      open heart    CARDIAC VALVE SURGERY      pig valve replacement -    CAROTID STENT      LASIK SX Bilateral     LYMPH NODE BIOPSY Left 6/17/2019    Procedure: BIOPSY, LYMPH NODE (GROIN);  Surgeon: Adelfo Yin MD;  Location: [facility] OR;  Service: General;  Laterality: Left;    REPLACEMENT OF IMPLANTABLE CARDIOVERTER-DEFIBRILLATOR (ICD) GENERATOR N/A 7/29/2020    Procedure: REPLACEMENT, ICD GENERATOR;  Surgeon: Marko Hartley MD;  [facility]: [facility];  Service: Cardiology;  Laterality: N/A;       Review of patient's allergies indicates:   Allergen Reactions    Statins-hmg-coa reductase inhibitors        No current facility-administered medications on file prior to encounter.     Current Outpatient Medications on File Prior to Encounter   Medication Sig    ALPRAZolam (XANAX) 0.25 MG tablet  TAKE ONE TABLET BY MOUTH TWICE DAILY AS NEEDED    amiodarone (PACERONE) 200 MG Tab Take 0.5 tablets (100 mg total) by mouth once daily.    apixaban (ELIQUIS) 5 mg Tab Take 5 mg by mouth 2 (two) times daily.    aspirin (ECOTRIN) 81 MG EC tablet Take by mouth once daily.    atenoloL (TENORMIN) 25 MG tablet Take 1 tablet (25 mg total) by mouth once daily.    gabapentin (NEURONTIN) 100 MG capsule TAKE TWO CAPSULES BY MOUTH THREE TIMES DAILY (DOSE INCREASED    HYDROcodone-acetaminophen (NORCO)  mg per tablet Take 1 tablet by mouth every 12 (twelve) hours as needed for Pain.    isosorbide mononitrate (IMDUR) 60 MG 24 hr tablet Take 60 mg by mouth once daily.     pravastatin (PRAVACHOL) 20 MG tablet Take 20 mg by mouth once daily.    carvediloL (COREG) 6.25 MG tablet Take 6.25 mg by mouth 2 (two) times daily.    docusate sodium (COLACE) 100 MG capsule Take 200 mg by mouth every morning.    DULoxetine (CYMBALTA) 60 MG capsule Take 1 capsule (60 mg total) by mouth once daily.    ezetimibe (ZETIA) 10 mg tablet TAKE ONE TABLET BY MOUTH ONCE A DAY    furosemide (LASIX) 20 MG tablet Take 20 mg by mouth once daily.     icosapent ethyL (VASCEPA) 1 gram Cap TAKE TWO CAPSULES BY MOUTH TWICE DAILY    pioglitazone (ACTOS) 15 MG tablet TAKE ONE TABLET BY MOUTH ONCE A DAY    primidone (MYSOLINE) 50 MG Tab Take 50 mg by mouth 2 (two) times a day.    REPATHA SURECLICK 140 mg/mL PnIj INJECT ONE SYRINGE SUBCUTANEOUSLY ONCE EVERY 14 DAYS    SITagliptin phosphate (JANUVIA) 100 MG Tab Take 1 tablet (100 mg total) by mouth once daily.     Family History       Problem Relation (Age of Onset)    Cancer Father    Diabetes Mother    Heart disease Father, Paternal Grandfather          Tobacco Use    Smoking status: Former     Current packs/day: 0.00     Types: Cigarettes, Cigars     Quit date: 4/10/2015     Years since quittin.3    Smokeless tobacco: Never   Substance and Sexual Activity    Alcohol use: No      Alcohol/week: 0.0 standard drinks of alcohol    Drug use: No    Sexual activity: Yes     Partners: Female     Review of Systems   Constitutional: Positive for malaise/fatigue.   HENT: Negative.     Eyes: Negative.    Cardiovascular:  Positive for dyspnea on exertion. Negative for chest pain, irregular heartbeat, leg swelling, near-syncope, orthopnea, palpitations, paroxysmal nocturnal dyspnea and syncope.   Respiratory:  Negative for cough and shortness of breath.    Endocrine: Negative.    Hematologic/Lymphatic: Negative.    Skin: Negative.    Musculoskeletal: Negative.    Gastrointestinal:  Positive for hematemesis, nausea and vomiting.   Neurological:  Positive for weakness.   Psychiatric/Behavioral: Negative.     Allergic/Immunologic: Negative.      Objective:     Vital Signs (Most Recent):  Temp: 98.4 °F (36.9 °C) (08/17/23 0830)  Pulse: (!) 55 (08/17/23 0730)  Resp: 14 (08/17/23 0730)  BP: (!) 109/55 (08/17/23 0830)  SpO2: (!) 93 % (08/17/23 0730) Vital Signs (24h Range):  Temp:  [97.8 °F (36.6 °C)-99 °F (37.2 °C)] 98.4 °F (36.9 °C)  Pulse:  [50-67] 55  Resp:  [8-34] 14  SpO2:  [82 %-100 %] 93 %  BP: ()/() 109/55     Weight: 84.5 kg (186 lb 4.6 oz)  Body mass index is 24.58 kg/m².    SpO2: (!) 93 %         Intake/Output Summary (Last 24 hours) at 8/17/2023 0920  Last data filed at 8/17/2023 0841  Gross per 24 hour   Intake 1949.83 ml   Output 2275 ml   Net -325.17 ml         Lines/Drains/Airways       Peripheral Intravenous Line  Duration                  Peripheral IV - Single Lumen 08/15/23 2200 18 G Posterior;Right Forearm 1 day         Peripheral IV - Single Lumen 08/17/23 0015 18 G Anterior;Right Upper Arm <1 day                     Physical Exam  Constitutional:       Appearance: He is ill-appearing. He is not diaphoretic.   HENT:      Head: Atraumatic.   Eyes:      General:         Right eye: No discharge.         Left eye: No discharge.   Cardiovascular:      Rate and Rhythm: Normal rate  "and regular rhythm.      Heart sounds: Murmur heard.   Abdominal:      Palpations: Abdomen is soft.   Musculoskeletal:      Right lower leg: No edema.      Left lower leg: No edema.   Skin:     General: Skin is warm and dry.      Capillary Refill: Capillary refill takes 2 to 3 seconds.   Neurological:      Mental Status: He is alert. Mental status is at baseline.          Significant Labs: BMP:   Recent Labs   Lab 08/15/23  1900 08/16/23  0239 08/17/23  0204   * 175*  175* 128*    139  139 137   K 4.8 4.6  4.6 4.4    108  108 108   CO2 19* 24 24 23   BUN 48* 51*  52* 42*   CREATININE 2.2* 2.1*  2.1* 1.5*   CALCIUM 8.3* 8.0*  8.1* 7.6*   MG  --  2.2 2.0     , CMP   Recent Labs   Lab 08/15/23  1900 08/16/23  0239 08/17/23  0204    139  139 137   K 4.8 4.6  4.6 4.4    108  108 108   CO2 19* 24 24 23   * 175*  175* 128*   BUN 48* 51*  52* 42*   CREATININE 2.2* 2.1*  2.1* 1.5*   CALCIUM 8.3* 8.0*  8.1* 7.6*   PROT 5.4* 5.1* 4.5*   ALBUMIN 2.9* 2.8* 2.5*   BILITOT 0.3 0.4 0.9   ALKPHOS 48* 46* 40*   AST 12 10 17   ALT 11 9* 9*   ANIONGAP 16 7*  7* 6*     , CBC   Recent Labs   Lab 08/16/23  1149 08/16/23  1304 08/16/23  1931 08/17/23  0204   WBC 18.89*  --  12.16 12.87*  12.91*  12.91*   HGB 6.9* 7.3* 6.8* 7.3*  7.1*  7.1*   HCT 21.6* 23.0* 20.5* 22.3*  21.9*  21.9*   *  --  114* 107*  106*  106*     , INR   Recent Labs   Lab 08/15/23  1900 08/16/23  2046   INR 1.2 1.2     , Lipid Panel No results for input(s): "CHOL", "HDL", "LDLCALC", "TRIG", "CHOLHDL" in the last 48 hours., Troponin   Recent Labs   Lab 08/15/23  1900   TROPONINI 0.049*     , and All pertinent lab results from the last 24 hours have been reviewed.    Significant Imaging: Echocardiogram: Transthoracic echo (TTE) complete (Cupid Only):   Results for orders placed or performed during the hospital encounter of 08/15/23   Echo   Result Value Ref Range    BSA 2.01 m2    Lawson's Biplane " MOD Ejection Fraction 45 %    LVOT stroke volume 117.23 cm3    LVIDd 6.57 (A) 3.5 - 6.0 cm    LV Systolic Volume 164.86 mL    LV Systolic Volume Index 81.6 mL/m2    LVIDs 5.77 (A) 2.1 - 4.0 cm    LV Diastolic Volume 221.56 mL    LV Diastolic Volume Index 109.68 mL/m2    IVS 1.25 (A) 0.6 - 1.1 cm    LVOT diameter 2.25 cm    LVOT area 4.0 cm2    FS 12 (A) 28 - 44 %    Left Ventricle Relative Wall Thickness 0.29 cm    Posterior Wall 0.96 0.6 - 1.1 cm    LV mass 327.78 g    LV Mass Index 162 g/m2    TDI LATERAL 0.04 m/s    TDI SEPTAL 0.05 m/s    TR Max Pop 2.41 m/s    PV Peak S Pop 0.68 m/s    PV Peak D Pop 0.69 m/s    Pulm vein S/D ratio 0.99     LVOT peak pop 1.17 m/s    Left Ventricular Outflow Tract Mean Velocity 0.78 cm/s    Left Ventricular Outflow Tract Mean Gradient 2.81 mmHg    LA volume (mod) 110.18 cm3    LA Volume Index (Mod) 54.5 mL/m2    LA size 4.79 cm    Left Atrium Major Axis 6.65 cm    Left Atrium Minor Axis 6.61 cm    RVDD 3.28 cm    TAPSE 1.60 cm    RA Major Axis 5.29 cm    AV mean gradient 4 mmHg    AV peak gradient 10 mmHg    Ao peak pop 1.55 m/s    Ao VTI 37.00 cm    LVOT peak VTI 29.50 cm    AV valve area 3.17 cm²    AV Velocity Ratio 0.75     AV index (prosthetic) 0.80     CLIF by Velocity Ratio 3.00 cm²    Mr max pop 5.00 m/s    MV mean gradient 4 mmHg    MV peak gradient 13 mmHg    MV valve area by continuity eq 1.71 cm2    MV VTI 68.7 cm    Triscuspid Valve Regurgitation Peak Gradient 23 mmHg    PV PEAK VELOCITY 1.00 m/s    PV peak gradient 4 mmHg    Pulmonary Valve Mean Velocity 0.72 m/s    STJ 2.43 cm    Ascending aorta 3.13 cm    IVC diameter 1.90 cm    Mean e' 0.05 m/s    ZLVIDS 3.62     ZLVIDD 0.99     LA Volume Index 65.5 mL/m2    LA volume 132.27 cm3    LA WIDTH 4.9 cm    RA Width 3.2 cm    Mitral Valve Heart Rate 58 bpm    Narrative      Left Ventricle: The left ventricle is mildly dilated. regional wall   motion abnormalities present. There is reduced systolic function. Biplane    (2D) method of discs ejection fraction is 45%.    Right Ventricle: Normal right ventricular cavity size. Wall thickness   is normal. Right ventricle wall motion  is normal. Systolic function is   normal. Pacemaker lead present in the ventricle.    Left Atrium: Left atrium is severely dilated.    Aortic Valve: There is mild aortic valve sclerosis.    Mitral Valve: There is a bioprosthetic valve in the mitral position   that is well-seated. The mean pressure gradient across the mitral valve is   4 mmHg at a heart rate of 58 bpm. Very small mobile echodense shadow 0.2 x   0.4 cm in the submitral apparatus. Differential is detached cord related   to  prior mitral surgery(more likely), thrombus or vegetations.    Pulmonic Valve: There is mild regurgitation.       Assessment and Plan:     Brief HPI: Patient seen this morning on rounds without cardiac complaint. NPO for EGD/colonoscopy today.  7.3/22.     CHF (congestive heart failure), NYHA class II, chronic, systolic  TTE    Left Ventricle: The left ventricle is mildly dilated. regional wall   motion abnormalities present. There is reduced systolic function. Biplane   (2D) method of discs ejection fraction is 45%.    Right Ventricle: Normal right ventricular cavity size. Wall thickness   is normal. Right ventricle wall motion  is normal. Systolic function is   normal. Pacemaker lead present in the ventricle.    Left Atrium: Left atrium is severely dilated.    Aortic Valve: There is mild aortic valve sclerosis.    Mitral Valve: There is a bioprosthetic valve in the mitral position   that is well-seated. The mean pressure gradient across the mitral valve is   4 mmHg at a heart rate of 58 bpm. Very small mobile echodense shadow 0.2 x   0.4 cm in the submitral apparatus. Differential is detached cord related   to  prior mitral surgery(more likely), thrombus or vegetations.    Pulmonic Valve: There is mild regurgitation.    Recent Labs   Lab 08/15/23  1900   BNP  399*   .    Patient HH 3.6/11 on admit, up to 7.3/22  Hold home Lasix  Resume BB as tolerated   S/p ICD  Euvolemic on exam     Repeat TTE pending     CAD (coronary artery disease)  Unknown coronary anatomy   Chest pain during episode of vomiting felt to be in setting of severe anemia and CARLOS   EKG without acute ischemic changes  Troponin mildly elevated at baseline 0.04  Holding asa given GIB  Consider resumption of Imdur, BB   Repatha (as outpatient)   Transfuse PRBCs to goal >8  Patient cleared from CV perspective for GI procedures. Cardiology will follow.     Bioprosthetic mitral valve replacement  Porcine valve  No need for anticoagulation       Mitral Valve: There is a bioprosthetic valve in the mitral position   that is well-seated. The mean pressure gradient across the mitral valve is   4 mmHg at a heart rate of 58 bpm. Very small mobile echodense shadow 0.2 x   0.4 cm in the submitral apparatus. Differential is detached cord related   to  prior mitral surgery(more likely), thrombus or vegetations.    Patient on OAC as OP for AF. Thrombus unlikely. Afebrile- vegetation unlikely    Hyperlipidemia  Resume Repatha as outpatient     Essential hypertension  SBP 80s-120s  BB on hold given hypotension overnight- if SBP >100- resume BB  Consider resumption of Imdur once stable from GIB perspective     Atrial fibrillation  Currently SR/paced  Continue Amiodarone, resume BB once BP is stable  Hold Eliquis given active GIB, will discuss resumption once GI work up is complete  Follow up with primary cardiologist as OP to discuss ELIZABETH closure          VTE Risk Mitigation (From admission, onward)         Ordered     IP VTE HIGH RISK PATIENT  Once         08/15/23 3390     Place sequential compression device  Until discontinued         08/15/23 2326                Jomar Lew NP  Cardiology  [place] - Intensive Care

## 2023-08-17 NOTE — PLAN OF CARE
Pt on room air in no apparent distres  IS tx. Given with good pt. Effort.  Will cont. To monitor.

## 2023-08-17 NOTE — OR NURSING
Patient scheduled for EGD/Colon exams today, chart reviewed and report taken from nurse. Only 50% of Golytely consumed, no BM's noted. NPO, IV in place, paced rhythm.

## 2023-08-17 NOTE — PLAN OF CARE
Care Plan    POC reviewed with Wesley Bernal and family. Questions and concerns addressed. No acute events today. Pt progressing toward goals. Will continue to monitor. See below and flowsheets for full assessment and VS info.       Neuro:  [place] Coma Scale  Best Eye Response: 4-->(E4) spontaneous  Best Motor Response: 6-->(M6) obeys commands  Best Verbal Response: 5-->(V5) oriented  [place] Coma Scale Score: 15  Assessment Qualifiers: patient not sedated/intubated, no eye obstruction present  Pupil PERRLA: yes  24 hr Temp:  [98.3 °F (36.8 °C)-99 °F (37.2 °C)]      CV:  Rhythm: paced rhythm, sinus bradycardia  DVT prophylaxis: VTE Required Core Measure: (SCDs) Sequential compression device initiated/maintained    Resp:          GI/:  GI prophylaxis: yes  Diet/Nutrition Received: NPO  Last Bowel Movement: 08/15/23      Intake/Output Summary (Last 24 hours) at 8/17/2023 1825  Last data filed at 8/17/2023 1755  Gross per 24 hour   Intake 1206.34 ml   Output 4775 ml   Net -3568.66 ml       Labs/Accuchecks:  Recent Labs   Lab 08/17/23  1811   WBC 13.18*   RBC 2.90*   HGB 8.7*   HCT 26.8*   *      Recent Labs   Lab 08/17/23  0204      K 4.4   CO2 23      BUN 42*   CREATININE 1.5*   ALKPHOS 40*   ALT 9*   AST 17   BILITOT 0.9      Recent Labs   Lab 08/16/23  2046   INR 1.2   APTT 23.9      Recent Labs   Lab 08/15/23  1900   TROPONINI 0.049*       Electrolytes: Electrolytes replaced  Accuchecks: Q6H    Gtts/LDAs:      Lines/Drains/Airways       Peripheral Intravenous Line  Duration                  Peripheral IV - Single Lumen 08/15/23 2200 18 G Posterior;Right Forearm 1 day         Peripheral IV - Single Lumen 08/17/23 0015 18 G Anterior;Right Upper Arm <1 day                    Skin/Wounds     Wounds: No  Wound care consulted: No    Consults  Consults (From admission, onward)          Status Ordering Provider     IP consult to case management  Once        Provider:  (Not yet assigned)     Completed CECIL SANDOVAL     Inpatient consult to Cardiology-[facility]  Once        Provider:  Maureen Arguello MD    Completed MAUREEN RODRIGUEZ     Gastroenterology  Once        Provider:  Thierno Sevilla MD    Completed MAUREEN RODRIGUEZ             Problem: Diabetes Comorbidity  Goal: Blood Glucose Level Within Targeted Range  Outcome: Ongoing, Progressing     Problem: Fluid and Electrolyte Imbalance (Acute Kidney Injury/Impairment)  Goal: Fluid and Electrolyte Balance  Outcome: Ongoing, Progressing     Problem: Renal Function Impairment (Acute Kidney Injury/Impairment)  Goal: Effective Renal Function  Outcome: Ongoing, Progressing     Problem: Adjustment to Illness (Gastrointestinal Bleeding)  Goal: Optimal Coping with Acute Illness  Outcome: Ongoing, Progressing     Problem: Bleeding (Gastrointestinal Bleeding)  Goal: Hemostasis  Outcome: Ongoing, Progressing

## 2023-08-18 NOTE — MEDICAL/APP STUDENT
[facility] Pulmonary/Critical Care Consult Note      Patient:Wesley Bernal  Age:72 y.o.  MRN:[id number]  Admit date:8/15/2023   LOS:3 day(s)       HPI:       Mr. Dax Bernal is a 72 y.o. M with a PMHx of a fib on Eliquis, ICM, HFrEF (30-35%), MR s/p MVR in 2013, s/p AICD placement, rectal cancer (in remission), CKD3b, HTN, HLD, T2DM, and anxiety who presented to the [facility] ED on 8/15 for fatigue/RAMIREZ, pallor, bloody-appearing stool, constipation, and decreased appetite for 4 days. He also reports dark brown vomit and weakness to the point where he hasn't been able to get out of bed.    On exam in the ED, he was bradycardic to the 50s, unable to lift arms and legs from bed, and had delayed capillary refill. Labs were remarkable for Hb 4, BUN 52, creatinine 2.2, and positive IAT. CT abdomen was unremarkable. Blood was not given d/t positive IAT. He received Protonix 80 prior to being transferred to [facility].    Interval Hx:  Received his EGD yesterday 8/17 which revealed Dieulafoy lesion in stomach. Lesion was clipped. Felt more energetic and stronger initially but began to feel malaise and depressed this morning. Received 1 L LR overnight bolus for hypotension. Received 2 units of pRBCs and CTA abdomen/pelvis today. Levo ggt began for persistent hypotension despite 2 units of pRBCs. Has now received a total of 8 units of pRBCs.     MEDICAL HISTORY:      Past Medical History:  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    Cardiomyopathy     Carotid artery disease     Coronary artery disease     Diabetes mellitus type II     Encounter for blood transfusion     Heart disease     Hyperlipidemia     Hypertension     PAD (peripheral artery disease)        Past Surgical History:  Past Surgical History:   Procedure Laterality Date    ABDOMINAL AORTA STENT      BRACHIOCEPHALIC VEIN ANGIOPLASTY / STENTING      CARDIAC DEFIBRILLATOR  PLACEMENT      2011    CARDIAC PACEMAKER PLACEMENT          CARDIAC SURGERY      open heart    CARDIAC VALVE SURGERY      pig valve replacement -    CAROTID STENT      COLONOSCOPY N/A 2023    Procedure: COLONOSCOPY;  Surgeon: Javier Chang MD;  Location: [facility] ENDO;  Service: Endoscopy;  Laterality: N/A;    ESOPHAGOGASTRODUODENOSCOPY N/A 2023    Procedure: EGD (ESOPHAGOGASTRODUODENOSCOPY);  Surgeon: Javier Chang MD;  Location: [facility] ENDO;  Service: Endoscopy;  Laterality: N/A;    LASIK SX Bilateral     LYMPH NODE BIOPSY Left 2019    Procedure: BIOPSY, LYMPH NODE (GROIN);  Surgeon: Adelfo Yin MD;  Location: [facility] OR;  Service: General;  Laterality: Left;    REPLACEMENT OF IMPLANTABLE CARDIOVERTER-DEFIBRILLATOR (ICD) GENERATOR N/A 2020    Procedure: REPLACEMENT, ICD GENERATOR;  Surgeon: Marko Hartley MD;  Location: [facility] CATH;  Service: Cardiology;  Laterality: N/A;         Family History:   Family History   Problem Relation Age of Onset    Diabetes Mother     Heart disease Father     Cancer Father         melanoma, prostate    Heart disease Paternal Grandfather          Social History:  Social History     Tobacco Use    Smoking status: Former     Current packs/day: 0.00     Types: Cigarettes, Cigars     Quit date: 4/10/2015     Years since quittin.3    Smokeless tobacco: Never   Substance Use Topics    Alcohol use: No     Alcohol/week: 0.0 standard drinks of alcohol    Drug use: No         Allergies:  Review of patient's allergies indicates:   Allergen Reactions    Statins-hmg-coa reductase inhibitors        Home Medications:  Current Outpatient Medications   Medication Instructions    ALPRAZolam (XANAX) 0.25 MG tablet TAKE ONE TABLET BY MOUTH TWICE DAILY AS NEEDED    amiodarone (PACERONE) 100 mg, Oral, Daily    apixaban (ELIQUIS) 5 mg, Oral, 2 times daily    aspirin (ECOTRIN) 81 MG EC tablet Daily    atenoloL (TENORMIN) 25 mg, Oral, Daily    carvediloL  (COREG) 6.25 mg, Oral, 2 times daily    docusate sodium (COLACE) 200 mg, Oral, Every morning    DULoxetine (CYMBALTA) 60 mg, Oral, Daily    ezetimibe (ZETIA) 10 mg tablet TAKE ONE TABLET BY MOUTH ONCE A DAY    furosemide (LASIX) 20 mg, Oral, Daily    gabapentin (NEURONTIN) 100 MG capsule TAKE TWO CAPSULES BY MOUTH THREE TIMES DAILY (DOSE INCREASED    HYDROcodone-acetaminophen (NORCO)  mg per tablet 1 tablet, Oral, Every 12 hours PRN    icosapent ethyL (VASCEPA) 1 gram Cap TAKE TWO CAPSULES BY MOUTH TWICE DAILY    isosorbide mononitrate (IMDUR) 60 mg, Oral, Daily    pioglitazone (ACTOS) 15 MG tablet TAKE ONE TABLET BY MOUTH ONCE A DAY    pravastatin (PRAVACHOL) 20 mg, Oral, Daily    primidone (MYSOLINE) 50 mg, Oral, 2 times daily    REPATHA SURECLICK 140 mg/mL PnIj INJECT ONE SYRINGE SUBCUTANEOUSLY ONCE EVERY 14 DAYS    SITagliptin phosphate (JANUVIA) 100 mg, Oral, Daily        OBJECTIVE DATA:      Vital Signs:  Vitals:    08/18/23 0645   BP: (!) 110/54   Pulse: (!) 53   Resp: 19   Temp:        Intake/Output:    Intake/Output Summary (Last 24 hours) at 8/18/2023 0722  Last data filed at 8/18/2023 0354  Gross per 24 hour   Intake 1249.01 ml   Output 4350 ml   Net -3100.99 ml       Physical Exam:  Constitutional: cooperative, NAD, thin, tired  HEENT: NCAT, pale conjunctiva, dry MM  Neck: Supple, no masses, trachea not deviated.   Resp: On Room Air, satting well. Symmetrical, clear to auscultation bilaterally, No wheezes, rhonchi, or crackles.   Cardio:  3/6 systolic murmur noted, consistent with replaced mitral valve  GI: Soft, non-tender, non-distended, bowel sounds hypoactive.  Extremities: No edema, peripheral pulses 2+ and symmetric, extremities warm.  Skin: SCDs in place, Pallor noted, capillary refill takes >3 seconds  Neurologic: Alert and oriented x3, follows commands, moves extremities spontaneously. 3/5 strength throughout. No asterixis.    Laboratory/Imaging:  Recent Labs   Lab 08/16/23  [id number]  08/16/23  0755 08/17/23  0204 08/17/23  1056 08/17/23  1811 08/18/23  0018 08/18/23  0449   WBC 9.81   < > 12.87*  12.91*  12.91*   < > 13.18* 9.33 8.12   HGB 3.6*   < > 7.3*  7.1*  7.1*   < > 8.7* 8.2* 7.6*   HCT 11.7*   < > 22.3*  21.9*  21.9*   < > 26.8* 24.5* 23.7*   *   < > 107*  106*  106*   < > 135* 122* 115*     139  --  137  --   --   --  140   K 4.6  4.6  --  4.4  --   --   --  4.0     108  --  108  --   --   --  107   CREATININE 2.1*  2.1*  --  1.5*  --   --   --  1.4   BUN 51*  52*  --  42*  --   --   --  28*   CO2 24  24  --  23  --   --   --  25   ALT 9*  --  9*  --   --   --  9*   AST 10  --  17  --   --   --  15    < > = values in this interval not displayed.       Microbiology:  N/A    Imaging:  TTE (2020)  Mild left ventricular enlargement.  Moderately decreased left ventricular systolic function. The estimated ejection fraction is 33%.  Eccentric left ventricular hypertrophy.  Grade I (mild) left ventricular diastolic dysfunction consistent with impaired relaxation.  Local segmental wall motion abnormalities.  Low normal right ventricular systolic function.  Mild right atrial enlargement.  There is a mechanical mitral valve prosthesis.  The mean diastolic gradient across the mitral valve is 3 mmHg ; MVA 1.57; mild MR  Normal central venous pressure (3 mmHg).  The estimated PA systolic pressure is 11 mmHg ( THIS MAY NOT BE TRUE PEAK??).  Mild mitral regurgitation.     TTE (8/15/2023)    Left Ventricle: The left ventricle is mildly dilated. regional wall motion abnormalities present. There is reduced systolic function. Biplane (2D) method of discs ejection fraction is 45%.    Right Ventricle: Normal right ventricular cavity size. Wall thickness is normal. Right ventricle wall motion  is normal. Systolic function is normal. Pacemaker lead present in the ventricle.    Left Atrium: Left atrium is severely dilated.    Aortic Valve: There is mild aortic valve sclerosis.     Mitral Valve: There is a bioprosthetic valve in the mitral position that is well-seated. The mean pressure gradient across the mitral valve is 4 mmHg at a heart rate of 58 bpm. Very small mobile echodense shadow 0.2 x 0.4 cm in the submitral apparatus. Differential is detached cord related to  prior mitral surgery(more likely), thrombus or vegetations.    Pulmonic Valve: There is mild regurgitation.     CXR 8/17/2023:  FINDINGS:  Right-sided Port-A-Cath, 2 lead left-sided pacer with sternotomy wires and surgical clips overlying the cardiomediastinal silhouette relatively stable.  There is no significant new lung opacity.  Lucency along the periphery of the right lung suggestive for overlapping skin fold with lung markings seen beyond this, no large pneumothorax.  There is continued blunting left costophrenic angle concerning for small effusion versus scarring.  Clinical correlation and follow-up advised    CXR 8/15:  FINDINGS:  Enlarged cardiac silhouette with pulmonary vascular congestion.  Mild diffuse interstitial prominence may reflect edema, pneumonitis, or interstitial scarring.  Small left pleural effusion versus scarring.  Left chest wall AICD.  Right chest wall port.     CT Head w/o Contrast 8/15:  Impression:  No acute abnormality.     CT Abdomen Pelvis w/o Contrast 8/15:  Impression:  Evaluation for metastatic disease is limited without intravenous contrast.  Indeterminate renal lesions.  Recommend follow-up ultrasound.  Low-attenuation blood within the heart, compatible with anemia.  Please confirm with laboratory values.  Cholelithiasis.  Circumferential bladder wall thickening.  Correlate for cystitis.    Medications:   amiodarone  100 mg Oral Daily    ezetimibe  10 mg Oral Daily    gabapentin  200 mg Oral TID    mupirocin   Nasal BID    pantoprazole  40 mg Intravenous BID    pravastatin  20 mg Oral Daily             0.9%  NaCl infusion (for blood administration), acetaminophen, ALPRAZolam, dextrose  10%, dextrose 10%, glucagon (human recombinant), hydrALAZINE, insulin aspart U-100, melatonin, morphine, ondansetron, sodium chloride 0.9%     Problem List:  Patient Active Problem List   Diagnosis    Atrial fibrillation    Essential hypertension    Type 2 diabetes mellitus    Hyperlipidemia    Anxiety disorder    Bioprosthetic mitral valve replacement    Stage 3a chronic kidney disease    Hyperkalemia    Statin intolerance    Mass of anus    Lumbar disc disease with radiculopathy    Aortic atherosclerosis    CAD (coronary artery disease)    Left groin mass    Implantable cardioverter-defibrillator (ICD) at end of battery life    CHF (congestive heart failure), NYHA class II, chronic, systolic    Ischemic cardiomyopathy    Shingles outbreak    Acute on chronic Stage 3B kidney disease    Herpes zoster iritis    Posterior subcapsular polar age-related cataract of both eyes    Squamous cell cancer, anus    Essential tremor    Acute on chronic anemia 2/2 GIB    Acute hypoxemic respiratory failure       Assessment/Plan:     Wesley Bernal is a 72 y.o. male with a PMHx of rectal CA in remission, DM2, CKD3b, prosthetic MVR, CAD, ICM HFrEF (LVEF 33% + grade I DD) s/p AICD, Afib on Eliquis, HLD, HTN who was transferred to [facility] on 8/15 for evaluation of dyspnea/weakness/fatigue in the setting of bloody bowel movements found to have a hgb of 3.6 with hematemesis episode in the ICU. S/p 6U RBCs, 1 plt, 1 FFP. H/H stable. CARLOS improving.  Gastric ulcer plausible given episode of hematemesis, though lower GI bleed cannot be ruled out given history of BRB in stool.  GI consulted for further management w/ EGD on 8/17 which revealed Dieulafoy lesion in stomach. Lesion was clipped. Pt with initial clinical improvement s/p EGD but began to feel weak and depressed on AM of 8/18.      Neuro  #History of anxiety with chronic benzo use  - Per pt takes xanax .25 mg daily AM and PM  - PRN xanax .25 mg BID to prevent withdrawal, none  required during this hospitalization  - pt reporting decreased mood and some anxiety on AM of 8/18, reminded PRN Xanax available if needed     #Generalized weakness  - in setting of blood loss anemia  - uses walker at home  - PT/OT eval on 8/21     #Diabetic Neuropathy  - gabapentin 200 TID       Cardiovascular  #Hypotension in setting of acute on chronic blood loss  #History of Essential Hypertension  - holding Imdur and BB  - bradycardia in 50s, SBP 60s-90s overnight and 100s-110 this AM 8/18  - SBP 66 @ 0545 8/18, improved to 100-110s during transfusion of 7th unit  - Levo ggt initiated on 8/18 for persistent hypotension despite transfusion    #Atrial Fibrillation  - controlled at home with atenolol 25 daily and Amiodarone 100 daily  - EKG NSR with 1st deg AV block  - continue amiodarone 100 daily  - holding Eliquis/ASA/BP/HF meds in setting of soft BPs and GIB  - continue telemetry    #ICM HFrEF, NHYA class II, chronic, systolic  - s/p AICD  - Hypovolemic on exam 2/2 GIB  - Last TTE in 2020 showing LVEF 33%   - Repeat TTE 8/15: EF 45%, LA severe dilation, LV mild dilated, mild AV sclerosis, mild PV regurg  - Given lasix 20 mg IV on 8/17. Holding home lasix 20 qd PO.  - Holding Imdur and BB.    #Bioprosthetic mitral valve replacement  - S/p porcine valve in 2013 for severe MR  - Exam appreciable for 3/6 systolic murmur heard best LLSB    #CAD  #Hyperlipidemia  - continue pravastatin 20 and ezetimibe 10   - hold ASA      Respiratory  - No acute issues  - on NC 1L    #History Tobacco Use  - Pt endorses ~40 pk yr hx but quit years ago  - On Room Air, satting high 90s       GI/FEN  #Acute on chronic blood loss anemia 2/2 upper vs. lower GIB  #Possible PUD   - Risk factors GIB: on Eliquis, hx of rectal CA, significant daily NSAID use  - Pt reports BRB for awhile with associated constipation and hemorrhoids.   - No hx alcoholism or hepatitis   - Type and screen with positive anti-P1  - Hgb 3.6 on admit--> today H/H  7.6/23.7 during transfusion of 7th unit of pRBCs  - EGD with GI on 8/17 showed Dieulafoy lesion in stomach which was clipped  - recommend transfusion goal Hgb <7   - Protonix 40 BID  - zofran 4 IV q8h PRN nausea  - holding eliquis/ASA in setting of GIB  - CBC q8h    #Hx rectal cancer   - in remission s/p radiation and chemo in 2019   - CT A/P w/o cont 8/15: Cholelithiasis. Colonic diverticulosis, without diverticulitis.  No small bowel obstruction.  Evaluation for metastatic disease is limited without intravenous contrast. Aortoiliac stent.    #Hypophosphatemia  - phos 1.7 on 8/17  - replaced w/ NaPO4 15mmol on 8/17  - f/u labs on AM of 8/19  - replete PRN    F: s/p 1 L LR  E: see above; goal K>4, Mg>2  N: NPO      Renal  #CARLOS on CKD3b, improving  - likely pre-renal azotemia due to dehydration and GIB  - Baseline Cr 1.6-1.8; admit Cr 2.2  - BUN/creatinine down trending 28/1.4 (42/1.5) with eGFR 54 this AM 8/18  - continued improvement s/p IVF and RBC  - I 1.2 L, O 4.3 L, net -3.1 L over last 24 hrs, net -2.8 L since admission  - strict I/Os  -  Avoid nephrotoxins and renally dose meds for GFR       Heme  #Acute on chronic blood loss anemia 2/2 GIB, stable  - see above  - CBCs q6h     #Thrombocytopenia  - plt 115 (122, 135) today 8/18  - plt transfusion done d/t >5 units pRBC         Infectious Disease  #Leukocytosis, resolved  - Afebrile, No S&S systemic infection  - WBC 8.12  - gran % stable 77.5  - U/A unremarkable  - daily CBC      Endocrine  #T2DM   - 8/15 A1c 5.9   - serum glc 126 this AM 8/18  - Hold Oral hypoglycemics while patient is in the hospital  - aspart 0-5 units q6h PRN + Accuchecks  - Goal glucose 140-180 while in ICU        Feeding: NPO  Analgesia: PRN tylenol, morphine, alprazolam   Sedation: n/a  Thrombo PPX: SCD  Head of Bed: elevated >300  Stress Ulcer PPX: Protonix 40 BID  Glucose: aspart 0-5 units q6h PRN, goal 140-180  SBT/SAT:  n/a  Bowels: Last Bowel Movement: Last Bowel Movement:  08/15/23  Indwelling Lines: 2 PIVs  Deescalation Abx: none    Code: Full  Dispo: Remain in MICU      Thank you for allowing us to participate in the care of this patient. We will continue to follow.       Baljeet Benavides, MS4  [employer]

## 2023-08-18 NOTE — NURSING
Pt back from CTA. Abad SLADE and family at bedside. Bedside bladder scanned performed revealed 210ccs. HnH resulted (see labs) will hold off unit of blood ordered. Levo stopped. Pt BP stable.

## 2023-08-18 NOTE — PLAN OF CARE
Problem: Occupational Therapy  Goal: Occupational Therapy Goal  Description: Goals to be met by: 9/18/23     Patient will increase functional independence with ADLs by performing:    LE Dressing with Modified Evang[place].  Grooming while standing with Modified [place]ang[place].  Toileting from toilet with Modified Ev[place]eline for hygiene and clothing management.   Supine to sit with Modified Evangeline.  Step transfer with Modified Evangeline  Toilet transfer to toilet with Modified Evangeline.  Increased functional strength to WFL for self care skills and functional mobility .  Upper extremity exercise program x10 reps per handout, with independence.    Outcome: Ongoing, Progressing     Pt would benefit from cont OT services in order to maximize functional independence. Recommendations ongoing at this time pending further participation in therapy services.

## 2023-08-18 NOTE — ASSESSMENT & PLAN NOTE
Reported blood in stool + h/o rectal cancer s/p chemo/XRT. However reported coffee ground emesis  EGD 8/17 with Dieulafoy's lesion, treated. Colonoscopy not performed d/t not tolerating prep  Trend hgb  Continue PPI IV BID  Advance diet

## 2023-08-18 NOTE — PROGRESS NOTES
This is an attestation of a separate note written by the ICU resident today. As the teaching physician, I was present for and have confirmed the key portions of the service documented in the resident's note from today. In addition to the resident's note, I add the following:      Mr. Bernal is a 73 yo M with PMHx of rectal CA in remission, DM2, CKD, prosthetic MVR, CAD, HFrEF (LVEF 33% + grade I DD) s/p AICD, Afib on Eliquis who was transferred to [facility] for evaluation of dyspnea/weakness/fatigue in the setting of bloody bowel movements and hematemesis found to have a hgb of 4.0. Hospitalization complicated by CARLOS. EGD/colonoscopy was performed on 8/17 which demonstrated a gastric dieulafoy lesion with oozing s/p hemostatic clip.     Plan:  GI bleed: Dieulafoy lesion noted on EGD s/p hemostatic clip. Required 1U PRBC overnight. Has received a total of 7U PRBC, 1U FFP, 1U Plt. On PPI IV BID. CBC q6h. Given sudden drop in BP requiring pressors, will obtain stat CTA abd/pel.  CARLOS on CKD: Pre-renal. Cr 2.2 --> 1.5 --> 1.4.  Afib: On amiodarone. Eliquis held.  Diabetic neuropathy: On gabapentin 200 mg TID.  Anxiety: Takes Xanax at home. Available PRN.  Nutrition: NPO until after CTA  DVT ppx: SCDs    45 minutes of critical care time was spent  in the critical care management of the patient. This included management of organ failures related to critical illness, titration of continuous infusions, management of mechanical ventilation, review of pertinent labs and imaging studies, discussion of the patient with consulting services, and discussion of the patients condition with the patient and family.     Nisha Ibarra MD  [facility] Attending  [phone]

## 2023-08-18 NOTE — PT/OT/SLP EVAL
Physical Therapy Evaluation and Treatment    Patient Name:  Wesley Bernal   MRN:  [id number]    Recommendations:     Discharge Recommendations:  (TBD)   Discharge Equipment Recommendations:  (TBD)   Barriers to discharge:  increased assist required    Assessment:     Wesley Bernal is a 72 y.o. male admitted with a medical diagnosis of Acute on chronic anemia.  He presents with the following impairments/functional limitations: weakness, gait instability, impaired endurance, impaired balance, impaired self care skills, impaired functional mobility, impaired sensation, decreased lower extremity function, decreased upper extremity function, decreased ROM, decreased coordination, impaired coordination, impaired cardiopulmonary response to activity .Pt required Mary for ambulating with RW in room. HR elevated during session, unsure of accuracy. BP decreased from 144/65 to 122/59 post ambulation. Generalized weakness/fatigue and jerky / tremulous movements noted. D/c recs TBD pending progress.     Rehab Prognosis: Good; patient would benefit from acute skilled PT services to address these deficits and reach maximum level of function.    Recent Surgery: Procedure(s) (LRB):  EGD (ESOPHAGOGASTRODUODENOSCOPY) (N/A)  COLONOSCOPY (N/A) 1 Day Post-Op    Plan:     During this hospitalization, patient to be seen 5 x/week to address the identified rehab impairments via gait training, therapeutic activities, therapeutic exercises, neuromuscular re-education and progress toward the following goals:    Plan of Care Expires:  09/18/23    Subjective     Chief Complaint: weakness, tenderness in lower abdomen  Patient/Family Comments/goals: return to PLOF  Pain/Comfort:  Pain Rating 1:  (abdominal discomfort)    Patients cultural, spiritual, Confucianist conflicts given the current situation: no    Living Environment:  Pt lives with spouse, son, and wife's brother in [facility], 1 step to enter, t/s combo  Prior to admission, patients level  of function was independent/mod I; PRN use of cane or RW . Pt drives and reports no falls. Equipment used at home: cane, straight, walker, rolling, wheelchair, shower chair, grab bar, bedside commode.  DME owned (not currently used): bedside commode and wheelchair.  Upon discharge, patient will have assistance from spouse.    Objective:     Communicated with nsg prior to session.  Patient found HOB elevated with peripheral IV, telemetry, pulse ox (continuous), blood pressure cuff, SCD  upon PT entry to room.    General Precautions: Standard, fall  Orthopedic Precautions:N/A   Braces: N/A  Respiratory Status: Room air    Exams:  Cognitive Exam:  Patient is oriented to Person, Place, and Situation  Postural Exam:  Patient presented with the following abnormalities:    -       Rounded shoulders  -       Forward head  -       Abnormal trunk flexion  -       Kyphosis  Sensation:    -       Impaired  BLE pt reports hx of neuropathy and tingling but no numbness  Skin Integrity/Edema:      -       Skin integrity: Visible skin intact  RLE ROM: WFL except R ankle DF to ~neutral  RLE Strength: grossly 4- to 4/5  LLE ROM: WFL except L ankle DF to ~neutral  LLE Strength: grossly 4- to 4/5    Functional Mobility:  Bed Mobility:     Scooting: minimum assistance  Supine to Sit: moderate assistance  Sit to Supine: moderate assistance  Transfers:     Sit to Stand:  moderate assistance with rolling walker  Gait: Pt ambulated 3 side steps, ~5 ft forward/backward X 2, and ~12 ft around bed with RW and Mary; forward flexed posture noted  cueing for upright       AM-PAC 6 CLICK MOBILITY  Total Score:12       Treatment & Education:  Pt transitioned supine>sit   Performed sit>stand and pt demonstrating posterior lean and LOB requiring Min-modA for standing balance initially which improved, however, pt with impaired balance overall  Pt ambulated in room as reported above  ICU monitoring and elevated HR 160s-170 unsure of accuracy  Pt  returned seated then supine; pt reporting dizziness  BP decreased   144/65 to 122/59 post ambulation.   Generalized weakness/fatigue and jerky / tremulous movements noted during MMT  SCD's replaced   Pt educated on role of PT/POC    Patient left HOB elevated with all lines intact, call button in reach, nsg notified, and family present.    GOALS:   Multidisciplinary Problems       Physical Therapy Goals          Problem: Physical Therapy    Goal Priority Disciplines Outcome Goal Variances Interventions   Physical Therapy Goal     PT, PT/OT Ongoing, Progressing     Description: Goals to be met by: 23     Patient will increase functional independence with mobility by performin. Supine to sit with Stand-by Assistance  2. Sit to supine with Stand-by Assistance  3. Sit to stand transfer with Stand-by Assistance  4. Bed to chair transfer with Stand-by Assistance using Rolling Walker  5. Gait  x 100 feet with Stand-by Assistance using Rolling Walker.   6. Ascend/Descend 4 inch curb step with Contact Guard Assistance and Minimal Assistance using Rolling Walker.                         History:     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    Cardiomyopathy     Carotid artery disease     Coronary artery disease     Diabetes mellitus type II     Encounter for blood transfusion     Heart disease     Hyperlipidemia     Hypertension     PAD (peripheral artery disease)        Past Surgical History:   Procedure Laterality Date    ABDOMINAL AORTA STENT      BRACHIOCEPHALIC VEIN ANGIOPLASTY / STENTING      CARDIAC DEFIBRILLATOR PLACEMENT      2011    CARDIAC PACEMAKER PLACEMENT          CARDIAC SURGERY      open heart    CARDIAC VALVE SURGERY      pig valve replacement -    CAROTID STENT      COLONOSCOPY N/A 2023    Procedure: COLONOSCOPY;  Surgeon: Javier Chang MD;  Location: [facility];  Service:  Endoscopy;  Laterality: N/A;    ESOPHAGOGASTRODUODENOSCOPY N/A 8/17/2023    Procedure: EGD (ESOPHAGOGASTRODUODENOSCOPY);  Surgeon: Javier Chang MD;  Location: [facility] ENDO;  Service: Endoscopy;  Laterality: N/A;    LASIK SX Bilateral     LYMPH NODE BIOPSY Left 6/17/2019    Procedure: BIOPSY, LYMPH NODE (GROIN);  Surgeon: Adelfo Yin MD;  Location: [facility];  Service: General;  Laterality: Left;    REPLACEMENT OF IMPLANTABLE CARDIOVERTER-DEFIBRILLATOR (ICD) GENERATOR N/A 7/29/2020    Procedure: REPLACEMENT, ICD GENERATOR;  Surgeon: Marko Hartley MD;  Location: [facility] CATH;  Service: Cardiology;  Laterality: N/A;       Time Tracking:     PT Received On: 08/18/23  PT Start Time: 1335     PT Stop Time: 1406  PT Total Time (min): 31 min with OT    Billable Minutes: Evaluation 8, Gait Training 13, and Therapeutic Activity 10      08/18/2023

## 2023-08-18 NOTE — PROGRESS NOTES
[facility] Pulmonary/Critical Care Consult Note      Patient:Wesley Bernal  Age:72 y.o.  MRN:[id number]  Admit date:8/15/2023   LOS:3 day(s)       HPI:       Mr. Dax Bernal is a 72 y.o. M with a PMHx of a fib on Eliquis, ICM, HFrEF (30-35%), MR s/p MVR in 2013, s/p AICD placement, rectal cancer (in remission), CKD3b, HTN, HLD, T2DM, and anxiety who presented to the [facility] ED on 8/15 for fatigue/RAMIREZ, pallor, bloody-appearing stool, constipation, and decreased appetite for 4 days. He also reports dark brown vomit and weakness to the point where he hasn't been able to get out of bed.    On exam in the ED, he was bradycardic to the 50s, unable to lift arms and legs from bed, and had delayed capillary refill. Labs were remarkable for Hb 4, BUN 52, creatinine 2.2, and positive IAT. CT abdomen was unremarkable. Blood was not given d/t positive IAT. He received Protonix 80 prior to [place] [facility].    Interval Hx:  Received his EGD yesterday 8/17 which revealed Dieulafoy lesion in stomach. Lesion was clipped. Felt more energetic and stronger initially but began to feel malaise and depressed this morning. Received 1 L LR overnight bolus for hypotension. Received 2 units of pRBCs and CTA abdomen/pelvis today. Levo ggt began for persistent hypotension despite 2 units of pRBCs. Has now received a total of 8 units of pRBCs.     MEDICAL HISTORY:      Past Medical History:  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    Cardiomyopathy     Carotid artery disease     Coronary artery disease     Diabetes mellitus type II     Encounter for blood transfusion     Heart disease     Hyperlipidemia     Hypertension     PAD (peripheral artery disease)        Past Surgical History:  Past Surgical History:   Procedure Laterality Date    ABDOMINAL AORTA STENT      BRACHIOCEPHALIC VEIN ANGIOPLASTY / STENTING      CARDIAC DEFIBRILLATOR  PLACEMENT      2011    CARDIAC PACEMAKER PLACEMENT          CARDIAC SURGERY      open heart    CARDIAC VALVE SURGERY      pig valve replacement -    CAROTID STENT      COLONOSCOPY N/A 2023    Procedure: COLONOSCOPY;  Surgeon: Javier Chang MD;  Location: [facility] ENDO;  Service: Endoscopy;  Laterality: N/A;    ESOPHAGOGASTRODUODENOSCOPY N/A 2023    Procedure: EGD (ESOPHAGOGASTRODUODENOSCOPY);  Surgeon: Javier Chang MD;  Location: [facility] ENDO;  Service: Endoscopy;  Laterality: N/A;    LASIK SX Bilateral     LYMPH NODE BIOPSY Left 2019    Procedure: BIOPSY, LYMPH NODE (GROIN);  Surgeon: Adelfo Yin MD;  Location: [facility];  Service: General;  Laterality: Left;    REPLACEMENT OF IMPLANTABLE CARDIOVERTER-DEFIBRILLATOR (ICD) GENERATOR N/A 2020    Procedure: REPLACEMENT, ICD GENERATOR;  Surgeon: Marko Hartley MD;  Location: [facility] CATH;  Service: Cardiology;  Laterality: N/A;         Family History:   Family History   Problem Relation Age of Onset    Diabetes Mother     Heart disease Father     Cancer Father         melanoma, prostate    Heart disease Paternal Grandfather          Social History:  Social History     Tobacco Use    Smoking status: Former     Current packs/day: 0.00     Types: Cigarettes, Cigars     Quit date: 4/10/2015     Years since quittin.3    Smokeless tobacco: Never   Substance Use Topics    Alcohol use: No     Alcohol/week: 0.0 standard drinks of alcohol    Drug use: No         Allergies:  Review of patient's allergies indicates:   Allergen Reactions    Statins-hmg-coa reductase inhibitors        Home Medications:  Current Outpatient Medications   Medication Instructions    ALPRAZolam (XANAX) 0.25 MG tablet TAKE ONE TABLET BY MOUTH TWICE DAILY AS NEEDED    amiodarone (PACERONE) 100 mg, Oral, Daily    apixaban (ELIQUIS) 5 mg, Oral, 2 times daily    aspirin (ECOTRIN) 81 MG EC tablet Daily    atenoloL (TENORMIN) 25 mg, Oral, Daily    carvediloL  (COREG) 6.25 mg, Oral, 2 times daily    docusate sodium (COLACE) 200 mg, Oral, Every morning    DULoxetine (CYMBALTA) 60 mg, Oral, Daily    ezetimibe (ZETIA) 10 mg tablet TAKE ONE TABLET BY MOUTH ONCE A DAY    furosemide (LASIX) 20 mg, Oral, Daily    gabapentin (NEURONTIN) 100 MG capsule TAKE TWO CAPSULES BY MOUTH THREE TIMES DAILY (DOSE INCREASED    HYDROcodone-acetaminophen (NORCO)  mg per tablet 1 tablet, Oral, Every 12 hours PRN    icosapent ethyL (VASCEPA) 1 gram Cap TAKE TWO CAPSULES BY MOUTH TWICE DAILY    isosorbide mononitrate (IMDUR) 60 mg, Oral, Daily    pioglitazone (ACTOS) 15 MG tablet TAKE ONE TABLET BY MOUTH ONCE A DAY    pravastatin (PRAVACHOL) 20 mg, Oral, Daily    primidone (MYSOLINE) 50 mg, Oral, 2 times daily    REPATHA SURECLICK 140 mg/mL PnIj INJECT ONE SYRINGE SUBCUTANEOUSLY ONCE EVERY 14 DAYS    SITagliptin phosphate (JANUVIA) 100 mg, Oral, Daily        OBJECTIVE DATA:      Vital Signs:  Vitals:    08/18/23 1455   BP:    Pulse: (!) 50   Resp: (!) 25   Temp:        Intake/Output:    Intake/Output Summary (Last 24 hours) at 8/18/2023 1706  Last data filed at 8/18/2023 1640  Gross per 24 hour   Intake 1110 ml   Output 2300 ml   Net -1190 ml         Physical Exam:  Constitutional: cooperative, NAD, thin, tired  HEENT: NCAT, pale conjunctiva, dry MM  Neck: Supple, no masses, trachea not deviated.   Resp: On Room Air, satting well. Symmetrical, clear to auscultation bilaterally, No wheezes, rhonchi, or crackles.   Cardio:  3/6 systolic murmur noted, consistent with replaced mitral valve  GI: Soft, non-tender, non-distended, bowel sounds hypoactive.  Extremities: No edema, peripheral pulses 2+ and symmetric, extremities warm.  Skin: SCDs in place, Pallor noted, capillary refill takes >3 seconds  Neurologic: Alert and oriented x3, follows commands, moves extremities spontaneously. 3/5 strength throughout. No asterixis.    Laboratory/Imaging:  Recent Labs   Lab 08/16/23  0239 08/16/23  1831  08/17/23  0204 08/17/23  1056 08/18/23  0018 08/18/23  0449 08/18/23  1146   WBC 9.81   < > 12.87*  12.91*  12.91*   < > 9.33 8.12 10.03   HGB 3.6*   < > 7.3*  7.1*  7.1*   < > 8.2* 7.6* 9.1*  9.1*   HCT 11.7*   < > 22.3*  21.9*  21.9*   < > 24.5* 23.7* 27.7*  27.7*   *   < > 107*  106*  106*   < > 122* 115* 123*     139  --  137  --   --  140  --    K 4.6  4.6  --  4.4  --   --  4.0  --      108  --  108  --   --  107  --    CREATININE 2.1*  2.1*  --  1.5*  --   --  1.4  --    BUN 51*  52*  --  42*  --   --  28*  --    CO2 24  24  --  23  --   --  25  --    ALT 9*  --  9*  --   --  9*  --    AST 10  --  17  --   --  15  --     < > = values in this interval not displayed.         Microbiology:  N/A    Imaging:  TTE (2020)  Mild left ventricular enlargement.  Moderately decreased left ventricular systolic function. The estimated ejection fraction is 33%.  Eccentric left ventricular hypertrophy.  Grade I (mild) left ventricular diastolic dysfunction consistent with impaired relaxation.  Local segmental wall motion abnormalities.  Low normal right ventricular systolic function.  Mild right atrial enlargement.  There is a mechanical mitral valve prosthesis.  The mean diastolic gradient across the mitral valve is 3 mmHg ; MVA 1.57; mild MR  Normal central venous pressure (3 mmHg).  The estimated PA systolic pressure is 11 mmHg ( THIS MAY NOT BE TRUE PEAK??).  Mild mitral regurgitation.     TTE (8/15/2023)    Left Ventricle: The left ventricle is mildly dilated. regional wall motion abnormalities present. There is reduced systolic function. Biplane (2D) method of discs ejection fraction is 45%.    Right Ventricle: Normal right ventricular cavity size. Wall thickness is normal. Right ventricle wall motion  is normal. Systolic function is normal. Pacemaker lead present in the ventricle.    Left Atrium: Left atrium is severely dilated.    Aortic Valve: There is mild aortic valve  sclerosis.    Mitral Valve: There is a bioprosthetic valve in the mitral position that is well-seated. The mean pressure gradient across the mitral valve is 4 mmHg at a heart rate of 58 bpm. Very small mobile echodense shadow 0.2 x 0.4 cm in the submitral apparatus. Differential is detached cord related to  prior mitral surgery(more likely), thrombus or vegetations.    Pulmonic Valve: There is mild regurgitation.     CXR 8/17/2023:  FINDINGS:  Right-sided Port-A-Cath, 2 lead left-sided pacer with sternotomy wires and surgical clips overlying the cardiomediastinal silhouette relatively stable.  There is no significant new lung opacity.  Lucency along the periphery of the right lung suggestive for overlapping skin fold with lung markings seen beyond this, no large pneumothorax.  There is continued blunting left costophrenic angle concerning for small effusion versus scarring.  Clinical correlation and follow-up advised    CXR 8/15:  FINDINGS:  Enlarged cardiac silhouette with pulmonary vascular congestion.  Mild diffuse interstitial prominence may reflect edema, pneumonitis, or interstitial scarring.  Small left pleural effusion versus scarring.  Left chest wall AICD.  Right chest wall port.     CT Head w/o Contrast 8/15:  Impression:  No acute abnormality.     CT Abdomen Pelvis w/o Contrast 8/15:  Impression:  Evaluation for metastatic disease is limited without intravenous contrast.  Indeterminate renal lesions.  Recommend follow-up ultrasound.  Low-attenuation blood within the heart, compatible with anemia.  Please confirm with laboratory values.  Cholelithiasis.  Circumferential bladder wall thickening.  Correlate for cystitis.    Medications:   amiodarone  100 mg Oral Daily    ezetimibe  10 mg Oral Daily    gabapentin  200 mg Oral TID    mupirocin   Nasal BID    pantoprazole  40 mg Intravenous BID    pravastatin  20 mg Oral Daily         NORepinephrine bitartrate-D5W 0.02 mcg/kg/min (08/18/23 6597)        0.9%   NaCl infusion (for blood administration), acetaminophen, ALPRAZolam, dextrose 10%, dextrose 10%, glucagon (human recombinant), hydrALAZINE, insulin aspart U-100, melatonin, morphine, ondansetron, sodium chloride 0.9%     Problem List:  Patient Active Problem List   Diagnosis    Atrial fibrillation    Essential hypertension    Type 2 diabetes mellitus    Hyperlipidemia    Anxiety disorder    Bioprosthetic mitral valve replacement    Stage 3a chronic kidney disease    Hyperkalemia    Statin intolerance    Mass of anus    Lumbar disc disease with radiculopathy    Aortic atherosclerosis    CAD (coronary artery disease)    Left groin mass    Implantable cardioverter-defibrillator (ICD) at end of battery life    CHF (congestive heart failure), NYHA class II, chronic, systolic    Ischemic cardiomyopathy    Shingles outbreak    Acute on chronic Stage 3B kidney disease    Herpes zoster iritis    Posterior subcapsular polar age-related cataract of both eyes    Squamous cell cancer, anus    Essential tremor    Acute on chronic anemia 2/2 GIB    Acute hypoxemic respiratory failure       Assessment/Plan:     Wesley Bernal is a 72 y.o. male with a PMHx of rectal CA in remission, DM2, CKD3b, prosthetic MVR, CAD, ICM HFrEF (LVEF 33% + grade I DD) s/p AICD, Afib on Eliquis, HLD, HTN who was transferred to [facility] on 8/15 for evaluation of dyspnea/weakness/fatigue in the setting of bloody bowel movements found to have a hgb of 3.6 with hematemesis episode in the ICU. S/p 6U RBCs, 1 plt, 1 FFP. H/H stable. CARLOS improving.  Gastric ulcer plausible given episode of hematemesis, though lower GI bleed cannot be ruled out given history of BRB in stool.  GI consulted for further management w/ EGD on 8/17 which revealed Dieulafoy lesion in stomach. Lesion was clipped. Pt with initial clinical improvement s/p EGD but began to feel weak and depressed on AM of 8/18.      Neuro  #History of anxiety with chronic benzo use  - Per pt takes  xanax .25 mg daily AM and PM  - PRN xanax .25 mg BID to prevent withdrawal, none required during this hospitalization  - pt reporting decreased mood and some anxiety on AM of 8/18, reminded PRN Xanax available if needed     #Generalized weakness  - in setting of blood loss anemia  - uses walker at home  - PT/OT eval on 8/21     #Diabetic Neuropathy  - gabapentin 200 TID       Cardiovascular  #Hypotension in setting of acute on chronic blood loss  #History of Essential Hypertension  - holding Imdur and BB  - bradycardia in 50s, SBP 60s-90s overnight and 100s-110 this AM 8/18  - SBP 66 @ 0545 8/18, improved to 100-110s during transfusion of 7th unit  - Levo ggt initiated on 8/18 for persistent hypotension despite transfusion    #Atrial Fibrillation  - controlled at home with atenolol 25 daily and Amiodarone 100 daily  - EKG NSR with 1st deg AV block  - continue amiodarone 100 daily  - holding Eliquis/ASA/BP/HF meds in setting of soft BPs and GIB  - continue telemetry    #ICM HFrEF, NHYA class II, chronic, systolic  - s/p AICD  - Hypovolemic on exam 2/2 GIB  - Last TTE in 2020 showing LVEF 33%   - Repeat TTE 8/15: EF 45%, LA severe dilation, LV mild dilated, mild AV sclerosis, mild PV regurg  - Given lasix 20 mg IV on 8/17. Holding home lasix 20 qd PO.  - Holding Imdur and BB.    #Bioprosthetic mitral valve replacement  - S/p porcine valve in 2013 for severe MR  - Exam appreciable for 3/6 systolic murmur heard best LLSB    #CAD  #Hyperlipidemia  - continue pravastatin 20 and ezetimibe 10   - hold ASA      Respiratory  - No acute issues  - on NC 1L    #History Tobacco Use  - Pt endorses ~40 pk yr hx but quit years ago  - On Room Air, satting high 90s       GI/FEN  #Acute on chronic blood loss anemia 2/2 upper vs. lower GIB  #Possible PUD   - Risk factors GIB: on Eliquis, hx of rectal CA, significant daily NSAID use  - Pt reports BRB for awhile with associated constipation and hemorrhoids.   - No hx alcoholism or  hepatitis   - Type and screen with positive anti-P1  - Hgb 3.6 on admit--> today H/H 7.6/23.7 during transfusion of 7th unit of pRBCs  - EGD with GI on 8/17 showed Dieulafoy lesion in stomach which was clipped  - recommend transfusion goal Hgb <7   - Protonix 40 BID IV  - zofran 4 IV q8h PRN nausea  - holding eliquis/ASA in setting of GIB  - CBC q8h    #Hx rectal cancer   - in remission s/p radiation and chemo in 2019   - CT A/P w/o cont 8/15: Cholelithiasis. Colonic diverticulosis, without diverticulitis.  No small bowel obstruction.  Evaluation for metastatic disease is limited without intravenous contrast. Aortoiliac stent.    #Hypophosphatemia  - phos 1.7 on 8/17  - replaced w/ NaPO4 15mmol on 8/17  - f/u labs on AM of 8/19  - replete PRN    F: s/p 1 L LR  E: see above; goal K>4, Mg>2  N: NPO      Renal  #CARLOS on CKD3b, improving  - likely pre-renal azotemia due to dehydration and GIB  - Baseline Cr 1.6-1.8; admit Cr 2.2  - BUN/creatinine down trending 28/1.4 (42/1.5) with eGFR 54 this AM 8/18  - continued improvement s/p IVF and RBC  - I 1.2 L, O 4.3 L, net -3.1 L over last 24 hrs, net -2.8 L since admission  - strict I/Os  -  Avoid nephrotoxins and renally dose meds for GFR       Heme  #Acute on chronic blood loss anemia 2/2 GIB, stable  - see above  - CBCs q6h     #Thrombocytopenia  - plt 115 (122, 135) today 8/18  - plt transfusion done d/t >5 units pRBC         Infectious Disease  #Leukocytosis, resolved  - Afebrile, No S&S systemic infection  - WBC 8.12  - gran % stable 77.5  - U/A unremarkable  - daily CBC      Endocrine  #T2DM   - 8/15 A1c 5.9   - serum glc 126 this AM 8/18  - Hold Oral hypoglycemics while patient is in the hospital  - aspart 0-5 units q6h PRN + Accuchecks  - Goal glucose 140-180 while in ICU        Feeding: NPO  Analgesia: PRN tylenol, morphine, alprazolam   Sedation: n/a  Thrombo PPX: SCD  Head of Bed: elevated >300  Stress Ulcer PPX: Protonix 40 BID  Glucose: aspart 0-5 units q6h PRN,  goal 140-180  SBT/SAT:  n/a  Bowels: Last Bowel Movement: Last Bowel Movement: 08/15/23  Indwelling Lines: 2 PIVs  Deescalation Abx: none    Code: Full  Dispo: Remain in MICU      Thank you for allowing us to participate in the care of this patient. We will continue to follow.       Baljeet Benavides, MS4  [employer]    KRAEN Srinivasan MD  [facility]

## 2023-08-18 NOTE — ASSESSMENT & PLAN NOTE
Unknown coronary anatomy   Chest pain during episode of vomiting felt to be in setting of severe anemia and CARLOS   EKG without acute ischemic changes  Troponin mildly elevated at baseline 0.04  Holding asa given GIB- resume when cleared by GI  Consider resumption of Imdur, BB   Repatha (as outpatient)   Transfuse PRBCs to goal >8

## 2023-08-18 NOTE — PLAN OF CARE
Problem: Diabetes Comorbidity  Goal: Blood Glucose Level Within Targeted Range  Outcome: Ongoing, Progressing     Problem: Fluid and Electrolyte Imbalance (Acute Kidney Injury/Impairment)  Goal: Fluid and Electrolyte Balance  Outcome: Ongoing, Progressing     Problem: Oral Intake Inadequate (Acute Kidney Injury/Impairment)  Goal: Optimal Nutrition Intake  Outcome: Ongoing, Progressing     Problem: Renal Function Impairment (Acute Kidney Injury/Impairment)  Goal: Effective Renal Function  Outcome: Ongoing, Progressing     Problem: Adult Inpatient Plan of Care  Goal: Plan of Care Review  Outcome: Ongoing, Progressing  Goal: Patient-Specific Goal (Individualized)  Outcome: Ongoing, Progressing  Goal: Absence of Hospital-Acquired Illness or Injury  Outcome: Ongoing, Progressing  Goal: Optimal Comfort and Wellbeing  Outcome: Ongoing, Progressing  Goal: Readiness for Transition of Care  Outcome: Ongoing, Progressing     Problem: Skin Injury Risk Increased  Goal: Skin Health and Integrity  Outcome: Ongoing, Progressing     Problem: Fall Injury Risk  Goal: Absence of Fall and Fall-Related Injury  Outcome: Ongoing, Progressing     Problem: Adjustment to Illness (Gastrointestinal Bleeding)  Goal: Optimal Coping with Acute Illness  Outcome: Ongoing, Progressing     Problem: Bleeding (Gastrointestinal Bleeding)  Goal: Hemostasis  Outcome: Ongoing, Progressing

## 2023-08-18 NOTE — NURSING
MD notified pt BP soft, intermittent MAP below 65. Recycle BP stable but soft. Pt sleeping deeply but easily arousable and asymptomatic. Discussed labs as well. No overt signs of bleeding. No new orders at this time, continue to monitor.     0145: Pt BP remaining low, MAP in low 50s. Pt placed in trendelenburg position with minimal response. Order for LR bolus received. Will carry out orders.

## 2023-08-18 NOTE — PT/OT/SLP EVAL
Occupational Therapy   Evaluation/Treatment     Name: Wesley Bernal  MRN: [id number]  Admitting Diagnosis: Acute on chronic anemia  Recent Surgery: Procedure(s) (LRB):  EGD (ESOPHAGOGASTRODUODENOSCOPY) (N/A)  COLONOSCOPY (N/A) 1 Day Post-Op    Recommendations:     Discharge Recommendations:  (TBD)  Discharge Equipment Recommendations:   (TBD)  Barriers to discharge:  None    Assessment:     Wesley Bernal is a 72 y.o. male with a medical diagnosis of Acute on chronic anemia.  He presents with The primary encounter diagnosis was Fatigue. Diagnoses of GIB (gastrointestinal bleeding), Bioprosthetic mitral valve replacement, current hospitalization, Gastrointestinal hemorrhage, unspecified gastrointestinal hemorrhage type, and Acute on chronic anemia 2/2 GIB were also pertinent to this visit.  . Performance deficits affecting function: weakness, gait instability, impaired balance, impaired functional mobility, decreased lower extremity function, decreased upper extremity function, decreased safety awareness, impaired cardiopulmonary response to activity, impaired self care skills, impaired endurance, decreased coordination, impaired coordination.      Pt would benefit from cont OT services in order to maximize functional independence. Recommendations ongoing at this time pending further participation in therapy services.     Rehab Prognosis: Good; patient would benefit from acute skilled OT services to address these deficits and reach maximum level of function.       Plan:     Patient to be seen 5 x/week to address the above listed problems via self-care/home management, therapeutic activities, therapeutic exercises  Plan of Care Expires: 09/18/23  Plan of Care Reviewed with: patient, spouse, family    Subjective     Chief Complaint: abdominal discomfort; weakness from being in bed   Patient/Family Comments/goals: return to PLOF     Occupational Profile:  Living Environment: with spouse, son, and wife's brother in  [facility], 1 step to enter, t/s combo  Previous level of function: independent/mod I; PRN use of cane or RW   Equipment Used at Home: cane, straight, crutches, walker, rolling, wheelchair, shower chair, grab bar, BSC  Assistance upon Discharge: from spouse     Pain/Comfort:  Pain Rating 1:  (abdominal discomfort)    Patients cultural, spiritual, Confucianist conflicts given the current situation:      Objective:     Communicated with: satish prior to session.  Patient found supine with   upon OT entry to room.    General Precautions: Standard, fall  Orthopedic Precautions: N/A  Braces: N/A  Respiratory Status: Room air    Occupational Performance:    Bed Mobility:    Patient completed Scooting/Bridging with minimum assistance  Patient completed Supine to Sit with moderate assistance  Patient completed Sit to Supine with moderate assistance    Functional Mobility/Transfers:  Patient completed Sit <> Stand Transfer with moderate assistance  with  rolling walker   Functional Mobility: Min A with RW     Activities of Daily Living:  Lower Body Dressing: total assistance      Cognitive/Visual Perceptual:  Slightly impaired safety awareness/insight to deficits     Physical Exam:  Balance:    -       fair seated; fair -/poor standing   Postural examination/scapula alignment: -       Rounded shoulders  -       Forward head  -       Abnormal trunk flexion  -       Kyphosis  Skin integrity: Visible skin intact  Dominant hand:    -       right  Upper Extremity Range of Motion:  BUE WFL for pt's needs   Upper Extremity Strength:  BUE grossly 4-/5    Strength:  4-/5     AMPAC 6 Click ADL:  AMPAC Total Score: 15    Treatment & Education:  Pt found supine   Vitals monitored throughout session; pt with reports of dizziness during session- specifically with position changes; initially with elevated BP at start of session; trending down throughout session  Initially with increased assist for standing trials from EOB ; posterior leaning and  LOB   Functional mobility forward/back with RW; impaired balance and forward flexed posture over RW; seated rest break required prior to further trials   Able to ambulate around foot of bed; elevated HR noted - unsure of accuracy (170's); seated break required and return to supine   Educated on UE/LE therex to perform while supine    Patient left supine with all lines intact, call button in reach, nsg notified, and family present    GOALS:   Multidisciplinary Problems       Occupational Therapy Goals          Problem: Occupational Therapy    Goal Priority Disciplines Outcome Interventions   Occupational Therapy Goal     OT, PT/OT Ongoing, Progressing    Description: Goals to be met by: 9/18/23     Patient will increase functional independence with ADLs by performing:    LE Dressing with Modified [place].  Grooming while standing with Modified [place].  Toileting from toilet with Modified [place] for hygiene and clothing management.   Supine to sit with Modified [place].  Step transfer with Modified [place]  Toilet transfer to toilet with Modified [place].  Increased functional strength to WFL for self care skills and functional mobility .  Upper extremity exercise program x10 reps per handout, with independence.                         History:     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    Cardiomyopathy     Carotid artery disease     Coronary artery disease     Diabetes mellitus type II     Encounter for blood transfusion     Heart disease     Hyperlipidemia     Hypertension     PAD (peripheral artery disease)          Past Surgical History:   Procedure Laterality Date    ABDOMINAL AORTA STENT      BRACHIOCEPHALIC VEIN ANGIOPLASTY / STENTING      CARDIAC DEFIBRILLATOR PLACEMENT      1/2011    CARDIAC PACEMAKER PLACEMENT      1-2011    CARDIAC SURGERY      open heart    CARDIAC VALVE  SURGERY      pig valve replacement -    CAROTID STENT      COLONOSCOPY N/A 8/17/2023    Procedure: COLONOSCOPY;  Surgeon: Javier Chang MD;  Location: [facility] ENDO;  Service: Endoscopy;  Laterality: N/A;    ESOPHAGOGASTRODUODENOSCOPY N/A 8/17/2023    Procedure: EGD (ESOPHAGOGASTRODUODENOSCOPY);  Surgeon: Javier Chang MD;  Location: [facility] ENDO;  Service: Endoscopy;  Laterality: N/A;    LASIK SX Bilateral     LYMPH NODE BIOPSY Left 6/17/2019    Procedure: BIOPSY, LYMPH NODE (GROIN);  Surgeon: Adelfo Yin MD;  Location: [facility] OR;  Service: General;  Laterality: Left;    REPLACEMENT OF IMPLANTABLE CARDIOVERTER-DEFIBRILLATOR (ICD) GENERATOR N/A 7/29/2020    Procedure: REPLACEMENT, ICD GENERATOR;  Surgeon: Marko Hartley MD;  Location: [facility] CATH;  Service: Cardiology;  Laterality: N/A;       Time Tracking:     OT Date of Treatment: 08/18/23  OT Start Time: 1334  OT Stop Time: 1406  OT Total Time (min): 32 min    Billable Minutes:Evaluation 8  Therapeutic Activity 24 8/18/2023

## 2023-08-18 NOTE — PT/OT/SLP PROGRESS
Physical Therapy      Patient Name:  Wesley Bernal   MRN:  [id number]    Patient not seen this AM secondary to Patient fatigue, Other (Comment) (Pt's sister reporting pt recently falling asleep after busy night and requesting pt remain resting at this time). Will follow-up as able.    8/18/2023

## 2023-08-18 NOTE — PROGRESS NOTES
[facility] - Intensive Care  Gastroenterology  Progress Note    Patient Name: Wesley Bernal  MRN: [id number]  Admission Date: 8/15/2023  Hospital Length of Stay: 3 days  Code Status: Full Code   Attending Provider: Hiren Melgoza MD  Consulting Provider: Javier Chang MD  Primary Care Physician: Lalo Sweet MD  Principal Problem: Acute on chronic anemia        Subjective:     Interval History: Transfused overnight. Pt was found to have drop in BP today. CTA negative for active bleed. Pressure better    Review of Systems   Constitutional:  Positive for fatigue.   Gastrointestinal:  Positive for blood in stool, diarrhea, nausea and vomiting. Negative for abdominal pain.   Neurological:  Positive for weakness.     Objective:     Vital Signs (Most Recent):  Temp: 98.2 °F (36.8 °C) (08/18/23 1106)  Pulse: (!) 50 (08/18/23 1455)  Resp: (!) 25 (08/18/23 1455)  BP: (!) 123/55 (08/18/23 1450)  SpO2: 100 % (08/18/23 1455) Vital Signs (24h Range):  Temp:  [98 °F (36.7 °C)-98.8 °F (37.1 °C)] 98.2 °F (36.8 °C)  Pulse:  [50-65] 50  Resp:  [10-33] 25  SpO2:  [82 %-100 %] 100 %  BP: ()/() 123/55     Weight: 73.5 kg (162 lb 0.6 oz) (08/18/23 0600)  Body mass index is 21.38 kg/m².      Intake/Output Summary (Last 24 hours) at 8/18/2023 1538  Last data filed at 8/18/2023 1406  Gross per 24 hour   Intake 1110 ml   Output 2075 ml   Net -965 ml       Lines/Drains/Airways       Peripheral Intravenous Line  Duration                  Peripheral IV - Single Lumen 08/17/23 0015 18 G Anterior;Right Upper Arm 1 day         Peripheral IV - Single Lumen 08/18/23 1100 Anterior;Left Forearm <1 day                     Physical Exam  Vitals and nursing note reviewed.   Constitutional:       Appearance: He is well-developed.   HENT:      Head: Normocephalic and atraumatic.   Eyes:      General: No scleral icterus.     Pupils: Pupils are equal, round, and reactive to light.   Cardiovascular:      Rate and Rhythm: Normal  rate. Rhythm irregular.      Heart sounds: Normal heart sounds.   Pulmonary:      Effort: Pulmonary effort is normal. No respiratory distress.      Breath sounds: Normal breath sounds.   Abdominal:      General: Bowel sounds are normal. There is no distension.      Palpations: Abdomen is soft.      Tenderness: There is no abdominal tenderness.   Musculoskeletal:         General: Normal range of motion.      Cervical back: Normal range of motion and neck supple.   Skin:     General: Skin is warm and dry.      Findings: No erythema or rash.   Neurological:      Mental Status: He is alert and oriented to person, place, and time.      Comments: No asterixis   Psychiatric:         Behavior: Behavior normal.          Significant Labs:  Recent Lab Results  (Last 5 results in the past 24 hours)        08/18/23  1146   08/18/23  0625   08/18/23  0449   08/18/23  0018   08/17/23  2332        Albumin     2.6           Alkaline Phosphatase     46           ALT     9           Anion Gap     8           AST     15           Baso #     0.05           Basophil %     0.6           BILIRUBIN TOTAL     1.0  Comment: For infants and newborns, interpretation of results should be based  on gestational age, weight and in agreement with clinical  observations.    Premature Infant recommended reference ranges:  Up to 24 hours.............<8.0 mg/dL  Up to 48 hours............<12.0 mg/dL  3-5 days..................<15.0 mg/dL  6-29 days.................<15.0 mg/dL             BUN     28           Calcium     7.8           Chloride     107           CO2     25           Creatinine     1.4           Differential Method     Automated           eGFR     54           Eos #     0.4           Eosinophil %     4.3           Glucose     114           Gran # (ANC)     6.3           Gran %     77.5           Hematocrit 27.7     23.7   24.5          27.7               Hemoglobin 9.1     7.6   8.2          9.1               Immature Grans (Abs)      0.09  Comment: Mild elevation in immature granulocytes is non specific and   can be seen in a variety of conditions including stress response,   acute inflammation, trauma and pregnancy. Correlation with other   laboratory and clinical findings is essential.             Immature Granulocytes     1.1           Lymph #     0.6           Lymph %     6.8           MCH 30.5     29.7   31.1         MCHC 32.9     32.1   33.5         MCV 93     93   93         Mono #     0.8           Mono %     9.7           MPV 10.6     10.1   10.2         nRBC     0           Platelets 123     115   122         POCT Glucose   126       139       Potassium     4.0           PROTEIN TOTAL     4.9           RBC 2.98     2.56   2.64         RDW 15.9     16.3   16.3         Sodium     140           WBC 10.03     8.12   9.33                                  Significant Imaging:  Imaging results within the past 24 hours have been reviewed.    Assessment/Plan:     Oncology  * Acute on chronic anemia 2/2 GIB  Reported blood in stool + h/o rectal cancer s/p chemo/XRT. However reported coffee ground emesis  EGD 8/17 with Dieulafoy's lesion, treated. Colonoscopy not performed d/t not tolerating prep  Trend hgb  Continue PPI IV BID  Advance diet        Thank you for your consult. I will follow-up with patient. Please contact us if you have any additional questions.    Javier Chang MD  Gastroenterology  [facility] - Intensive Care

## 2023-08-18 NOTE — PT/OT/SLP PROGRESS
Occupational Therapy  Visit Attempt     Patient Name:  Wesley Bernal   MRN:  [id number]    Patient not seen today secondary to Other (Comment) (sister present in room; requesting to defer therapy at this time- states pt recently falling asleep after restless night; requesting to allow pt to rest at this time). Will follow-up as available.    8/18/2023

## 2023-08-18 NOTE — PLAN OF CARE
Problem: Physical Therapy  Goal: Physical Therapy Goal  Description: Goals to be met by: 23     Patient will increase functional independence with mobility by performin. Supine to sit with Stand-by Assistance  2. Sit to supine with Stand-by Assistance  3. Sit to stand transfer with Stand-by Assistance  4. Bed to chair transfer with Stand-by Assistance using Rolling Walker  5. Gait  x 100 feet with Stand-by Assistance using Rolling Walker.   6. Ascend/Descend 4 inch curb step with Contact Guard Assistance and Minimal Assistance using Rolling Walker.    Outcome: Ongoing, Progressing     Pt required Mary for ambulating with RW in room. HR elevated during session, unsure of accuracy. BP decreased from 144/65 to 122/59 post ambulation. Generalized weakness/fatigue and jerky / tremulous movements noted. D/c recs TBD pending progress.

## 2023-08-18 NOTE — NURSING
Pt BP dropped to 81/39 w/ MAP of 56. BP cuff recycled and read 76/36 MAP of 52.  Levo ordered and started. Another unit of blood to be prepped and given. CTA and HnH ordered as well. New PIV inserted in LUE. Pt's sister at bedside and notified of new orders.

## 2023-08-18 NOTE — NURSING
MD notified BP still soft, MAP borderline. Pt has received 1 L LR bolus. Remains asymptomatic. STAT CBC/CMP ordered. Will carry out orders.

## 2023-08-18 NOTE — SUBJECTIVE & OBJECTIVE
Subjective:     Interval History: Transfused overnight. Pt was found to have drop in BP today. CTA negative for active bleed. Pressure better    Review of Systems   Constitutional:  Positive for fatigue.   Gastrointestinal:  Positive for blood in stool, diarrhea, nausea and vomiting. Negative for abdominal pain.   Neurological:  Positive for weakness.     Objective:     Vital Signs (Most Recent):  Temp: 98.2 °F (36.8 °C) (08/18/23 1106)  Pulse: (!) 50 (08/18/23 1455)  Resp: (!) 25 (08/18/23 1455)  BP: (!) 123/55 (08/18/23 1450)  SpO2: 100 % (08/18/23 1455) Vital Signs (24h Range):  Temp:  [98 °F (36.7 °C)-98.8 °F (37.1 °C)] 98.2 °F (36.8 °C)  Pulse:  [50-65] 50  Resp:  [10-33] 25  SpO2:  [82 %-100 %] 100 %  BP: ()/() 123/55     Weight: 73.5 kg (162 lb 0.6 oz) (08/18/23 0600)  Body mass index is 21.38 kg/m².      Intake/Output Summary (Last 24 hours) at 8/18/2023 1538  Last data filed at 8/18/2023 1406  Gross per 24 hour   Intake 1110 ml   Output 2075 ml   Net -965 ml       Lines/Drains/Airways       Peripheral Intravenous Line  Duration                  Peripheral IV - Single Lumen 08/17/23 0015 18 G Anterior;Right Upper Arm 1 day         Peripheral IV - Single Lumen 08/18/23 1100 Anterior;Left Forearm <1 day                     Physical Exam  Vitals and nursing note reviewed.   Constitutional:       Appearance: He is well-developed.   HENT:      Head: Normocephalic and atraumatic.   Eyes:      General: No scleral icterus.     Pupils: Pupils are equal, round, and reactive to light.   Cardiovascular:      Rate and Rhythm: Normal rate. Rhythm irregular.      Heart sounds: Normal heart sounds.   Pulmonary:      Effort: Pulmonary effort is normal. No respiratory distress.      Breath sounds: Normal breath sounds.   Abdominal:      General: Bowel sounds are normal. There is no distension.      Palpations: Abdomen is soft.      Tenderness: There is no abdominal tenderness.   Musculoskeletal:          General: Normal range of motion.      Cervical back: Normal range of motion and neck supple.   Skin:     General: Skin is warm and dry.      Findings: No erythema or rash.   Neurological:      Mental Status: He is alert and oriented to person, place, and time.      Comments: No asterixis   Psychiatric:         Behavior: Behavior normal.          Significant Labs:  Recent Lab Results  (Last 5 results in the past 24 hours)        08/18/23  1146   08/18/23  0625   08/18/23  0449   08/18/23  0018   08/17/23  2332        Albumin     2.6           Alkaline Phosphatase     46           ALT     9           Anion Gap     8           AST     15           Baso #     0.05           Basophil %     0.6           BILIRUBIN TOTAL     1.0  Comment: For infants and newborns, interpretation of results should be based  on gestational age, weight and in agreement with clinical  observations.    Premature Infant recommended reference ranges:  Up to 24 hours.............<8.0 mg/dL  Up to 48 hours............<12.0 mg/dL  3-5 days..................<15.0 mg/dL  6-29 days.................<15.0 mg/dL             BUN     28           Calcium     7.8           Chloride     107           CO2     25           Creatinine     1.4           Differential Method     Automated           eGFR     54           Eos #     0.4           Eosinophil %     4.3           Glucose     114           Gran # (ANC)     6.3           Gran %     77.5           Hematocrit 27.7     23.7   24.5          27.7               Hemoglobin 9.1     7.6   8.2          9.1               Immature Grans (Abs)     0.09  Comment: Mild elevation in immature granulocytes is non specific and   can be seen in a variety of conditions including stress response,   acute inflammation, trauma and pregnancy. Correlation with other   laboratory and clinical findings is essential.             Immature Granulocytes     1.1           Lymph #     0.6           Lymph %     6.8           MCH 30.5      29.7   31.1         MCHC 32.9     32.1   33.5         MCV 93     93   93         Mono #     0.8           Mono %     9.7           MPV 10.6     10.1   10.2         nRBC     0           Platelets 123     115   122         POCT Glucose   126       139       Potassium     4.0           PROTEIN TOTAL     4.9           RBC 2.98     2.56   2.64         RDW 15.9     16.3   16.3         Sodium     140           WBC 10.03     8.12   9.33                                  Significant Imaging:  Imaging results within the past 24 hours have been reviewed.

## 2023-08-18 NOTE — NURSING
Upon entering room, pt said his arm felt wet. Assessing pt's right arm, right forearm PIV infusing blood was loose and right arm covered in blood. Blood tubing moved to right upper PIV and loose PIV removed. Patient arm cleaned and pt lying comfortably in bed

## 2023-08-18 NOTE — TELEPHONE ENCOUNTER
----- Message from Lalita Rodriguez sent at 8/17/2023  1:42 PM CDT -----  Regarding: HFU  Patient is being discharged from [facility] and is requiring a hospital follow up appointment with their Primary Care Provider in 7 days. Patient is established. I am unable to schedule an appointment in that time frame. Please schedule patient a sooner appointment and message me back so Discharge Nurse can advise patient prior to discharge.    DX:JAYA      Thank you, Nereyda  Physician Referral Specialist/Discharge

## 2023-08-18 NOTE — PHYSICIAN QUERY
PT Name: Wesley Bernal  MR #: [id number]     DOCUMENTATION CLARIFICATION     CDS/: Xiao Toscano               Contact information: [email or web address]  This form is a permanent document in the medical record.     Query Date: August 18, 2023    By submitting this query, we are merely seeking further clarification of documentation.  Please utilize your independent clinical judgment when addressing the question(s) below.  The Medical Record contains the following   Indicators   Supporting Clinical Findings Location in Medical Record   x Documentation of Respiratory Failure, ARDS Acute hypoxemic respiratory failure Hospital Medicine PN 08/17     x Subjective Respiratory Signs/Symptoms:   SOB, RAMIREZ, Cough, etc. No c/o dizziness, lightheadedness, CP, SOB    No reports of CP or SOB overnight.    Intensive Care PN 08/17    Cardiology PN 08/17   x Objective Respiratory Signs/Symptoms: Respiratory distress, Accessory muscle use, tachypnea, wheezing, etc. Pulmonary:   Effort: Pulmonary effort is normal. No respiratory distress.   Breath sounds: Normal breath sounds.    Resp: On Room Air, satting well. Symmetrical, clear to auscultation bilaterally, No wheezes, rhonchi, or crackles.     Pt on room air in no apparent distress     Pt on documented O2, no respiratory distress noted.      Hospital Medicine PN 08/17        Intensive Care PN 08/17      Respiratory Therapist Plan of Care 08/17 9:21 am    Respiratory Therapist Plan of Care 08/17 7:44 PM   x RR     ABGs     O2 sat 8/17 on RA with sats % until 1645 when placed on 1 L nc until 1944 with reading on 0.5L of %, then single sat of 82% at 1954 on 1L and next reading at 2000 of 100%. Sats remain % until 8/18 at 0330 when subsequently on RA    Resp:  [8-34]    VS Flowsheet          Hospital Medicine PN 08/17, Vital Signs (24h Range)    Hypoxia/Hypercapnia      BiPAP/Intubation/Mechanical Ventilation     x Supplemental O2 O2 sats low 90s on 2 L  nasal cannula currently.  Wean as tolerated   Supplemental O2 as needed to keep sats greater than 92%    Pt on room air in no apparent distress  IS tx. Given with good pt. Effort    Room Air - 1L nc Hospital Medicine PN 08/17      Respiratory Therapist Plan of Care 08/17 9:21 am    VS Flowsheet 8/17 & 08/18        Home O2, Oxygen Dependence      Radiology findings     x Acute/Chronic Illness Acute on chronic anemia 2/2 GIB  CHF (congestive heart failure), NYHA class II, chronic, systolic -Lasix 20 mg IV once on 08/17/2023.  Suspect some volume overload after receiving 4 units PRBC    History Tobacco Use  - Pt endorses ~40 pk yr hx but quit years ago  - On Room Air, satting high 90s    Hospital Medicine PN 08/17          Intensive Care PN 08/17     x Treatment Incentive spirometry q.2 hours while awake  Chest x-ray on 08/17/2023= pending (suspect some volume overload after receiving 4 units of PRBC)   Hospital Medicine PN 08/17     x Other Respiratory- No acute issues   Intensive Care PN 08/17         The noted clinical guidelines following a diagnosis are only system guidelines and do not replace the providers clinical judgment.      Due to the conflicting clinical picture, please clinically validate the diagnosis of __Acute hypoxemic respiratory failure_.    If validated, please provide additional clinical support for the diagnosis.     [  x ] Above stated diagnosis is not confirmed and/or it has been ruled out   [    ] Above stated diagnosis is not confirmed and/or it has been ruled out, other diagnosis ruled in (please specify):_______________   [    ] Acute Respiratory Failure with Hypoxia (ABG pO2 < 60 mmHg or O2 sat of <91% on room air and respiratory symptoms documented) diagnosis is confirmed and additional clinical support/decision-making indicators for the diagnosis include (please specify): _______________________________________________   [    ] Other clarification (please specify): ___________________          Please document in your progress notes daily for the duration of treatment until resolved and include in your discharge summary.     Reference:    JUANITA Torres MD. (2020, March 13). Acute respiratory distress syndrome: Clinical features, diagnosis, and complications in adults ([id number] BOBBI Morales MD & [id number] ELIU Cole MD, Eds.). Retrieved November 13, 2020, from [email or web address]?search=ards&source=search_result&selectedTitle=1~150&usage_type=default&display_rank=1  Form No. 05840

## 2023-08-18 NOTE — ASSESSMENT & PLAN NOTE
TTE    Left Ventricle: The left ventricle is mildly dilated. regional wall   motion abnormalities present. There is reduced systolic function. Biplane   (2D) method of discs ejection fraction is 45%.    Right Ventricle: Normal right ventricular cavity size. Wall thickness   is normal. Right ventricle wall motion  is normal. Systolic function is   normal. Pacemaker lead present in the ventricle.    Left Atrium: Left atrium is severely dilated.    Aortic Valve: There is mild aortic valve sclerosis.    Mitral Valve: There is a bioprosthetic valve in the mitral position   that is well-seated. The mean pressure gradient across the mitral valve is   4 mmHg at a heart rate of 58 bpm. Very small mobile echodense shadow 0.2 x   0.4 cm in the submitral apparatus. Differential is detached cord related   to  prior mitral surgery(more likely), thrombus or vegetations.    Pulmonic Valve: There is mild regurgitation.    Recent Labs   Lab 08/15/23  1900   *   .    Hold home Lasix  Resume BB as tolerated- BP marginal   S/p ICD  Euvolemic on exam

## 2023-08-18 NOTE — PROGRESS NOTES
[facility] - Intensive Care  Cardiology  Progress Note    Patient Name: Wesley Bernal  MRN: [id number]  Admission Date: 8/15/2023  Hospital Length of Stay: 3 days  Code Status: Full Code   Attending Physician: Hiren Melgoza MD   Primary Care Physician: Lalo Sweet MD  Expected Discharge Date:   Principal Problem:Acute on chronic anemia    Subjective:     Hospital Course:   08/16/2023 Per HPI   08/17/2023 LVEF 45%. HH 7.3/22. NPO for EGD/Colonoscopy today. No reports of CP or SOB overnight.   08/18/2023 s/p EGD- Dieulafoy lesion of stomach clipped. HH trended down overnight- PRBC transfusion in progress. No cardiac complaints. SR on tele. OAC on hold.       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    Cardiomyopathy     Carotid artery disease     Coronary artery disease     Diabetes mellitus type II     Encounter for blood transfusion     Heart disease     Hyperlipidemia     Hypertension     PAD (peripheral artery disease)        Past Surgical History:   Procedure Laterality Date    ABDOMINAL AORTA STENT      BRACHIOCEPHALIC VEIN ANGIOPLASTY / STENTING      CARDIAC DEFIBRILLATOR PLACEMENT      1/2011    CARDIAC PACEMAKER PLACEMENT      1-2011    CARDIAC SURGERY      open heart    CARDIAC VALVE SURGERY      pig valve replacement -    CAROTID STENT      COLONOSCOPY N/A 8/17/2023    Procedure: COLONOSCOPY;  Surgeon: Javier Chang MD;  Location: [facility];  Service: Endoscopy;  Laterality: N/A;    ESOPHAGOGASTRODUODENOSCOPY N/A 8/17/2023    Procedure: EGD (ESOPHAGOGASTRODUODENOSCOPY);  Surgeon: Javier Chang MD;  Location: [facility];  Service: Endoscopy;  Laterality: N/A;    LASIK SX Bilateral     LYMPH NODE BIOPSY Left 6/17/2019    Procedure: BIOPSY, LYMPH NODE (GROIN);  Surgeon: Adelfo Yin MD;  Location[facility];  Service: General;  Laterality: Left;    REPLACEMENT  OF IMPLANTABLE CARDIOVERTER-DEFIBRILLATOR (ICD) GENERATOR N/A 7/29/2020    Procedure: REPLACEMENT, ICD GENERATOR;  Surgeon: Marko Hartley MD;  Location: [facility] CATH;  Service: Cardiology;  Laterality: N/A;       Review of patient's allergies indicates:   Allergen Reactions    Statins-hmg-coa reductase inhibitors        No current facility-administered medications on file prior to encounter.     Current Outpatient Medications on File Prior to Encounter   Medication Sig    ALPRAZolam (XANAX) 0.25 MG tablet TAKE ONE TABLET BY MOUTH TWICE DAILY AS NEEDED    amiodarone (PACERONE) 200 MG Tab Take 0.5 tablets (100 mg total) by mouth once daily.    apixaban (ELIQUIS) 5 mg Tab Take 5 mg by mouth 2 (two) times daily.    aspirin (ECOTRIN) 81 MG EC tablet Take by mouth once daily.    atenoloL (TENORMIN) 25 MG tablet Take 1 tablet (25 mg total) by mouth once daily.    gabapentin (NEURONTIN) 100 MG capsule TAKE TWO CAPSULES BY MOUTH THREE TIMES DAILY (DOSE INCREASED    HYDROcodone-acetaminophen (NORCO)  mg per tablet Take 1 tablet by mouth every 12 (twelve) hours as needed for Pain.    isosorbide mononitrate (IMDUR) 60 MG 24 hr tablet Take 60 mg by mouth once daily.     pravastatin (PRAVACHOL) 20 MG tablet Take 20 mg by mouth once daily.    carvediloL (COREG) 6.25 MG tablet Take 6.25 mg by mouth 2 (two) times daily.    docusate sodium (COLACE) 100 MG capsule Take 200 mg by mouth every morning.    DULoxetine (CYMBALTA) 60 MG capsule Take 1 capsule (60 mg total) by mouth once daily.    ezetimibe (ZETIA) 10 mg tablet TAKE ONE TABLET BY MOUTH ONCE A DAY    furosemide (LASIX) 20 MG tablet Take 20 mg by mouth once daily.     icosapent ethyL (VASCEPA) 1 gram Cap TAKE TWO CAPSULES BY MOUTH TWICE DAILY    pioglitazone (ACTOS) 15 MG tablet TAKE ONE TABLET BY MOUTH ONCE A DAY    primidone (MYSOLINE) 50 MG Tab Take 50 mg by mouth 2 (two) times a day.    REPATHA SURECLICK 140 mg/mL PnIj INJECT ONE SYRINGE  SUBCUTANEOUSLY ONCE EVERY 14 DAYS    SITagliptin phosphate (JANUVIA) 100 MG Tab Take 1 tablet (100 mg total) by mouth once daily.     Family History       Problem Relation (Age of Onset)    Cancer Father    Diabetes Mother    Heart disease Father, Paternal Grandfather          Tobacco Use    Smoking status: Former     Current packs/day: 0.00     Types: Cigarettes, Cigars     Quit date: 4/10/2015     Years since quittin.3    Smokeless tobacco: Never   Substance and Sexual Activity    Alcohol use: No     Alcohol/week: 0.0 standard drinks of alcohol    Drug use: No    Sexual activity: Yes     Partners: Female     Review of Systems   Constitutional: Positive for malaise/fatigue.   HENT: Negative.     Eyes: Negative.    Cardiovascular:  Positive for dyspnea on exertion. Negative for chest pain, irregular heartbeat, leg swelling, near-syncope, orthopnea, palpitations, paroxysmal nocturnal dyspnea and syncope.   Respiratory:  Negative for cough and shortness of breath.    Endocrine: Negative.    Hematologic/Lymphatic: Negative.    Skin: Negative.    Musculoskeletal: Negative.    Gastrointestinal:  Negative for hematemesis, nausea and vomiting.   Neurological:  Positive for weakness.   Psychiatric/Behavioral: Negative.     Allergic/Immunologic: Negative.      Objective:     Vital Signs (Most Recent):  Temp: 98.1 °F (36.7 °C) (23)  Pulse: (!) 51 (23)  Resp: 13 (23)  BP: (!) 116/59 (23)  SpO2: 99 % (23) Vital Signs (24h Range):  Temp:  [98 °F (36.7 °C)-98.8 °F (37.1 °C)] 98.1 °F (36.7 °C)  Pulse:  [50-59] 51  Resp:  [10-34] 13  SpO2:  [82 %-100 %] 99 %  BP: ()/(28-81) 116/59     Weight: 73.5 kg (162 lb 0.6 oz)  Body mass index is 21.38 kg/m².    SpO2: 99 %         Intake/Output Summary (Last 24 hours) at 2023 1031  Last data filed at 2023 0729  Gross per 24 hour   Intake 1359.01 ml   Output 3350 ml   Net -1990.99 ml         Lines/Drains/Airways   "     Peripheral Intravenous Line  Duration                  Peripheral IV - Single Lumen 08/15/23 2200 18 G Posterior;Right Forearm 2 days         Peripheral IV - Single Lumen 08/17/23 0015 18 G Anterior;Right Upper Arm 1 day                     Physical Exam  Constitutional:       Appearance: He is ill-appearing. He is not diaphoretic.   HENT:      Head: Atraumatic.   Eyes:      General:         Right eye: No discharge.         Left eye: No discharge.   Cardiovascular:      Rate and Rhythm: Normal rate and regular rhythm.      Heart sounds: Murmur heard.   Abdominal:      Palpations: Abdomen is soft.   Musculoskeletal:      Right lower leg: No edema.      Left lower leg: No edema.   Skin:     General: Skin is warm and dry.      Capillary Refill: Capillary refill takes 2 to 3 seconds.   Neurological:      Mental Status: He is alert. Mental status is at baseline.          Significant Labs: BMP:   Recent Labs   Lab 08/17/23  0204 08/18/23 0449   * 114*    140   K 4.4 4.0    107   CO2 23 25   BUN 42* 28*   CREATININE 1.5* 1.4   CALCIUM 7.6* 7.8*   MG 2.0  --      , CMP   Recent Labs   Lab 08/17/23  0204 08/18/23 0449    140   K 4.4 4.0    107   CO2 23 25   * 114*   BUN 42* 28*   CREATININE 1.5* 1.4   CALCIUM 7.6* 7.8*   PROT 4.5* 4.9*   ALBUMIN 2.5* 2.6*   BILITOT 0.9 1.0   ALKPHOS 40* 46*   AST 17 15   ALT 9* 9*   ANIONGAP 6* 8     , CBC   Recent Labs   Lab 08/17/23  1811 08/18/23  0018 08/18/23 0449   WBC 13.18* 9.33 8.12   HGB 8.7* 8.2* 7.6*   HCT 26.8* 24.5* 23.7*   * 122* 115*     , INR   Recent Labs   Lab 08/16/23 2046   INR 1.2     , Lipid Panel No results for input(s): "CHOL", "HDL", "LDLCALC", "TRIG", "CHOLHDL" in the last 48 hours., Troponin   No results for input(s): "TROPONINI" in the last 48 hours.  , and All pertinent lab results from the last 24 hours have been reviewed.    Significant Imaging: Echocardiogram: Transthoracic echo (TTE) complete ([facility] " Only):   Results for orders placed or performed during the hospital encounter of 08/15/23   Echo   Result Value Ref Range    BSA 2.01 m2    Lawson's Biplane MOD Ejection Fraction 45 %    LVOT stroke volume 117.23 cm3    LVIDd 6.57 (A) 3.5 - 6.0 cm    LV Systolic Volume 164.86 mL    LV Systolic Volume Index 81.6 mL/m2    LVIDs 5.77 (A) 2.1 - 4.0 cm    LV Diastolic Volume 221.56 mL    LV Diastolic Volume Index 109.68 mL/m2    IVS 1.25 (A) 0.6 - 1.1 cm    LVOT diameter 2.25 cm    LVOT area 4.0 cm2    FS 12 (A) 28 - 44 %    Left Ventricle Relative Wall Thickness 0.29 cm    Posterior Wall 0.96 0.6 - 1.1 cm    LV mass 327.78 g    LV Mass Index 162 g/m2    TDI LATERAL 0.04 m/s    TDI SEPTAL 0.05 m/s    TR Max Pop 2.41 m/s    PV Peak S Pop 0.68 m/s    PV Peak D Pop 0.69 m/s    Pulm vein S/D ratio 0.99     LVOT peak pop 1.17 m/s    Left Ventricular Outflow Tract Mean Velocity 0.78 cm/s    Left Ventricular Outflow Tract Mean Gradient 2.81 mmHg    LA volume (mod) 110.18 cm3    LA Volume Index (Mod) 54.5 mL/m2    LA size 4.79 cm    Left Atrium Major Axis 6.65 cm    Left Atrium Minor Axis 6.61 cm    RVDD 3.28 cm    TAPSE 1.60 cm    RA Major Axis 5.29 cm    AV mean gradient 4 mmHg    AV peak gradient 10 mmHg    Ao peak pop 1.55 m/s    Ao VTI 37.00 cm    LVOT peak VTI 29.50 cm    AV valve area 3.17 cm²    AV Velocity Ratio 0.75     AV index (prosthetic) 0.80     CLIF by Velocity Ratio 3.00 cm²    Mr max pop 5.00 m/s    MV mean gradient 4 mmHg    MV peak gradient 13 mmHg    MV valve area by continuity eq 1.71 cm2    MV VTI 68.7 cm    Triscuspid Valve Regurgitation Peak Gradient 23 mmHg    PV PEAK VELOCITY 1.00 m/s    PV peak gradient 4 mmHg    Pulmonary Valve Mean Velocity 0.72 m/s    STJ 2.43 cm    Ascending aorta 3.13 cm    IVC diameter 1.90 cm    Mean e' 0.05 m/s    ZLVIDS 3.62     ZLVIDD 0.99     LA Volume Index 65.5 mL/m2    LA volume 132.27 cm3    LA WIDTH 4.9 cm    RA Width 3.2 cm    Mitral Valve Heart Rate 58 bpm     Narrative      Left Ventricle: The left ventricle is mildly dilated. regional wall   motion abnormalities present. There is reduced systolic function. Biplane   (2D) method of discs ejection fraction is 45%.    Right Ventricle: Normal right ventricular cavity size. Wall thickness   is normal. Right ventricle wall motion  is normal. Systolic function is   normal. Pacemaker lead present in the ventricle.    Left Atrium: Left atrium is severely dilated.    Aortic Valve: There is mild aortic valve sclerosis.    Mitral Valve: There is a bioprosthetic valve in the mitral position   that is well-seated. The mean pressure gradient across the mitral valve is   4 mmHg at a heart rate of 58 bpm. Very small mobile echodense shadow 0.2 x   0.4 cm in the submitral apparatus. Differential is detached cord related   to  prior mitral surgery(more likely), thrombus or vegetations.    Pulmonic Valve: There is mild regurgitation.       Assessment and Plan:     Brief HPI: Patient seen this morning on rounds without cardiac complaint. Additional PRBCs being transfused. Hgb 7.6 down from 8.7 yesterday. Will continue to hold OAC.     CHF (congestive heart failure), NYHA class II, chronic, systolic  TTE    Left Ventricle: The left ventricle is mildly dilated. regional wall   motion abnormalities present. There is reduced systolic function. Biplane   (2D) method of discs ejection fraction is 45%.    Right Ventricle: Normal right ventricular cavity size. Wall thickness   is normal. Right ventricle wall motion  is normal. Systolic function is   normal. Pacemaker lead present in the ventricle.    Left Atrium: Left atrium is severely dilated.    Aortic Valve: There is mild aortic valve sclerosis.    Mitral Valve: There is a bioprosthetic valve in the mitral position   that is well-seated. The mean pressure gradient across the mitral valve is   4 mmHg at a heart rate of 58 bpm. Very small mobile echodense shadow 0.2 x   0.4 cm in the  submitral apparatus. Differential is detached cord related   to  prior mitral surgery(more likely), thrombus or vegetations.    Pulmonic Valve: There is mild regurgitation.    Recent Labs   Lab 08/15/23  1900   *   .    Hold home Lasix  Resume BB as tolerated- BP marginal   S/p ICD  Euvolemic on exam       CAD (coronary artery disease)  Unknown coronary anatomy   Chest pain during episode of vomiting felt to be in setting of severe anemia and CARLOS   EKG without acute ischemic changes  Troponin mildly elevated at baseline 0.04  Holding asa given GIB- resume when cleared by GI  Consider resumption of Imdur, BB   Repatha (as outpatient)   Transfuse PRBCs to goal >8       Bioprosthetic mitral valve replacement  Porcine valve  No need for anticoagulation       Mitral Valve: There is a bioprosthetic valve in the mitral position   that is well-seated. The mean pressure gradient across the mitral valve is   4 mmHg at a heart rate of 58 bpm. Very small mobile echodense shadow 0.2 x   0.4 cm in the submitral apparatus. Differential is detached cord related   to  prior mitral surgery(more likely), thrombus or vegetations.    Patient on OAC as OP for AF. Thrombus unlikely. Afebrile- vegetation unlikely    Hyperlipidemia  Resume Repatha as outpatient     Essential hypertension  SBP 80s-120s  BB on hold given hypotension overnight- if SBP >100- resume BB  Consider resumption of Imdur     Atrial fibrillation  Currently SR/paced  Continue Amiodarone, resume BB once BP is stable  Hold Eliquis and follow up with primary cardiologist as OP to discuss ELIZABETH closure/ resumption of OAC          VTE Risk Mitigation (From admission, onward)         Ordered     IP VTE HIGH RISK PATIENT  Once         08/15/23 1044     Place sequential compression device  Until discontinued         08/15/23 2323                Jomar Lew NP  Cardiology  [facility] - Intensive Care

## 2023-08-18 NOTE — ASSESSMENT & PLAN NOTE
SBP 80s-120s  BB on hold given hypotension overnight- if SBP >100- resume BB  Consider resumption of Imdur

## 2023-08-18 NOTE — ASSESSMENT & PLAN NOTE
Currently SR/paced  Continue Amiodarone, resume BB once BP is stable  Hold Eliquis and follow up with primary cardiologist as OP to discuss ELIZABETH closure/ resumption of OAC

## 2023-08-18 NOTE — SUBJECTIVE & OBJECTIVE
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    Cardiomyopathy     Carotid artery disease     Coronary artery disease     Diabetes mellitus type II     Encounter for blood transfusion     Heart disease     Hyperlipidemia     Hypertension     PAD (peripheral artery disease)        Past Surgical History:   Procedure Laterality Date    ABDOMINAL AORTA STENT      BRACHIOCEPHALIC VEIN ANGIOPLASTY / STENTING      CARDIAC DEFIBRILLATOR PLACEMENT      1/2011    CARDIAC PACEMAKER PLACEMENT      1-2011    CARDIAC SURGERY      open heart    CARDIAC VALVE SURGERY      pig valve replacement -    CAROTID STENT      COLONOSCOPY N/A 8/17/2023    Procedure: COLONOSCOPY;  Surgeon: Javier Chang MD;  Location: [facility] ENDO;  Service: Endoscopy;  Laterality: N/A;    ESOPHAGOGASTRODUODENOSCOPY N/A 8/17/2023    Procedure: EGD (ESOPHAGOGASTRODUODENOSCOPY);  Surgeon: Javier Chang MD;  Location: [facility] ENDO;  Service: Endoscopy;  Laterality: N/A;    LASIK SX Bilateral     LYMPH NODE BIOPSY Left 6/17/2019    Procedure: BIOPSY, LYMPH NODE (GROIN);  Surgeon: Adelfo Yin MD;  Location: [facility];  Service: General;  Laterality: Left;    REPLACEMENT OF IMPLANTABLE CARDIOVERTER-DEFIBRILLATOR (ICD) GENERATOR N/A 7/29/2020    Procedure: REPLACEMENT, ICD GENERATOR;  Surgeon: Marko Hartley MD;  Location: [facility] CATH;  Service: Cardiology;  Laterality: N/A;       Review of patient's allergies indicates:   Allergen Reactions    Statins-hmg-coa reductase inhibitors        No current facility-administered medications on file prior to encounter.     Current Outpatient Medications on File Prior to Encounter   Medication Sig    ALPRAZolam (XANAX) 0.25 MG tablet TAKE ONE TABLET BY MOUTH TWICE DAILY AS NEEDED    amiodarone (PACERONE) 200 MG Tab Take 0.5 tablets (100 mg total) by mouth once daily.    apixaban (ELIQUIS) 5 mg Tab Take 5 mg by mouth  2 (two) times daily.    aspirin (ECOTRIN) 81 MG EC tablet Take by mouth once daily.    atenoloL (TENORMIN) 25 MG tablet Take 1 tablet (25 mg total) by mouth once daily.    gabapentin (NEURONTIN) 100 MG capsule TAKE TWO CAPSULES BY MOUTH THREE TIMES DAILY (DOSE INCREASED    HYDROcodone-acetaminophen (NORCO)  mg per tablet Take 1 tablet by mouth every 12 (twelve) hours as needed for Pain.    isosorbide mononitrate (IMDUR) 60 MG 24 hr tablet Take 60 mg by mouth once daily.     pravastatin (PRAVACHOL) 20 MG tablet Take 20 mg by mouth once daily.    carvediloL (COREG) 6.25 MG tablet Take 6.25 mg by mouth 2 (two) times daily.    docusate sodium (COLACE) 100 MG capsule Take 200 mg by mouth every morning.    DULoxetine (CYMBALTA) 60 MG capsule Take 1 capsule (60 mg total) by mouth once daily.    ezetimibe (ZETIA) 10 mg tablet TAKE ONE TABLET BY MOUTH ONCE A DAY    furosemide (LASIX) 20 MG tablet Take 20 mg by mouth once daily.     icosapent ethyL (VASCEPA) 1 gram Cap TAKE TWO CAPSULES BY MOUTH TWICE DAILY    pioglitazone (ACTOS) 15 MG tablet TAKE ONE TABLET BY MOUTH ONCE A DAY    primidone (MYSOLINE) 50 MG Tab Take 50 mg by mouth 2 (two) times a day.    REPATHA SURECLICK 140 mg/mL PnIj INJECT ONE SYRINGE SUBCUTANEOUSLY ONCE EVERY 14 DAYS    SITagliptin phosphate (JANUVIA) 100 MG Tab Take 1 tablet (100 mg total) by mouth once daily.     Family History       Problem Relation (Age of Onset)    Cancer Father    Diabetes Mother    Heart disease Father, Paternal Grandfather          Tobacco Use    Smoking status: Former     Current packs/day: 0.00     Types: Cigarettes, Cigars     Quit date: 4/10/2015     Years since quittin.3    Smokeless tobacco: Never   Substance and Sexual Activity    Alcohol use: No     Alcohol/week: 0.0 standard drinks of alcohol    Drug use: No    Sexual activity: Yes     Partners: Female     Review of Systems   Constitutional: Positive for malaise/fatigue.   HENT: Negative.     Eyes: Negative.     Cardiovascular:  Positive for dyspnea on exertion. Negative for chest pain, irregular heartbeat, leg swelling, near-syncope, orthopnea, palpitations, paroxysmal nocturnal dyspnea and syncope.   Respiratory:  Negative for cough and shortness of breath.    Endocrine: Negative.    Hematologic/Lymphatic: Negative.    Skin: Negative.    Musculoskeletal: Negative.    Gastrointestinal:  Negative for hematemesis, nausea and vomiting.   Neurological:  Positive for weakness.   Psychiatric/Behavioral: Negative.     Allergic/Immunologic: Negative.      Objective:     Vital Signs (Most Recent):  Temp: 98.1 °F (36.7 °C) (08/18/23 0815)  Pulse: (!) 51 (08/18/23 0701)  Resp: 13 (08/18/23 0701)  BP: (!) 116/59 (08/18/23 0701)  SpO2: 99 % (08/18/23 0701) Vital Signs (24h Range):  Temp:  [98 °F (36.7 °C)-98.8 °F (37.1 °C)] 98.1 °F (36.7 °C)  Pulse:  [50-59] 51  Resp:  [10-34] 13  SpO2:  [82 %-100 %] 99 %  BP: ()/(28-81) 116/59     Weight: 73.5 kg (162 lb 0.6 oz)  Body mass index is 21.38 kg/m².    SpO2: 99 %         Intake/Output Summary (Last 24 hours) at 8/18/2023 1031  Last data filed at 8/18/2023 0729  Gross per 24 hour   Intake 1359.01 ml   Output 3350 ml   Net -1990.99 ml         Lines/Drains/Airways       Peripheral Intravenous Line  Duration                  Peripheral IV - Single Lumen 08/15/23 2200 18 G Posterior;Right Forearm 2 days         Peripheral IV - Single Lumen 08/17/23 0015 18 G Anterior;Right Upper Arm 1 day                     Physical Exam  Constitutional:       Appearance: He is ill-appearing. He is not diaphoretic.   HENT:      Head: Atraumatic.   Eyes:      General:         Right eye: No discharge.         Left eye: No discharge.   Cardiovascular:      Rate and Rhythm: Normal rate and regular rhythm.      Heart sounds: Murmur heard.   Abdominal:      Palpations: Abdomen is soft.   Musculoskeletal:      Right lower leg: No edema.      Left lower leg: No edema.   Skin:     General: Skin is warm and dry.  "     Capillary Refill: Capillary refill takes 2 to 3 seconds.   Neurological:      Mental Status: He is alert. Mental status is at baseline.          Significant Labs: BMP:   Recent Labs   Lab 08/17/23  0204 08/18/23  0449   * 114*    140   K 4.4 4.0    107   CO2 23 25   BUN 42* 28*   CREATININE 1.5* 1.4   CALCIUM 7.6* 7.8*   MG 2.0  --      , CMP   Recent Labs   Lab 08/17/23  0204 08/18/23  0449    140   K 4.4 4.0    107   CO2 23 25   * 114*   BUN 42* 28*   CREATININE 1.5* 1.4   CALCIUM 7.6* 7.8*   PROT 4.5* 4.9*   ALBUMIN 2.5* 2.6*   BILITOT 0.9 1.0   ALKPHOS 40* 46*   AST 17 15   ALT 9* 9*   ANIONGAP 6* 8     , CBC   Recent Labs   Lab 08/17/23  1811 08/18/23  0018 08/18/23 0449   WBC 13.18* 9.33 8.12   HGB 8.7* 8.2* 7.6*   HCT 26.8* 24.5* 23.7*   * 122* 115*     , INR   Recent Labs   Lab 08/16/23 2046   INR 1.2     , Lipid Panel No results for input(s): "CHOL", "HDL", "LDLCALC", "TRIG", "CHOLHDL" in the last 48 hours., Troponin   No results for input(s): "TROPONINI" in the last 48 hours.  , and All pertinent lab results from the last 24 hours have been reviewed.    Significant Imaging: Echocardiogram: Transthoracic echo (TTE) complete (Cupid Only):   Results for orders placed or performed during the hospital encounter of 08/15/23   Echo   Result Value Ref Range    BSA 2.01 m2    Lawson's Biplane MOD Ejection Fraction 45 %    LVOT stroke volume 117.23 cm3    LVIDd 6.57 (A) 3.5 - 6.0 cm    LV Systolic Volume 164.86 mL    LV Systolic Volume Index 81.6 mL/m2    LVIDs 5.77 (A) 2.1 - 4.0 cm    LV Diastolic Volume 221.56 mL    LV Diastolic Volume Index 109.68 mL/m2    IVS 1.25 (A) 0.6 - 1.1 cm    LVOT diameter 2.25 cm    LVOT area 4.0 cm2    FS 12 (A) 28 - 44 %    Left Ventricle Relative Wall Thickness 0.29 cm    Posterior Wall 0.96 0.6 - 1.1 cm    LV mass 327.78 g    LV Mass Index 162 g/m2    TDI LATERAL 0.04 m/s    TDI SEPTAL 0.05 m/s    TR Max Pop 2.41 m/s    PV Peak S " Pop 0.68 m/s    PV Peak D Pop 0.69 m/s    Pulm vein S/D ratio 0.99     LVOT peak pop 1.17 m/s    Left Ventricular Outflow Tract Mean Velocity 0.78 cm/s    Left Ventricular Outflow Tract Mean Gradient 2.81 mmHg    LA volume (mod) 110.18 cm3    LA Volume Index (Mod) 54.5 mL/m2    LA size 4.79 cm    Left Atrium Major Axis 6.65 cm    Left Atrium Minor Axis 6.61 cm    RVDD 3.28 cm    TAPSE 1.60 cm    RA Major Axis 5.29 cm    AV mean gradient 4 mmHg    AV peak gradient 10 mmHg    Ao peak pop 1.55 m/s    Ao VTI 37.00 cm    LVOT peak VTI 29.50 cm    AV valve area 3.17 cm²    AV Velocity Ratio 0.75     AV index (prosthetic) 0.80     CLIF by Velocity Ratio 3.00 cm²    Mr max pop 5.00 m/s    MV mean gradient 4 mmHg    MV peak gradient 13 mmHg    MV valve area by continuity eq 1.71 cm2    MV VTI 68.7 cm    Triscuspid Valve Regurgitation Peak Gradient 23 mmHg    PV PEAK VELOCITY 1.00 m/s    PV peak gradient 4 mmHg    Pulmonary Valve Mean Velocity 0.72 m/s    STJ 2.43 cm    Ascending aorta 3.13 cm    IVC diameter 1.90 cm    Mean e' 0.05 m/s    ZLVIDS 3.62     ZLVIDD 0.99     LA Volume Index 65.5 mL/m2    LA volume 132.27 cm3    LA WIDTH 4.9 cm    RA Width 3.2 cm    Mitral Valve Heart Rate 58 bpm    Narrative      Left Ventricle: The left ventricle is mildly dilated. regional wall   motion abnormalities present. There is reduced systolic function. Biplane   (2D) method of discs ejection fraction is 45%.    Right Ventricle: Normal right ventricular cavity size. Wall thickness   is normal. Right ventricle wall motion  is normal. Systolic function is   normal. Pacemaker lead present in the ventricle.    Left Atrium: Left atrium is severely dilated.    Aortic Valve: There is mild aortic valve sclerosis.    Mitral Valve: There is a bioprosthetic valve in the mitral position   that is well-seated. The mean pressure gradient across the mitral valve is   4 mmHg at a heart rate of 58 bpm. Very small mobile echodense shadow 0.2 x   0.4 cm in  the submitral apparatus. Differential is detached cord related   to  prior mitral surgery(more likely), thrombus or vegetations.    Pulmonic Valve: There is mild regurgitation.

## 2023-08-19 NOTE — ASSESSMENT & PLAN NOTE
Patient with Paroxysmal (<7 days) atrial fibrillation which is controlled currently with Beta Blocker and Amiodarone. Patient is currently in sinus rhythm.[id number] Score: 3.   - AC indicated but Eliquis being held due to GIB and hgb 3.6, now 9.2

## 2023-08-19 NOTE — NURSING
Pt transferred to 505 from 565 on cardiac monitor. Medication (aspart) and personal belongings with patient. Sinus maye on monitor, BP stable. Significant other (Tierra) aware.Report given to Jessica SALDIVAR. Once in room, pt bed at lowest position, food tray set up, call light within reach. Jessica SALDIVAR in room when patient arrived.

## 2023-08-19 NOTE — ASSESSMENT & PLAN NOTE
- Presumed etiology is GIB. Likely chronic blood loss while on Eliquis. Blood around stools with associated constipation and brown stools sounds like hemorrhoids  - No signs of acute bleed and hemodynamically stable  - Does have hx of rectal cancer with radiation making GIB high risk  --------------------  - monitor H&H= decrease  - Holding Eliquis and ASA  - GI consulted= follow recommendation  - NPO until cleared by GI  - Telemetry.   - IV PPI BID for now  -transfuse 4 units PRBC on 08/16/2023  -consider transfusion of 1 unit PRBC if and when hemoglobin less than 7  -per cardiology on 08/16/2023=Patient cleared from CV perspective for GI procedures.  -EGD/colonoscopy on 08/17/2023 by GI= s/p gastric bleeding source clipped  -continue to monitor H&H

## 2023-08-19 NOTE — SUBJECTIVE & OBJECTIVE
Subjective:     Interval History: No acute events overnight.  VSS without presser support.  EGD 8/17 with clipping of Dieulafoy's lesion.  Pt tolerating diet, denies complaints, states he would like to go home.  Transfer orders to floor in place.    Review of Systems   Constitutional:  Negative for appetite change.   Respiratory:  Negative for chest tightness, shortness of breath and wheezing.    Cardiovascular:  Negative for chest pain, palpitations and leg swelling.   Gastrointestinal:  Negative for abdominal pain and blood in stool.     Objective:     Vital Signs (Most Recent):  Temp: 98.3 °F (36.8 °C) (08/19/23 0730)  Pulse: (!) 51 (08/19/23 1015)  Resp: (!) 21 (08/19/23 1015)  BP: (!) 115/58 (08/19/23 1015)  SpO2: 100 % (08/19/23 1015) Vital Signs (24h Range):  Temp:  [98.3 °F (36.8 °C)-98.6 °F (37 °C)] 98.3 °F (36.8 °C)  Pulse:  [49-55] 51  Resp:  [12-27] 21  SpO2:  [93 %-100 %] 100 %  BP: ()/(38-75) 115/58     Weight: 81 kg (178 lb 9.2 oz) (08/19/23 0545)  Body mass index is 23.56 kg/m².      Intake/Output Summary (Last 24 hours) at 8/19/2023 1456  Last data filed at 8/19/2023 0552  Gross per 24 hour   Intake 256.87 ml   Output 1825 ml   Net -1568.13 ml       Lines/Drains/Airways       Peripheral Intravenous Line  Duration                  Peripheral IV - Single Lumen 08/17/23 0015 18 G Anterior;Right Upper Arm 2 days         Peripheral IV - Single Lumen 08/18/23 1100 Anterior;Left Forearm 1 day                     Physical Exam  Constitutional:       Appearance: Normal appearance. He is well-developed.   HENT:      Head: Normocephalic and atraumatic.   Eyes:      Extraocular Movements: Extraocular movements intact.      Pupils: Pupils are equal, round, and reactive to light.   Pulmonary:      Effort: Pulmonary effort is normal. No respiratory distress.   Abdominal:      General: There is no distension.      Palpations: Abdomen is soft.      Tenderness: There is no abdominal tenderness.    Musculoskeletal:         General: No deformity. Normal range of motion.      Cervical back: Normal range of motion and neck supple.   Skin:     General: Skin is warm and dry.   Neurological:      General: No focal deficit present.      Mental Status: He is alert and oriented to person, place, and time.   Psychiatric:         Mood and Affect: Mood normal.         Behavior: Behavior normal.          Significant Labs:  CBC:   Recent Labs   Lab 08/19/23  0010 08/19/23  0457 08/19/23  1209   WBC 6.74 7.85 6.88   HGB 9.2* 9.7* 9.9*   HCT 27.3* 29.1* 29.9*   * 133* 130*         Significant Imaging:  Imaging results within the past 24 hours have been reviewed.

## 2023-08-19 NOTE — NURSING
Care Plan    POC reviewed with Wesley Bernal and family. Questions and concerns addressed. No acute events today. Pt progressing toward goals. Will continue to monitor. See below and flowsheets for full assessment and VS info.       Neuro:  Nelson Coma Scale  Best Eye Response: 4-->(E4) spontaneous  Best Motor Response: 6-->(M6) obeys commands  Best Verbal Response: 5-->(V5) oriented  Nelson Coma Scale Score: 15  Assessment Qualifiers: no eye obstruction present, patient not sedated/intubated  Pupil PERRLA: yes  24 hr Temp:  [98 °F (36.7 °C)-98.6 °F (37 °C)]      CV:  Rhythm: sinus bradycardia, paced rhythm  DVT prophylaxis: VTE Required Core Measure: (SCDs) Sequential compression device initiated/maintained    Resp:          GI/:  GI prophylaxis: yes  Diet/Nutrition Received: NPO  Last Bowel Movement: 08/15/23  Voiding Characteristics: voids spontaneously without difficulty   Intake/Output Summary (Last 24 hours) at 8/18/2023 1944  Last data filed at 8/18/2023 1640  Gross per 24 hour   Intake 1110 ml   Output 2050 ml   Net -940 ml       Labs/Accuchecks:  Recent Labs   Lab 08/18/23  1843   WBC 7.05   RBC 2.95*   HGB 9.2*   HCT 27.2*   *      Recent Labs   Lab 08/18/23  0449      K 4.0   CO2 25      BUN 28*   CREATININE 1.4   ALKPHOS 46*   ALT 9*   AST 15   BILITOT 1.0      Recent Labs   Lab 08/16/23  2046   INR 1.2   APTT 23.9      Recent Labs   Lab 08/15/23  1900   TROPONINI 0.049*       Electrolytes: No replacement orders  Accuchecks: Q6H    Gtts/LDAs:   NORepinephrine bitartrate-D5W 0.02 mcg/kg/min (08/18/23 1058)       Lines/Drains/Airways       Peripheral Intravenous Line  Duration                  Peripheral IV - Single Lumen 08/17/23 0015 18 G Anterior;Right Upper Arm 1 day         Peripheral IV - Single Lumen 08/18/23 1100 Anterior;Left Forearm <1 day                    Skin/Wounds     Wounds: No  Wound care consulted: No    Consults  Consults (From admission, onward)           Status Ordering Provider     IP consult to case management  Once        Provider:  (Not yet assigned)    Completed CECIL SANDOVAL     [facility]  [facility]        Provider:  Maureen Arguello MD    Completed MAUREEN RODRIGUEZ     Gastroenterology  Once        Provider:  Thierno Sevilla MD    Completed MAUREEN RODRIGUEZ

## 2023-08-19 NOTE — ASSESSMENT & PLAN NOTE
- EGD 8/17 with Dieulafoy's lesion, treated. Colonoscopy not performed d/t not tolerating prep  - Agree with transfer to the floor  - Cont PPI bid  - Tolerating diet without issue  - If d/c in am, please send with bid PPI and instructions to avoid all NSAIDS for at least one month.  - I would hold Eliquis for an additional 5 days IF it is ok to do so with his type of heart valve.  The Dieulafoy lesion is an arterial bleed.

## 2023-08-19 NOTE — SUBJECTIVE & OBJECTIVE
Interval History:  Patient was examined in his bed with his family by his side.  He was calm and not in distress.  He denies chest pain, palpitation, shortness of breath.  He had just returned from imaging studies and had no complaints.    Review of Systems   Constitutional: Negative.    HENT: Negative.     Eyes: Negative.    Respiratory: Negative.     Cardiovascular: Negative.    Gastrointestinal: Negative.    Endocrine: Negative.    Genitourinary: Negative.    Musculoskeletal: Negative.    Skin: Negative.    Allergic/Immunologic: Negative.    Neurological: Negative.    Hematological: Negative.    Psychiatric/Behavioral: Negative.     All other systems reviewed and are negative.    Objective:     Vital Signs (Most Recent):  Temp: 98.2 °F (36.8 °C) (08/18/23 1106)  Pulse: (!) 50 (08/18/23 1455)  Resp: (!) 25 (08/18/23 1455)  BP: (!) 123/55 (08/18/23 1450)  SpO2: 100 % (08/18/23 1455) Vital Signs (24h Range):  Temp:  [98 °F (36.7 °C)-98.6 °F (37 °C)] 98.2 °F (36.8 °C)  Pulse:  [50-65] 50  Resp:  [10-33] 25  SpO2:  [82 %-100 %] 100 %  BP: ()/() 123/55     Weight: 73.5 kg (162 lb 0.6 oz)  Body mass index is 21.38 kg/m².    Intake/Output Summary (Last 24 hours) at 8/18/2023 1913  Last data filed at 8/18/2023 1640  Gross per 24 hour   Intake 1110 ml   Output 2050 ml   Net -940 ml           Physical Exam  Vitals and nursing note reviewed.   Constitutional:       General: He is not in acute distress.     Appearance: Normal appearance.   HENT:      Head: Normocephalic and atraumatic.      Nose: Nose normal.      Mouth/Throat:      Mouth: Mucous membranes are moist.   Eyes:      Extraocular Movements: Extraocular movements intact.      Pupils: Pupils are equal, round, and reactive to light.   Cardiovascular:      Rate and Rhythm: Normal rate and regular rhythm.      Pulses: Normal pulses.      Heart sounds: Normal heart sounds.   Pulmonary:      Effort: Pulmonary effort is normal. No respiratory distress.       Breath sounds: Normal breath sounds.   Abdominal:      General: Bowel sounds are normal. There is no distension.      Palpations: Abdomen is soft.      Tenderness: There is no abdominal tenderness.   Musculoskeletal:         General: Normal range of motion.      Cervical back: Normal range of motion and neck supple.   Skin:     General: Skin is warm and dry.      Coloration: Skin is pale.   Neurological:      General: No focal deficit present.      Mental Status: He is alert and oriented to person, place, and time. Mental status is at baseline.   Psychiatric:         Mood and Affect: Mood normal.         Behavior: Behavior normal.             Significant Labs: All pertinent labs within the past 24 hours have been reviewed.    Significant Imaging: I have reviewed all pertinent imaging results/findings within the past 24 hours.

## 2023-08-19 NOTE — PROGRESS NOTES
[facility] Medicine  Progress Note    Patient Name: Wesley Bernal  MRN: [id number]  Patient Class: IP- Inpatient   Admission Date: 8/15/2023  Length of Stay: 3 days  Attending Physician: Hiren Melgoza MD  Primary Care Provider: Lalo Sweet MD        Subjective:     Principal Problem:Acute on chronic anemia        HPI:  71 yo male with a PMHX of ICM HFrEF 30-35% s/p ICD, bioprosthetic MVR 2013 for severe MR, AF on amio/Eliquis here for worsening fatigue and bloody stools.    Patient states that the last 3-4 days he has become significantly weaker with associated constipation.  He has only had 1 or 2 bowel movements a day which is unusual for him.  The stools have been very hard and brown but with blood around it.  Says his stools for the last month have been relatively normal and denies any melena or codie hematochezia until 2 days ago.  When pressed further about symptoms, he has slowly limited his ADLs due to worsening dyspnea on exertion and extreme fatigue.  This has been ongoing for about a month.  For example, he no longer goes to the grocery store due to his dyspnea.  Denies any dizziness, syncope, chest pain, palpitations, orthopnea, PND, swelling, hematemesis, abdominal pain.    At the outside ED, labs showed hemoglobin of 4 with and CRALOS.  SBP ranged in the 140s to 160s with EKG showing sinus bradycardia in the 50s.  Rectal exam showed no stool in the rectal vault.  CT abdomen was unremarkable.  Type and screen unfortunately showed an unknown antibody at the outside hospital's lab.  Therefore, he has not received blood.      Upon transfer here, he is comfortable and well-appearing.  Hemodynamically stable.  A repeat type and screen was sent to ascertain the specific antibody. Will hold on transfusion unless hemodynamically unstable or when antibody testing is complete.      Overview/Hospital Course:  No notes on file    Interval History:  Patient was examined in his bed  with his family by his side.  He was calm and not in distress.  He denies chest pain, palpitation, shortness of breath.  He had just returned from imaging studies and had no complaints.    Review of Systems   Constitutional: Negative.    HENT: Negative.     Eyes: Negative.    Respiratory: Negative.     Cardiovascular: Negative.    Gastrointestinal: Negative.    Endocrine: Negative.    Genitourinary: Negative.    Musculoskeletal: Negative.    Skin: Negative.    Allergic/Immunologic: Negative.    Neurological: Negative.    Hematological: Negative.    Psychiatric/Behavioral: Negative.     All other systems reviewed and are negative.    Objective:     Vital Signs (Most Recent):  Temp: 98.2 °F (36.8 °C) (08/18/23 1106)  Pulse: (!) 50 (08/18/23 1455)  Resp: (!) 25 (08/18/23 1455)  BP: (!) 123/55 (08/18/23 1450)  SpO2: 100 % (08/18/23 1455) Vital Signs (24h Range):  Temp:  [98 °F (36.7 °C)-98.6 °F (37 °C)] 98.2 °F (36.8 °C)  Pulse:  [50-65] 50  Resp:  [10-33] 25  SpO2:  [82 %-100 %] 100 %  BP: ()/() 123/55     Weight: 73.5 kg (162 lb 0.6 oz)  Body mass index is 21.38 kg/m².    Intake/Output Summary (Last 24 hours) at 8/18/2023 1913  Last data filed at 8/18/2023 1640  Gross per 24 hour   Intake 1110 ml   Output 2050 ml   Net -940 ml           Physical Exam  Vitals and nursing note reviewed.   Constitutional:       General: He is not in acute distress.     Appearance: Normal appearance.   HENT:      Head: Normocephalic and atraumatic.      Nose: Nose normal.      Mouth/Throat:      Mouth: Mucous membranes are moist.   Eyes:      Extraocular Movements: Extraocular movements intact.      Pupils: Pupils are equal, round, and reactive to light.   Cardiovascular:      Rate and Rhythm: Normal rate and regular rhythm.      Pulses: Normal pulses.      Heart sounds: Normal heart sounds.   Pulmonary:      Effort: Pulmonary effort is normal. No respiratory distress.      Breath sounds: Normal breath sounds.   Abdominal:       General: Bowel sounds are normal. There is no distension.      Palpations: Abdomen is soft.      Tenderness: There is no abdominal tenderness.   Musculoskeletal:         General: Normal range of motion.      Cervical back: Normal range of motion and neck supple.   Skin:     General: Skin is warm and dry.      Coloration: Skin is pale.   Neurological:      General: No focal deficit present.      Mental Status: He is alert and oriented to person, place, and time. Mental status is at baseline.   Psychiatric:         Mood and Affect: Mood normal.         Behavior: Behavior normal.             Significant Labs: All pertinent labs within the past 24 hours have been reviewed.    Significant Imaging: I have reviewed all pertinent imaging results/findings within the past 24 hours.      Assessment/Plan:      * Acute on chronic anemia 2/2 GIB  - Presumed etiology is GIB. Likely chronic blood loss while on Eliquis. Blood around stools with associated constipation and brown stools sounds like hemorrhoids  - No signs of acute bleed and hemodynamically stable  - Does have hx of rectal cancer with radiation making GIB high risk  --------------------  - monitor H&H= decrease  - Holding Eliquis and ASA  - GI consulted= follow recommendation  - NPO until cleared by GI  - Telemetry.   - IV PPI BID for now  -transfuse 4 units PRBC on 08/16/2023  -consider transfusion of 1 unit PRBC if and when hemoglobin less than 7  -per cardiology on 08/16/2023=Patient cleared from CV perspective for GI procedures.  -EGD/colonoscopy on 08/17/2023 by GI= s/p gastric bleeding source clipped  -continue to monitor H&H      Acute hypoxemic respiratory failure  -O2 sats low 90s on 2 L nasal cannula currently.  Wean as tolerated   -incentive spirometry q.2 hours while awake   -supplemental O2 as needed to keep sats greater than 92%  -chest x-ray on 08/17/2023  -improved    Acute on chronic Stage 3B kidney disease  -monitor creatinine= improved   -Patient  with acute kidney injury/acute renal failure likely due to pre-renal azotemia due to dehydration CARLOS is currently stable. Baseline creatinine 1.6-1.8 - Labs reviewed- Renal function/electrolytes with Estimated Creatinine Clearance: 50.3 mL/min (based on SCr of 1.4 mg/dL). according to latest data. Monitor urine output and serial BMP and adjust therapy as needed. Avoid nephrotoxins and renally dose meds for GFR listed above.    CHF (congestive heart failure), NYHA class II, chronic, systolic  - Last TTE showing LVEF 33%  -on admit looked dry on exam and labs consistent with hypovolemia from GIB  - Will hold on lasix. May need some after multiple pRBC  - Continue BB  - Holding Imdur  - Not on RAASi likely due to CKD but would benefit from it as outpatient  - Repeat TTE due to valve  -chest x-ray on 08/17/2023  -strict ins and outs/daily weights  -Lasix 20 mg IV once on 08/17/2023.  Suspect some volume overload after receiving 4 units PRBC  -symptoms improved    CAD (coronary artery disease)  -patient denies chest pain or shortness of breath currently   -EKG on 08/16/2023=Sinus rhythm with 1st degree A-V block. Nonspecific intraventricular block. Nonspecific T wave abnormality  -cardiac enzymes on 08/15/2023= elevated x1 (likely due to demand ischemia due to severe anemia)  -cardiology consulted= follow recommendations      Bioprosthetic mitral valve replacement  - S/p bio MVR in 2013  - Repeat TTE on 08/16/2023 to eval LVEF and MV function= pending results  - Holding Eliquis (on for AF) and ASA due to GIB      Hyperlipidemia  - Continue statin      Type 2 diabetes mellitus  Patient's FSGs are controlled on current medication regimen.  Last A1c reviewed-   Lab Results   Component Value Date    HGBA1C 5.9 (H) 08/16/2023     Most recent fingerstick glucose reviewed-   Recent Labs   Lab 08/17/23  2305 08/17/23  2332 08/18/23  0625 08/18/23  1547   POCTGLUCOSE 158* 139* 126* 145*     Current correctional scale   Low  Maintain anti-hyperglycemic dose as follows-   Antihyperglycemics (From admission, onward)    Start     Stop Route Frequency Ordered    08/16/23 0125  insulin aspart U-100 pen 0-5 Units         -- SubQ Every 6 hours PRN 08/16/23 0025        Hold Oral hypoglycemics while patient is in the hospital.    Essential hypertension  -monitor blood pressure = stable range  -cover with hydralazine 10 mg IV q.4 hours p.r.n. for systolic blood pressure above 160 or diastolic greater than 100  -adjust BP medication as needed  -resume oral BP medications when tolerating oral diet      Atrial fibrillation  Patient with Paroxysmal (<7 days) atrial fibrillation which is controlled currently with Beta Blocker and Amiodarone. Patient is currently in sinus rhythm.[id number] Score: 3.   - AC indicated but Eliquis being held due to GIB and hgb 3.6, now 9.2      VTE Risk Mitigation (From admission, onward)         Ordered     IP VTE HIGH RISK PATIENT  Once         08/15/23 2325     Place sequential compression device  Until discontinued         08/15/23 2325                Discharge Planning   THU:      Code Status: Full Code   Is the patient medically ready for discharge?:     Reason for patient still in hospital (select all that apply): Treatment  Discharge Plan A: Home with family            Critical care time spent on the evaluation and treatment of severe organ dysfunction, review of pertinent labs and imaging studies, discussions with consulting providers and discussions with patient/family: 32 minutes.      Hiren Melgoza MD  Department of Hospital Medicine   [facility]

## 2023-08-19 NOTE — ASSESSMENT & PLAN NOTE
- Last TTE showing LVEF 33%  -on admit looked dry on exam and labs consistent with hypovolemia from GIB  - Will hold on lasix. May need some after multiple pRBC  - Continue BB  - Holding Imdur  - Not on RAASi likely due to CKD but would benefit from it as outpatient  - Repeat TTE due to valve  -chest x-ray on 08/17/2023  -strict ins and outs/daily weights  -Lasix 20 mg IV once on 08/17/2023.  Suspect some volume overload after receiving 4 units PRBC  -symptoms improved

## 2023-08-19 NOTE — ASSESSMENT & PLAN NOTE
-O2 sats low 90s on 2 L nasal cannula currently.  Wean as tolerated   -incentive spirometry q.2 hours while awake   -supplemental O2 as needed to keep sats greater than 92%  -chest x-ray on 08/17/2023  -improved

## 2023-08-19 NOTE — PROGRESS NOTES
[facility] Medicine  Progress Note    Patient Name: Wesley Bernal  MRN: [id number]  Patient Class: IP- Inpatient   Admission Date: 8/15/2023  Length of Stay: 4 days  Attending Physician: Robel Julien MD  Primary Care Provider: Lalo Sweet MD        Subjective:     Principal Problem:Acute on chronic anemia      HPI:  73 yo male with a PMHX of ICM HFrEF 30-35% s/p ICD, bioprosthetic MVR 2013 for severe MR, AF on amio/Eliquis here for worsening fatigue and bloody stools.     Patient states that the last 3-4 days he has become significantly weaker with associated constipation.  He has only had 1 or 2 bowel movements a day which is unusual for him.  The stools have been very hard and brown but with blood around it.  Says his stools for the last month have been relatively normal and denies any melena or codie hematochezia until 2 days ago.  When pressed further about symptoms, he has slowly limited his ADLs due to worsening dyspnea on exertion and extreme fatigue.  This has been ongoing for about a month.  For example, he no longer goes to the grocery store due to his dyspnea.  Denies any dizziness, syncope, chest pain, palpitations, orthopnea, PND, swelling, hematemesis, abdominal pain.     At the outside ED, labs showed hemoglobin of 4 with and CARLOS.  SBP ranged in the 140s to 160s with EKG showing sinus bradycardia in the 50s.  Rectal exam showed no stool in the rectal vault.  CT abdomen was unremarkable.  Type and screen unfortunately showed an unknown antibody at the outside hospital's lab.  Therefore, he has not received blood.       Upon transfer here, he is comfortable and well-appearing.  Hemodynamically stable.  A repeat type and screen was sent to ascertain the specific antibody. Will hold on transfusion unless hemodynamically unstable or when antibody testing is complete.         Interval History: no acute event overnight. Reviewed labs and vitals. H/H 9/27. EGD 8/17  with Dieulafoy's lesion, treated. Colonoscopy not performed as he not tolerating prep. CTA 9/19 no evidence of active bleed. On Levophed from 8/18 but held this AM. He is eating his breakfast and would like to go home.    Review of Systems   Constitutional:  Positive for fatigue. Negative for activity change, diaphoresis and unexpected weight change.   HENT: Negative.  Negative for congestion, ear pain, mouth sores, rhinorrhea and voice change.    Eyes: Negative.  Negative for pain, discharge and visual disturbance.   Respiratory: Negative.  Negative for apnea, cough and wheezing.    Cardiovascular: Negative.  Negative for chest pain and palpitations.   Gastrointestinal: Negative.  Negative for abdominal distention, anal bleeding, diarrhea and vomiting.   Endocrine: Negative.  Negative for cold intolerance and polyuria.   Genitourinary: Negative.  Negative for decreased urine volume, difficulty urinating, frequency, penile discharge and scrotal swelling.   Musculoskeletal: Negative.  Negative for back pain, myalgias and neck stiffness.   Skin: Negative.  Negative for color change and rash.   Allergic/Immunologic: Negative.  Negative for environmental allergies and immunocompromised state.   Neurological:  Positive for weakness. Negative for dizziness, speech difficulty and light-headedness.   Hematological: Negative.    Psychiatric/Behavioral: Negative.  Negative for agitation, dysphoric mood and suicidal ideas. The patient is not nervous/anxious.      Objective:     Vital Signs (Most Recent):  Temp: 98.4 °F (36.9 °C) (08/19/23 0330)  Pulse: (!) 50 (08/19/23 0645)  Resp: (!) 23 (08/19/23 0645)  BP: (!) 128/57 (08/19/23 0645)  SpO2: 100 % (08/19/23 0645) Vital Signs (24h Range):  Temp:  [98.1 °F (36.7 °C)-98.6 °F (37 °C)] 98.4 °F (36.9 °C)  Pulse:  [49-65] 50  Resp:  [12-33] 23  SpO2:  [83 %-100 %] 100 %  BP: ()/() 128/57     Weight: 81 kg (178 lb 9.2 oz)  Body mass index is 23.56 kg/m².    Intake/Output  Summary (Last 24 hours) at 8/19/2023 0745  Last data filed at 8/19/2023 0552  Gross per 24 hour   Intake 256.87 ml   Output 2950 ml   Net -2693.13 ml      Physical Exam      Vitals and nursing note reviewed.   Constitutional:       General: He is not in acute distress.     Appearance: Normal appearance.   HENT:      Head: Normocephalic and atraumatic.      Nose: Nose normal.      Mouth/Throat:      Mouth: Mucous membranes are moist.   Eyes:      Extraocular Movements: Extraocular movements intact.      Pupils: Pupils are equal, round, and reactive to light.   Cardiovascular:      Rate and Rhythm: Normal rate and regular rhythm.      Pulses: Normal pulses.      Heart sounds: Normal heart sounds.   Pulmonary:      Effort: Pulmonary effort is normal. No respiratory distress.      Breath sounds: Normal breath sounds.   Abdominal:      General: Bowel sounds are normal. There is no distension.      Palpations: Abdomen is soft.      Tenderness: There is no abdominal tenderness.   Musculoskeletal:         General: Normal range of motion.      Cervical back: Normal range of motion and neck supple.   Skin:     General: Skin is warm and dry.      Coloration: Skin is pale.   Neurological:      General: No focal deficit present.      Mental Status: He is alert and oriented to person, place, and time. Mental status is at baseline.   Psychiatric:         Mood and Affect: Mood normal.         Behavior: Behavior normal.     Significant Labs: All pertinent labs within the past 24 hours have been reviewed.  BMP:   Recent Labs   Lab 08/18/23  0449   *      K 4.0      CO2 25   BUN 28*   CREATININE 1.4   CALCIUM 7.8*     CBC:   Recent Labs   Lab 08/18/23  1146 08/18/23  1843 08/19/23  0010   WBC 10.03 7.05 6.74   HGB 9.1*  9.1* 9.2* 9.2*   HCT 27.7*  27.7* 27.2* 27.3*   * 118* 115*     CMP:   Recent Labs   Lab 08/18/23  0449      K 4.0      CO2 25   *   BUN 28*   CREATININE 1.4   CALCIUM  7.8*   PROT 4.9*   ALBUMIN 2.6*   BILITOT 1.0   ALKPHOS 46*   AST 15   ALT 9*   ANIONGAP 8       Significant Imaging: I have reviewed all pertinent imaging results/findings within the past 24 hours.  I have reviewed and interpreted all pertinent imaging results/findings within the past 24 hours.        EXAMINATION: 8/18  CTA ABDOMEN AND PELVIS     CLINICAL HISTORY:  GI bleed; Gastrointestinal hemorrhage, unspecified     TECHNIQUE:  Pre and postcontrast images were obtained through the abdomen and pelvis per CTA protocol.  Coronal and sagittal images were reviewed.  130 cc intravenous contrast was administered.     COMPARISON:  CT abdomen pelvis dated 08/15/2023     FINDINGS:  CTA:     Multifocal mixed-type atherosclerosus of the lower descending thoracic and abdominal aorta with scattered areas of mild wall irregularity suggesting sites of plaque ulceration.  Infrarenal aorto bi-iliac stent.  Narrowed appearance of the bilateral common iliac arteries at the level of stent though appear patent.  Scattered atherosclerosis of the bilateral internal and external iliac branches.  The celiac and SMA axis appear patent with scattered atherosclerosis.  Renal arteries appear patent with scattered atherosclerosis.  Note made of small 5 mm rim calcified aneurysm which appears thrombosed at the right renal artery (series 3, image 65).  The proximal PRADIP is occluded at its origin with reconstitution likely via collateralized flow.  No abnormal contrast blush to suggest component of active hemorrhage.  Scattered high density contents within stomach similar across precontrast and postcontrast sequences noting hemostasis clip.     Non CTA:     Partial visualized cardiac pacer/AICD leads with median sternotomy.  Bibasilar atelectasis.     The liver, spleen, and adrenal glands appear unremarkable.  Cholelithiasis.  Small 1.3 cm splenule near the splenic hilum and pancreatic tail.  Pancreas is otherwise unremarkable.  Nonspecific  bilateral perinephric stranding.  Kidneys enhance symmetrically with bilateral renal cortical hypodense lesions, some not definitively simple by Hounsfield criteria with others too small to characterize.  No hydronephrosis.  Urinary bladder is partial fluid distended with mildly enlarged prostate.     GI tract is normal in caliber.  Scattered colonic stool retention.  Trace fluid at the lower right paracolic gutter.  No pathologic adenopathy.     Scattered spine and hip DJD.  No aggressive osseous lesion.     Impression:     No convincing evidence for active GI bleed.  Mild scattered high density contents within the stomach, similar across precontrast and postcontrast sequences with hemostasis clip noted.     Bilateral renal lesions, possible cysts though not definitively simple by Hounsfield criteria.  This can be further correlated with dedicated renal ultrasound to ensure cystic nature.     Multi-vessel atherosclerotic versus with aorto bi-iliac stent as described.     Cholelithiasis, trace fluid at the lower right paracolic gutter, and additional findings as above.        Electronically signed by: Romero Rausch  Date:                                            08/18/2023  Time:                                           14:15    Assessment/Plan:      Active Diagnoses:    Diagnosis Date Noted POA    PRINCIPAL PROBLEM:  Acute on chronic anemia 2/2 GIB [D64.9] 08/15/2023 Yes    Acute hypoxemic respiratory failure [J96.01] 08/17/2023 Yes    Acute on chronic Stage 3B kidney disease [N17.9, N18.9] 09/18/2020 Yes    CHF (congestive heart failure), NYHA class II, chronic, systolic [I50.22] 07/29/2020 Yes    CAD (coronary artery disease) [I25.10] 05/28/2018 Yes    Atrial fibrillation [I48.91] 11/25/2013 Yes    Essential hypertension [I10] 11/25/2013 Yes    Type 2 diabetes mellitus [E11.9] 11/25/2013 Yes    Hyperlipidemia [E78.5] 11/25/2013 Yes    Bioprosthetic mitral valve replacement [Z95.2] 11/25/2013 Not Applicable       Problems Resolved During this Admission:     VTE Risk Mitigation (From admission, onward)           Ordered     IP VTE HIGH RISK PATIENT  Once         08/15/23 2325     Place sequential compression device  Until discontinued         08/15/23 2325                  * Acute on chronic anemia 2/2 GIB  - Presumed etiology is GIB. Likely chronic blood loss while on Eliquis. Blood around stools with associated constipation and brown stools sounds like hemorrhoids  - No signs of acute bleed and hemodynamically stable  - Does have hx of rectal cancer with radiation making GIB high risk  --------------------  - monitor H&H= decrease  - Holding Eliquis and ASA  - GI consulted= follow recommendation  - IV PPI BID for now  -transfuse 4 units PRBC on 08/16/2023  -consider transfusion of 1 unit PRBC if and when hemoglobin less than 7  -per cardiology on 08/16/2023=Patient cleared from CV perspective for GI procedures.  -EGD/colonoscopy on 08/17/2023 by GI= s/p gastric bleeding source clipped  -continue to monitor H&H        Acute hypoxemic respiratory failure  -O2 sats low 90s on 2 L nasal cannula currently.  Wean as tolerated   -incentive spirometry q.2 hours while awake   -supplemental O2 as needed to keep sats greater than 92%  -chest x-ray on 08/17/2023  -improved     Acute on chronic Stage 3B kidney disease  -monitor creatinine= improved   -Patient with acute kidney injury/acute renal failure likely due to pre-renal azotemia due to dehydration CARLOS is currently stable. Baseline creatinine 1.6-1.8 - Labs reviewed- Renal function/electrolytes with Estimated Creatinine Clearance: 50.3 mL/min (based on SCr of 1.4 mg/dL). according to latest data. Monitor urine output and serial BMP and adjust therapy as needed. Avoid nephrotoxins and renally dose meds for GFR listed above.     CHF (congestive heart failure), NYHA class II, chronic, systolic  - Last TTE showing LVEF 33%  -on admit looked dry on exam and labs consistent with  hypovolemia from GIB  - Will hold on lasix. May need some after multiple pRBC  - Continue BB  - Holding Imdur  - Not on RAASi likely due to CKD but would benefit from it as outpatient  - Repeat TTE due to valve  -chest x-ray on 08/17/2023  -strict ins and outs/daily weights  -Lasix 20 mg IV once on 08/17/2023.  Suspect some volume overload after receiving 4 units PRBC  -symptoms improved     CAD (coronary artery disease)  -patient denies chest pain or shortness of breath currently   -EKG on 08/16/2023=Sinus rhythm with 1st degree A-V block. Nonspecific intraventricular block. Nonspecific T wave abnormality  -cardiac enzymes on 08/15/2023= elevated x1 (likely due to demand ischemia due to severe anemia)  -cardiology consulted= follow recommendations        Bioprosthetic mitral valve replacement  - S/p bio MVR in 2013  - Repeat TTE on 08/16/2023 to eval LVEF and MV function= pending results  - Holding Eliquis (on for AF) and ASA due to GIB        Hyperlipidemia  - Continue statin        Type 2 diabetes mellitus  Patient's FSGs are controlled on current medication regimen.  Last A1c reviewed-         Lab Results   Component Value Date     HGBA1C 5.9 (H) 08/16/2023      Most recent fingerstick glucose reviewed-          Recent Labs   Lab 08/17/23  2305 08/17/23  2332 08/18/23  0625 08/18/23  1547   POCTGLUCOSE 158* 139* 126* 145*      Current correctional scale  Low  Maintain anti-hyperglycemic dose as follows-             Antihyperglycemics (From admission, onward)     Start     Stop Route Frequency Ordered     08/16/23 0125   insulin aspart U-100 pen 0-5 Units         -- SubQ Every 6 hours PRN 08/16/23 0025          Hold Oral hypoglycemics while patient is in the hospital.     Essential hypertension  -monitor blood pressure = stable range  -cover with hydralazine 10 mg IV q.4 hours p.r.n. for systolic blood pressure above 160 or diastolic greater than 100  -adjust BP medication as needed  -resume oral BP medications  when tolerating oral diet        Atrial fibrillation  Patient with Paroxysmal (<7 days) atrial fibrillation which is controlled currently with Beta Blocker and Amiodarone. Patient is currently in sinus rhythm.[id number] Score: 3.   - AC indicated but Eliquis being held due to GIB and hgb 3.6, now 9.2        Critical care time spent on the evaluation and treatment of severe organ dysfunction, review of pertinent labs and imaging studies, discussions with consulting providers and discussions with patient/family: 30 minutes.     Robel Julien MD  Department of Hospital Medicine   [place] - Intensive Care

## 2023-08-19 NOTE — ASSESSMENT & PLAN NOTE
-monitor creatinine= improved   -Patient with acute kidney injury/acute renal failure likely due to pre-renal azotemia due to dehydration CARLOS is currently stable. Baseline creatinine 1.6-1.8 - Labs reviewed- Renal function/electrolytes with Estimated Creatinine Clearance: 50.3 mL/min (based on SCr of 1.4 mg/dL). according to latest data. Monitor urine output and serial BMP and adjust therapy as needed. Avoid nephrotoxins and renally dose meds for GFR listed above.

## 2023-08-19 NOTE — PROGRESS NOTES
[facility] - Intensive Care  Gastroenterology  Progress Note    Patient Name: Wesley Bernal  MRN: [id number]  Admission Date: 8/15/2023  Hospital Length of Stay: 4 days  Code Status: Full Code   Attending Provider: Robel Julien MD  Consulting Provider: Lalita Galeas MD  Primary Care Physician: Lalo Sweet MD  Principal Problem: Acute on chronic anemia        Subjective:     Interval History: No acute events overnight.  VSS without presser support.  EGD 8/17 with clipping of Dieulafoy's lesion.  Pt tolerating diet, denies complaints, states he would like to go home.  Transfer orders to floor in place.    Review of Systems   Constitutional:  Negative for appetite change.   Respiratory:  Negative for chest tightness, shortness of breath and wheezing.    Cardiovascular:  Negative for chest pain, palpitations and leg swelling.   Gastrointestinal:  Negative for abdominal pain and blood in stool.     Objective:     Vital Signs (Most Recent):  Temp: 98.3 °F (36.8 °C) (08/19/23 0730)  Pulse: (!) 51 (08/19/23 1015)  Resp: (!) 21 (08/19/23 1015)  BP: (!) 115/58 (08/19/23 1015)  SpO2: 100 % (08/19/23 1015) Vital Signs (24h Range):  Temp:  [98.3 °F (36.8 °C)-98.6 °F (37 °C)] 98.3 °F (36.8 °C)  Pulse:  [49-55] 51  Resp:  [12-27] 21  SpO2:  [93 %-100 %] 100 %  BP: ()/(38-75) 115/58     Weight: 81 kg (178 lb 9.2 oz) (08/19/23 0545)  Body mass index is 23.56 kg/m².      Intake/Output Summary (Last 24 hours) at 8/19/2023 1456  Last data filed at 8/19/2023 0552  Gross per 24 hour   Intake 256.87 ml   Output 1825 ml   Net -1568.13 ml       Lines/Drains/Airways       Peripheral Intravenous Line  Duration                  Peripheral IV - Single Lumen 08/17/23 0015 18 G Anterior;Right Upper Arm 2 days         Peripheral IV - Single Lumen 08/18/23 1100 Anterior;Left Forearm 1 day                     Physical Exam  Constitutional:       Appearance: Normal appearance. He is well-developed.   HENT:      Head:  Normocephalic and atraumatic.   Eyes:      Extraocular Movements: Extraocular movements intact.      Pupils: Pupils are equal, round, and reactive to light.   Pulmonary:      Effort: Pulmonary effort is normal. No respiratory distress.   Abdominal:      General: There is no distension.      Palpations: Abdomen is soft.      Tenderness: There is no abdominal tenderness.   Musculoskeletal:         General: No deformity. Normal range of motion.      Cervical back: Normal range of motion and neck supple.   Skin:     General: Skin is warm and dry.   Neurological:      General: No focal deficit present.      Mental Status: He is alert and oriented to person, place, and time.   Psychiatric:         Mood and Affect: Mood normal.         Behavior: Behavior normal.          Significant Labs:  CBC:   Recent Labs   Lab 08/19/23  0010 08/19/23  0457 08/19/23  1209   WBC 6.74 7.85 6.88   HGB 9.2* 9.7* 9.9*   HCT 27.3* 29.1* 29.9*   * 133* 130*         Significant Imaging:  Imaging results within the past 24 hours have been reviewed.    Assessment/Plan:     Oncology  * Acute on chronic anemia 2/2 GIB  - EGD 8/17 with Dieulafoy's lesion, treated. Colonoscopy not performed d/t not tolerating prep  - Agree with transfer to the floor  - Cont PPI bid  - Tolerating diet without issue  - If d/c in am, please send with bid PPI and instructions to avoid all NSAIDS for at least one month.  - I would hold Eliquis for an additional 5 days IF it is ok to do so with his type of heart valve.  The Dieulafoy lesion is an arterial bleed.        Thank you for your consult. I will follow-up with patient. Please contact us if you have any additional questions.    Lalita Galeas MD  Gastroenterology  [facility] - Intensive Care

## 2023-08-19 NOTE — PT/OT/SLP PROGRESS
Physical Therapy Treatment    Patient Name:  Wesley Bernal   MRN:  [id number]    Recommendations:     Discharge Recommendations: other (see comments) (TBD)  Discharge Equipment Recommendations: other (see comments) (TBD)  Barriers to discharge:  Increased assistance required to perform functional mobility tasks.     Assessment:     Wesley Bernal is a 72 y.o. male admitted with a medical diagnosis of Acute on chronic anemia.  He presents with the following impairments/functional limitations: weakness, decreased coordination, impaired endurance, decreased lower extremity function, impaired functional mobility, gait instability, impaired self care skills.    Rehab Prognosis: Good; patient would benefit from acute skilled PT services to address these deficits and reach maximum level of function.    Recent Surgery: Procedure(s) (LRB):  EGD (ESOPHAGOGASTRODUODENOSCOPY) (N/A)  COLONOSCOPY (N/A) 2 Days Post-Op    Plan:     During this hospitalization, patient to be seen 5 x/week to address the identified rehab impairments via gait training, therapeutic activities, therapeutic exercises, neuromuscular re-education and progress toward the following goals:    Plan of Care Expires:  09/18/23    Subjective     Chief Complaint: Pt with no complaints or concerns at this time.   Patient/Family Comments/goals: Pt agreeable to PT treatment.   Pain/Comfort:  Pain Rating 1: 0/10      Objective:     Communicated with nursing prior to session.  Patient found HOB elevated with peripheral IV, telemetry, pulse ox (continuous), blood pressure cuff upon PT entry to room.     General Precautions: Standard, fall  Orthopedic Precautions: N/A  Braces: N/A  Respiratory Status: Room air     Functional Mobility:  Bed Mobility:     Supine to Sit: contact guard assistance  Sit to Supine: contact guard assistance  Transfers:     Sit to Stand:  contact guard assistance with rolling walker  Gait: Pt performed fwd/retro stepping x multiple trials due  to being connected to ICU monitoring requiring CGA as well as verbal cueing on proper RW usage and LE step sequencing.   Balance: Pt with good sitting and fair standing balance.       AM-PAC 6 CLICK MOBILITY  Turning over in bed (including adjusting bedclothes, sheets and blankets)?: 3  Sitting down on and standing up from a chair with arms (e.g., wheelchair, bedside commode, etc.): 3  Moving from lying on back to sitting on the side of the bed?: 3  Moving to and from a bed to a chair (including a wheelchair)?: 3  Need to walk in hospital room?: 3  Climbing 3-5 steps with a railing?: 1  Basic Mobility Total Score: 16       Treatment & Education:      Patient left HOB elevated with all lines intact, call button in reach, and nursing notified..    GOALS:   Multidisciplinary Problems       Physical Therapy Goals          Problem: Physical Therapy    Goal Priority Disciplines Outcome Goal Variances Interventions   Physical Therapy Goal     PT, PT/OT Ongoing, Progressing     Description: Goals to be met by: 23     Patient will increase functional independence with mobility by performin. Supine to sit with Stand-by Assistance  2. Sit to supine with Stand-by Assistance  3. Sit to stand transfer with Stand-by Assistance  4. Bed to chair transfer with Stand-by Assistance using Rolling Walker  5. Gait  x 100 feet with Stand-by Assistance using Rolling Walker.   6. Ascend/Descend 4 inch curb step with Contact Guard Assistance and Minimal Assistance using Rolling Walker.                         Time Tracking:     PT Received On: 23  PT Start Time: 1326     PT Stop Time: 1347  PT Total Time (min): 21 min     Billable Minutes: Therapeutic Activity 21    Treatment Type: Treatment  PT/PTA: PTA     Number of PTA visits since last PT visit: 2023

## 2023-08-19 NOTE — ASSESSMENT & PLAN NOTE
Patient's FSGs are controlled on current medication regimen.  Last A1c reviewed-   Lab Results   Component Value Date    HGBA1C 5.9 (H) 08/16/2023     Most recent fingerstick glucose reviewed-   Recent Labs   Lab 08/17/23  2305 08/17/23  2332 08/18/23  0625 08/18/23  1547   POCTGLUCOSE 158* 139* 126* 145*     Current correctional scale  Low  Maintain anti-hyperglycemic dose as follows-   Antihyperglycemics (From admission, onward)    Start     Stop Route Frequency Ordered    08/16/23 0125  insulin aspart U-100 pen 0-5 Units         -- SubQ Every 6 hours PRN 08/16/23 0025        Hold Oral hypoglycemics while patient is in the hospital.

## 2023-08-20 PROBLEM — J96.01 ACUTE HYPOXEMIC RESPIRATORY FAILURE: Status: RESOLVED | Noted: 2023-01-01 | Resolved: 2023-01-01

## 2023-08-20 NOTE — PLAN OF CARE
08/20/23 1006   Final Note   Assessment Type Final Discharge Note   Anticipated Discharge Disposition Home   What phone number can be called within the next 1-3 days to see how you are doing after discharge? [phone]   Post-Acute Status   Discharge Delays None known at this time      spoke with pt's significant other Tierra, regarding discharge plan to home. Pt's significant other informed SW that she was in route to the hospital and would transport pt home at discharge. No additional needs at this time.     Follow up appointments scheduled below.    NAM sent message to schedulers for GI follow up.    Future Appointments   Date Time Provider Department Center   8/25/2023  2:45 PM Lalo Sweet MD [facility]

## 2023-08-20 NOTE — NURSING
VN cued into room and permission given to adjust camera towards patient and wife.  Wife is at bedside and reviewed AVS with her.  Patient's wife verbalized complete understanding on the discharge instructions.  Wheelchair placed per Transport. Voiced no other concerns.

## 2023-08-20 NOTE — PLAN OF CARE
Outside lab entered/scanned to chart.   Problem: Adult Inpatient Plan of Care  Goal: Plan of Care Review  Outcome: Ongoing, Progressing  Chart check complete. Vitals, orders, labs, and progress notes reviewed. Care plan updated. Will monitor.

## 2023-08-20 NOTE — DISCHARGE SUMMARY
[facility] Medicine  Discharge Summary      Patient Name: Wesley Bernal  MRN: [id number]  Admission Date: 8/15/2023  Hospital Length of Stay: 5 days  Discharge Date and Time:  08/20/2023 9:50 AM  Attending Physician: Robel Julien MD   Discharging Provider: Robel Julien MD  Discharge Provider Team: Networked reference to record PCT   Primary Care Provider: Lalo Sweet MD        HPI: HPI:  73 yo male with a PMHX of ICM HFrEF 30-35% s/p ICD, bioprosthetic MVR 2013 for severe MR, AF on amio/Eliquis here for worsening fatigue and bloody stools.     Patient states that the last 3-4 days he has become significantly weaker with associated constipation.  He has only had 1 or 2 bowel movements a day which is unusual for him.  The stools have been very hard and brown but with blood around it.  Says his stools for the last month have been relatively normal and denies any melena or codie hematochezia until 2 days ago.  When pressed further about symptoms, he has slowly limited his ADLs due to worsening dyspnea on exertion and extreme fatigue.  This has been ongoing for about a month.  For example, he no longer goes to the grocery store due to his dyspnea.  Denies any dizziness, syncope, chest pain, palpitations, orthopnea, PND, swelling, hematemesis, abdominal pain.     At the outside ED, labs showed hemoglobin of 4 with and CARLOS.  SBP ranged in the 140s to 160s with EKG showing sinus bradycardia in the 50s.  Rectal exam showed no stool in the rectal vault.  CT abdomen was unremarkable.  Type and screen unfortunately showed an unknown antibody at the outside hospital's lab.  Therefore, he has not received blood.       Upon transfer here, he is comfortable and well-appearing.  Hemodynamically stable.  A repeat type and screen was sent to ascertain the specific antibody. Will hold on transfusion unless hemodynamically unstable or when antibody testing is complete.       Procedure(s) (LRB):  EGD  (ESOPHAGOGASTRODUODENOSCOPY) (N/A)  COLONOSCOPY (N/A)      Hospital Course: patient admitted for anemia and transfused 4 units PRBC. GI consulted and had EGD and found Dieulafoy's lesion, treated. Colonoscopy unable to do per GI due to insufficient prep. H/H stable since then. He is eating and has no issues. Patient will be discharged home and GI consultation.    Consults:   Consults (From admission, onward)          Status Ordering Provider     IP consult to case management  Once        Provider:  (Not yet assigned)    Completed CECIL SANDOVAL     Inpatient consult to Cardiology-[facility]  Once        Provider:  Maureen Arguello MD    Completed MAUREEN RODRIGUEZ     Gastroenterology  Once        Provider:  Thierno Sevilla MD    Completed MAUREEN RODRIGUEZ            Final Active Diagnoses:    Diagnosis Date Noted POA    PRINCIPAL PROBLEM:  Acute on chronic anemia 2/2 GIB [D64.9] 08/15/2023 Yes    Acute on chronic Stage 3B kidney disease [N17.9, N18.9] 09/18/2020 Yes    CHF (congestive heart failure), NYHA class II, chronic, systolic [I50.22] 07/29/2020 Yes    CAD (coronary artery disease) [I25.10] 05/28/2018 Yes    Atrial fibrillation [I48.91] 11/25/2013 Yes    Essential hypertension [I10] 11/25/2013 Yes    Type 2 diabetes mellitus [E11.9] 11/25/2013 Yes    Hyperlipidemia [E78.5] 11/25/2013 Yes    Bioprosthetic mitral valve replacement [Z95.2] 11/25/2013 Not Applicable      Problems Resolved During this Admission:    Diagnosis Date Noted Date Resolved POA    Acute hypoxemic respiratory failure [J96.01] 08/17/2023 08/20/2023 Yes      Discharged Condition: fair    Disposition: Home or Self Care    Follow Up:   Follow-up Information       Lalo Sweet MD Follow up on 8/25/2023.    Specialty: Family Medicine  Why: at 2:45pm; PCP hospital follow up appointment.  Contact information:  [place] [address] [phone]                           Patient Instructions:      Ambulatory  referral/consult to Gastroenterology   Standing Status: Future   Referral Priority: Routine Referral Type: Consultation   Referral Reason: Specialty Services Required   Requested Specialty: Gastroenterology   Number of Visits Requested: 1     Medications:  Reconciled Home Medications:      Medication List        CONTINUE taking these medications      ALPRAZolam 0.25 MG tablet  Commonly known as: XANAX  TAKE ONE TABLET BY MOUTH TWICE DAILY AS NEEDED     amiodarone 200 MG Tab  Commonly known as: PACERONE  Take 0.5 tablets (100 mg total) by mouth once daily.     apixaban 5 mg Tab  Commonly known as: ELIQUIS  Take 5 mg by mouth 2 (two) times daily.     aspirin 81 MG EC tablet  Commonly known as: ECOTRIN  Take by mouth once daily.     atenoloL 25 MG tablet  Commonly known as: TENORMIN  Take 1 tablet (25 mg total) by mouth once daily.     carvediloL 6.25 MG tablet  Commonly known as: COREG  Take 6.25 mg by mouth 2 (two) times daily.     docusate sodium 100 MG capsule  Commonly known as: COLACE  Take 200 mg by mouth every morning.     DULoxetine 60 MG capsule  Commonly known as: CYMBALTA  Take 1 capsule (60 mg total) by mouth once daily.     ezetimibe 10 mg tablet  Commonly known as: ZETIA  TAKE ONE TABLET BY MOUTH ONCE A DAY     furosemide 20 MG tablet  Commonly known as: LASIX  Take 20 mg by mouth once daily.     gabapentin 100 MG capsule  Commonly known as: NEURONTIN  TAKE TWO CAPSULES BY MOUTH THREE TIMES DAILY (DOSE INCREASED     HYDROcodone-acetaminophen  mg per tablet  Commonly known as: NORCO  Take 1 tablet by mouth every 12 (twelve) hours as needed for Pain.     isosorbide mononitrate 60 MG 24 hr tablet  Commonly known as: IMDUR  Take 60 mg by mouth once daily.     pioglitazone 15 MG tablet  Commonly known as: ACTOS  TAKE ONE TABLET BY MOUTH ONCE A DAY     pravastatin 20 MG tablet  Commonly known as: PRAVACHOL  Take 20 mg by mouth once daily.     primidone 50 MG Tab  Commonly known as: MYSOLINE  Take 50 mg  by mouth 2 (two) times a day.     REPATHA SURECLICK 140 mg/mL Pnij  Generic drug: evolocumab  INJECT ONE SYRINGE SUBCUTANEOUSLY ONCE EVERY 14 DAYS     SITagliptin phosphate 100 MG Tab  Commonly known as: JANUVIA  Take 1 tablet (100 mg total) by mouth once daily.     VASCEPA 1 gram Cap  Generic drug: icosapent ethyL  TAKE TWO CAPSULES BY MOUTH TWICE DAILY              Significant Diagnostic Studies: Labs: BMP:   Recent Labs   Lab 08/19/23  0457   *      K 4.0      CO2 25   BUN 18   CREATININE 1.4   CALCIUM 8.2*   , CMP   Recent Labs   Lab 08/19/23  0457      K 4.0      CO2 25   *   BUN 18   CREATININE 1.4   CALCIUM 8.2*   ANIONGAP 6*   , and CBC   Recent Labs   Lab 08/19/23  1822 08/19/23  2353 08/20/23  0559   WBC 6.27 6.33 6.42   HGB 9.4* 9.0* 9.2*   HCT 28.0* 27.0* 27.5*   * 123* 133*       Pending Diagnostic Studies:       None          Indwelling Lines/Drains at time of discharge:   Lines/Drains/Airways       None                   Time spent on the discharge of patient: 30 minutes         Robel Julien MD  Department of Hospital Medicine  [facility]

## 2023-08-21 NOTE — PROGRESS NOTES
C3 nurse attempted to contact Wesley Bernal for a TCC post hospital discharge follow up call. The patient is unable to conduct the call @ this time. The patient requested a callback.    The patient has a scheduled [facility] appointment with Lalo Sweet MD on 08/25/23 @ 9619. Message sent to Physician staff.

## 2023-08-21 NOTE — PROGRESS NOTES
C3 nurse spoke with Wesley Bernal for a TCC post hospital discharge follow up call. The patient has a scheduled [facility] appointment with Lalo Sweet MD on 08/25/23 @ 3105.

## 2023-08-25 PROBLEM — K25.0 ACUTE GASTRIC ULCER WITH HEMORRHAGE: Status: ACTIVE | Noted: 2023-01-01

## 2023-08-25 NOTE — PROGRESS NOTES
Subjective:       Patient ID: Wesley Bernal is a 72 y.o. male.    Chief Complaint: Follow-up (Pt here for hospital f/u. )      Transitional Care Note    Family and/or Caretaker present at visit?  No.  Diagnostic tests reviewed/disposition: I have reviewed all completed as well as pending diagnostic tests at the time of discharge.  Disease/illness education: anemia, CHF, UGI bleed  Home health/community services discussion/referrals: Patient does not have home health established from hospital visit.  They do not need home health.  If needed, we will set up home health for the patient.   Establishment or re-establishment of referral orders for community resources: No other necessary community resources.   Discussion with other health care providers: No discussion with other health care providers necessary.     Patient is status post hospitalization for upper GI bleed on Eliquis.  He required EGD and clamping of a bleeding ulcer.  He was discharged home once the bleeding was stable.  He is still on his Eliquis.  He is not on a PPI.  Patient states he is feeling better.  He still weak.  Her required a wheelchair to get to my office today.  Below is his hospital course.    71 yo male with a PMHX of ICM HFrEF 30-35% s/p ICD, bioprosthetic MVR 2013 for severe MR, AF on amio/Eliquis here for worsening fatigue and bloody stools.     Patient states that the last 3-4 days he has become significantly weaker with associated constipation.  He has only had 1 or 2 bowel movements a day which is unusual for him.  The stools have been very hard and brown but with blood around it.  Says his stools for the last month have been relatively normal and denies any melena or codie hematochezia until 2 days ago.  When pressed further about symptoms, he has slowly limited his ADLs due to worsening dyspnea on exertion and extreme fatigue.  This has been ongoing for about a month.  For example, he no longer goes to the grocery store due to  his dyspnea.  Denies any dizziness, syncope, chest pain, palpitations, orthopnea, PND, swelling, hematemesis, abdominal pain.     At the outside ED, labs showed hemoglobin of 4 with and CARLOS.  SBP ranged in the 140s to 160s with EKG showing sinus bradycardia in the 50s.  Rectal exam showed no stool in the rectal vault.  CT abdomen was unremarkable.  Type and screen unfortunately showed an unknown antibody at the outside hospital's lab.  Therefore, he has not received blood.       Upon transfer here, he is comfortable and well-appearing.  Hemodynamically stable.  A repeat type and screen was sent to ascertain the specific antibody. Will hold on transfusion unless hemodynamically unstable or when antibody testing is complete.        Procedure(s) (LRB):  EGD (ESOPHAGOGASTRODUODENOSCOPY) (N/A)  COLONOSCOPY (N/A)      Hospital Course: patient admitted for anemia and transfused 4 units PRBC. GI consulted and had EGD and found Dieulafoy's lesion, treated. Colonoscopy unable to do per GI due to insufficient prep. H/H stable since then. He is eating and has no issues. Patient will be discharged home and GI consultation.      Review of Systems   Constitutional:  Positive for unexpected weight change. Negative for activity change, chills, fatigue and fever.   HENT:  Negative for sore throat and trouble swallowing.    Respiratory:  Negative for cough, chest tightness and shortness of breath.    Cardiovascular:  Negative for chest pain and leg swelling.   Gastrointestinal:  Positive for constipation. Negative for abdominal pain, blood in stool and diarrhea.   Endocrine: Negative for cold intolerance and heat intolerance.   Genitourinary:  Negative for difficulty urinating.   Musculoskeletal:  Negative for back pain, gait problem and joint swelling.   Skin:  Negative for rash.   Neurological:  Positive for light-headedness. Negative for numbness.   Hematological:  Negative for adenopathy.   Psychiatric/Behavioral:  Negative for  decreased concentration.        Objective:      Vitals:    08/25/23 1503   BP: (!) 108/50   Pulse: (!) 55   Resp: 16     Physical Exam  Constitutional:       Appearance: Normal appearance. He is well-developed. He is ill-appearing.      Comments: In wheel chair   HENT:      Head: Normocephalic and atraumatic.      Right Ear: Tympanic membrane, ear canal and external ear normal.      Left Ear: Tympanic membrane, ear canal and external ear normal.      Nose: Nose normal.   Eyes:      Conjunctiva/sclera: Conjunctivae normal.      Pupils: Pupils are equal, round, and reactive to light.   Neck:      Thyroid: No thyromegaly.      Trachea: No tracheal deviation.   Cardiovascular:      Rate and Rhythm: Normal rate and regular rhythm.      Heart sounds: Normal heart sounds. No murmur heard.     No gallop.   Pulmonary:      Effort: Pulmonary effort is normal.      Breath sounds: Normal breath sounds.   Abdominal:      General: Bowel sounds are normal. There is no distension.      Palpations: Abdomen is soft. There is no mass.      Tenderness: There is no abdominal tenderness. There is no guarding or rebound.      Hernia: There is no hernia in the left inguinal area.   Genitourinary:     Prostate: Normal.      Rectum: Normal.   Musculoskeletal:         General: Normal range of motion.      Cervical back: Normal range of motion and neck supple.      Right lower leg: No edema.      Left lower leg: No edema.   Skin:     General: Skin is warm and dry.   Neurological:      General: No focal deficit present.      Mental Status: He is alert and oriented to person, place, and time.      Motor: Weakness present.      Gait: Gait abnormal.      Deep Tendon Reflexes: Reflexes are normal and symmetric.   Psychiatric:         Behavior: Behavior normal.         Thought Content: Thought content normal.         Judgment: Judgment normal.         Lab Results   Component Value Date    WBC 6.42 08/20/2023    HGB 9.2 (L) 08/20/2023    HCT 27.5 (L)  08/20/2023    MCV 93 08/20/2023     (L) 08/20/2023     BMP  Lab Results   Component Value Date     08/19/2023    K 4.0 08/19/2023     08/19/2023    CO2 25 08/19/2023    BUN 18 08/19/2023    CREATININE 1.4 08/19/2023    CALCIUM 8.2 (L) 08/19/2023    ANIONGAP 6 (L) 08/19/2023    EGFRNORACEVR 54 (A) 08/19/2023   1. Acute gastric ulcer with hemorrhage  Overview:  Required admission, transfusion of 4 units, EGD, clamp placement on ulcer.    Monitor CBC closely.    Follow-up with Gastroenterology    Orders:  -     CBC Auto Differential; Future; Expected date: 08/25/2023  -     Ambulatory referral/consult to Home Health; Future; Expected date: 08/26/2023    2. Coronary artery disease with stable angina pectoris, unspecified vessel or lesion type, unspecified whether native or transplanted heart  Overview:  Keep appointment with Cardiology      3. CHF (congestive heart failure), NYHA class II, chronic, systolic  Overview:  Patient had aortic valve replacement  Keep appointment Cardiology  Continue atenolol 25 mg daily  Continue amiodarone 100 mg daily  Continue Eliquis 5 mg daily   Continue aspirin 81 mg daily  Continue Lasix 20 mg daily  Continue Imdur 60 mg daily    Orders:  -     Ambulatory referral/consult to Home Health; Future; Expected date: 08/26/2023    4. Stage 3a chronic kidney disease  Overview:  Renal fxn stable  Avoid NSAIDS  Good BP control      Orders:  -     Comprehensive Metabolic Panel; Future; Expected date: 08/25/2023  -     Ambulatory referral/consult to Home Health; Future; Expected date: 08/26/2023    5. Acute on chronic anemia 2/2 GIB  -     CBC Auto Differential; Future; Expected date: 08/25/2023    6. Type 2 diabetes mellitus with hyperglycemia, without long-term current use of insulin    7. Essential hypertension  Overview:  Two gram sodium diet.    Weight loss discussed.    Try to walk 2 miles per day.    Quit smoking.    Current medications will be:  - atenolol (TENORMIN) 25  MG tablet; Take 1 tablet (25 mg total) by mouth once daily.    - furosemide (LASIX) 20 MG tablet; Take 1 tablet (20 mg total) by mouth 2 (two) times daily.   - lisinopril 10 MG tablet; Take 1 tablet (10 mg total) by mouth twice daily.               ASA 81 mg po daily              Patient told to take 20 mg of Lasix if he does not have edema.  He can increase to 40 mg daily when he does have some swelling.           Assessment:       1. Acute gastric ulcer with hemorrhage    2. Coronary artery disease with stable angina pectoris, unspecified vessel or lesion type, unspecified whether native or transplanted heart    3. CHF (congestive heart failure), NYHA class II, chronic, systolic    4. Stage 3a chronic kidney disease    5. Acute on chronic anemia 2/2 GIB    6. Type 2 diabetes mellitus with hyperglycemia, without long-term current use of insulin    7. Essential hypertension        Plan:   1. Acute gastric ulcer with hemorrhage  Overview:  Required admission, transfusion of 4 units, EGD, clamp placement on ulcer.    Monitor CBC closely.    Follow-up with Gastroenterology    Orders:  -     CBC Auto Differential; Future; Expected date: 08/25/2023  -     Ambulatory referral/consult to Home Health; Future; Expected date: 08/26/2023    2. Coronary artery disease with stable angina pectoris, unspecified vessel or lesion type, unspecified whether native or transplanted heart  Overview:  Keep appointment with Cardiology      3. CHF (congestive heart failure), NYHA class II, chronic, systolic  Overview:  Patient had aortic valve replacement  Keep appointment Cardiology  Continue atenolol 25 mg daily  Continue amiodarone 100 mg daily  Continue Eliquis 5 mg daily   Continue aspirin 81 mg daily  Continue Lasix 20 mg daily  Continue Imdur 60 mg daily    Orders:  -     Ambulatory referral/consult to Home Health; Future; Expected date: 08/26/2023    4. Stage 3a chronic kidney disease  Overview:  Renal fxn stable  Avoid NSAIDS  Good  BP control      Orders:  -     Comprehensive Metabolic Panel; Future; Expected date: 08/25/2023  -     Ambulatory referral/consult to Home Health; Future; Expected date: 08/26/2023    5. Acute on chronic anemia 2/2 GIB  -     CBC Auto Differential; Future; Expected date: 08/25/2023    6. Type 2 diabetes mellitus with hyperglycemia, without long-term current use of insulin    7. Essential hypertension  Overview:  Two gram sodium diet.    Weight loss discussed.    Try to walk 2 miles per day.    Quit smoking.    Current medications will be:  - atenolol (TENORMIN) 25 MG tablet; Take 1 tablet (25 mg total) by mouth once daily.    - furosemide (LASIX) 20 MG tablet; Take 1 tablet (20 mg total) by mouth 2 (two) times daily.   - lisinopril 10 MG tablet; Take 1 tablet (10 mg total) by mouth twice daily.               ASA 81 mg po daily              Patient told to take 20 mg of Lasix if he does not have edema.  He can increase to 40 mg daily when he does have some swelling.         1. Type 2 diabetes mellitus with hyperglycemia, without long-term current use of insulin  Assessment & Plan:  Lab Results   Component Value Date    HGBA1C 6.3 (H) 11/12/2021   I have recommended the following steps for improving diabetic care and outcome to patient::   Referral to Diabetic Education department if necessary.  Diabetic diet discussed in detail, written exchange diet given, low cholesterol diet, weight control and daily exercise discussed.    All medications, side effects and compliance issues discussed.    Long term complications of diabetes discussed.  Home glucose monitoring emphasized. Fasting morning glucose goals should be less than 140.  2 hour postprandial goal should be less than 180.  Annual eye examinations at Ophthalmology discussed,   Glycohemoglobin and other lab monitoring discussed.  Medications for this patient will be:  - sitagliptin (JANUVIA) 100 MG Tab; Take 1 tablet (100 mg total) by mouth once daily.                 Actos 15 mg by mouth daily    Orders:  -     SITagliptin phosphate (JANUVIA) 100 MG Tab; Take 1 tablet (100 mg total) by mouth once daily.  Dispense: 30 tablet; Refill: 5  -     Comprehensive Metabolic Panel; Future; Expected date: 02/06/2023  -     Lipid Panel; Future; Expected date: 02/06/2023  -     Hemoglobin A1C; Future; Expected date: 02/06/2023    2. Paroxysmal atrial fibrillation  Assessment & Plan:  Keep appointment with cardiology   Continue Eliquis 5 mg twice daily  Continue amiodarone 100 mg daily  Control rate with atenolol 25 mg daily    Orders:  -     amiodarone (PACERONE) 200 MG Tab; Take 0.5 tablets (100 mg total) by mouth once daily.  Dispense: 15 tablet; Refill: 5    3. Essential hypertension  Assessment & Plan:  Two gram sodium diet.    Weight loss discussed.    Try to walk 2 miles per day.    Quit smoking.    Current medications will be:  - atenolol (TENORMIN) 25 MG tablet; Take 1 tablet (25 mg total) by mouth once daily.    - furosemide (LASIX) 20 MG tablet; Take 1 tablet (20 mg total) by mouth 2 (two) times daily.   - lisinopril 10 MG tablet; Take 1 tablet (10 mg total) by mouth twice daily.               ASA 81 mg po daily              Patient told to take 20 mg of Lasix if he does not have edema.  He can increase to 40 mg daily when he does have some swelling.    4. Lumbar disc disease with radiculopathy  Assessment & Plan:  Continue hydrocodone p.r.n. pain   Continue gabapentin 100 mg 2 capsules 3 times daily  Continue Cymbalta 60 mg daily    5. Squamous cell cancer, anus  Assessment & Plan:  Patient underwent radiation, chemo, surgery.  Now in remission.      6. CHF (congestive heart failure), NYHA class II, chronic, systolic  Assessment & Plan:  Patient had aortic valve replacement  Keep appointment Cardiology  Continue atenolol 25 mg daily  Continue amiodarone 100 mg daily  Continue Eliquis 5 mg daily   Continue aspirin 81 mg daily  Continue Lasix 20 mg daily  Continue Imdur 60 mg  daily      7. Type 2 diabetes mellitus with diabetic peripheral angiopathy without gangrene, without long-term current use of insulin  Assessment & Plan:  Lab Results   Component Value Date    HGBA1C 6.3 (H) 11/12/2021   I have recommended the following steps for improving diabetic care and outcome to patient::   Referral to Diabetic Education department if necessary.  Diabetic diet discussed in detail, written exchange diet given, low cholesterol diet, weight control and daily exercise discussed.    All medications, side effects and compliance issues discussed.    Long term complications of diabetes discussed.  Home glucose monitoring emphasized. Fasting morning glucose goals should be less than 140.  2 hour postprandial goal should be less than 180.  Annual eye examinations at Ophthalmology discussed,   Glycohemoglobin and other lab monitoring discussed.  Medications for this patient will be:  - sitagliptin (JANUVIA) 100 MG daily              Actos 15 mg by mouth daily      8. Stage 3a chronic kidney disease  Assessment & Plan:  Maintain good control blood pressure and cholesterol.    Avoid nephrotoxic drugs.        9. Mixed hyperlipidemia  Assessment & Plan:  Low fat diet.  Avoid sweets.  A 10 pound weight loss by the next visit as a  good goal.  Increase consumption of fruits and vegetables, fish and chicken.  Use medications below:  Statin intolerant  Continue Lovaza  Continues Zetia  Continue Repatha.        10. Anxiety disorder, unspecified type  Assessment & Plan:  Continue Cymbalta 60 mg daily   Continue Xanax 0.25 mg twice daily as needed      11. Essential tremor  Assessment & Plan:  Continue my slim 50 mg twice daily

## 2023-09-05 NOTE — TELEPHONE ENCOUNTER
LOV 08/25/2023    patient requesting refill for   HYDROcodone-acetaminophen (NORCO)  mg per tablet 60 tablet           RX pending   pharmacy confirmed  please advise

## 2023-09-05 NOTE — TELEPHONE ENCOUNTER
Care Due:                  Date            Visit Type   Department     Provider  --------------------------------------------------------------------------------                                EP [facility]                              [facility]  Last Visit: 08-      CARE ([facility])   MEDICINE     Micki                              EP -                              PRIMARY      [facility] INTERNAL  Lalo Orellana  Next Visit: 09-      CARE ([facility])   [facility]                                                            Last  Test          Frequency    Reason                     Performed    Due Date  --------------------------------------------------------------------------------    Lipid Panel.  12 months..  ezetimibe................  09- 08-    Health [facility] Embedded Care Due Messages. Reference number: [id number].   9/05/2023 8:30:45 AM CDT

## 2023-10-02 NOTE — TELEPHONE ENCOUNTER
No care due was identified.  Health [facility] Embedded Care Due Messages. Reference number: [id number].   10/02/2023 9:45:01 AM CDT

## 2023-10-23 NOTE — TELEPHONE ENCOUNTER
Pt requesting refill or new Rx.   Is this a refill or new RX:  refill  RX name and strength: Hydrocodone  mg  Last office visit: 8/25/23  Is this a 30-day or 90-day RX:  30  day  Pharmacy name and location:  [facility] last sent in on 10/2/23

## 2023-10-23 NOTE — TELEPHONE ENCOUNTER
----- Message from Simone Valadez sent at 10/23/2023  1:10 PM CDT -----  Contact: Patient  Wesley Bernal  MRN: [id number]  : 1950  PCP: Lalo Sweet  Home Phone      [phone]  Work Phone      Not on file.  Mobile          [phone]  Home Phone      Not on file.      MESSAGE:   Pt requesting refill or new Rx.   Is this a refill or new RX:  refill  RX name and strength: Hydrocodone  mg  Last office visit: 23  Is this a 30-day or 90-day RX:  30  day  Pharmacy name and location:  [facility]  Comments:      Phone:  [phone]     PCP: Micki

## 2023-10-23 NOTE — TELEPHONE ENCOUNTER
No care due was identified.  Health [facility] Embedded Care Due Messages. Reference number: [id number].   10/23/2023 1:18:50 PM CDT

## 2023-10-27 PROBLEM — E11.42 DIABETIC POLYNEUROPATHY ASSOCIATED WITH TYPE 2 DIABETES MELLITUS: Status: ACTIVE | Noted: 2023-01-01

## 2023-10-27 NOTE — PROGRESS NOTES
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 male here for a checkup.  Completed chemo and RXT for metestatic anal cancer.  His fatigue is much better.  .  He has familial intention tremor.  Gait is getting worse.  Starting to shuffle.  His tremor is better on Primadone.  Seen by Dr. Chaidez.  His weight is stable.  Patient has a history of peripheral vascular disease and lumbar spinal stenosis.  Lately, this seems to be stable.  Patient has iliac artery and femoral artery stents.  His pain is controlled with Norco.  He developed severe myalgias from Crestor and Lipitor.  He lost muscle mass.   His cholesterol increased so cardiology now has him on Zetia and Fish oil.  He is now taking and tolerating Repatha.  He has cardiac stents, peripheral stents.  He probably has familial hypercholesterolemia.  He also sees Cardiology for paroxysmal atrial fibrillation.  He is taking amiodarone, Coreg, Eloquis, atenolol and aspirin.  Patient has a history of CRI 3.  Renal ultrasound showed chronic kidney disease.  He no longer sees nephrology.  He is now on Januvia, Actos.  He tolerates it well.  His blood sugars are controlled decently.  Patient also has a history of mitral valve replacement, atrial fib, hypertension.  He has a pacemaker and defibrillator in place.  This has not been a problem for him.  He is now seeing Dr. Leigh.  His cardiologist was in [place].  That was his brother-in-law.  2018 patient had bilateral stents placed in his legs and is doing better.  He denies signs or symptoms of claudications.  He tolerates his blood pressure medication well and is not having side effects such as chronic cough or dizziness.    Clinically his back pain has been stable.  He takes 1 or 2 Norco's per day which helps.  Saw neurology and was started on Gabapentin and  is doing better.    ROS:   Gen.:  No fever, chills.  + weight loss   Fatigued.  Not interested in doing things  HEENT: No headaches, sinus congestion, sore throat, change in vision.  Voice getting shakey  CV: No chest pain  RESP: No shortness of breath, wheezing, cough  GI: No change in bowel habits, no diarrhea, no abdominal pain, no hematochezia  : No dysuria, frequency  MSK: Significant muscle pain, joint pain, back pain  NEURO: No numbness, weakness.  Worsening tremor, shuffling gait from back pain  ENDO: No polyuria, polydipsia, heat or cold intolerance    PHYSICAL EXAM;   Vitals:    10/27/23 0812   BP: 112/64   Pulse: (!) 52   Resp: 16     APPEARANCE: Well nourished, well developed, in no acute distress.    HEAD: Normocephalic, atraumatic.  EYES: PERRL. EOMI.      EARS: TM's intact. Light reflex normal. No retraction or perforation.    NOSE: Mucosa pink. Airway clear.  MOUTH & THROAT: No tonsillar enlargement. No pharyngeal erythema or exudate. No stridor.  NECK: Supple.  Bilateral carotid endarterectomy scars  NODES: No cervical, axillary or inguinal lymph node enlargement.  CHEST: Lungs clear to auscultation.  CARDIOVASCULAR: Normal S1, S2. No rubs, murmurs or gallops.  Poor DP pulses bilaterally  ABDOMEN: Bowel sounds normal. Not distended. Soft. No tenderness or masses.  Abdominal bruits are present  Rectal exam:  Atrophic mucosa, friable.  RXT changes  MUSCULOSKELETAL:  No CCE.  Very poor distal pulses in the left leg.  Palpable pulses in the right leg  SKIN: No rashes or pigmented moles.  NEUROLOGIC:       Cranial Nerves: II-XII grossly intact.      Motor: 5/5 strength major flexors/extensors.  Cogwheel rigidity.        DTR's: Knees, Ankles 2+ and equal bilaterally; downgoing toes.      Sensory: Intact to light touch distally.      Gait & Posture: Shuffling gait.  Pill rolling tremor?  Mild cogwheeling?  MENTAL STATUS: Normal orientation to person, place and time, normal affect, normal flow thought,  normal memory.    Protective Sensation (w/ 10 gram monofilament):  Right: Decreased  Left: Decreased    Visual Inspection:  Normal -  Bilateral    Pedal Pulses:   Right: Present  Left: Present    Posterior Tibialis Pulses:   Right:Present  Left: Present      Lab Results   Component Value Date    HGBA1C 6.0 (H) 10/27/2023     Lab Results   Component Value Date    LDLCALC 54.4 (L) 10/27/2023     Lab Results   Component Value Date    CREATININE 2.4 (H) 10/27/2023     Lab Results   Component Value Date    PSA 2.1 05/04/2017    PSA 2.0 09/03/2014     Lab Results   Component Value Date    WBC 11.78 10/27/2023    HGB 10.3 (L) 10/27/2023    HCT 33.1 (L) 10/27/2023    MCV 97 10/27/2023     10/27/2023     BMP  Lab Results   Component Value Date     10/27/2023    K 4.1 10/27/2023     10/27/2023    CO2 25 10/27/2023    BUN 33 (H) 10/27/2023    CREATININE 2.4 (H) 10/27/2023    CALCIUM 8.9 10/27/2023    ANIONGAP 13 10/27/2023    ESTGFRAFRICA 50 (A) 03/21/2022    EGFRNONAA 43 (A) 03/21/2022       ASSESSMENT/PLAN:    1. Type 2 diabetes mellitus with hyperglycemia, without long-term current use of insulin  -     SITagliptin phosphate (JANUVIA) 100 MG Tab; Take 1 tablet (100 mg total) by mouth once daily.  Dispense: 30 tablet; Refill: 5  -     pioglitazone (ACTOS) 15 MG tablet; Take 1 tablet (15 mg total) by mouth once daily.  Dispense: 30 tablet; Refill: 5  -     Comprehensive Metabolic Panel; Future; Expected date: 10/27/2023  -     Hemoglobin A1C; Future; Expected date: 10/27/2023    2. Lumbar disc disease with radiculopathy  -     HYDROcodone-acetaminophen (NORCO)  mg per tablet; Take 1 tablet by mouth every 12 (twelve) hours as needed for Pain.  Dispense: 60 tablet; Refill: 0    3. Generalized anxiety disorder  -     ALPRAZolam (XANAX) 0.25 MG tablet; TAKE ONE TABLET BY MOUTH TWICE DAILY AS NEEDED  Dispense: 60 tablet; Refill: 2    4. Paroxysmal atrial fibrillation  -     amiodarone (PACERONE) 200 MG  Tab; Take 0.5 tablets (100 mg total) by mouth once daily.  Dispense: 15 tablet; Refill: 5    5. Essential hypertension  Overview:  Two gram sodium diet.    Weight loss discussed.    Try to walk 2 miles per day.    Quit smoking.    Current medications will be:  - atenolol (TENORMIN) 25 MG tablet; Take 1 tablet (25 mg total) by mouth once daily.    - furosemide (LASIX) 20 MG tablet; Take 1 tablet (20 mg total) by mouth 2 (two) times daily.   - lisinopril 10 MG tablet; Take 1 tablet (10 mg total) by mouth twice daily.               ASA 81 mg po daily              Patient told to take 20 mg of Lasix if he does not have edema.  He can increase to 40 mg daily when he does have some swelling.    Orders:  -     atenoloL (TENORMIN) 25 MG tablet; Take 1 tablet (25 mg total) by mouth once daily.  Dispense: 30 tablet; Refill: 5    6. Hyperlipidemia, unspecified hyperlipidemia type  -     ezetimibe (ZETIA) 10 mg tablet; Take 1 tablet (10 mg total) by mouth once daily.  Dispense: 90 tablet; Refill: 1  -     icosapent ethyL (VASCEPA) 1 gram Cap; Take 2 capsules (2,000 mg total) by mouth 2 (two) times daily.  Dispense: 360 capsule; Refill: 1  -     Lipid Panel; Future; Expected date: 10/27/2023    7. Diabetic polyneuropathy associated with type 2 diabetes mellitus  Overview:  Gabapentin 200 mg tid.  Titrate as needed.  Cymbalta 60 mg daily from neurology      8. Gastric ulcer, unspecified chronicity, unspecified whether gastric ulcer hemorrhage or perforation present  Overview:  This was surgically repaired through endoscopy.    Orders:  -     CBC Auto Differential; Future; Expected date: 10/27/2023    9. Immunization due  -     Influenza (FLUAD) - Quadrivalent (Adjuvanted) *Preferred* (65+) (PF)    10. Stage 3a chronic kidney disease  Overview:  Renal fxn stable  Avoid NSAIDS  Good BP control  Keep appt with nephrology  Drink more water   Repeat BMP in 1 month         1. Type 2 diabetes mellitus with hyperglycemia, without  long-term current use of insulin  Assessment & Plan:  Lab Results   Component Value Date    HGBA1C 6.3 (H) 11/12/2021   I have recommended the following steps for improving diabetic care and outcome to patient::   Referral to Diabetic Education department if necessary.  Diabetic diet discussed in detail, written exchange diet given, low cholesterol diet, weight control and daily exercise discussed.    All medications, side effects and compliance issues discussed.    Long term complications of diabetes discussed.  Home glucose monitoring emphasized. Fasting morning glucose goals should be less than 140.  2 hour postprandial goal should be less than 180.  Annual eye examinations at Ophthalmology discussed,   Glycohemoglobin and other lab monitoring discussed.  Medications for this patient will be:  - sitagliptin (JANUVIA) 100 MG Tab; Take 1 tablet (100 mg total) by mouth once daily.                Actos 15 mg by mouth daily    Orders:  -     SITagliptin phosphate (JANUVIA) 100 MG Tab; Take 1 tablet (100 mg total) by mouth once daily.  Dispense: 30 tablet; Refill: 5  -     Comprehensive Metabolic Panel; Future; Expected date: 02/06/2023  -     Lipid Panel; Future; Expected date: 02/06/2023  -     Hemoglobin A1C; Future; Expected date: 02/06/2023    2. Paroxysmal atrial fibrillation  Assessment & Plan:  Keep appointment with cardiology   Continue Eliquis 5 mg twice daily  Continue amiodarone 100 mg daily  Control rate with atenolol 25 mg daily    Orders:  -     amiodarone (PACERONE) 200 MG Tab; Take 0.5 tablets (100 mg total) by mouth once daily.  Dispense: 15 tablet; Refill: 5    3. Essential hypertension  Assessment & Plan:  Two gram sodium diet.    Weight loss discussed.    Try to walk 2 miles per day.    Quit smoking.    Current medications will be:  - atenolol (TENORMIN) 25 MG tablet; Take 1 tablet (25 mg total) by mouth once daily.    - furosemide (LASIX) 20 MG tablet; Take 1 tablet (20 mg total) by mouth 2 (two)  times daily.   - lisinopril 10 MG tablet; Take 1 tablet (10 mg total) by mouth twice daily.               ASA 81 mg po daily              Patient told to take 20 mg of Lasix if he does not have edema.  He can increase to 40 mg daily when he does have some swelling.    Orders:  -     atenoloL (TENORMIN) 25 MG tablet; Take 1 tablet (25 mg total) by mouth once daily.  Dispense: 30 tablet; Refill: 5    4. Lumbar disc disease with radiculopathy  Assessment & Plan:  Continue hydrocodone p.r.n. pain   Continue gabapentin 100 mg 2 capsules 3 times daily  Continue Cymbalta 60 mg daily    Orders:  -     HYDROcodone-acetaminophen (NORCO)  mg per tablet; Take 1 tablet by mouth every 12 (twelve) hours as needed for Pain.  Dispense: 60 tablet; Refill: 0    5. Squamous cell cancer, anus  Assessment & Plan:  Patient underwent radiation, chemo, surgery.  Now in remission.      6. CHF (congestive heart failure), NYHA class II, chronic, systolic  Assessment & Plan:  Patient had aortic valve replacement  Keep appointment Cardiology  Continue atenolol 25 mg daily  Continue amiodarone 100 mg daily  Continue Eliquis 5 mg daily   Continue aspirin 81 mg daily  Continue Lasix 20 mg daily  Continue Imdur 60 mg daily  Continue Coreg 6.25 mg bied      7. Type 2 diabetes mellitus with diabetic peripheral angiopathy without gangrene, without long-term current use of insulin  Assessment & Plan:  Lab Results   Component Value Date    HGBA1C 6.3 (H) 11/12/2021   I have recommended the following steps for improving diabetic care and outcome to patient::   Referral to Diabetic Education department if necessary.  Diabetic diet discussed in detail, written exchange diet given, low cholesterol diet, weight control and daily exercise discussed.    All medications, side effects and compliance issues discussed.    Long term complications of diabetes discussed.  Home glucose monitoring emphasized. Fasting morning glucose goals should be less than 140.   2 hour postprandial goal should be less than 180.  Annual eye examinations at Ophthalmology discussed,   Glycohemoglobin and other lab monitoring discussed.  Medications for this patient will be:  - sitagliptin (JANUVIA) 100 MG Tab; Take 1 tablet (100 mg total) by mouth once daily.                Actos 15 mg by mouth daily      8. Stage 3a chronic kidney disease  Assessment & Plan:  Maintain good control blood pressure and cholesterol.    Avoid nephrotoxic drugs.        9. Mixed hyperlipidemia  Assessment & Plan:  Low fat diet.  Avoid sweets.  A 10 pound weight loss by the next visit as a  good goal.  Increase consumption of fruits and vegetables, fish and chicken.  Use medications below:  Statin intolerant  Continue Lovaza  Continues Zetia  Continue Repatha.        10. Anxiety disorder, unspecified type  Assessment & Plan:  Continue Cymbalta 60 mg daily   Continue Xanax 0.25 mg twice daily as needed      11. Essential tremor  Assessment & Plan:  Continue my slim 50 mg twice daily    Next appointment will be in 6 months.

## 2023-10-30 PROBLEM — K25.9 GASTRIC ULCER: Status: ACTIVE | Noted: 2023-01-01

## 2023-10-30 NOTE — PROGRESS NOTES
Tell patient that labs look good.  Kidneys a little worse.  Drink plenty of water.   Repeat kidney labs in one month.

## 2023-11-03 PROBLEM — R07.9 CHEST PAIN: Status: ACTIVE | Noted: 2023-01-01

## 2023-11-03 PROBLEM — R91.1 NODULE OF LOWER LOBE OF LEFT LUNG: Status: ACTIVE | Noted: 2023-01-01

## 2023-11-03 NOTE — ED PROVIDER NOTES
Encounter Date: 11/3/2023       History     Chief Complaint   Patient presents with    Fall     Pt reports rolling out of his bed and falling onto the ground yesterday at approximately 9 AM. Reports to back pain,  left sided rib pain, and weakness after falling. +Blood thinners, denies LOC or hitting his head.      72-year-old male presents with left rib & back pain after fall yesterday  Patient states he was in significant pain & could not get the sleep tonight  Patient states he had no head trauma or loss of consciousness with fall  Patient states his left ribs hurt in his low back hurts, normal neuro exam  No leg weakness or signs of cauda equina syndrome on ER evaluation   No obvious gross chest wall deformity, clear lung sounds noted today  Soft benign abdominal exam no signs of internal bleeding clinically    The history is provided by the patient.     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    Cardiomyopathy     Carotid artery disease     Coronary artery disease     Diabetes mellitus type II     Encounter for blood transfusion     Heart disease     Hyperlipidemia     Hypertension     PAD (peripheral artery disease)      Past Surgical History:   Procedure Laterality Date    ABDOMINAL AORTA STENT      BRACHIOCEPHALIC VEIN ANGIOPLASTY / STENTING      CARDIAC DEFIBRILLATOR PLACEMENT      1/2011    CARDIAC PACEMAKER PLACEMENT      1-2011    CARDIAC SURGERY      open heart    CARDIAC VALVE SURGERY      pig valve replacement -    CAROTID STENT      COLONOSCOPY N/A 8/17/2023    Procedure: COLONOSCOPY;  Surgeon: Javier Chang MD;  Location: [facility];  Service: Endoscopy;  Laterality: N/A;    ESOPHAGOGASTRODUODENOSCOPY N/A  2023    Procedure: EGD (ESOPHAGOGASTRODUODENOSCOPY);  Surgeon: Javier Chang MD;  Location: [facility] ENDO;  Service: Endoscopy;  Laterality: N/A;    LASIK SX Bilateral     LYMPH NODE BIOPSY Left 2019    Procedure: BIOPSY, LYMPH NODE (GROIN);  Surgeon: Adelfo Yin MD;  Location: [facility] OR;  Service: General;  Laterality: Left;    REPLACEMENT OF IMPLANTABLE CARDIOVERTER-DEFIBRILLATOR (ICD) GENERATOR N/A 2020    Procedure: REPLACEMENT, ICD GENERATOR;  Surgeon: Marko Hartley MD;  Location: [facility] CATH;  Service: Cardiology;  Laterality: N/A;     Family History   Problem Relation Age of Onset    Diabetes Mother     Heart disease Father     Cancer Father         melanoma, prostate    Heart disease Paternal Grandfather      Social History     Tobacco Use    Smoking status: Former     Current packs/day: 0.00     Types: Cigarettes, Cigars     Quit date: 4/10/2015     Years since quittin.5    Smokeless tobacco: Never   Substance Use Topics    Alcohol use: No     Alcohol/week: 0.0 standard drinks of alcohol    Drug use: Never     Review of Systems   Constitutional:  Negative for activity change, appetite change, fatigue, fever and unexpected weight change.   HENT:  Negative for congestion, ear pain, mouth sores, nosebleeds, rhinorrhea, sinus pressure, sneezing and sore throat.    Eyes:  Negative for pain, discharge, redness and itching.   Respiratory:  Negative for apnea, cough, chest tightness and shortness of breath.    Cardiovascular:  Negative for chest pain, palpitations and leg swelling.   Gastrointestinal:  Negative for abdominal distention, abdominal pain, anal bleeding, constipation, diarrhea, nausea and vomiting.   Endocrine: Negative.    Genitourinary:  Negative for dysuria, enuresis, flank pain and frequency.   Musculoskeletal:  Negative for arthralgias, neck pain and neck stiffness.        (+) chest wall & back pain after fall yesterday afternoon   Skin:  Negative for color change  and wound.   Allergic/Immunologic: Negative.    Neurological:  Negative for dizziness, tremors, syncope, facial asymmetry, speech difficulty, weakness, light-headedness, numbness and headaches.   Hematological:  Negative for adenopathy. Does not bruise/bleed easily.   Psychiatric/Behavioral:  Negative for agitation, behavioral problems, hallucinations, self-injury and suicidal ideas. The patient is not nervous/anxious.        Physical Exam     Initial Vitals [11/03/23 0423]   BP Pulse Resp Temp SpO2   (!) 161/72 (!) 59 20 97.6 °F (36.4 °C) 100 %      MAP       --         Physical Exam    Nursing note and vitals reviewed.  Constitutional: Vital signs are normal. He appears well-developed and well-nourished. He is cooperative.   HENT:   Head: Normocephalic and atraumatic.   Mouth/Throat: Uvula is midline and mucous membranes are normal.   Eyes: Conjunctivae, EOM and lids are normal. Pupils are equal, round, and reactive to light.   Neck: Neck supple.   Normal range of motion.   Full passive range of motion without pain.     Cardiovascular:  Normal rate, regular rhythm, S1 normal, S2 normal, normal heart sounds, intact distal pulses and normal pulses.           Pulmonary/Chest: Effort normal and breath sounds normal.   Abdominal: Abdomen is soft and flat. Bowel sounds are normal.   Musculoskeletal:      Cervical back: Full passive range of motion without pain, normal range of motion and neck supple.      Comments: (+) cervical kyphosis     Neurological: He is alert and oriented to person, place, and time. He has normal strength.   Skin: Skin is warm, dry and intact. Capillary refill takes less than 2 seconds.         ED Course   Procedures  Labs Reviewed   COMPREHENSIVE METABOLIC PANEL - Abnormal; Notable for the following components:       Result Value    Glucose 180 (*)     BUN 24 (*)     Creatinine 1.7 (*)     Total Protein 9.0 (*)     eGFR 43 (*)     All other components within normal limits   CBC W/ AUTO  DIFFERENTIAL - Abnormal; Notable for the following components:    WBC 13.11 (*)     RBC 4.41 (*)     Hemoglobin 12.9 (*)     MCHC 31.2 (*)     RDW 14.6 (*)     Immature Granulocytes 0.6 (*)     Gran # (ANC) 10.7 (*)     Immature Grans (Abs) 0.08 (*)     Lymph # 0.9 (*)     Eos # 0.7 (*)     Gran % 81.8 (*)     Lymph % 7.1 (*)     All other components within normal limits   TROPONIN I - Abnormal; Notable for the following components:    Troponin I 0.072 (*)     All other components within normal limits   CK - Abnormal; Notable for the following components:    CPK 18 (*)     All other components within normal limits   B-TYPE NATRIURETIC PEPTIDE - Abnormal; Notable for the following components:     (*)     All other components within normal limits   URINALYSIS, REFLEX TO URINE CULTURE - Abnormal; Notable for the following components:    Protein, UA 2+ (*)     Ketones, UA Trace (*)     Occult Blood UA Trace (*)     All other components within normal limits    Narrative:     Specimen Source->Urine   URINALYSIS MICROSCOPIC - Abnormal; Notable for the following components:    Bacteria Moderate (*)     Hyaline Casts, UA 2 (*)     All other components within normal limits    Narrative:     Specimen Source->Urine     EKG Readings: (Independently Interpreted)   No STEMI  Normal sinus rhythm  No ectopy  Normal conduction  Normal ST segments  Normal T-wave  Normal axis  Heart rate in the 60s       Imaging Results              CT Chest Abdomen Pelvis Without Contrast (XPD) (Final result)  Result time 11/03/23 08:33:01   Procedure changed from CT Chest Abdomen Pelvis With IV Contrast (XPD)     Final result by Mirian Holguin MD (11/03/23 08:33:01)                   Impression:      No evidence for definite acute traumatic injury in the chest, abdomen or pelvis, noting limitation given the lack of intravenous contrast material.    Mass in the left upper lobe with a large pleural base, measuring up to approximately 3.9 cm  in greatest dimension highly concerning for malignancy with scattered solid pulmonary nodules also seen throughout the lungs bilaterally, subcentimeter in size.  There is associated extensive mediastinal adenopathy with a lymph node in the pre-vascular space extending into the AP window that measures 3.3 cm in short axis dimension.    Cholelithiasis without CT evidence for cholecystitis.      Electronically signed by: Mirian Holguin MD  Date:    11/03/2023  Time:    08:33               Narrative:    EXAMINATION:  CT CHEST ABDOMEN PELVIS WITHOUT CONTRAST(XPD)    CLINICAL HISTORY:  Polytrauma, blunt;    TECHNIQUE:  Low dose axial images, sagittal and coronal reformations were obtained from the thoracic inlet to the pubic symphysis without contrast.    COMPARISON:  08/18/2023, 08/15/2023    FINDINGS:  Evaluation of the solid abdominal organs is limited given the lack of intravenous contrast material.    The thyroid appears normal.  The main central airways are patent.  The esophagus appears normal.  The heart is enlarged.  Calcified coronary artery disease.  Calcified atheromatous disease affects the aorta and its major branch vessels.  Left-sided pacer device in place.  There is extensive mediastinal adenopathy with a large lymph node in the prevascular space extending into the AP window measuring at least 3.3 cm in short axis dimension.  No axillary adenopathy.    Centrilobular emphysematous disease.  There is a mass in the left upper lobe laterally that abuts pleural surface measuring approximately 3.5 x 2.7 cm in transverse dimension and approximately 3.9 cm in craniocaudal dimension concerning for malignancy.  There are few scattered solid pulmonary nodules seen throughout the lungs bilaterally, subcentimeter in size.  A few scattered calcified granulomas are noted.  Left lower lobe pulmonary nodule measures 9 mm.  No pleural effusions.    Evaluation of the solid abdominal organs is limited given the lack of  intravenous contrast material.  Cholelithiasis without evidence for cholecystitis.  The unenhanced liver, spleen, pancreas, adrenal glands and kidneys are normal in size, shape and contour.  Bilateral renal cysts are present.  No nephrolithiasis, ureterolithiasis, hydronephrosis or hydroureter is seen.  The urinary bladder is partially distended and appears normal the prostate appears normal.    Constipation.  No free air, free fluid or obstruction.  No pathologically enlarged abdominal or pelvic lymph nodes are seen.    Age-appropriate degenerative changes affect the skeleton.                                       Medications   sodium chloride 0.9% bolus 500 mL 500 mL (0 mLs Intravenous Stopped 11/3/23 0541)   traMADoL tablet 50 mg (50 mg Oral Given 11/3/23 0609)     Medical Decision Making  72-year-old male presents with back & chest wall pain after a fall  Patient denies any head trauma loss of consciousness with the fall  Patient only complains of left rib & lower lumbar pain this early morning  Patient has clear breath sounds with no signs of acute abdomen today  Differential includes pneumothorax, hemothorax, rib fracture, rib contusion  Differential also includes blunt trauma, spleen injury, liver injury, contusion  Differential also includes compression fracture lumbar spine, DDD, contusion    Problems Addressed:  Fall: complicated acute illness or injury    Amount and/or Complexity of Data Reviewed  External Data Reviewed: labs and notes.  Labs: ordered. Decision-making details documented in ED Course.  Radiology: ordered and independent interpretation performed.  ECG/medicine tests: ordered and independent interpretation performed.    ED Management & Risk of Complications, Morbidity, Mortality:  Patient feels weak so EKG is prudent with lab work due CKD/medical comorbidities  CT chest abdomen pelvis without IV contrast ordered to assess blunt trauma today  Will transition this patient to the oncoming ER  physician at 6:00 a.m. shift change today    Critical Care ED Physician Time (minutes):  -- Performed by: Alexi Carl M.D.  -- Date/Time: 4:34 AM 11/3/2023   -- Direct Patient Care (Face Time): 5  -- Additional History from Records or Taking Additional History: 5  -- Ordering, Reviewing, and Interpreting Diagnostic Studies: 5  -- Total Time in Documentation: 15  -- Consultation with Other Physicians: 5  -- Consultation with Family Related to Condition: 5  -- Total Critical Care Time: 40  -- Critical care was necessary to treat the following conditions:   -- Critical care was time spent personally by me on the following activities:   -- discussions with consultants regarding treatment plan today  -- development of treatment plan with patient & their family  -- examination of patient, ordering and performing treatments   -- review of radiographic studies, re-evaluation of pt's condition  -- review of labs and evaluation of response to treatment        Clinical Impression:   Final diagnoses:  [W19.XXXA] Fall        ED Disposition Condition    Admit Stable                Alexi Carl MD  11/04/23 1802

## 2023-11-03 NOTE — ASSESSMENT & PLAN NOTE
Creatine stable for now. BMP reviewed- noted Estimated Creatinine Clearance: 42.3 mL/min (A) (based on SCr of 1.7 mg/dL (H)). according to latest data. Based on current GFR, CKD stage is stage 3 - GFR 30-59.  Monitor UOP and serial BMP and adjust therapy as needed. Renally dose meds. Avoid nephrotoxic medications and procedures.

## 2023-11-03 NOTE — PT/OT/SLP EVAL
Occupational Therapy   Evaluation and Discharge Note    Name: Wesley Bernal  MRN: [id number]  Admitting Diagnosis: <principal problem not specified>  Recent Surgery: * No surgery found *      Recommendations:     Discharge Recommendations: Moderate Intensity Therapy  Discharge Equipment Recommendations: none  Barriers to discharge:  None    Assessment:     Wesley Bernal is a 72 y.o. male with a medical diagnosis of <principal problem not specified>. At this time, patient is functioning at their prior level of function and does not require further acute OT services.     Plan:     During this hospitalization, patient does not require further acute OT services.  Please re-consult if situation changes.    Plan of Care Reviewed with: patient    Subjective     Chief Complaint: left rib pain.  Patient/Family Comments/goals: None stated.    Occupational Profile:  Living Environment: Pt lives with his wife, step son, and brother in law in [facility] with 1 [address].  Previous level of function: MOD I with AD listed below.  Equipment Used at home: cane, quad, walker, rolling, wheelchair, shower chair, grab bar  Assistance upon Discharge: Family    Pain/Comfort:  Pain Rating 1: 6/10  Location - Side 1: Left  Location - Orientation 1: lateral  Location 1: rib(s)  Pain Addressed 1: Reposition, Cessation of Activity  Pain Rating Post-Intervention 1: 0/10 (No pain reported.)    Patients cultural, spiritual, Uatsdin conflicts given the current situation: no    Objective:     Communicated with: Nursing prior to session.  Patient found HOB elevated with telemetry, peripheral IV upon OT entry to room.    General Precautions: Standard, fall  Orthopedic Precautions: N/A  Braces: N/A  Respiratory Status: Room air     Occupational Performance:    Bed Mobility:    Patient completed Rolling/Turning to Left with  independence  Patient completed Scooting/Bridging with independence  Patient completed Supine to Sit with independence  Patient  completed Sit to Supine with independence    Functional Mobility/Transfers:  Patient completed Sit <> Stand Transfer with independence  with  no assistive device   Patient completed Toilet Transfer Step Transfer technique with modified independence with  grab bars  Functional Mobility: Pt ambulated ~10 feet with cane to bathroom with supervision.    Activities of Daily Living:  Grooming: independence to perform oral care and face washing at sink level.  Upper Body Dressing: independence to don/doff gown seated EOB.  Lower Body Dressing: independence to don/doff socks seated EOB.    Cognitive/Visual Perceptual:  Cognitive/Psychosocial Skills:     -       Oriented to: Person, Place, Time, and Situation   -       Follows Commands/attention:Follows multistep  commands  -       Communication: clear/fluent    Physical Exam:  Balance:    -       Pt demonstrating good balance throughout evaluation.   Postural examination/scapula alignment:    -       Rounded shoulders  -       Forward head  Upper Extremity Range of Motion:     -       Right Upper Extremity: WFL  -       Left Upper Extremity: WFL  Upper Extremity Strength:    -       Right Upper Extremity: WFL  -       Left Upper Extremity: WFL   Strength:    -       Right Upper Extremity: WFL  -       Left Upper Extremity: WFL    AMPAC 6 Click ADL:  AMPAC Total Score: 22    Treatment & Education:  OT evaluation completed with Pt. No acute OT services recommended at this time due to functional level.    Patient left HOB elevated with all lines intact, call button in reach, and bed alarm on    GOALS:   Multidisciplinary Problems       Occupational Therapy Goals       Not on file                    History:     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    Cardiomyopathy     Carotid artery disease     Coronary artery disease     Diabetes mellitus type II     Encounter for blood  transfusion     Heart disease     Hyperlipidemia     Hypertension     PAD (peripheral artery disease)          Past Surgical History:   Procedure Laterality Date    ABDOMINAL AORTA STENT      BRACHIOCEPHALIC VEIN ANGIOPLASTY / STENTING      CARDIAC DEFIBRILLATOR PLACEMENT      1/2011    CARDIAC PACEMAKER PLACEMENT      1-2011    CARDIAC SURGERY      open heart    CARDIAC VALVE SURGERY      pig valve replacement -    CAROTID STENT      COLONOSCOPY N/A 8/17/2023    Procedure: COLONOSCOPY;  Surgeon: Javier Chang MD;  Location: [facility] ENDO;  Service: Endoscopy;  Laterality: N/A;    ESOPHAGOGASTRODUODENOSCOPY N/A 8/17/2023    Procedure: EGD (ESOPHAGOGASTRODUODENOSCOPY);  Surgeon: Javier Chang MD;  Location: [facility] ENDO;  Service: Endoscopy;  Laterality: N/A;    LASIK SX Bilateral     LYMPH NODE BIOPSY Left 6/17/2019    Procedure: BIOPSY, LYMPH NODE (GROIN);  Surgeon: Adelfo Yin MD;  Location: [facility];  Service: General;  Laterality: Left;    REPLACEMENT OF IMPLANTABLE CARDIOVERTER-DEFIBRILLATOR (ICD) GENERATOR N/A 7/29/2020    Procedure: REPLACEMENT, ICD GENERATOR;  Surgeon: Marko Hartley MD;  Location: [facility] CATH;  Service: Cardiology;  Laterality: N/A;       Time Tracking:     OT Date of Treatment:    OT Start Time: 1018  OT Stop Time: 1042  OT Total Time (min): 24 min    Billable Minutes:Evaluation 24    11/3/2023

## 2023-11-03 NOTE — PT/OT/SLP PROGRESS
Physical Therapy      Patient Name:  Wesley Bernal   MRN:  [id number]    Patient not seen today secondary to Other (Comment) Patient is being seen by medical team at this time.Will follow-up later time /date when appropriate..

## 2023-11-03 NOTE — SUBJECTIVE & OBJECTIVE
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    Cardiomyopathy     Carotid artery disease     Coronary artery disease     Diabetes mellitus type II     Encounter for blood transfusion     Heart disease     Hyperlipidemia     Hypertension     PAD (peripheral artery disease)        Past Surgical History:   Procedure Laterality Date    ABDOMINAL AORTA STENT      BRACHIOCEPHALIC VEIN ANGIOPLASTY / STENTING      CARDIAC DEFIBRILLATOR PLACEMENT      1/2011    CARDIAC PACEMAKER PLACEMENT      1-2011    CARDIAC SURGERY      open heart    CARDIAC VALVE SURGERY      pig valve replacement -    CAROTID STENT      COLONOSCOPY N/A 8/17/2023    Procedure: COLONOSCOPY;  Surgeon: Javier Chang MD;  Location: [facility] ENDO;  Service: Endoscopy;  Laterality: N/A;    ESOPHAGOGASTRODUODENOSCOPY N/A 8/17/2023    Procedure: EGD (ESOPHAGOGASTRODUODENOSCOPY);  Surgeon: Javier Chang MD;  Location: [facility] ENDO;  Service: Endoscopy;  Laterality: N/A;    LASIK SX Bilateral     LYMPH NODE BIOPSY Left 6/17/2019    Procedure: BIOPSY, LYMPH NODE (GROIN);  Surgeon: Adelfo Yin MD;  Location: [facility];  Service: General;  Laterality: Left;    REPLACEMENT OF IMPLANTABLE CARDIOVERTER-DEFIBRILLATOR (ICD) GENERATOR N/A 7/29/2020    Procedure: REPLACEMENT, ICD GENERATOR;  Surgeon: Marko Hartley MD;  Location: [facility] CATH;  Service: Cardiology;  Laterality: N/A;       Review of patient's allergies indicates:   Allergen Reactions    Ace inhibitors Other (See Comments)    Atorvastatin calcium Other (See Comments)    Statins-hmg-coa reductase inhibitors        No current facility-administered medications on file prior to encounter.     Current Outpatient Medications on File Prior to Encounter   Medication Sig    ALPRAZolam (XANAX) 0.25 MG tablet TAKE ONE TABLET BY MOUTH TWICE DAILY AS NEEDED    amiodarone (PACERONE) 200 MG Tab Take 0.5 tablets  "(100 mg total) by mouth once daily. (Patient taking differently: Take 100 mg by mouth once daily. Pt takes 1 Tablet of 200 mg twice daily)    apixaban (ELIQUIS) 5 mg Tab Take 5 mg by mouth 2 (two) times daily.    carvediloL (COREG) 6.25 MG tablet Take 6.25 mg by mouth 2 (two) times daily.    ezetimibe (ZETIA) 10 mg tablet Take 1 tablet (10 mg total) by mouth once daily.    furosemide (LASIX) 20 MG tablet Take 20 mg by mouth once daily.     gabapentin (NEURONTIN) 100 MG capsule Pt states he takes "2 in the morning and 4 at night" as it helps him sleep better.    HYDROcodone-acetaminophen (NORCO)  mg per tablet Take 1 tablet by mouth every 12 (twelve) hours as needed for Pain. (Patient taking differently: Take 1.5 tablets by mouth every 12 (twelve) hours as needed for Pain.)    icosapent ethyL (VASCEPA) 1 gram Cap Take 2 capsules (2,000 mg total) by mouth 2 (two) times daily.    isosorbide mononitrate (IMDUR) 60 MG 24 hr tablet Take 60 mg by mouth once daily.     pioglitazone (ACTOS) 15 MG tablet Take 1 tablet (15 mg total) by mouth once daily.    primidone (MYSOLINE) 50 MG Tab Take 50 mg by mouth 2 (two) times a day.    REPATHA SURECLICK 140 mg/mL PnIj INJECT ONE SYRINGE SUBCUTANEOUSLY ONCE EVERY 14 DAYS    SITagliptin phosphate (JANUVIA) 100 MG Tab Take 1 tablet (100 mg total) by mouth once daily.    [DISCONTINUED] atenoloL (TENORMIN) 25 MG tablet Take 1 tablet (25 mg total) by mouth once daily.    docusate sodium (COLACE) 100 MG capsule Take 200 mg by mouth every morning.    DULoxetine (CYMBALTA) 60 MG capsule Take 1 capsule (60 mg total) by mouth once daily.     Family History       Problem Relation (Age of Onset)    Cancer Father    Diabetes Mother    Heart disease Father, Paternal Grandfather          Tobacco Use    Smoking status: Former     Current packs/day: 0.00     Types: Cigarettes, Cigars     Quit date: 4/10/2015     Years since quittin.5    Smokeless tobacco: Never   Substance and Sexual " Activity    Alcohol use: No     Alcohol/week: 0.0 standard drinks of alcohol    Drug use: Never    Sexual activity: Yes     Partners: Female     Review of Systems   Constitutional:  Positive for fatigue and unexpected weight change. Negative for chills and fever.   HENT:  Negative for ear discharge and ear pain.    Eyes:  Negative for pain and discharge.   Respiratory:  Negative for cough and shortness of breath.    Cardiovascular:  Positive for chest pain. Negative for leg swelling.   Gastrointestinal:  Negative for nausea and vomiting.   Endocrine: Negative for cold intolerance and heat intolerance.   Genitourinary:  Negative for difficulty urinating and dysuria.   Musculoskeletal:  Negative for joint swelling and myalgias.   Skin:  Negative for rash and wound.   Neurological:  Positive for weakness (generalized). Negative for dizziness and headaches.   Psychiatric/Behavioral:  Negative for agitation and confusion.      Objective:     Vital Signs (Most Recent):  Temp: 96.5 °F (35.8 °C) (11/03/23 1114)  Pulse: 75 (11/03/23 1114)  Resp: 16 (11/03/23 1114)  BP: 132/60 (11/03/23 1114)  SpO2: 95 % (11/03/23 1114) Vital Signs (24h Range):  Temp:  [96.5 °F (35.8 °C)-98.4 °F (36.9 °C)] 96.5 °F (35.8 °C)  Pulse:  [52-75] 75  Resp:  [14-20] 16  SpO2:  [95 %-100 %] 95 %  BP: (132-172)/(60-74) 132/60     Weight: 75.1 kg (165 lb 9.1 oz)  Body mass index is 21.84 kg/m².     Physical Exam  Constitutional:       General: He is not in acute distress.     Comments: Decreased muscle tone     HENT:      Head: Normocephalic and atraumatic.   Eyes:      General:         Right eye: No discharge.         Left eye: No discharge.   Cardiovascular:      Rate and Rhythm: Normal rate. Rhythm irregular.   Pulmonary:      Effort: Pulmonary effort is normal.      Breath sounds: Normal breath sounds.   Abdominal:      General: There is no distension.      Tenderness: There is abdominal tenderness.      Hernia: Hernia: left lateral side.  "  Musculoskeletal:         General: No swelling or tenderness.      Cervical back: Neck supple. No tenderness.   Skin:     General: Skin is warm and dry.   Neurological:      General: No focal deficit present.      Mental Status: He is alert and oriented to person, place, and time.   Psychiatric:         Mood and Affect: Mood normal.         Behavior: Behavior normal.                Significant Labs: A1C:   Recent Labs   Lab 08/16/23  0239 10/27/23  0904   HGBA1C 5.9* 6.0*     ABGs: No results for input(s): "PH", "PCO2", "HCO3", "POCSATURATED", "BE", "TOTALHB", "COHB", "METHB", "O2HB", "POCFIO2", "PO2" in the last 48 hours.  Bilirubin:   Recent Labs   Lab 10/27/23  0904 11/03/23  0434   BILITOT 0.4 0.7     Blood Culture: No results for input(s): "LABBLOO" in the last 48 hours.  BMP:   Recent Labs   Lab 11/03/23  0434   *      K 4.4      CO2 24   BUN 24*   CREATININE 1.7*   CALCIUM 10.0     CBC:   Recent Labs   Lab 11/03/23  0434   WBC 13.11*   HGB 12.9*   HCT 41.3        CMP:   Recent Labs   Lab 11/03/23  0434      K 4.4      CO2 24   *   BUN 24*   CREATININE 1.7*   CALCIUM 10.0   PROT 9.0*   ALBUMIN 4.1   BILITOT 0.7   ALKPHOS 90   AST 14   ALT 13   ANIONGAP 15     Cardiac Markers:   Recent Labs   Lab 11/03/23  0434   *     Coagulation: No results for input(s): "PT", "INR", "APTT" in the last 48 hours.  Lactic Acid: No results for input(s): "LACTATE" in the last 48 hours.  Lipase: No results for input(s): "LIPASE" in the last 48 hours.  Lipid Panel: No results for input(s): "CHOL", "HDL", "LDLCALC", "TRIG", "CHOLHDL" in the last 48 hours.  Magnesium: No results for input(s): "MG" in the last 48 hours.  POCT Glucose:   Recent Labs   Lab 11/03/23  1115   POCTGLUCOSE 237*     Prealbumin: No results for input(s): "PREALBUMIN" in the last 48 hours.  Respiratory Culture: No results for input(s): "GSRESP", "RESPIRATORYC" in the last 48 hours.  Troponin:   Recent Labs " "  Lab 11/03/23  0434   TROPONINI 0.072*     TSH: No results for input(s): "TSH" in the last 4320 hours.  Urine Culture: No results for input(s): "LABURIN" in the last 48 hours.  Urine Studies:   Recent Labs   Lab 11/03/23  0525   COLORU Yellow   APPEARANCEUA Clear   PHUR 6.0   SPECGRAV 1.020   PROTEINUA 2+*   GLUCUA Negative   KETONESU Trace*   BILIRUBINUA Negative   OCCULTUA Trace*   NITRITE Negative   UROBILINOGEN 1.0   LEUKOCYTESUR Negative   RBCUA 0   WBCUA 5   BACTERIA Moderate*   HYALINECASTS 2*       Significant Imaging: I have reviewed all pertinent imaging results/findings within the past 24 hours.  "

## 2023-11-03 NOTE — CONSULTS
[facility] - Med Surg (3rd Fl)  Pulmonology  Consult Note    Patient Name: Wesley Bernal  MRN: [id number]  Admission Date: 11/3/2023  Hospital Length of Stay: 1 days  Code Status: Full Code  Attending Physician: Chel Graf MD  Primary Care Provider: Lalo Sweet MD   Principal Problem: Chest pain    Consults  Subjective:     HPI:  Wesley Bernal is a 72 y.o. year old male that's presents with a chief complaint of SOB and he fell out of bed his ct chest reveals a 3.9 cm MATTHIAS lesion in the lung . History approximately 1 year ago of anal/colon cancer sp chemotherapy.Admitted today will do bx of the lung as an outpt. Consulted to evaluate Respiratory status.    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    Cardiomyopathy     Carotid artery disease     Coronary artery disease     Diabetes mellitus type II     Encounter for blood transfusion     Heart disease     Hyperlipidemia     Hypertension     PAD (peripheral artery disease)         Past Surgical History:   Procedure Laterality Date    ABDOMINAL AORTA STENT      BRACHIOCEPHALIC VEIN ANGIOPLASTY / STENTING      CARDIAC DEFIBRILLATOR PLACEMENT      1/2011    CARDIAC PACEMAKER PLACEMENT      1-2011    CARDIAC SURGERY      open heart    CARDIAC VALVE SURGERY      pig valve replacement -    CAROTID STENT      COLONOSCOPY N/A 8/17/2023    Procedure: COLONOSCOPY;  Surgeon: Javier Chang MD;  Location: [facility];  Service: Endoscopy;  Laterality: N/A;    ESOPHAGOGASTRODUODENOSCOPY N/A 8/17/2023    Procedure: EGD (ESOPHAGOGASTRODUODENOSCOPY);  Surgeon: Javier Chang MD;  Location: [facility];  Service: Endoscopy;  Laterality: N/A;    LASIK SX Bilateral     LYMPH NODE BIOPSY Left 6/17/2019    Procedure: BIOPSY, LYMPH NODE (GROIN);  Surgeon: Adelfo Yin MD;  Location: [facility];  Service: General;  Laterality: Left;     "REPLACEMENT OF IMPLANTABLE CARDIOVERTER-DEFIBRILLATOR (ICD) GENERATOR N/A 7/29/2020    Procedure: REPLACEMENT, ICD GENERATOR;  Surgeon: Marko Hartley MD;  Location: [facility] CATH;  Service: Cardiology;  Laterality: N/A;       Prior to Admission medications    Medication Sig Start Date End Date Taking? Authorizing Provider   ALPRAZolam (XANAX) 0.25 MG tablet TAKE ONE TABLET BY MOUTH TWICE DAILY AS NEEDED 10/27/23  Yes Lalo Sweet MD   amiodarone (PACERONE) 200 MG Tab Take 0.5 tablets (100 mg total) by mouth once daily.  Patient taking differently: Take 100 mg by mouth once daily. Pt takes 1 Tablet of 200 mg twice daily 10/27/23  Yes Lalo Sweet MD   apixaban (ELIQUIS) 5 mg Tab Take 5 mg by mouth 2 (two) times daily.   Yes Provider, Historical   carvediloL (COREG) 6.25 MG tablet Take 6.25 mg by mouth 2 (two) times daily. 7/22/23  Yes Provider, Historical   ezetimibe (ZETIA) 10 mg tablet Take 1 tablet (10 mg total) by mouth once daily. 10/27/23  Yes Lalo Sweet MD   furosemide (LASIX) 20 MG tablet Take 20 mg by mouth once daily.  8/15/20  Yes Provider, Historical   gabapentin (NEURONTIN) 100 MG capsule Pt states he takes "2 in the morning and 4 at night" as it helps him sleep better. 8/16/22  Yes Provider, Historical   HYDROcodone-acetaminophen (NORCO)  mg per tablet Take 1 tablet by mouth every 12 (twelve) hours as needed for Pain.  Patient taking differently: Take 1.5 tablets by mouth every 12 (twelve) hours as needed for Pain. 11/3/23  Yes Lalo Sweet MD   icosapent ethyL (VASCEPA) 1 gram Cap Take 2 capsules (2,000 mg total) by mouth 2 (two) times daily. 10/27/23  Yes Lalo Sweet MD   isosorbide mononitrate (IMDUR) 60 MG 24 hr tablet Take 60 mg by mouth once daily.  12/16/19  Yes Provider, Historical   pioglitazone (ACTOS) 15 MG tablet Take 1 tablet (15 mg total) by mouth once daily. 10/27/23  Yes Lalo Sweet MD   primidone (MYSOLINE) 50 MG Tab Take " 50 mg by mouth 2 (two) times a day.   Yes Provider, Historical   REPATHA SURECLICK 140 mg/mL PnIj INJECT ONE SYRINGE SUBCUTANEOUSLY ONCE EVERY 14 DAYS 22  Yes Provider, Historical   SITagliptin phosphate (JANUVIA) 100 MG Tab Take 1 tablet (100 mg total) by mouth once daily. 10/27/23  Yes Lalo Sweet MD   atenoloL (TENORMIN) 25 MG tablet Take 1 tablet (25 mg total) by mouth once daily. 10/27/23 11/3/23 Yes Lalo Sweet MD   docusate sodium (COLACE) 100 MG capsule Take 200 mg by mouth every morning.    Provider, Historical   DULoxetine (CYMBALTA) 60 MG capsule Take 1 capsule (60 mg total) by mouth once daily. 9/20/21 10/27/23  Walter Baker MD       Social History     Socioeconomic History    Marital status: Significant Other     Spouse name: Tierra Jacobson    Number of children: 0   Tobacco Use    Smoking status: Former     Current packs/day: 0.00     Types: Cigarettes, Cigars     Quit date: 4/10/2015     Years since quittin.5    Smokeless tobacco: Never   Substance and Sexual Activity    Alcohol use: No     Alcohol/week: 0.0 standard drinks of alcohol    Drug use: Never    Sexual activity: Yes     Partners: Female     Social Determinants of Health     Financial Resource Strain: Low Risk  (2023)    Overall Financial Resource Strain (CARDIA)     Difficulty of Paying Living Expenses: Not hard at all   Food Insecurity: No Food Insecurity (2023)    Hunger Vital Sign     Worried About Running Out of Food in the Last Year: Never true     Ran Out of Food in the Last Year: Never true   Transportation Needs: No Transportation Needs (11/3/2023)    PRAPARE - Transportation     Lack of Transportation (Medical): No     Lack of Transportation (Non-Medical): No   Physical Activity: Sufficiently Active (2023)    Exercise Vital Sign     Days of Exercise per Week: 7 days     Minutes of Exercise per Session: 30 min   Stress: No Stress Concern Present (2023)     [facility] [employer] - Occupational Stress Questionnaire     Feeling of Stress : Not at all   Social Connections: Socially Integrated (8/16/2023)    Social Connection and Isolation Panel [NHANES]     Frequency of Communication with Friends and Family: More than three times a week     Frequency of Social Gatherings with Friends and Family: More than three times a week     Attends Orthodoxy Services: More than 4 times per year     Active Member of Clubs or Organizations: Yes     Attends Club or Organization Meetings: More than 4 times per year     Marital Status: Living with partner   Housing Stability: Unknown (8/16/2023)    Housing Stability Vital Sign     Unable to Pay for Housing in the Last Year: No     Unstable Housing in the Last Year: No       Family History   Problem Relation Age of Onset    Diabetes Mother     Heart disease Father     Cancer Father         melanoma, prostate    Heart disease Paternal Grandfather        Review of patient's allergies indicates:   Allergen Reactions    Ace inhibitors Other (See Comments)    Atorvastatin calcium Other (See Comments)    Statins-hmg-coa reductase inhibitors     Allergies have been reviewed.     ROS: Review of Systems   Constitutional: Positive for malaise/fatigue and weight loss (??). Negative for chills and fever.   HENT: Negative for congestion, nosebleeds and sore throat.    Eyes: Negative for double vision, photophobia and discharge.   Respiratory: Positive for cough, sputum production and shortness of breath. Negative for hemoptysis.    Cardiovascular: Positive for leg swelling (mild) and PND (occasional). Negative for palpitations.   Gastrointestinal: Negative for abdominal pain, constipation, diarrhea, nausea and vomiting.   Genitourinary: Negative for dysuria and frequency.   Musculoskeletal: Positive for myalgias (generalized). Negative for back pain and neck pain.   Skin: Negative.    Neurological: Negative for  dizziness, focal weakness, weakness and headaches.   Endo/Heme/Allergies: Does not bruise/bleed easily.   Psychiatric/Behavioral: Negative for memory loss and substance abuse. The patient has insomnia (intermittant ).        PE:   Vitals:    11/03/23 1002 11/03/23 1020 11/03/23 1114 11/03/23 1200   BP:  (!) 172/74 132/60    BP Location:   Right arm    Patient Position:   Lying    Pulse: 60  75 62   Resp:  17 16    Temp:   96.5 °F (35.8 °C)    TempSrc:   Tympanic    SpO2:   95%    Weight:       Height:        Physical Exam    Alert and orientated X 3   HEENT: Head: Normocephalic no trauma                Ears : Normal Pinna No Drainage no Battles sign                Eyes: Vision Unchanged, No conjunctivitis,No drainage                Neck: Supple, No JVD,No Abnormal Carotid Pulsations                Throat: No Erythema, No pus,No Swelling,Mallampati score= 3    Chest: course BS bilaterally with no wheeze   Cardiac: RRR S1+ S2 with a -S3: +M = 2/6, No R/H/G  Abdomen: Bowel Sounds are Normal.Soft Abdomen. No organomegaly of Liver,Spleen,or Kidneys   CNS: Non focal and intact. Cranial nerves 2, 346,8,9,10 and 12 are normal.Norrmal gait.Normal posture.  Extremities: No Clubbing,No Cyanosis with oxygen,Positive mild edema of lower extremities Bilateral  Skin: No Rash, No Ulcerative sores,and No cellulitis of the IV site.    Lab Results   Component Value Date    WBC 13.11 (H) 11/03/2023    HGB 12.9 (L) 11/03/2023    HCT 41.3 11/03/2023     11/03/2023    CHOL 113 (L) 10/27/2023    TRIG 128 10/27/2023    HDL 33 (L) 10/27/2023    LDLDIRECT 30 05/17/2023    ALT 13 11/03/2023    AST 14 11/03/2023     11/03/2023    K 4.4 11/03/2023     11/03/2023    CREATININE 1.7 (H) 11/03/2023    BUN 24 (H) 11/03/2023    CO2 24 11/03/2023    TSH 2.009 03/21/2022    PSA 2.1 05/04/2017    INR 1.2 08/16/2023    HGBA1C 6.0 (H) 10/27/2023       EXAMINATION:  CT CHEST ABDOMEN PELVIS WITHOUT CONTRAST(XPD)     CLINICAL  HISTORY:  Polytrauma, blunt;     TECHNIQUE:  Low dose axial images, sagittal and coronal reformations were obtained from the thoracic inlet to the pubic symphysis without contrast.     COMPARISON:  08/18/2023, 08/15/2023     FINDINGS:  Evaluation of the solid abdominal organs is limited given the lack of intravenous contrast material.     The thyroid appears normal.  The main central airways are patent.  The esophagus appears normal.  The heart is enlarged.  Calcified coronary artery disease.  Calcified atheromatous disease affects the aorta and its major branch vessels.  Left-sided pacer device in place.  There is extensive mediastinal adenopathy with a large lymph node in the prevascular space extending into the AP window measuring at least 3.3 cm in short axis dimension.  No axillary adenopathy.     Centrilobular emphysematous disease.  There is a mass in the left upper lobe laterally that abuts pleural surface measuring approximately 3.5 x 2.7 cm in transverse dimension and approximately 3.9 cm in craniocaudal dimension concerning for malignancy.  There are few scattered solid pulmonary nodules seen throughout the lungs bilaterally, subcentimeter in size.  A few scattered calcified granulomas are noted.  Left lower lobe pulmonary nodule measures 9 mm.  No pleural effusions.     Evaluation of the solid abdominal organs is limited given the lack of intravenous contrast material.  Cholelithiasis without evidence for cholecystitis.  The unenhanced liver, spleen, pancreas, adrenal glands and kidneys are normal in size, shape and contour.  Bilateral renal cysts are present.  No nephrolithiasis, ureterolithiasis, hydronephrosis or hydroureter is seen.  The urinary bladder is partially distended and appears normal the prostate appears normal.     Constipation.  No free air, free fluid or obstruction.  No pathologically enlarged abdominal or pelvic lymph nodes are seen.     Age-appropriate degenerative changes affect the  skeleton.     Impression:     No evidence for definite acute traumatic injury in the chest, abdomen or pelvis, noting limitation given the lack of intravenous contrast material.     Mass in the left upper lobe with a large pleural base, measuring up to approximately 3.9 cm in greatest dimension highly concerning for malignancy with scattered solid pulmonary nodules also seen throughout the lungs bilaterally, subcentimeter in size.  There is associated extensive mediastinal adenopathy with a lymph node in the pre-vascular space extending into the AP window that measures 3.3 cm in short axis dimension.     Cholelithiasis without CT evidence for cholecystitis.        Electronically signed by: Mirian Holguin MD  Date:                                            11/03/2023  Time:                                           08:33           Exam Ended: 11/03/23 05:34                   Assessment/Plan:     Pulmonary  Nodule of upper lobe of left lung  Looks very suspious    Cardiac/Vascular  * Chest pain  Stable     Essential hypertension  Stable     Atrial fibrillation  Stable no afib    Oncology  Squamous cell cancer, anus  1 year ago     ct head   Hold eloquis till Monday   Or Monday at 2 pm npo ater midnight Sunday will see in clinic Monday at 1030 am      Thank you for your consult. I will follow-up with patient. Please contact us if you have any additional questions.   Critical care time spent on the evaluation and treatment of severe organ dysfunction, review of pertinent labs and imaging studies, discussions with consulting providers and discussions with patient/family: 61 minutes.    Prudencio Hidalgo MD  Pulmonology  [facility] - Med Surg (3rd Fl)

## 2023-11-03 NOTE — ASSESSMENT & PLAN NOTE
Likely muscle skeletal at admission and located to the left side  Troponin chronically elevated   EKG without ST elevation prolonged Qtc   CTchest, abdomen, pelvis with cholelithiasis without cholecystitis, no fractures and lung mass.

## 2023-11-03 NOTE — H&P
[facility] ([address] [place] [address] Medicine  History & Physical    Patient Name: Wesley Bernal  MRN: [id number]  Patient Class: OP- Observation  Admission Date: 11/3/2023  Attending Physician: Chel Graf MD   Primary Care Provider: Lalo Sweet MD         Patient information was obtained from patient and ER records.     Subjective:     Principal Problem:Chest pain    Chief Complaint:   Chief Complaint   Patient presents with    Fall     Pt reports rolling out of his bed and falling onto the ground yesterday at approximately 9 AM. Reports to back pain,  left sided rib pain, and weakness after falling. +Blood thinners, denies LOC or hitting his head.         HPI: Patient is a 72 year old male with medical history of former smoker, HTN, HLD, CAD, CABG, Afib, prosthetic heart valve, DM type 2, anxiety, recent rectal cancer (remission 1 year) who presented to the ED after falling out of bed.  He hit left side of abdomen/chest and attempted to take pain medication at home but did not have relief.  Prior to admission he has had left chest wall discomfort for 2-3 days.  He has had weight loss during and after chemotherapy.  He doesn't have fever, chills, CP, SOB, nausea and vomiting.       Admitted for chest pain.        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    Cardiomyopathy     Carotid artery disease     Coronary artery disease     Diabetes mellitus type II     Encounter for blood transfusion     Heart disease     Hyperlipidemia     Hypertension     PAD (peripheral artery disease)        Past Surgical History:   Procedure Laterality Date    ABDOMINAL AORTA STENT      BRACHIOCEPHALIC VEIN ANGIOPLASTY / STENTING      CARDIAC DEFIBRILLATOR PLACEMENT      1/2011    CARDIAC PACEMAKER PLACEMENT      1-2011    CARDIAC SURGERY      open heart    CARDIAC VALVE SURGERY      pig valve replacement -  "   CAROTID STENT      COLONOSCOPY N/A 8/17/2023    Procedure: COLONOSCOPY;  Surgeon: Javier Chang MD;  Location: [facility] ENDO;  Service: Endoscopy;  Laterality: N/A;    ESOPHAGOGASTRODUODENOSCOPY N/A 8/17/2023    Procedure: EGD (ESOPHAGOGASTRODUODENOSCOPY);  Surgeon: Javier Chang MD;  Location: [facility] ENDO;  Service: Endoscopy;  Laterality: N/A;    LASIK SX Bilateral     LYMPH NODE BIOPSY Left 6/17/2019    Procedure: BIOPSY, LYMPH NODE (GROIN);  Surgeon: Adelfo Yin MD;  Location: [facility] OR;  Service: General;  Laterality: Left;    REPLACEMENT OF IMPLANTABLE CARDIOVERTER-DEFIBRILLATOR (ICD) GENERATOR N/A 7/29/2020    Procedure: REPLACEMENT, ICD GENERATOR;  Surgeon: Marko Hartley MD;  Location: [facility] CATH;  Service: Cardiology;  Laterality: N/A;       Review of patient's allergies indicates:   Allergen Reactions    Ace inhibitors Other (See Comments)    Atorvastatin calcium Other (See Comments)    Statins-hmg-coa reductase inhibitors        No current facility-administered medications on file prior to encounter.     Current Outpatient Medications on File Prior to Encounter   Medication Sig    ALPRAZolam (XANAX) 0.25 MG tablet TAKE ONE TABLET BY MOUTH TWICE DAILY AS NEEDED    amiodarone (PACERONE) 200 MG Tab Take 0.5 tablets (100 mg total) by mouth once daily. (Patient taking differently: Take 100 mg by mouth once daily. Pt takes 1 Tablet of 200 mg twice daily)    apixaban (ELIQUIS) 5 mg Tab Take 5 mg by mouth 2 (two) times daily.    carvediloL (COREG) 6.25 MG tablet Take 6.25 mg by mouth 2 (two) times daily.    ezetimibe (ZETIA) 10 mg tablet Take 1 tablet (10 mg total) by mouth once daily.    furosemide (LASIX) 20 MG tablet Take 20 mg by mouth once daily.     gabapentin (NEURONTIN) 100 MG capsule Pt states he takes "2 in the morning and 4 at night" as it helps him sleep better.    HYDROcodone-acetaminophen (NORCO)  mg per tablet Take 1 tablet by mouth every 12 (twelve) " hours as needed for Pain. (Patient taking differently: Take 1.5 tablets by mouth every 12 (twelve) hours as needed for Pain.)    icosapent ethyL (VASCEPA) 1 gram Cap Take 2 capsules (2,000 mg total) by mouth 2 (two) times daily.    isosorbide mononitrate (IMDUR) 60 MG 24 hr tablet Take 60 mg by mouth once daily.     pioglitazone (ACTOS) 15 MG tablet Take 1 tablet (15 mg total) by mouth once daily.    primidone (MYSOLINE) 50 MG Tab Take 50 mg by mouth 2 (two) times a day.    REPATHA SURECLICK 140 mg/mL PnIj INJECT ONE SYRINGE SUBCUTANEOUSLY ONCE EVERY 14 DAYS    SITagliptin phosphate (JANUVIA) 100 MG Tab Take 1 tablet (100 mg total) by mouth once daily.    [DISCONTINUED] atenoloL (TENORMIN) 25 MG tablet Take 1 tablet (25 mg total) by mouth once daily.    docusate sodium (COLACE) 100 MG capsule Take 200 mg by mouth every morning.    DULoxetine (CYMBALTA) 60 MG capsule Take 1 capsule (60 mg total) by mouth once daily.     Family History       Problem Relation (Age of Onset)    Cancer Father    Diabetes Mother    Heart disease Father, Paternal Grandfather          Tobacco Use    Smoking status: Former     Current packs/day: 0.00     Types: Cigarettes, Cigars     Quit date: 4/10/2015     Years since quittin.5    Smokeless tobacco: Never   Substance and Sexual Activity    Alcohol use: No     Alcohol/week: 0.0 standard drinks of alcohol    Drug use: Never    Sexual activity: Yes     Partners: Female     Review of Systems   Constitutional:  Positive for fatigue and unexpected weight change. Negative for chills and fever.   HENT:  Negative for ear discharge and ear pain.    Eyes:  Negative for pain and discharge.   Respiratory:  Negative for cough and shortness of breath.    Cardiovascular:  Positive for chest pain. Negative for leg swelling.   Gastrointestinal:  Negative for nausea and vomiting.   Endocrine: Negative for cold intolerance and heat intolerance.   Genitourinary:  Negative for difficulty  "urinating and dysuria.   Musculoskeletal:  Negative for joint swelling and myalgias.   Skin:  Negative for rash and wound.   Neurological:  Positive for weakness (generalized). Negative for dizziness and headaches.   Psychiatric/Behavioral:  Negative for agitation and confusion.      Objective:     Vital Signs (Most Recent):  Temp: 96.5 °F (35.8 °C) (11/03/23 1114)  Pulse: 75 (11/03/23 1114)  Resp: 16 (11/03/23 1114)  BP: 132/60 (11/03/23 1114)  SpO2: 95 % (11/03/23 1114) Vital Signs (24h Range):  Temp:  [96.5 °F (35.8 °C)-98.4 °F (36.9 °C)] 96.5 °F (35.8 °C)  Pulse:  [52-75] 75  Resp:  [14-20] 16  SpO2:  [95 %-100 %] 95 %  BP: (132-172)/(60-74) 132/60     Weight: 75.1 kg (165 lb 9.1 oz)  Body mass index is 21.84 kg/m².     Physical Exam  Constitutional:       General: He is not in acute distress.     Comments: Decreased muscle tone     HENT:      Head: Normocephalic and atraumatic.   Eyes:      General:         Right eye: No discharge.         Left eye: No discharge.   Cardiovascular:      Rate and Rhythm: Normal rate. Rhythm irregular.   Pulmonary:      Effort: Pulmonary effort is normal.      Breath sounds: Normal breath sounds.   Abdominal:      General: There is no distension.      Tenderness: There is abdominal tenderness.      Hernia: Hernia: left lateral side.   Musculoskeletal:         General: No swelling or tenderness.      Cervical back: Neck supple. No tenderness.   Skin:     General: Skin is warm and dry.   Neurological:      General: No focal deficit present.      Mental Status: He is alert and oriented to person, place, and time.   Psychiatric:         Mood and Affect: Mood normal.         Behavior: Behavior normal.                Significant Labs: A1C:   Recent Labs   Lab 08/16/23  0239 10/27/23  0904   HGBA1C 5.9* 6.0*     ABGs: No results for input(s): "PH", "PCO2", "HCO3", "POCSATURATED", "BE", "TOTALHB", "COHB", "METHB", "O2HB", "POCFIO2", "PO2" in the last 48 hours.  Bilirubin:   Recent Labs " "  Lab 10/27/23  0904 11/03/23  0434   BILITOT 0.4 0.7     Blood Culture: No results for input(s): "LABBLOO" in the last 48 hours.  BMP:   Recent Labs   Lab 11/03/23  0434   *      K 4.4      CO2 24   BUN 24*   CREATININE 1.7*   CALCIUM 10.0     CBC:   Recent Labs   Lab 11/03/23 0434   WBC 13.11*   HGB 12.9*   HCT 41.3        CMP:   Recent Labs   Lab 11/03/23  0434      K 4.4      CO2 24   *   BUN 24*   CREATININE 1.7*   CALCIUM 10.0   PROT 9.0*   ALBUMIN 4.1   BILITOT 0.7   ALKPHOS 90   AST 14   ALT 13   ANIONGAP 15     Cardiac Markers:   Recent Labs   Lab 11/03/23  0434   *     Coagulation: No results for input(s): "PT", "INR", "APTT" in the last 48 hours.  Lactic Acid: No results for input(s): "LACTATE" in the last 48 hours.  Lipase: No results for input(s): "LIPASE" in the last 48 hours.  Lipid Panel: No results for input(s): "CHOL", "HDL", "LDLCALC", "TRIG", "CHOLHDL" in the last 48 hours.  Magnesium: No results for input(s): "MG" in the last 48 hours.  POCT Glucose:   Recent Labs   Lab 11/03/23  1115   POCTGLUCOSE 237*     Prealbumin: No results for input(s): "PREALBUMIN" in the last 48 hours.  Respiratory Culture: No results for input(s): "GSRESP", "RESPIRATORYC" in the last 48 hours.  Troponin:   Recent Labs   Lab 11/03/23 0434   TROPONINI 0.072*     TSH: No results for input(s): "TSH" in the last 4320 hours.  Urine Culture: No results for input(s): "LABURIN" in the last 48 hours.  Urine Studies:   Recent Labs   Lab 11/03/23  0525   COLORU Yellow   APPEARANCEUA Clear   PHUR 6.0   SPECGRAV 1.020   PROTEINUA 2+*   GLUCUA Negative   KETONESU Trace*   BILIRUBINUA Negative   OCCULTUA Trace*   NITRITE Negative   UROBILINOGEN 1.0   LEUKOCYTESUR Negative   RBCUA 0   WBCUA 5   BACTERIA Moderate*   HYALINECASTS 2*       Significant Imaging: I have reviewed all pertinent imaging results/findings within the past 24 hours.    Assessment/Plan:     * Chest pain  Likely " muscle skeletal at admission and located to the left side  Troponin chronically elevated   EKG without ST elevation prolonged Qtc   CTchest, abdomen, pelvis with cholelithiasis without cholecystitis, no fractures and lung mass.        Nodule of upper lobe of left lung  Seen on CT Chest  3.9 cm with concern for malignancy with extensive mediastinal adenopathy with one measuring 3.3cm.  Pulmonology consulted  Discussed with patient       CHF (congestive heart failure), NYHA class II, chronic, systolic  Patient is identified as having Systolic (HFrEF) heart failure that is Chronic. CHF is currently controlled. Latest ECHO performed and demonstrates- Results for orders placed during the hospital encounter of 08/15/23    Echo    Interpretation Summary    Left Ventricle: The left ventricle is mildly dilated. regional wall motion abnormalities present. There is reduced systolic function. Biplane (2D) method of discs ejection fraction is 45%.    Right Ventricle: Normal right ventricular cavity size. Wall thickness is normal. Right ventricle wall motion  is normal. Systolic function is normal. Pacemaker lead present in the ventricle.    Left Atrium: Left atrium is severely dilated.    Aortic Valve: There is mild aortic valve sclerosis.    Mitral Valve: There is a bioprosthetic valve in the mitral position that is well-seated. The mean pressure gradient across the mitral valve is 4 mmHg at a heart rate of 58 bpm. Very small mobile echodense shadow 0.2 x 0.4 cm in the submitral apparatus. Differential is detached cord related to  prior mitral surgery(more likely), thrombus or vegetations.    Pulmonic Valve: There is mild regurgitation.  . Continue Beta Blocker and monitor clinical status closely. Monitor on telemetry. Patient is off CHF pathway.  Monitor strict Is&Os and daily weights.  Place on fluid restriction of 1.5 L. Cardiology has not been any consulted. Continue to stress to patient importance of self efficacy  and  on diet for CHF. Last BNP reviewed- and noted below   Recent Labs   Lab 11/03/23  0434   *   .    CAD (coronary artery disease)  Continue zetia & eliquis   Troponin mildly elevated 0.072    Lumbar disc disease with radiculopathy  Currently on gabapentin       Stage 3b chronic kidney disease  Creatine stable for now. BMP reviewed- noted Estimated Creatinine Clearance: 42.3 mL/min (A) (based on SCr of 1.7 mg/dL (H)). according to latest data. Based on current GFR, CKD stage is stage 3 - GFR 30-59.  Monitor UOP and serial BMP and adjust therapy as needed. Renally dose meds. Avoid nephrotoxic medications and procedures.    Bioprosthetic mitral valve replacement  Porcine valve         Anxiety disorder  Continue xanax nightly     Type 2 diabetes mellitus with circulatory disorder  Patient's FSGs are controlled on current medication regimen.  Last A1c reviewed-   Lab Results   Component Value Date    HGBA1C 6.0 (H) 10/27/2023     Most recent fingerstick glucose reviewed-   Recent Labs   Lab 11/03/23  1115   POCTGLUCOSE 237*     Current correctional scale  Low  Maintain anti-hyperglycemic dose as follows-   Antihyperglycemics (From admission, onward)    Start     Stop Route Frequency Ordered    11/03/23 1218  insulin aspart U-100 pen 0-5 Units         -- SubQ Before meals & nightly PRN 11/03/23 1118        Hold Oral hypoglycemics while patient is in the hospital.    Essential hypertension  Continue home bp meds     Atrial fibrillation  Irregularly irregular on telemetry  Continue amiodarone, carvedilol and eliquis       VTE Risk Mitigation (From admission, onward)         Ordered     apixaban tablet 5 mg  2 times daily         11/03/23 1008     IP VTE HIGH RISK PATIENT  Once         11/03/23 0819     Place sequential compression device  Until discontinued         11/03/23 0819                     On 11/03/2023, patient should be placed in hospital observation services under my care in collaboration with  Dr. Graf.          Lillie Rangel PA-C  Department of Hospital Medicine  formerly [facility] ([facility])

## 2023-11-03 NOTE — PROGRESS NOTES
[place] - Med Surg (3rd Fl)  Adult Nutrition  Progress Note    SUMMARY       Recommendations   1) Continue DM 2000 kcal cardiac diet   2) Add Boost glucose control BID   3) weigh weekly    Goals: 1) PO intakes > 50% of meals and supplements at f/u  Nutrition Goal Status: new  Communication of RD Recs:  (POC, sticky note)    Assessment and Plan    Mild chronic illness related malnutrition  R/t decreased appetite, increased needs d/t CKD/DM/CAD  As evidenced by > 7.5% wt loss x 3 months and PO intakes < 75% of needs x unknown time  Intervention: nutrition supplement therapy, carb/Na/Fat modified diet  New    Possible moderate-severe need NFPE/interview    Malnutrition Assessment     Skin (Micronutrient):  (Ramiro = 18, no wounds noted)  Teeth (Micronutrient): none   Micronutrient Evaluation Summary: suspected deficiency  Micronutrient Evaluation Comments: check iron   Energy Intake (Malnutrition): other (see comments) (unspecified)     Wt loss 13% x 3 months                    Reason for Assessment    Reason For Assessment: identified at risk by screening criteria  Diagnosis:  (chest pain)  Relevant Medical History: HTN, HLD, CAD, CABG, DM2, anxiety rectal CA s/p chemo and xrt. ( remission x 1 yr),  Interdisciplinary Rounds: did not attend (remote)    General Information Comments: 71 y/o male admitted with chest pain and s/p fall. Risk screen endorses poor appetite PTA, ? time frame. Insignificant wt loss per chart review. NFPE to be done by on-site RD at f/u.    Nutrition Discharge Planning: To be determined- DM 2000 kcal, cardiac diet    Nutrition Risk Screen    Nutrition Risk Screen: reduced oral intake over the last month    Nutrition/Diet History    Spiritual, Cultural Beliefs, Jain Practices, Values that Affect Care: no  Food Allergies: NKFA  Factors Affecting Nutritional Intake: None identified at this time    Anthropometrics    Temp: 96.5 °F (35.8 °C)  Height Method: Stated (office 9/27/22)  Height: 6'  "1" (185.4 cm)  Height (inches): 73 in  Weight Method: Bed Scale  Weight: 75.1 kg (165 lb 9.1 oz)  Weight (lb): 165.57 lb  Ideal Body Weight (IBW), Male: 184 lb  % Ideal Body Weight, Male (lb): 89.98 %  BMI (Calculated): 21.8  BMI Grade: 18.5-24.9 - normal  Weight Loss: unintentional  Usual Body Weight (UBW), k.1 kg  % Usual Body Weight: 89.49  % Weight Change From Usual Weight: -10.7 %       Lab/Procedures/Meds    Pertinent Labs Reviewed: reviewed  BMP  Lab Results   Component Value Date     2023    K 4.4 2023     2023    CO2 24 2023    BUN 24 (H) 2023    CREATININE 1.7 (H) 2023    CALCIUM 10.0 2023    ANIONGAP 15 2023    EGFRNORACEVR 43 (A) 2023     POCT Glucose   Date Value Ref Range Status   2023 237 (H) 70 - 110 mg/dL Final     Lab Results   Component Value Date    HGBA1C 6.0 (H) 10/27/2023     Lab Results   Component Value Date    ALBUMIN 4.1 2023       Lab Results   Component Value Date    WBC 13.11 (H) 2023    HGB 12.9 (L) 2023    HCT 41.3 2023    MCV 94 2023     2023         Pertinent Medications Reviewed: reviewed  Pertinent Medications Comments: docusate, insulin    Estimated/Assessed Needs    Weight Used For Calorie Calculations: 75.1 kg (165 lb 9.1 oz)  Energy Calorie Requirements (kcal): 25-30 kcal/kg ( CKD) = 0467-8133 kcal  Energy Need Method: Kcal/kg  Protein Requirements: 0.8-1 g protein/kg ( 60-75 g)  Weight Used For Protein Calculations: 75.1 kg (165 lb 9.1 oz)  Fluid Requirements (mL): 2970-9708 ml or per MD  Estimated Fluid Requirement Method: RDA Method  CHO Requirement: 210-257 g      Nutrition Prescription Ordered    Current Diet Order: DM 2000 kcal, cardiac diet    Evaluation of Received Nutrient/Fluid Intake    Energy Calories Required: not meeting needs  Protein Required: not meeting needs  Fluid Required: not meeting needs  Tolerance: tolerating   No intake or output " data in the 24 hours ending 11/03/23 1532    % Intake of Estimated Energy Needs: not noted  % Meal Intake: not noted    Nutrition Risk    Level of Risk/Frequency of Follow-up:  (2 x weekly)     Monitor and Evaluation    Food and Nutrient Intake: energy intake, food and beverage intake  Food and Nutrient Adminstration: diet order  Knowledge/Beliefs/Attitudes: food and nutrition knowledge/skill  Anthropometric Measurements: weight  Biochemical Data, Medical Tests and Procedures: electrolyte and renal panel, gastrointestinal profile, glucose/endocrine profile  Nutrition-Focused Physical Findings: overall appearance     Nutrition Follow-Up    RD Follow-up?: Yes

## 2023-11-03 NOTE — ASSESSMENT & PLAN NOTE
Seen on CT Chest  3.9 cm with concern for malignancy with extensive mediastinal adenopathy with one measuring 3.3cm.  Pulmonology consulted  Discussed with patient

## 2023-11-03 NOTE — PLAN OF CARE
Recommendations   1) Continue DM 2000 kcal cardiac diet   2) Add Boost glucose control BID   3) weigh weekly    Goals: 1) PO intakes > 50% of meals and supplements at f/u  Nutrition Goal Status: new  Communication of RD Recs:  (POC, sticky note)

## 2023-11-03 NOTE — ASSESSMENT & PLAN NOTE
Irregularly irregular on telemetry  Continue amiodarone, carvedilol and eliquis   Hold eliquis prior to lung biopsy

## 2023-11-03 NOTE — PLAN OF CARE
Problem: Adult Inpatient Plan of Care  Goal: Plan of Care Review  Outcome: Ongoing, Progressing  Flowsheets (Taken 11/3/2023 1252)  Plan of Care Reviewed With:   patient   spouse     Problem: Diabetes Comorbidity  Goal: Blood Glucose Level Within Targeted Range  Outcome: Ongoing, Progressing     Problem: Skin Injury Risk Increased  Goal: Skin Health and Integrity  Outcome: Ongoing, Progressing     Problem: Fall Injury Risk  Goal: Absence of Fall and Fall-Related Injury  Outcome: Ongoing, Progressing

## 2023-11-03 NOTE — PLAN OF CARE
11/03/23 1131   WALLER Message   Medicare Outpatient and Observation Notification regarding financial responsibility Given to patient/caregiver;Explained to patient/caregiver;Signed/date by patient/caregiver   Date WALLER was signed 11/03/23   Time WALLER was signed 1131

## 2023-11-03 NOTE — PLAN OF CARE
"  [place] - Med Surg (3rd Fl)  Initial Discharge Assessment       Primary Care Provider: Lalo Sweet MD    Admission Diagnosis: Fall [W19.XXXA]    Admission Date: 11/3/2023  Expected Discharge Date:     Transition of Care Barriers: (P) None    Payor: [employer] MANAGED MEDICARE / Plan: [employer] MEDICARE HMO / Product Type: Capitation /     Extended Emergency Contact Information  Primary Emergency Contact: Tierra Jacobson  Address: [address] [place] [address] [place] [address] [facility]  Home Phone: [phone]  Mobile Phone: [phone]  Relation: Significant other  Secondary Emergency Contact: Keyon Bernal "Bill"  Mobile Phone: [phone]  Relation: Brother    Discharge Plan A: (P) Home  Discharge Plan B: (P) Home with family      [facility] [place] [facility] [place] - [address]  [address]  [place] [address]  Phone: [phone] Fax: [phone]      Initial Assessment (most recent)       Adult Discharge Assessment - 11/03/23 1013          Discharge Assessment    Assessment Type Discharge Planning Assessment (P)      Confirmed/corrected address, phone number and insurance Yes (P)      Confirmed Demographics Correct on Facesheet (P)      Source of Information patient (P)      Communicated THU with patient/caregiver Date not available/Unable to determine (P)      Reason For Admission Fall (P)      People in Home significant other (P)      Facility Arrived From: Home (P)      Do you expect to return to your current living situation? Yes (P)      Do you have help at home or someone to help you manage your care at home? Yes (P)      Who are your caregiver(s) and their phone number(s)? [place] [phone] (P)      Prior to hospitilization cognitive status: Alert/Oriented (P)      Current cognitive status: Alert/Oriented (P)      Dressing/Bathing bathing difficulty, requires equipment (P)      Dressing/Bathing Management utilizes a shower " chair (P)      Home Layout Able to live on 1st floor (P)      Equipment Currently Used at Home cane, quad;walker, rolling;wheelchair;shower chair;other (see comments);grab bar (P)    BP machine    Readmission within 30 days? No (P)      Patient currently being followed by outpatient case management? No (P)      Do you currently have service(s) that help you manage your care at home? No (P)      Do you take prescription medications? Yes (P)      Do you have prescription coverage? Yes (P)      Coverage [employer] (P)      Do you have any problems affording any of your prescribed medications? No (P)      Is the patient taking medications as prescribed? yes (P)      Who is going to help you get home at discharge? Tierra, signficant other (P)      How do you get to doctors appointments? car, drives self (P)      Are you on dialysis? No (P)      Do you take coumadin? No (P)      DME Needed Upon Discharge  other (see comments) (P)    TBD    Discharge Plan discussed with: Patient (P)      Transition of Care Barriers None (P)      Discharge Plan A Home (P)      Discharge Plan B Home with family (P)         Transportation Needs    In the past 12 months, has lack of transportation kept you from medical appointments or from getting medications? No (P)      In the past 12 months, has lack of transportation kept you from meetings, work, or from getting things needed for daily living? No (P)                       Discharge assessment completed at bedside with patient. There are no post acute needs identified at this time. SW will remain available if needs arise.

## 2023-11-03 NOTE — ASSESSMENT & PLAN NOTE
Patient's FSGs are controlled on current medication regimen.  Last A1c reviewed-   Lab Results   Component Value Date    HGBA1C 6.0 (H) 10/27/2023     Most recent fingerstick glucose reviewed-   Recent Labs   Lab 11/03/23  1115   POCTGLUCOSE 237*     Current correctional scale  Low  Maintain anti-hyperglycemic dose as follows-   Antihyperglycemics (From admission, onward)    Start     Stop Route Frequency Ordered    11/03/23 1218  insulin aspart U-100 pen 0-5 Units         -- SubQ Before meals & nightly PRN 11/03/23 1118        Hold Oral hypoglycemics while patient is in the hospital.

## 2023-11-03 NOTE — ASSESSMENT & PLAN NOTE
Patient is identified as having Systolic (HFrEF) heart failure that is Chronic. CHF is currently controlled. Latest ECHO performed and demonstrates- Results for orders placed during the hospital encounter of 08/15/23    Echo    Interpretation Summary    Left Ventricle: The left ventricle is mildly dilated. regional wall motion abnormalities present. There is reduced systolic function. Biplane (2D) method of discs ejection fraction is 45%.    Right Ventricle: Normal right ventricular cavity size. Wall thickness is normal. Right ventricle wall motion  is normal. Systolic function is normal. Pacemaker lead present in the ventricle.    Left Atrium: Left atrium is severely dilated.    Aortic Valve: There is mild aortic valve sclerosis.    Mitral Valve: There is a bioprosthetic valve in the mitral position that is well-seated. The mean pressure gradient across the mitral valve is 4 mmHg at a heart rate of 58 bpm. Very small mobile echodense shadow 0.2 x 0.4 cm in the submitral apparatus. Differential is detached cord related to  prior mitral surgery(more likely), thrombus or vegetations.    Pulmonic Valve: There is mild regurgitation.  . Continue Beta Blocker and monitor clinical status closely. Monitor on telemetry. Patient is off CHF pathway.  Monitor strict Is&Os and daily weights.  Place on fluid restriction of 1.5 L. Cardiology has not been any consulted. Continue to stress to patient importance of self efficacy and  on diet for CHF. Last BNP reviewed- and noted below   Recent Labs   Lab 11/03/23  0434   *   .

## 2023-11-03 NOTE — DISCHARGE SUMMARY
[facility] ([facility])  [facility] Medicine  Discharge Summary      Patient Name: Wesley Bernal  MRN: [id number]  BRIDGETT: [id number]  Patient Class: OP- Observation  Admission Date: 11/3/2023  Hospital Length of Stay: 1 days  Discharge Date and Time:  11/03/2023 1:54 PM  Attending Physician: Chel Graf MD   Discharging Provider: Lillie Rangel PA-C  Primary Care Provider: Lalo Sweet MD    Primary Care Team: Networked reference to record PCT     HPI:   Patient is a 72 year old male with medical history of former smoker, HTN, HLD, CAD, CABG, Afib, prosthetic heart valve, DM type 2, anxiety, recent rectal cancer (remission 1 year) who presented to the ED after falling out of bed.  He hit left side of abdomen/chest and attempted to take pain medication at home but did not have relief.  Prior to admission he has had left chest wall discomfort for 2-3 days.  He has had weight loss during and after chemotherapy.  He doesn't have fever, chills, CP, SOB, nausea and vomiting.       Admitted for chest pain.        * No surgery found *      Hospital Course:   Patient admitted for generalized weakness and left side chest pressure after fall.  No acute MI.  Found to have left upper lobe mass.  Pulmonology consulted and plan for biopsy Monday.  Holding eliquis.  Referral send to patient's prior oncologist Dr. Heck.         Goals of Care Treatment Preferences:  Code Status: Full Code      Consults:   Consults (From admission, onward)        Status Ordering Provider     IP consult to case management  Once        Provider:  (Not yet assigned)    Completed LILLIE RANGEL     Inpatient consult to Pulmonology  Once        Provider:  Prudencio Hidalgo MD    Acknowledged JONATAN BRIGHT          Neuro  Lumbar disc disease with radiculopathy  Currently on gabapentin       Pulmonary  Nodule of upper lobe of left lung  Seen on CT Chest  3.9 cm with concern for malignancy with extensive mediastinal adenopathy  with one measuring 3.3cm.  Pulmonology consulted  Discussed with patient       Cardiac/Vascular  * Chest pain  Likely muscle skeletal at admission and located to the left side  Troponin chronically elevated   EKG without ST elevation prolonged Qtc   CTchest, abdomen, pelvis with cholelithiasis without cholecystitis, no fractures and lung mass.        CHF (congestive heart failure), NYHA class II, chronic, systolic  Patient is identified as having Systolic (HFrEF) heart failure that is Chronic. CHF is currently controlled. Latest ECHO performed and demonstrates- Results for orders placed during the hospital encounter of 08/15/23    Echo    Interpretation Summary    Left Ventricle: The left ventricle is mildly dilated. regional wall motion abnormalities present. There is reduced systolic function. Biplane (2D) method of discs ejection fraction is 45%.    Right Ventricle: Normal right ventricular cavity size. Wall thickness is normal. Right ventricle wall motion  is normal. Systolic function is normal. Pacemaker lead present in the ventricle.    Left Atrium: Left atrium is severely dilated.    Aortic Valve: There is mild aortic valve sclerosis.    Mitral Valve: There is a bioprosthetic valve in the mitral position that is well-seated. The mean pressure gradient across the mitral valve is 4 mmHg at a heart rate of 58 bpm. Very small mobile echodense shadow 0.2 x 0.4 cm in the submitral apparatus. Differential is detached cord related to  prior mitral surgery(more likely), thrombus or vegetations.    Pulmonic Valve: There is mild regurgitation.  . Continue Beta Blocker and monitor clinical status closely. Monitor on telemetry. Patient is off CHF pathway.  Monitor strict Is&Os and daily weights.  Place on fluid restriction of 1.5 L. Cardiology has not been any consulted. Continue to stress to patient importance of self efficacy and  on diet for CHF. Last BNP reviewed- and noted below   Recent Labs   Lab  11/03/23  0434   *   .    Bioprosthetic mitral valve replacement  Porcine valve         Essential hypertension  Continue home bp meds     Atrial fibrillation  Irregularly irregular on telemetry  Continue amiodarone, carvedilol and eliquis   Hold eliquis prior to lung biopsy    Renal/  Stage 3b chronic kidney disease  Creatine stable for now. BMP reviewed- noted Estimated Creatinine Clearance: 42.3 mL/min (A) (based on SCr of 1.7 mg/dL (H)). according to latest data. Based on current GFR, CKD stage is stage 3 - GFR 30-59.  Monitor UOP and serial BMP and adjust therapy as needed. Renally dose meds. Avoid nephrotoxic medications and procedures.    Endocrine  Type 2 diabetes mellitus with circulatory disorder  Patient's FSGs are controlled on current medication regimen.  Last A1c reviewed-   Lab Results   Component Value Date    HGBA1C 6.0 (H) 10/27/2023     Most recent fingerstick glucose reviewed-   Recent Labs   Lab 11/03/23  1115   POCTGLUCOSE 237*     Current correctional scale  Low  Maintain anti-hyperglycemic dose as follows-   Antihyperglycemics (From admission, onward)    Start     Stop Route Frequency Ordered    11/03/23 1218  insulin aspart U-100 pen 0-5 Units         -- SubQ Before meals & nightly PRN 11/03/23 1118        Hold Oral hypoglycemics while patient is in the hospital.    Other  Anxiety disorder  Continue xanax nightly       Final Active Diagnoses:    Diagnosis Date Noted POA    PRINCIPAL PROBLEM:  Chest pain [R07.9] 11/03/2023 Yes    Nodule of upper lobe of left lung [R91.1] 11/03/2023 Yes    Squamous cell cancer, anus [C21.0] 02/07/2023 Yes    CHF (congestive heart failure), NYHA class II, chronic, systolic [I50.22] 07/29/2020 Yes    Lumbar disc disease with radiculopathy [M51.16] 11/10/2015 Yes    Stage 3b chronic kidney disease [N18.32]  Yes    Atrial fibrillation [I48.91] 11/25/2013 Yes    Essential hypertension [I10] 11/25/2013 Yes    Type 2 diabetes mellitus with  circulatory disorder [E11.59] 11/25/2013 Yes    Bioprosthetic mitral valve replacement [Z95.2] 11/25/2013 Not Applicable    Anxiety disorder [F41.9] 11/25/2013 Yes      Problems Resolved During this Admission:       Discharged Condition: stable    Disposition: Home or Self Care    Follow Up:   Follow-up Information     Prudencio Hidalgo MD. Go on 11/6/2023.    Specialty: Pulmonary Disease  Why: Do NOT take Eliquis on Saturday, Sunday, and Monday. Nothing by mouth after midnight before procedure. Appointment on clinic at 10:30AM.  Contact information:  [address]  [place] [address]  [phone]             Lalo Sweet MD Follow up in 1 week(s).    Specialty: Family Medicine  Contact information:  [address] [place] [address]  [phone]                       Patient Instructions:      Ambulatory referral/consult to Oncology   Standing Status: Future   Referral Priority: Routine Referral Type: Consultation   Referral Reason: Specialty Services Required   Requested Specialty: Oncology   Number of Visits Requested: 1       Significant Diagnostic Studies: see A&P     Pending Diagnostic Studies:     Procedure Component Value Units Date/Time    CT Head Without Contrast [[id number]]     Order Status: Sent Lab Status: No result          Medications:  Reconciled Home Medications:      Medication List      CHANGE how you take these medications    apixaban 5 mg Tab  Commonly known as: ELIQUIS  Take 1 tablet (5 mg total) by mouth 2 (two) times daily. Hold for biopsy  What changed: additional instructions     furosemide 20 MG tablet  Commonly known as: LASIX  Take 1 tablet (20 mg total) by mouth once daily. Monday, Wednesday, Friday  What changed: additional instructions     SITagliptin phosphate 50 MG Tab  Commonly known as: JANUVIA  Take 1 tablet (50 mg total) by mouth once daily.  What changed:   · medication strength  · how much to take        CONTINUE taking these medications    ALPRAZolam 0.25 MG  "tablet  Commonly known as: XANAX  TAKE ONE TABLET BY MOUTH TWICE DAILY AS NEEDED     amiodarone 200 MG Tab  Commonly known as: PACERONE  Take 0.5 tablets (100 mg total) by mouth once daily. Pt takes 1 Tablet of 200 mg twice daily     carvediloL 6.25 MG tablet  Commonly known as: COREG  Take 6.25 mg by mouth 2 (two) times daily.     docusate sodium 100 MG capsule  Commonly known as: COLACE  Take 200 mg by mouth every morning.     ezetimibe 10 mg tablet  Commonly known as: ZETIA  Take 1 tablet (10 mg total) by mouth once daily.     gabapentin 100 MG capsule  Commonly known as: NEURONTIN  Pt states he takes "2 in the morning and 4 at night" as it helps him sleep better.     HYDROcodone-acetaminophen  mg per tablet  Commonly known as: NORCO  Take 1 tablet by mouth every 12 (twelve) hours as needed for Pain.     icosapent ethyL 1 gram Cap  Commonly known as: VASCEPA  Take 2 capsules (2,000 mg total) by mouth 2 (two) times daily.     isosorbide mononitrate 60 MG 24 hr tablet  Commonly known as: IMDUR  Take 60 mg by mouth once daily.     pioglitazone 15 MG tablet  Commonly known as: ACTOS  Take 1 tablet (15 mg total) by mouth once daily.     REPATHA SURECLICK 140 mg/mL Pnij  Generic drug: evolocumab  INJECT ONE SYRINGE SUBCUTANEOUSLY ONCE EVERY 14 DAYS        STOP taking these medications    DULoxetine 60 MG capsule  Commonly known as: CYMBALTA     primidone 50 MG Tab  Commonly known as: MYSOLINE            Indwelling Lines/Drains at time of discharge:   Lines/Drains/Airways     None                 Time spent on the discharge of patient: 25 minutes         Lillie Rangel PA-C  Department of Hospital Medicine  [facility] (3rd Fl)  "

## 2023-11-03 NOTE — PLAN OF CARE
[facility] - Med Surg (3rd Fl)  Discharge Final Note    Primary Care Provider: Lalo Sweet MD    Expected Discharge Date: 11/3/2023    Final Discharge Note (most recent)       Final Note - 11/03/23 1409          Final Note    Assessment Type Final Discharge Note (P)      Anticipated Discharge Disposition Home or Self Care (P)      What phone number can be called within the next 1-3 days to see how you are doing after discharge? [phone] (P)      Hospital Resources/Appts/Education Provided Appointments scheduled and added to AVS (P)         Post-Acute Status    Post-Acute Authorization Other (P)      Other Status No Post-Acute Service Needs (P)      Discharge Delays None known at this time (P)                      Important Message from Medicare             Contact Info       Prudencio Hidalgo MD   Specialty: Pulmonary Disease    [address] [place] [address]   Phone: [phone]       Next Steps: Go on 11/6/2023    Instructions: Do NOT take Eliquis on Saturday, Sunday, and Monday. Nothing by mouth after midnight before procedure. Appointment on clinic at 10:30AM.    Lalo Sweet MD   Specialty: Family Medicine   Relationship: PCP - General    [address] [place] [address]   Phone: [phone]       Next Steps: Go on 11/14/2023    Instructions: at 9:30 AM          Patient will discharge today. No post acute needs identified. Follow up appointments scheduled, please see above.

## 2023-11-03 NOTE — HPI
Patient is a 72 year old male with medical history of former smoker, HTN, HLD, CAD, CABG, Afib, prosthetic heart valve, DM type 2, anxiety, recent rectal cancer (remission 1 year) who presented to the ED after falling out of bed.  He hit left side of abdomen/chest and attempted to take pain medication at home but did not have relief.  Prior to admission he has had left chest wall discomfort for 2-3 days.  He has had weight loss during and after chemotherapy.  He doesn't have fever, chills, CP, SOB, nausea and vomiting.       Admitted for chest pain.

## 2023-11-03 NOTE — PT/OT/SLP EVAL
"Physical Therapy Evaluation and Discharge Note    Patient Name:  Wesley Beranl   MRN:  [id number]    Recommendations:     Discharge Recommendations: Low Intensity Therapy (Home health PT/OT)  Discharge Equipment Recommendations: none   Barriers to discharge: None    Assessment:     Wesley Bernal is a 72 y.o. male admitted with a medical diagnosis of <principal problem not specified>. .  At this time, patient is functioning at their prior level of function and does not require further acute PT services.     Recent Surgery: * No surgery found *      Plan:     During this hospitalization, patient does not require further acute PT services.  Please re-consult if situation changes.      Subjective     Chief Complaint: no pain complain during PT visit  Patient/Family Comments/goals: "To return home".  Pain/Comfort:  Pain Rating 1: 0/10    Patients cultural, spiritual, Taoist conflicts given the current situation:      Living Environment:  Patient lives with wife, son and brother in [facility] with 1 threshold to enter.  Prior to admission, patients level of function was Independent.  Equipment used at home: cane, straight, walker, rolling, shower chair, bedside commode, grab bar, wheelchair.  DME owned (not currently used): none.  Upon discharge, patient will have assistance from wife and son.    Objective:     Communicated with patient prior to session.  Patient found HOB elevated with bed alarm, peripheral IV, telemetry, Other (comments) (avasys camera) upon PT entry to room.    General Precautions: Standard, fall    Orthopedic Precautions:N/A   Braces: N/A  Respiratory Status: Room air    Exams:  Cognitive Exam:  Patient is oriented to Person, Place, Time, and Situation  Fine Motor Coordination:    -       Intact  Gross Motor Coordination:  WFL  Postural Exam:  Patient presented with the following abnormalities:    -       Rounded shoulders  -       Forward head  Skin Integrity/Edema:      -       Skin " integrity: Visible skin intact  RLE ROM: WFL  RLE Strength: WFL  LLE ROM: WFL  LLE Strength: WFL    Functional Mobility:  Bed Mobility:     Rolling Left:  independence  Rolling Right: independence  Scooting: independence  Supine to Sit: independence  Sit to Supine: independence  Transfers:     Sit to Stand:  independence with no AD  Gait: Standby Assistance/Independent x ~150 feet at the hallway with no AD.  Stairs:  Pt ascended/descended 8 stair(s) with No Assistive Device with bilateral handrails with Stand-by Assistance/Independent     AM-PAC 6 CLICK MOBILITY  Total Score:22       Treatment and Education:  Completed PT eval. Further  acute PT tx is not recommended at this time.    AM-PAC 6 CLICK MOBILITY  Total Score:22     Patient left HOB elevated with all lines intact, call button in reach, bed alarm on, nursing present, and avasys camera .    GOALS:   Multidisciplinary Problems       Physical Therapy Goals       Not on file                    History:     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    Cardiomyopathy     Carotid artery disease     Coronary artery disease     Diabetes mellitus type II     Encounter for blood transfusion     Heart disease     Hyperlipidemia     Hypertension     PAD (peripheral artery disease)        Past Surgical History:   Procedure Laterality Date    ABDOMINAL AORTA STENT      BRACHIOCEPHALIC VEIN ANGIOPLASTY / STENTING      CARDIAC DEFIBRILLATOR PLACEMENT      1/2011    CARDIAC PACEMAKER PLACEMENT      1-2011    CARDIAC SURGERY      open heart    CARDIAC VALVE SURGERY      pig valve replacement -    CAROTID STENT      COLONOSCOPY N/A 8/17/2023    Procedure: COLONOSCOPY;  Surgeon: Javier Chang MD;  Location: [facility];  Service: Endoscopy;  Laterality: N/A;    ESOPHAGOGASTRODUODENOSCOPY N/A 8/17/2023    Procedure: EGD (ESOPHAGOGASTRODUODENOSCOPY);  Surgeon: Bernardo  Javier GRANT MD;  Location: [facility] ENDO;  Service: Endoscopy;  Laterality: N/A;    LASIK SX Bilateral     LYMPH NODE BIOPSY Left 6/17/2019    Procedure: BIOPSY, LYMPH NODE (GROIN);  Surgeon: Adelfo Yin MD;  Location: [facility] OR;  Service: General;  Laterality: Left;    REPLACEMENT OF IMPLANTABLE CARDIOVERTER-DEFIBRILLATOR (ICD) GENERATOR N/A 7/29/2020    Procedure: REPLACEMENT, ICD GENERATOR;  Surgeon: Marko Hartley MD;  Location: [facility] CATH;  Service: Cardiology;  Laterality: N/A;       Time Tracking:     PT Received On: 11/03/23  PT Start Time: 1045     PT Stop Time: 1105  PT Total Time (min): 20 min     Billable Minutes: Evaluation 20 11/03/2023

## 2023-11-06 NOTE — PROGRESS NOTES
C3 nurse spoke with Wesley Bernal  for a TCC post hospital discharge follow up call. The patient has a scheduled [facility] appointment with with Lalo Sweet MD at 11/14/2023 at 9:30 AM.

## 2023-11-06 NOTE — OR NURSING
Attempted to call patient. No answer for the preferred number and the call number disconnected. Dr. Hidalgo notified

## 2023-11-10 NOTE — H&P
Wesley Bernal is a 73 y.o. year old male that's presents with a chief complaint of SOB and Wheezing for last week and he was placed in hospital.Upon presenting to ER Last week he was admitted and discharged and schedule for a Bronchoscopy on Monday this week but it was cancelled due to family matters. He  was found in ER to have a large Left Upper Lobe Lung Mass. He has a hxo colon cancer for which he received cemotherapy less than 1 year ago in [facility] . Bronchoscopy to evaluate Respiratory status.    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    Cardiomyopathy     Carotid artery disease     Coronary artery disease     Diabetes mellitus type II     Encounter for blood transfusion     Heart disease     Hyperlipidemia     Hypertension     PAD (peripheral artery disease)         Past Surgical History:   Procedure Laterality Date    ABDOMINAL AORTA STENT      BRACHIOCEPHALIC VEIN ANGIOPLASTY / STENTING      CARDIAC DEFIBRILLATOR PLACEMENT      1/2011    CARDIAC PACEMAKER PLACEMENT      1-2011    CARDIAC SURGERY      open heart    CARDIAC VALVE SURGERY      pig valve replacement -    CAROTID STENT      COLONOSCOPY N/A 8/17/2023    Procedure: COLONOSCOPY;  Surgeon: Javier Chang MD;  Location: [facility];  Service: Endoscopy;  Laterality: N/A;    ESOPHAGOGASTRODUODENOSCOPY N/A 8/17/2023    Procedure: EGD (ESOPHAGOGASTRODUODENOSCOPY);  Surgeon: Javier Chang MD;  Location: [facility];  Service: Endoscopy;  Laterality: N/A;    LASIK SX Bilateral     LYMPH NODE BIOPSY Left 6/17/2019    Procedure: BIOPSY, LYMPH NODE (GROIN);  Surgeon: Adelfo Yin MD;  Location: [facility];  Service: General;  Laterality: Left;    REPLACEMENT OF IMPLANTABLE CARDIOVERTER-DEFIBRILLATOR (ICD) GENERATOR N/A 7/29/2020    Procedure: REPLACEMENT, ICD GENERATOR;  Surgeon: Marko Hartley MD;  Location: [facility] CATH;  Service:  "Cardiology;  Laterality: N/A;       Prior to Admission medications    Medication Sig Start Date End Date Taking? Authorizing Provider   ALPRAZolam (XANAX) 0.25 MG tablet TAKE ONE TABLET BY MOUTH TWICE DAILY AS NEEDED 10/27/23  Yes Lalo Sweet MD   amiodarone (PACERONE) 200 MG Tab Take 0.5 tablets (100 mg total) by mouth once daily. Pt takes 1 Tablet of 200 mg twice daily 11/3/23   Lillie Rangel PA-C   apixaban (ELIQUIS) 5 mg Tab Take 1 tablet (5 mg total) by mouth 2 (two) times daily. Hold for biopsy 11/3/23   Lillie Rangel PA-C   carvediloL (COREG) 6.25 MG tablet Take 6.25 mg by mouth 2 (two) times daily. 7/22/23   Provider, Historical   docusate sodium (COLACE) 100 MG capsule Take 200 mg by mouth every morning.    Provider, Historical   ezetimibe (ZETIA) 10 mg tablet Take 1 tablet (10 mg total) by mouth once daily. 10/27/23   Lalo Sweet MD   furosemide (LASIX) 20 MG tablet Take 1 tablet (20 mg total) by mouth once daily. Monday, Wednesday, Friday 11/3/23   Lillie Rangel PA-C   gabapentin (NEURONTIN) 100 MG capsule Pt states he takes "2 in the morning and 4 at night" as it helps him sleep better. 8/16/22   Provider, Historical   HYDROcodone-acetaminophen (NORCO)  mg per tablet Take 1 tablet by mouth every 12 (twelve) hours as needed for Pain.  Patient not taking: Reported on 11/6/2023 11/3/23   Lalo Sweet MD   icosapent ethyL (VASCEPA) 1 gram Cap Take 2 capsules (2,000 mg total) by mouth 2 (two) times daily. 10/27/23   Lalo Sweet MD   isosorbide mononitrate (IMDUR) 60 MG 24 hr tablet Take 60 mg by mouth once daily.  12/16/19   Provider, Historical   pioglitazone (ACTOS) 15 MG tablet Take 1 tablet (15 mg total) by mouth once daily. 10/27/23   Lalo Sweet MD REPATHA SURECLICK 140 mg/mL PnIj INJECT ONE SYRINGE SUBCUTANEOUSLY ONCE EVERY 14 DAYS 8/25/22   Provider, Historical   SITagliptin phosphate (JANUVIA) 50 MG Tab Take 1 tablet (50 " mg total) by mouth once daily. 11/3/23   Lillie Rangel PA-C       Social History     Socioeconomic History    Marital status: Significant Other     Spouse name: Tierra Jacobson    Number of children: 0   Tobacco Use    Smoking status: Former     Current packs/day: 0.00     Types: Cigarettes, Cigars     Quit date: 4/10/2015     Years since quittin.5    Smokeless tobacco: Never   Substance and Sexual Activity    Alcohol use: No     Alcohol/week: 0.0 standard drinks of alcohol    Drug use: Never    Sexual activity: Yes     Partners: Female     Social Determinants of Health     Financial Resource Strain: Low Risk  (2023)    Overall Financial Resource Strain (CARDIA)     Difficulty of Paying Living Expenses: Not hard at all   Food Insecurity: No Food Insecurity (2023)    Hunger Vital Sign     Worried About Running Out of Food in the Last Year: Never true     Ran Out of Food in the Last Year: Never true   Transportation Needs: No Transportation Needs (11/3/2023)    PRAPARE - Transportation     Lack of Transportation (Medical): No     Lack of Transportation (Non-Medical): No   Physical Activity: Sufficiently Active (2023)    Exercise Vital Sign     Days of Exercise per Week: 7 days     Minutes of Exercise per Session: 30 min   Stress: No Stress Concern Present (2023)    [employer] - Occupational Stress Questionnaire     Feeling of Stress : Not at all   Social Connections: Socially Integrated (2023)    Social Connection and Isolation Panel [NHANES]     Frequency of Communication with Friends and Family: More than three times a week     Frequency of Social Gatherings with Friends and Family: More than three times a week     Attends [employer] Services: More than 4 times per year     Active Member of Clubs or Organizations: Yes     Attends Club or Organization Meetings: More than 4 times per year     Marital Status: Living with partner   Housing Stability:  Unknown (8/16/2023)    Housing Stability Vital Sign     Unable to Pay for Housing in the Last Year: No     Unstable Housing in the Last Year: No       Family History   Problem Relation Age of Onset    Diabetes Mother     Heart disease Father     Cancer Father         melanoma, prostate    Heart disease Paternal Grandfather        Review of patient's allergies indicates:   Allergen Reactions    Ace inhibitors Other (See Comments)    Atorvastatin calcium Other (See Comments)    Statins-hmg-coa reductase inhibitors     Allergies have been reviewed.     ROS: Review of Systems   Constitutional:  Positive for malaise/fatigue and weight loss. Negative for chills and fever.   HENT:  Negative for congestion and sore throat.    Eyes:  Negative for double vision and photophobia.   Respiratory:  Positive for cough, hemoptysis, sputum production, shortness of breath and wheezing.    Cardiovascular:  Positive for leg swelling (mild) and PND (occasional). Negative for palpitations.   Gastrointestinal:  Negative for abdominal pain, diarrhea, nausea and vomiting.   Genitourinary:  Negative for dysuria and frequency.   Musculoskeletal:  Positive for myalgias (generalized). Negative for back pain and neck pain.   Skin: Negative.  Negative for rash.   Neurological:  Negative for dizziness, weakness and headaches.   Endo/Heme/Allergies:  Does not bruise/bleed easily.   Psychiatric/Behavioral:  Negative for memory loss. The patient has insomnia (intermittant ).        PE:   Vitals:    11/10/23 1259   BP: (!) 173/114    Physical Exam    Alert and orientated X 3   HEENT: Head: Normocephalic no trauma                Ears : Normal Pinna No Drainage no Battles sign                Eyes: Vision Unchanged, No conjunctivitis,No drainage                Neck: Supple, No JVD,No Abnormal Carotid Pulsations                Throat: No Erythema, No pus,No Swelling,Mallampati score= 3    Chest: Bilateral rhonchi without wheezing   Cardiac: RRR S1+ S2 with  a -S3: +M = 2/6, No R/H/G  Abdomen: Bowel Sounds are Normal.Soft Abdomen. No organomegaly of Liver,Spleen,or Kidneys   CNS: Non focal and intact. Cranial nerves 2, 346,8,9,10 and 12 are normal.Norrmal gait.Normal posture.  Extremities: No Clubbing,No Cyanosis with oxygen,Positive mild edema of lower extremities Bilateral  Skin: No Rash, No Ulcerative sores,and No cellulitis of the IV site.    Lab Results   Component Value Date    WBC 13.11 (H) 11/03/2023    HGB 12.9 (L) 11/03/2023    HCT 41.3 11/03/2023     11/03/2023    CHOL 113 (L) 10/27/2023    TRIG 128 10/27/2023    HDL 33 (L) 10/27/2023    LDLDIRECT 30 05/17/2023    ALT 13 11/03/2023    AST 14 11/03/2023     11/03/2023    K 4.4 11/03/2023     11/03/2023    CREATININE 1.7 (H) 11/03/2023    BUN 24 (H) 11/03/2023    CO2 24 11/03/2023    TSH 2.009 03/21/2022    PSA 2.1 05/04/2017    INR 1.2 08/16/2023    HGBA1C 6.0 (H) 10/27/2023       COMPARISON:  08/18/2023, 08/15/2023     FINDINGS:  Evaluation of the solid abdominal organs is limited given the lack of intravenous contrast material.     The thyroid appears normal.  The main central airways are patent.  The esophagus appears normal.  The heart is enlarged.  Calcified coronary artery disease.  Calcified atheromatous disease affects the aorta and its major branch vessels.  Left-sided pacer device in place.  There is extensive mediastinal adenopathy with a large lymph node in the prevascular space extending into the AP window measuring at least 3.3 cm in short axis dimension.  No axillary adenopathy.     Centrilobular emphysematous disease.  There is a mass in the left upper lobe laterally that abuts pleural surface measuring approximately 3.5 x 2.7 cm in transverse dimension and approximately 3.9 cm in craniocaudal dimension concerning for malignancy.  There are few scattered solid pulmonary nodules seen throughout the lungs bilaterally, subcentimeter in size.  A few scattered calcified granulomas  are noted.  Left lower lobe pulmonary nodule measures 9 mm.  No pleural effusions.     Evaluation of the solid abdominal organs is limited given the lack of intravenous contrast material.  Cholelithiasis without evidence for cholecystitis.  The unenhanced liver, spleen, pancreas, adrenal glands and kidneys are normal in size, shape and contour.  Bilateral renal cysts are present.  No nephrolithiasis, ureterolithiasis, hydronephrosis or hydroureter is seen.  The urinary bladder is partially distended and appears normal the prostate appears normal.     Constipation.  No free air, free fluid or obstruction.  No pathologically enlarged abdominal or pelvic lymph nodes are seen.     Age-appropriate degenerative changes affect the skeleton.     Impression:     No evidence for definite acute traumatic injury in the chest, abdomen or pelvis, noting limitation given the lack of intravenous contrast material.     Mass in the left upper lobe with a large pleural base, measuring up to approximately 3.9 cm in greatest dimension highly concerning for malignancy with scattered solid pulmonary nodules also seen throughout the lungs bilaterally, subcentimeter in size.  There is associated extensive mediastinal adenopathy with a lymph node in the pre-vascular space extending into the AP window that measures 3.3 cm in short axis dimension.     Cholelithiasis without CT evidence for cholecystitis.        Electronically signed by: Mirian Holguin MD  Date:                                            11/03/2023  Time:                                           08:33          1) Left Upper Lobe Lung Mass Colon Metastasis vs primary Lung Cancer  2) Hxo Colon Cancer  3) History of Smoking Cigarettes  4) Chronic Obstructive Lung Disease  5) Fatigue  6) Anorexia   7) Atrial Fibrillation eliquise on Hold and asa   8) Cardiac Valve Replacement (Pig Valve)  1. Bronchoscopy today  2. Collect specimen    Review of patient's allergies indicates:    Allergen Reactions    Ace inhibitors Other (See Comments)    Atorvastatin calcium Other (See Comments)    Statins-hmg-coa reductase inhibitors     Allergies have been reviewed.     Patient cleared for anesthesia (IV Conscious Sedation) ASA 3

## 2023-11-10 NOTE — BRIEF OP NOTE
"Brief Outpatient Discharge Note    Admit Date: 11/10/2023    Attending Physician: Prudencio Hidalgo MD     Discharge Physician: Prudencio Hidalgo MD    Allergies:  No Known Allergies       Final Diagnosis:  1) ) Left Upper Lobe Lung Mass Colon Metastasis vs primary Lung Cancer  2) Hxo Colon Cancer  3) History of Smoking Cigarettes  4) Chronic Obstructive Lung Disease  5) Fatigue  6) Anorexia   7) Atrial Fibrillation eliquise on Hold and asa   8) Cardiac Valve Replacement (Pig Valve      ICD-10-CM ICD-9-CM   1. Lung mass  R91.8 786.6       Disposition: Home or Self Care    Patient Instructions:   Current Discharge Medication List        CONTINUE these medications which have NOT CHANGED    Details   ALPRAZolam (XANAX) 0.25 MG tablet TAKE ONE TABLET BY MOUTH TWICE DAILY AS NEEDED  Qty: 60 tablet, Refills: 2    Associated Diagnoses: Generalized anxiety disorder      amiodarone (PACERONE) 200 MG Tab Take 0.5 tablets (100 mg total) by mouth once daily. Pt takes 1 Tablet of 200 mg twice daily    Associated Diagnoses: Paroxysmal atrial fibrillation      apixaban (ELIQUIS) 5 mg Tab Take 1 tablet (5 mg total) by mouth 2 (two) times daily. Hold for biopsy      carvediloL (COREG) 6.25 MG tablet Take 6.25 mg by mouth 2 (two) times daily.      docusate sodium (COLACE) 100 MG capsule Take 200 mg by mouth every morning.      ezetimibe (ZETIA) 10 mg tablet Take 1 tablet (10 mg total) by mouth once daily.  Qty: 90 tablet, Refills: 1    Associated Diagnoses: Hyperlipidemia, unspecified hyperlipidemia type      furosemide (LASIX) 20 MG tablet Take 1 tablet (20 mg total) by mouth once daily. Monday, Wednesday, Friday      gabapentin (NEURONTIN) 100 MG capsule Pt states he takes "2 in the morning and 4 at night" as it helps him sleep better.      HYDROcodone-acetaminophen (NORCO)  mg per tablet Take 1 tablet by mouth every 12 (twelve) hours as needed for Pain.  Qty: 60 tablet, Refills: 0    Comments: QUANTITY MORE THAN 7 DAYS MEDICALLY " NECESSARY  Associated Diagnoses: Lumbar disc disease with radiculopathy      icosapent ethyL (VASCEPA) 1 gram Cap Take 2 capsules (2,000 mg total) by mouth 2 (two) times daily.  Qty: 360 capsule, Refills: 1    Associated Diagnoses: Hyperlipidemia, unspecified hyperlipidemia type      isosorbide mononitrate (IMDUR) 60 MG 24 hr tablet Take 60 mg by mouth once daily.       pioglitazone (ACTOS) 15 MG tablet Take 1 tablet (15 mg total) by mouth once daily.  Qty: 30 tablet, Refills: 5    Associated Diagnoses: Type 2 diabetes mellitus with hyperglycemia, without long-term current use of insulin      REPATHA SURECLICK 140 mg/mL PnIj INJECT ONE SYRINGE SUBCUTANEOUSLY ONCE EVERY 14 DAYS      SITagliptin phosphate (JANUVIA) 50 MG Tab Take 1 tablet (50 mg total) by mouth once daily.    Associated Diagnoses: Type 2 diabetes mellitus with hyperglycemia, without long-term current use of insulin             Follow Up:  1 week    Discharge Condition:  Stable    Discharge Procedure Orders (must include Diet, Follow-up, Activity)  No discharge procedures on file.     Discharge Procedure Orders (must include Diet, Follow-up, Activity)  No discharge procedures on file.     Discharge Date: No discharge date for patient encounter.

## 2023-11-10 NOTE — OP NOTE
Operative Note       Surgery Date: 11/10/2023     Surgeon(s) and Role:     * Prudencio Hidalgo MD - Primary    Pre-op Diagnosis:  Mass of upper lobe of left lung [R91.8]  History of colon cancer [Z85.038]    Post-op Diagnosis:  No Endobronchial Lesion Seen    EXAMINATION:  CT CHEST ABDOMEN PELVIS WITHOUT CONTRAST(XPD)     CLINICAL HISTORY:  Polytrauma, blunt;     TECHNIQUE:  Low dose axial images, sagittal and coronal reformations were obtained from the thoracic inlet to the pubic symphysis without contrast.     COMPARISON:  08/18/2023, 08/15/2023     FINDINGS:  Evaluation of the solid abdominal organs is limited given the lack of intravenous contrast material.     The thyroid appears normal.  The main central airways are patent.  The esophagus appears normal.  The heart is enlarged.  Calcified coronary artery disease.  Calcified atheromatous disease affects the aorta and its major branch vessels.  Left-sided pacer device in place.  There is extensive mediastinal adenopathy with a large lymph node in the prevascular space extending into the AP window measuring at least 3.3 cm in short axis dimension.  No axillary adenopathy.     Centrilobular emphysematous disease.  There is a mass in the left upper lobe laterally that abuts pleural surface measuring approximately 3.5 x 2.7 cm in transverse dimension and approximately 3.9 cm in craniocaudal dimension concerning for malignancy.  There are few scattered solid pulmonary nodules seen throughout the lungs bilaterally, subcentimeter in size.  A few scattered calcified granulomas are noted.  Left lower lobe pulmonary nodule measures 9 mm.  No pleural effusions.     Evaluation of the solid abdominal organs is limited given the lack of intravenous contrast material.  Cholelithiasis without evidence for cholecystitis.  The unenhanced liver, spleen, pancreas, adrenal glands and kidneys are normal in size, shape and contour.  Bilateral renal cysts are present.  No nephrolithiasis,  ureterolithiasis, hydronephrosis or hydroureter is seen.  The urinary bladder is partially distended and appears normal the prostate appears normal.     Constipation.  No free air, free fluid or obstruction.  No pathologically enlarged abdominal or pelvic lymph nodes are seen.     Age-appropriate degenerative changes affect the skeleton.     Impression:     No evidence for definite acute traumatic injury in the chest, abdomen or pelvis, noting limitation given the lack of intravenous contrast material.     Mass in the left upper lobe with a large pleural base, measuring up to approximately 3.9 cm in greatest dimension highly concerning for malignancy with scattered solid pulmonary nodules also seen throughout the lungs bilaterally, subcentimeter in size.  There is associated extensive mediastinal adenopathy with a lymph node in the pre-vascular space extending into the AP window that measures 3.3 cm in short axis dimension.     Cholelithiasis without CT evidence for cholecystitis.        Electronically signed by: Mirian Holguin MD  Date:                                            11/03/2023  Time:                                           08:33        Procedure(s) (LRB):  BRONCHOSCOPY (N/A)    Anesthesia: RN IV Sedation    Procedure in Detail/Findings:  Wesley Bernal is a 73 y.o. male presents with chief complaint of SOB and Wheezing last week and he was placed in hospital.Upon presenting to ER Last week he was admitted and discharged and schedule for a Bronchoscopy on Monday this week but it was cancelled due to family matters. He  was found in ER to have a large Left Upper Lobe Lung Mass. Bronchoscopy couldn't be done intially in hospital as in patient due to he was taking Eloquise. He has a hxo colon cancer for which he received cemotherapy less than 1 year ago in [facility] . Bronchoscopy to evaluate Respiratory status..  With fluoroscopy guidance. ASA = 3. Via Right placed scope to the level of the vocal  cords. Patient is an out- patient(in pt vs out pt).Patient airways: Mouth opening good/Head/neck /Extension good  Vocal move normally  Subglottic area is clear  Trachea with bronchorrhea mild  Right Lung no endobronchial lesion.  Left Lung no endobronchial lesion.  There is mild to moderate Bronchial secretions  There is no mass in the any Bronchus A mass of size 3.5 cm is seen on CT in the MATTHIAS. Is located in MATTHIAS laterally segment.   no Blood loss.  Transbronchial Biopsy X's 6 of the Left lateral segment of left Zupper lobe .  Brush and Wash X's 1 of the MATTHIAS Lobe/Segment.  Patient tolerated the procedure well no complications.  Impression:  1) 1) Left Upper Lobe Lung Mass Colon Metastasis vs primary Lung Cancer  2) Hxo Colon Cancer  3) History of Smoking Cigarettes  4) Chronic Obstructive Lung Disease  5) Fatigue  6) Anorexia   7) Atrial Fibrillation eliquise on Hold and asa   8) Cardiac Valve Replacement (Pig Valve)        ICD-10-CM ICD-9-CM   1. Lung mass  R91.8 786.6     Plan:  1) Await Pathology.  2) Continue Bronchodilators.  3) Await cultures.  4) Return to clinic next Monday at 10 am call [phone]           Specimens (From admission, onward)       Start     Ordered    11/10/23 1325  Cytology, Fluid/Wash/Brush  Once        Question Answer Comment   Source: Bronch wash    Clinical Information: abnormal ct chest    Specific Site: endobronchial biopsy    Other Requests: pathology        11/10/23 1324    11/10/23 1325  Cytology, Fluid/Wash/Brush  Once        Comments: Bronchial BrushPreop-Abnormal CXRPostop-Abnormal CXRBronchoscopy     Question Answer Comment   Source: Bronchial Brush    Clinical Information: abnormal ct chest    Specific Site: endobronchial lesion    Other Requests: pathology        11/10/23 1324    11/10/23 1325  Specimen to Pathology, Surgery Pulmonary and Thoracic  Once        Comments: Pre-op Diagnosis: Mass of upper lobe of left lung [R91.8]History of colon cancer  [Z85.038]Procedure(s):BRONCHOSCOPY Number of specimens: 6Name of specimens: endobronchial biopsy     References:    Click here for ordering Quick Tip   Question Answer Comment   Procedure Type: Pulmonary and Thoracic    Specimen Class: Known or suspected malignancy        11/10/23 1324                  Implants: * No implants in log *           Disposition: PACU - hemodynamically stable.    Attestation:  I performed the procedure.

## 2023-11-12 NOTE — ED PROVIDER NOTES
"Encounter Date: 11/12/2023       History     Chief Complaint   Patient presents with    Chest Pain     Pt to ED via [facility] with c/o chest pain that radiates into back; per patient, a mass has been found "near the heart" and had biopsy performed 11/10/23. States pain woke pt from sleep.      73-year-old male with history of AFib, diabetes, hypertension, presenting with left-sided chest pain.  Patient was seen about a week ago for dehydration, found to have left-sided lung mass.  Two days ago patient had biopsy, and since then has suffered from pain, but pain worsened significantly about an hour ago.  No fever.  Wife does reports she noted some cough.  No shortness of breath.  No other complaints.      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    Cardiomyopathy     Carotid artery disease     Coronary artery disease     Diabetes mellitus type II     Encounter for blood transfusion     Heart disease     Hyperlipidemia     Hypertension     PAD (peripheral artery disease)      Past Surgical History:   Procedure Laterality Date    ABDOMINAL AORTA STENT      BRACHIOCEPHALIC VEIN ANGIOPLASTY / STENTING      CARDIAC DEFIBRILLATOR PLACEMENT      1/2011    CARDIAC PACEMAKER PLACEMENT      1-2011    CARDIAC SURGERY      open heart    CARDIAC VALVE SURGERY      pig valve replacement -    CAROTID STENT      COLONOSCOPY N/A 8/17/2023    Procedure: COLONOSCOPY;  Surgeon: Javier Chang MD;  Location: [facility];  Service: Endoscopy;  Laterality: N/A;    ESOPHAGOGASTRODUODENOSCOPY N/A 8/17/2023    Procedure: EGD (ESOPHAGOGASTRODUODENOSCOPY);  Surgeon: Javier Chang MD;  Location: [facility];  Service: Endoscopy;  Laterality: N/A;    " LASIK SX Bilateral     LYMPH NODE BIOPSY Left 2019    Procedure: BIOPSY, LYMPH NODE (GROIN);  Surgeon: Adelfo Yin MD;  Location: [facility] OR;  Service: General;  Laterality: Left;    REPLACEMENT OF IMPLANTABLE CARDIOVERTER-DEFIBRILLATOR (ICD) GENERATOR N/A 2020    Procedure: REPLACEMENT, ICD GENERATOR;  Surgeon: Marko Hartley MD;  Location: [facility] CATH;  Service: Cardiology;  Laterality: N/A;     Family History   Problem Relation Age of Onset    Diabetes Mother     Heart disease Father     Cancer Father         melanoma, prostate    Heart disease Paternal Grandfather      Social History     Tobacco Use    Smoking status: Former     Current packs/day: 0.00     Types: Cigarettes, Cigars     Quit date: 4/10/2015     Years since quittin.5    Smokeless tobacco: Never   Substance Use Topics    Alcohol use: No     Alcohol/week: 0.0 standard drinks of alcohol    Drug use: Never     Review of Systems   Constitutional:  Negative for fever.   HENT:  Negative for congestion and sore throat.    Respiratory:  Negative for shortness of breath.    Cardiovascular:  Positive for chest pain.   Gastrointestinal:  Negative for abdominal pain, diarrhea, nausea and vomiting.   Genitourinary:  Negative for dysuria.   Musculoskeletal:  Negative for back pain.   Skin:  Negative for rash.   Neurological:  Negative for weakness.   Hematological:  Does not bruise/bleed easily.       Physical Exam     Initial Vitals   BP Pulse Resp Temp SpO2   23 0210 23 0210 23 0210 23 0215 23 0210   (!) 141/111 70 (!) 24 98.9 °F (37.2 °C) 98 %      MAP       --                Physical Exam    Nursing note and vitals reviewed.  Constitutional: He appears well-developed.   Eyes: EOM are normal.   Neck:   Normal range of motion.  Cardiovascular:            No murmur heard.  Pulmonary/Chest: No respiratory distress.   Crackles to left upper lobe.  Lungs otherwise clear.  No wheezing.  Satting 98% on room air.    Abdominal: He exhibits no distension.   Musculoskeletal:         General: Normal range of motion.      Cervical back: Normal range of motion.     Neurological: He is alert.   Skin: Skin is warm.   Psychiatric: He has a normal mood and affect.         ED Course   Procedures  Labs Reviewed   CBC W/ AUTO DIFFERENTIAL - Abnormal; Notable for the following components:       Result Value    RBC 3.51 (*)     Hemoglobin 10.3 (*)     Hematocrit 32.7 (*)     MCHC 31.5 (*)     RDW 14.6 (*)     Immature Granulocytes 0.6 (*)     Gran # (ANC) 10.4 (*)     Immature Grans (Abs) 0.08 (*)     Lymph # 0.7 (*)     Eos # 0.7 (*)     Gran % 82.3 (*)     Lymph % 5.2 (*)     All other components within normal limits   COMPREHENSIVE METABOLIC PANEL - Abnormal; Notable for the following components:    CO2 22 (*)     Glucose 175 (*)     Albumin 3.3 (*)     eGFR 58 (*)     All other components within normal limits   TROPONIN I - Abnormal; Notable for the following components:    Troponin I 0.073 (*)     All other components within normal limits   B-TYPE NATRIURETIC PEPTIDE - Abnormal; Notable for the following components:    BNP 1,200 (*)     All other components within normal limits   TROPONIN I - Abnormal; Notable for the following components:    Troponin I 0.079 (*)     All other components within normal limits     EKG Readings: (Independently Interpreted)   Initial Reading: No STEMI. Rhythm: Normal Sinus Rhythm. Heart Rate: 66. Ectopy: No Ectopy. Conduction: Normal.       Imaging Results              CT Chest With Contrast (In process)                      Medications   cefTRIAXone (ROCEPHIN) 1 g in dextrose 5 % in water (D5W) 100 mL IVPB (MB+) (1 g Intravenous New Bag 11/12/23 1755)   azithromycin (ZITHROMAX) 500 mg in dextrose 5 % (D5W) 250 mL IVPB (Vial-Mate) (has no administration in time range)   morphine injection 4 mg (4 mg Intravenous Given 11/12/23 2446)   ondansetron injection 4 mg (4 mg Intravenous Given 11/12/23 7548)    iohexoL (OMNIPAQUE 350) injection 75 mL (75 mLs Intravenous Given 11/12/23 0336)   furosemide injection 40 mg (40 mg Intravenous Given 11/12/23 0548)     Medical Decision Making  DDX:  Left-sided chest pain that began after lung biopsy, but worsened this evening.  Will rule out ACS.  Will screen for pneumothorax versus pneumonia.  Viral causes.  Possible post biopsy injury.  DX:  CBC, CMP, troponin, EKG, chest x-ray.  CT chest.  TX:  Analgesia PRN  Dispo:  Pending workup        Amount and/or Complexity of Data Reviewed  Labs: ordered.  Radiology: ordered.    Risk  Prescription drug management.               ED Course as of 11/12/23 0601   Sun Nov 12, 2023   0539 Possible postobstructive pneumonia on CT scan.  Left upper lobe mass has increased in size since last CT. [NB]      ED Course User Index  [NB] Romero Coleman MD                    Clinical Impression:   Final diagnoses:  [R07.9] Chest pain        ED Disposition Condition    Transfer to Another Facility Stable                Romero Coleman MD  11/12/23 0225       Romero Coleman MD  11/12/23 0227       Romero Coleman MD  11/12/23 0601

## 2023-11-19 NOTE — ED PROVIDER NOTES
"Encounter Date: 11/19/2023       History     Chief Complaint   Patient presents with    Lung Pain     Pt to ED with c/o chronic L upper lung pain. Pt states that he has a growth in his upper L lung.      Wesley Bernal is a 73 y.o. male that presents with left upper lung pain today  Patient was recently diagnosed with left upper lobe lung malignancy last week  Patient underwent bronchoscopy by Dr. Prudencio Hidalgo this month on ER interview  Patient is currently scheduled to see Dr. Heck at [facility] for lung biopsy ASAP  Patient has had on again off again left upper lobe pain since the diagnosis today  Patient states "I know it is my lung cancer"; history of CAD noted on chart review  Normal sinus rhythm on EKG with no ST changes on ER evaluation this morning    The history is provided by the patient and the spouse.     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    Cardiomyopathy     Carotid artery disease     Coronary artery disease     Diabetes mellitus type II     Encounter for blood transfusion     Heart disease     Hyperlipidemia     Hypertension     PAD (peripheral artery disease)      Past Surgical History:   Procedure Laterality Date    ABDOMINAL AORTA STENT      BRACHIOCEPHALIC VEIN ANGIOPLASTY / STENTING      BRONCHOSCOPY N/A 11/10/2023    Procedure: BRONCHOSCOPY;  Surgeon: Prudencio Hidalgo MD;  Location: [facility] OR;  Service: Pulmonary;  Laterality: N/A;    CARDIAC DEFIBRILLATOR PLACEMENT      1/2011    CARDIAC PACEMAKER PLACEMENT      1-2011    CARDIAC SURGERY      open heart    CARDIAC VALVE SURGERY      pig valve replacement -    CAROTID STENT      COLONOSCOPY N/A 8/17/2023    Procedure: COLONOSCOPY;  Surgeon: Bernardo" Javier GRANT MD;  Location: [facility] ENDO;  Service: Endoscopy;  Laterality: N/A;    ESOPHAGOGASTRODUODENOSCOPY N/A 2023    Procedure: EGD (ESOPHAGOGASTRODUODENOSCOPY);  Surgeon: Javier Chang MD;  Location: [facility] ENDO;  Service: Endoscopy;  Laterality: N/A;    LASIK SX Bilateral     LYMPH NODE BIOPSY Left 2019    Procedure: BIOPSY, LYMPH NODE (GROIN);  Surgeon: Adelfo Yin MD;  Location: [facility] OR;  Service: General;  Laterality: Left;    REPLACEMENT OF IMPLANTABLE CARDIOVERTER-DEFIBRILLATOR (ICD) GENERATOR N/A 2020    Procedure: REPLACEMENT, ICD GENERATOR;  Surgeon: Marko Hartley MD;  Location: [facility] CATH;  Service: Cardiology;  Laterality: N/A;     Family History   Problem Relation Age of Onset    Diabetes Mother     Heart disease Father     Cancer Father         melanoma, prostate    Heart disease Paternal Grandfather      Social History     Tobacco Use    Smoking status: Former     Current packs/day: 0.00     Types: Cigarettes, Cigars     Quit date: 4/10/2015     Years since quittin.6    Smokeless tobacco: Never   Substance Use Topics    Alcohol use: No     Alcohol/week: 0.0 standard drinks of alcohol    Drug use: Never     Review of Systems   Constitutional:  Negative for activity change, appetite change, fatigue, fever and unexpected weight change.   HENT:  Negative for congestion, ear pain, mouth sores, nosebleeds, rhinorrhea, sinus pressure, sneezing and sore throat.    Eyes:  Negative for pain, discharge, redness and itching.   Respiratory:  Negative for apnea, cough, chest tightness and shortness of breath.    Cardiovascular:  Positive for chest pain. Negative for palpitations and leg swelling.   Gastrointestinal:  Negative for abdominal distention, abdominal pain, anal bleeding, constipation, diarrhea, nausea and vomiting.   Endocrine: Negative.    Genitourinary:  Negative for dysuria, enuresis, flank pain and frequency.   Musculoskeletal:  Negative for arthralgias, back  pain, neck pain and neck stiffness.   Skin:  Negative for color change and wound.   Allergic/Immunologic: Negative.    Neurological:  Negative for dizziness, tremors, syncope, facial asymmetry, speech difficulty, weakness, light-headedness, numbness and headaches.   Hematological:  Negative for adenopathy. Does not bruise/bleed easily.   Psychiatric/Behavioral:  Negative for agitation, behavioral problems, hallucinations, self-injury and suicidal ideas. The patient is not nervous/anxious.        Physical Exam     Initial Vitals [11/19/23 0618]   BP Pulse Resp Temp SpO2   (!) 219/97 (!) 53 20 97.6 °F (36.4 °C) 99 %      MAP       --         Physical Exam    Nursing note and vitals reviewed.  Constitutional: Vital signs are normal. He appears well-developed and well-nourished. He is cooperative.   HENT:   Head: Normocephalic and atraumatic.   Mouth/Throat: Uvula is midline and mucous membranes are normal.   Eyes: Conjunctivae, EOM and lids are normal. Pupils are equal, round, and reactive to light.   Neck: Neck supple.   Normal range of motion.   Full passive range of motion without pain.     Cardiovascular:  Normal rate, regular rhythm, S1 normal, S2 normal, normal heart sounds, intact distal pulses and normal pulses.           Pulmonary/Chest: Effort normal and breath sounds normal.   (+) pacemaker area appears clean dry & intact  (+) sternotomy scar clean dry & intact   Abdominal: Abdomen is soft and flat. Bowel sounds are normal.   Musculoskeletal:         General: Normal range of motion.      Cervical back: Full passive range of motion without pain, normal range of motion and neck supple.     Neurological: He is alert and oriented to person, place, and time. He has normal strength.   Skin: Skin is warm, dry and intact. Capillary refill takes less than 2 seconds.         ED Course   Procedures  Labs Reviewed   COMPREHENSIVE METABOLIC PANEL - Abnormal; Notable for the following components:       Result Value     Glucose 205 (*)     BUN 27 (*)     Creatinine 1.7 (*)     Albumin 3.4 (*)     eGFR 42 (*)     All other components within normal limits   CBC W/ AUTO DIFFERENTIAL - Abnormal; Notable for the following components:    WBC 13.56 (*)     RBC 3.30 (*)     Hemoglobin 9.7 (*)     Hematocrit 31.3 (*)     MCHC 31.0 (*)     RDW 14.6 (*)     Immature Granulocytes 1.3 (*)     Gran # (ANC) 11.5 (*)     Immature Grans (Abs) 0.17 (*)     Lymph # 0.5 (*)     Eos # 0.6 (*)     Gran % 84.9 (*)     Lymph % 3.9 (*)     All other components within normal limits   CK - Abnormal; Notable for the following components:    CPK 17 (*)     All other components within normal limits   TROPONIN I - Abnormal; Notable for the following components:    Troponin I 0.041 (*)     All other components within normal limits   B-TYPE NATRIURETIC PEPTIDE - Abnormal; Notable for the following components:     (*)     All other components within normal limits   TROPONIN I - Abnormal; Notable for the following components:    Troponin I 0.041 (*)     All other components within normal limits     EKG Readings: (Independently Interpreted)   No STEMI  Normal sinus rhythm  No ectopy  Normal conduction  Normal ST segments  Normal T-wave  Normal axis  Heart rate in the 50s       Imaging Results              X-Ray Chest 1 View (Final result)  Result time 11/19/23 08:31:36      Final result by Alexi Montgomery MD (11/19/23 08:31:36)                   Impression:      1. Left apical consolidative opacity concerning for primary or secondary malignancy.  2. Left perihilar peribronchial thickening and scattered interstitial opacities in the left upper lobe, correlating to findings prior CT exam from 11/12/2023.  3. No other acute radiographic abnormality in the chest.      Electronically signed by: Alexi Montgomery  Date:    11/19/2023  Time:    08:31               Narrative:    EXAMINATION:  XR CHEST 1 VIEW    CLINICAL HISTORY:  CP;    TECHNIQUE:  Single frontal view of the  chest was performed.    COMPARISON:  CT chest 11/12/2023, 08/17/2023    FINDINGS:  Left anterior chest wall defibrillator device.  Cardiac monitor wires overlie the chest.  Right anterior chest Port-A-Cath with tip projected over the SVC.  Status post median sternotomy and CABG.  Cardiomediastinal silhouette is mildly enlarged, unchanged.  Left apical lung consolidative opacity abutting the pleural margin concerning for primary or secondary malignancy.  Left perihilar bronchial wall thickening and scattered interstitial opacities in the left upper lung, correlating to findings on prior CT exam.  Scattered interstitial markings in the right mid and lower lung.  No significant pleural effusion or pneumothorax.                                       Medications   aspirin tablet 325 mg (325 mg Oral Given 11/19/23 0625)   ondansetron injection 4 mg (4 mg Intravenous Given 11/19/23 0659)   morphine injection 4 mg (4 mg Intravenous Given 11/19/23 0721)     Medical Decision Making  Left upper chest wall pain that the patient feels his due to malignancy  Recently malignancy diagnosis with bronchoscopy earlier this month  Patient is pending cardiac clearance for lung biopsy by [place] ASAP  Differential includes malignancy, angina, STEMI/non-STEMI, GERD, pleurisy    Problems Addressed:  Chest wall pain: complicated acute illness or injury  History of lung cancer: complicated acute illness or injury    Amount and/or Complexity of Data Reviewed  Independent Historian: spouse  External Data Reviewed: labs, radiology, ECG and notes.  Labs: ordered. Decision-making details documented in ED Course.  Radiology: ordered and independent interpretation performed.  ECG/medicine tests: ordered and independent interpretation performed.    ED Management & Risk of Complications, Morbidity, Mortality:  Normal sinus rhythm on EKG with a chest x-ray consistent with lung ca  (-) troponin x2, lab work within normal limits, creatinine 1.7 this  morning  Patient feeling better after IV morphine administered in the ER this a.m.  Patient has [place] at home, he will follow-up with pulmonology in the a.m.  This pain he states is related to his metastatic cancer, appointments ASAP  Carefully instructed return to the ER with any concerns after discharge     Clinical Impression:  Final diagnoses:  [R07.89] Chest wall pain (Primary)  [Z85.118] History of lung cancer        ED Disposition Condition    Discharge Stable          ED Prescriptions    None       Follow-up Information       Follow up With Specialties Details Why Contact Info    Lalo wSeet MD Family Medicine Schedule an appointment as soon as possible for a visit in 2 days  [address] [place] [address]  [phone]      Corwin Heck MD Hematology and Oncology Go in 2 days  [address] [place] [address]  [phone]      Fausto Leigh MD Cardiology Go in 2 days  [address] [place] [address]  [phone]               Alexi Carl MD  11/19/23 1011

## 2023-11-22 PROBLEM — J41.0 SIMPLE CHRONIC BRONCHITIS: Status: ACTIVE | Noted: 2023-01-01

## 2023-11-22 PROBLEM — R07.81 PLEURITIC PAIN: Status: ACTIVE | Noted: 2023-01-01

## 2023-11-22 PROBLEM — R91.8 LUNG MASS: Status: ACTIVE | Noted: 2023-01-01

## 2023-11-22 PROBLEM — R63.0 POOR APPETITE: Status: ACTIVE | Noted: 2023-01-01

## 2023-11-22 NOTE — PROGRESS NOTES
Subjective:       Patient ID: Wesley Bernal is a 73 y.o. male.    Chief Complaint: Follow-up (Pt here for hospital f/u. )      Transitional Care Note    Family and/or Caretaker present at visit?  Yes.  Diagnostic tests reviewed/disposition: I have reviewed all completed as well as pending diagnostic tests at the time of discharge.  Disease/illness education: Lung lesion  Home health/community services discussion/referrals: Patient does not have home health established from hospital visit.  They do not need home health.  If needed, we will set up home health for the patient.   Establishment or re-establishment of referral orders for community resources: No other necessary community resources.   Discussion with other health care providers: No discussion with other health care providers necessary.     Patient was admitted to the hospital 2 weeks ago for chest pain.  Cardiac workup was negative.  He was found to have a mass in his left upper lobe.  He underwent bronchoscopy but the biopsy result only showed inflammatory cells.  He will see a surgeon soon for a biopsy.  He is having a lot of pain with this mass.  The [place] helps.  Below is his hospital course      Patient is a 72 year old male with medical history of former smoker, HTN, HLD, CAD, CABG, Afib, prosthetic heart valve, DM type 2, anxiety, recent rectal cancer (remission 1 year) who presented to the ED after falling out of bed.  He hit left side of abdomen/chest and attempted to take pain medication at home but did not have relief.  Prior to admission he has had left chest wall discomfort for 2-3 days.  He has had weight loss during and after chemotherapy.  He doesn't have fever, chills, CP, SOB, nausea and vomiting.        Admitted for chest pain.          * No surgery found *       Hospital Course:   Patient admitted for generalized weakness and left side chest pressure after fall.  No acute MI.  Found to have left upper lobe mass.  Pulmonology  consulted and plan for biopsy Monday.  Holding eliquis.  Referral send to patient's prior oncologist Dr. Heck.          Review of Systems   Constitutional:  Positive for unexpected weight change. Negative for activity change, chills, fatigue and fever.   HENT:  Negative for sore throat and trouble swallowing.    Respiratory:  Negative for cough, chest tightness and shortness of breath.    Cardiovascular:  Positive for chest pain. Negative for leg swelling.   Gastrointestinal:  Negative for abdominal pain.   Endocrine: Negative for cold intolerance and heat intolerance.   Genitourinary:  Negative for difficulty urinating.   Musculoskeletal:  Positive for arthralgias and back pain. Negative for joint swelling.   Skin:  Negative for rash.   Neurological:  Positive for weakness. Negative for numbness.        Cognitive difficulties   Hematological:  Negative for adenopathy.   Psychiatric/Behavioral:  Positive for dysphoric mood. Negative for decreased concentration.        Objective:      Vitals:    11/22/23 1147   BP: (!) 110/50   Pulse: (!) 50   Resp:      Physical Exam  Constitutional:       Appearance: He is well-developed.   HENT:      Head: Normocephalic and atraumatic.      Right Ear: Tympanic membrane, ear canal and external ear normal.      Left Ear: Tympanic membrane, ear canal and external ear normal.      Nose: Nose normal.   Eyes:      Conjunctiva/sclera: Conjunctivae normal.      Pupils: Pupils are equal, round, and reactive to light.   Neck:      Thyroid: No thyromegaly.      Trachea: No tracheal deviation.   Cardiovascular:      Rate and Rhythm: Normal rate and regular rhythm.      Heart sounds: Normal heart sounds. No murmur heard.     No gallop.   Pulmonary:      Effort: Pulmonary effort is normal.      Breath sounds: Normal breath sounds.   Abdominal:      General: Bowel sounds are normal. There is no distension.      Palpations: Abdomen is soft. There is no mass.      Tenderness: There is no  abdominal tenderness. There is no guarding or rebound.      Hernia: There is no hernia in the left inguinal area.   Genitourinary:     Prostate: Normal.      Rectum: Normal.   Musculoskeletal:         General: Normal range of motion.      Cervical back: Normal range of motion and neck supple.   Skin:     General: Skin is warm and dry.   Neurological:      General: No focal deficit present.      Mental Status: He is alert and oriented to person, place, and time.      Cranial Nerves: No cranial nerve deficit.      Motor: Weakness present.      Gait: Gait abnormal.      Deep Tendon Reflexes: Reflexes are normal and symmetric.   Psychiatric:         Attention and Perception: Attention normal.         Mood and Affect: Mood and affect normal.         Speech: Speech normal.         Behavior: Behavior normal.         Thought Content: Thought content normal.         Cognition and Memory: Cognition is impaired. Memory is impaired.         Judgment: Judgment normal.         Assessment:       1. Lung mass    2. Lumbar disc disease with radiculopathy    3. Poor appetite    4. Simple chronic bronchitis    5. Pleuritic pain        Plan:   1. Lung mass  Overview:  I reviewed the CT scan with patient and wife.  Bronchoscopy was negative   Patient will need to see surgeon for biopsy.  He will get surgical clearance with Cardiology.      2. Lumbar disc disease with radiculopathy  -     HYDROcodone-acetaminophen (NORCO)  mg per tablet; Take 1 tablet by mouth every 6 (six) hours as needed for Pain.  Dispense: 60 tablet; Refill: 0    3. Poor appetite  Overview:  Try cyproheptadine 4 mg twice daily    Orders:  -     cyproheptadine (PERIACTIN) 4 mg tablet; Take 1 tablet (4 mg total) by mouth 2 (two) times daily as needed (poor appetite).  Dispense: 30 tablet; Refill: 5    4. Simple chronic bronchitis  Overview:  Avoid smoking   Continue albuterol inhaler      5. Pleuritic pain  Overview:  Indomethacin as needed   Continue Norco every  6 hours for pain control  Pain is probably coming from the lung cancer         Return to clinic in 3 weeks.  Patient will probably need pain control.

## 2023-11-27 PROBLEM — K25.0 ACUTE GASTRIC ULCER WITH HEMORRHAGE: Status: RESOLVED | Noted: 2023-01-01 | Resolved: 2023-01-01

## 2023-12-11 NOTE — ED NOTES
Pt wheeled into ED exam 05 and assisted into ED stretcher without incident. Pt placed into hospital gown with continuous cardiac, BP, and SPO2 monitoring initiated. AAOx4 at this time, but pt has poor recent memory normally. Pt reports left upper back pain, different than normal back pain, onset last night, worse through the night. Woke up this am with sharp left sided chest pain. Denies physical exertion or any activity prior to onset of pain. Reports no alleviating factors. Pt pale and weak at this time. Awaiting MD ayala.

## 2023-12-11 NOTE — ED PROVIDER NOTES
Encounter Date: 12/11/2023       History     Chief Complaint   Patient presents with    Chest Pain    Back Pain     S/p lung biopsy on 12/04/2023, pt to ED from home with c/o left upper back pain onset last pm with acute onset of sharp left sided CP this am. Took Norco 10 approx 1 hour ago without relief.      73 year old male with a PMHx of anxiety, Afib on eliquis, former smoker, HTN, HLD, CAD, CAGB, DM, rectal cancer (in remission) presents to the ED with his wife with left flank pain. Wife states patient does have some memory issues at baseline. They state it started last night. No falls, no trauma, no heavy lifting. States it's sharp, radiates to the front. No nausea, vomiting, urinary symptoms, shortness of breath, chest pain (admitted to chest pain earlier to RN but not to me). Has a dry cough. Had a lung biopsy by Dr. De La Rosa at [facility] on 12/4/23. Admitted and discharged 11/3/23 for similar symptoms.        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    Cardiomyopathy     Carotid artery disease     Coronary artery disease     Diabetes mellitus type II     Encounter for blood transfusion     Heart disease     Hyperlipidemia     Hypertension     PAD (peripheral artery disease)      Past Surgical History:   Procedure Laterality Date    ABDOMINAL AORTA STENT      BRACHIOCEPHALIC VEIN ANGIOPLASTY / STENTING      BRONCHOSCOPY N/A 11/10/2023    Procedure: BRONCHOSCOPY;  Surgeon: Prudencio Hidalgo MD;  Location: [facility] OR;  Service: Pulmonary;  Laterality: N/A;    CARDIAC DEFIBRILLATOR PLACEMENT      1/2011    CARDIAC PACEMAKER PLACEMENT      1-2011    CARDIAC SURGERY      open heart    CARDIAC VALVE SURGERY      pig valve replacement -     CAROTID STENT      COLONOSCOPY N/A 2023    Procedure: COLONOSCOPY;  Surgeon: Javier Chang MD;  Location: [facility] ENDO;  Service: Endoscopy;  Laterality: N/A;    ESOPHAGOGASTRODUODENOSCOPY N/A 2023    Procedure: EGD (ESOPHAGOGASTRODUODENOSCOPY);  Surgeon: Javier Chang MD;  Location: [facility] ENDO;  Service: Endoscopy;  Laterality: N/A;    LASIK SX Bilateral     LYMPH NODE BIOPSY Left 2019    Procedure: BIOPSY, LYMPH NODE (GROIN);  Surgeon: Adelfo Yin MD;  Location: [facility] OR;  Service: General;  Laterality: Left;    REPLACEMENT OF IMPLANTABLE CARDIOVERTER-DEFIBRILLATOR (ICD) GENERATOR N/A 2020    Procedure: REPLACEMENT, ICD GENERATOR;  Surgeon: Marko Hartley MD;  Location: [facility] CATH;  Service: Cardiology;  Laterality: N/A;     Family History   Problem Relation Age of Onset    Diabetes Mother     Heart disease Father     Cancer Father         melanoma, prostate    Heart disease Paternal Grandfather      Social History     Tobacco Use    Smoking status: Former     Current packs/day: 0.00     Types: Cigarettes, Cigars     Quit date: 4/10/2015     Years since quittin.6    Smokeless tobacco: Never   Substance Use Topics    Alcohol use: No     Alcohol/week: 0.0 standard drinks of alcohol    Drug use: Never     Review of Systems   Constitutional:  Negative for chills and fever.   HENT:  Negative for congestion, rhinorrhea and sneezing.    Eyes:  Negative for discharge and redness.   Respiratory:  Positive for cough. Negative for shortness of breath.    Cardiovascular:  Negative for chest pain and palpitations.   Gastrointestinal:  Negative for abdominal pain, diarrhea and vomiting.   Genitourinary:  Positive for flank pain. Negative for difficulty urinating and urgency.   Musculoskeletal:  Positive for back pain. Negative for neck pain.   Skin:  Negative for rash and wound.   Neurological:  Negative for weakness, numbness and headaches.       Physical Exam     Initial Vitals  [12/11/23 0925]   BP Pulse Resp Temp SpO2   (!) 142/60 62 (!) 28 98 °F (36.7 °C) 97 %      MAP       --         Physical Exam    Nursing note and vitals reviewed.  Constitutional: He appears well-developed. He is not diaphoretic. No distress.   HENT:   Head: Normocephalic and atraumatic.   Right Ear: External ear normal.   Left Ear: External ear normal.   Nose: Nose normal.   Eyes: Conjunctivae are normal. Right eye exhibits no discharge. Left eye exhibits no discharge. No scleral icterus.   Cardiovascular:  Normal rate and regular rhythm.           Pulmonary/Chest: Breath sounds normal. No stridor. Tachypnea noted. No respiratory distress. He has no wheezes. He has no rhonchi. He has no rales.   Abdominal: Abdomen is soft. He exhibits no distension. There is no abdominal tenderness.   No right CVA tenderness.  There is left CVA tenderness. There is no guarding.   Musculoskeletal:         General: No edema.      Lumbar back: No tenderness.     Neurological: He is alert and oriented to person, place, and time. GCS score is 15. GCS eye subscore is 4. GCS verbal subscore is 5. GCS motor subscore is 6.   Skin: Skin is warm and dry. Capillary refill takes less than 2 seconds.   Psychiatric: He has a normal mood and affect.         ED Course   Procedures  Labs Reviewed   CBC W/ AUTO DIFFERENTIAL - Abnormal; Notable for the following components:       Result Value    WBC 18.86 (*)     RBC 3.75 (*)     Hemoglobin 10.6 (*)     Hematocrit 35.4 (*)     MCHC 29.9 (*)     RDW 14.6 (*)     Immature Granulocytes 1.5 (*)     Gran # (ANC) 15.0 (*)     Immature Grans (Abs) 0.28 (*)     Lymph # 0.9 (*)     Eos # 1.6 (*)     Gran % 79.3 (*)     Lymph % 5.0 (*)     Eosinophil % 8.7 (*)     All other components within normal limits   COMPREHENSIVE METABOLIC PANEL - Abnormal; Notable for the following components:    Glucose 250 (*)     Creatinine 1.8 (*)     Albumin 3.4 (*)     eGFR 39 (*)     All other components within normal limits    TROPONIN I - Abnormal; Notable for the following components:    Troponin I 0.103 (*)     All other components within normal limits   B-TYPE NATRIURETIC PEPTIDE - Abnormal; Notable for the following components:     (*)     All other components within normal limits   D DIMER, QUANTITATIVE - Abnormal; Notable for the following components:    D-Dimer 2.29 (*)     All other components within normal limits   URINALYSIS, REFLEX TO URINE CULTURE - Abnormal; Notable for the following components:    Protein, UA 1+ (*)     Ketones, UA 1+ (*)     Occult Blood UA Trace (*)     All other components within normal limits    Narrative:     Specimen Source->Urine   TROPONIN I - Abnormal; Notable for the following components:    Troponin I 0.094 (*)     All other components within normal limits   INFLUENZA A & B BY MOLECULAR   CULTURE, BLOOD   CULTURE, BLOOD   LIPASE   LACTIC ACID, PLASMA   RSV ANTIGEN DETECTION   SARS-COV-2 RNA AMPLIFICATION, QUAL   URINALYSIS MICROSCOPIC    Narrative:     Specimen Source->Urine     EKG Readings: (Independently Interpreted)   Previous EKG: Compared with most recent EKG Previous EKG Date: 11/19/2023.   NSR at 64 bpm. Normal axis. Normal intervals. Nonspecific T wave abnormalities. No STEMI.       Imaging Results              CTA Chest Non-Coronary (PE Studies) (Final result)  Result time 12/11/23 10:45:23      Final result by Wandy Mckeon MD (12/11/23 10:45:23)                   Impression:      There are findings concerning for pulmonary malignancy.  Specifically there is a left upper lobe pulmonary mass lesion and impressive mediastinal lymphadenopathy thought to represent metastatic spread of disease.    There is no evidence pulmonary embolus.    There is cholelithiasis.      Electronically signed by: Wandy Mckeon MD  Date:    12/11/2023  Time:    10:45               Narrative:    EXAMINATION:  CTA CHEST NON CORONARY (PE STUDIES)    CLINICAL HISTORY:  Pulmonary embolism (PE)  suspected, positive D-dimer;    TECHNIQUE:  Low dose axial images, sagittal and coronal reformations were obtained from the thoracic inlet to the lung bases following the IV administration of 100 mL of Omnipaque 350.  Contrast timing was optimized to evaluate the pulmonary arteries.  MIP images were performed.    COMPARISON:  None    FINDINGS:  There is a 4.1 cm noncalcified mass lesion seen within the left upper lung zone.    There is mediastinal lymphadenopathy with the most prominent node seen within the AP window measuring 4.2 cm in short axis.  This does encase the left main pulmonary artery.    There is a subcarinal lymph node measuring 3.1 cm.    There is bilateral supraclavicular lymphadenopathy not well visualized due to streak artifact from the patient's pacemaker.    There is cardiomegaly.    There are no pulmonary arterial filling defects.  Visualized portions of the superior abdomen show cholelithiasis.    The patient has an aortic stent graft.  The osseous structures are unremarkable.    That is cancer                                       CT Renal Stone Study ABD Pelvis WO (Final result)  Result time 12/11/23 10:53:32      Final result by Marko Kessler MD (12/11/23 10:53:32)                   Impression:      1. Small pulmonary nodules at the left lung base.  Follow-up per Fleischner guidelines.  2. Cholelithiasis.  3. Stable bilateral isoattenuating exophytic renal lesions.  These may represent complicated cysts.  Confirmation with renal ultrasound as deemed clinically necessary.  4. Increased colonic stool volume.  Correlate clinically for constipation  5. Prostatic hypertrophy.      Electronically signed by: Marko Kessler  Date:    12/11/2023  Time:    10:53               Narrative:    EXAMINATION:  CT RENAL STONE STUDY ABD PELVIS WO    CLINICAL HISTORY:  Flank pain, kidney stone suspected;    TECHNIQUE:  Low dose axial images, sagittal and coronal reformations were obtained from the lung  bases to the pubic symphysis.  Contrast was not administered.    COMPARISON:  CT 11/03/2023 and 08/15/2023.    FINDINGS:  Small pulmonary nodules at the left lung base measuring 3-9 mm in size.  Dependent atelectasis at both lung bases with pleural thickening.    The liver and spleen are normal in size and attenuation.  Gallbladder is distended with multiple small dependent calcified gallstones.  Pancreas and adrenal glands are unremarkable.    Kidneys are mildly atrophic but normal in attenuation.  No renal calculi no changes of hydronephrosis.  Stable bilateral isointense exophytic renal lesions measuring 2.3 and 2.6 cm.  These may represent complicated cysts.  There is mild bilateral perinephric inflammatory change.    There is a large amount of stool throughout the colon.  No mesenteric inflammatory change.  No CT evidence for bowel obstruction.  The appendix is air-filled and normal in caliber.    Bladder is distended.  Prostate is enlarged measuring 4.2 cm AP x 4.7 cm transverse.  Seminal vesicles and rectum unremarkable.    No significant mesenteric or retroperitoneal lymphadenopathy.                                        X-Ray Chest AP Portable (Final result)  Result time 12/11/23 10:11:14      Final result by Marko Kessler MD (12/11/23 10:11:14)                   Impression:      1. Mild congestive heart failure.  2. Prior CABG.  3. Right subclavian Port-A-Cath.  4. Pacemaker.  This report was flagged in Epic as abnormal.      Electronically signed by: Marko Kessler  Date:    12/11/2023  Time:    10:11               Narrative:    EXAMINATION:  XR CHEST AP PORTABLE    CLINICAL HISTORY:  acute chest pain;    TECHNIQUE:  Portable view of the chest was performed.    COMPARISON:  11/19/2023.    FINDINGS:  Pulmonary hypoinflation crowding of the bronchopulmonary vessels.  Mild diffuse prominence of the pulmonary interstitium suspicious for interstitial edema.  No significant pleural effusion.  Heart size  is enlarged.  Prior CABG.    Bony thorax intact.  Right subclavian Port-A-Cath remains in satisfactory position.  Left axillary pacemaker in place.                                       Medications   aspirin tablet 325 mg (325 mg Oral Given 12/11/23 1000)   morphine injection 6 mg (6 mg Intravenous Given 12/11/23 0950)   ondansetron injection 4 mg (4 mg Intravenous Given 12/11/23 0950)   iohexoL (OMNIPAQUE 350) injection 75 mL (75 mLs Intravenous Given 12/11/23 1031)   furosemide injection 40 mg (40 mg Intravenous Given 12/11/23 1135)   enoxaparin injection 80 mg (80 mg Subcutaneous Given 12/11/23 1135)     Medical Decision Making  Ddx: ureteral stone, ACS, PE, PTX, aortic dissection, PNA, peritonitis, cancer pain    Based on the patient's evaluation - patient appears well and stable. Leukocytosis noted - no fever. Bcx, lactic ordered.     CKD noted on exam - stable. Trop 0.103 (higher than prior Trops which were 0.4-0.7),  with mild CHF on CXR. Currently without chest pain but reportedly had some earlier. No PE on CTA. There is the known lung mass, metastatic lung disease noted. They are awaiting biopsy results from [facility] to consider treatment. Have an oncology appointment with [facility] 12/27.     Aspirin 325mg given, lasix 40 mg, and lovenox 1mg/kg. Patient of [facility] so will consult for further recs with concern for NSTEMI/unstable angina.    Based on the patient's evaluation, patient appears well for discharge home.  He is currently denying any chest pain.  He had 2 negative ECGs.  There is no PE on CT imaging.  Again noted was the lung mass with possible metastatic disease of the lungs.  He also had an elevated troponin at 0.103. CT renal stone protocol with pulmonary nodules, gallstones, renal lesions bilaterally, constipation. No infection etiology identified with lower concern for sepsis.    [facility] (cardiology) consulted and was happy to assist. Does not appear to be cardiac in nature. Recommends  medical CAD treatment, continue amiodaron, ASA (rx sent), coreg, eliquis, zetia, imdur, repatha, vascepa. Patient is allergic to statins so no statin prescribed. Repeat troponin 0.094    Pain suspected to be from cancer/lung mass.    Will discharge home with PCP f/u, cardiology follow up. Sees pulmonary in 11 days, [place] oncology in 16 days. Return precautions given.    Amount and/or Complexity of Data Reviewed  Labs: ordered.  Radiology: ordered.    Risk  OTC drugs.  Prescription drug management.                                      Clinical Impression:  Final diagnoses:  [R07.9] Acute chest pain (Primary)  [R91.8] Mass of left lung  [N18.9] Chronic kidney disease, unspecified CKD stage  [R79.89] Elevated troponin  [N28.1] Bilateral renal cysts  [K80.20] Gallstones          ED Disposition Condition    Discharge Stable          ED Prescriptions       Medication Sig Dispense Start Date End Date Auth. Provider    aspirin 81 MG Chew Take 1 tablet (81 mg total) by mouth once daily. 30 tablet 12/11/2023 -- Matthew Burt DO          Follow-up Information       Follow up With Specialties Details Why Contact Info    Lalo Sweet MD Family Medicine Schedule an appointment as soon as possible for a visit in 3 days  [address] [place] [address]  [phone]      Fausto Leigh MD Cardiology Schedule an appointment as soon as possible for a visit in 3 days  [address] [place] [address]  [phone]      [facility] - Emergency Dept Emergency Medicine Go to  If symptoms worsen [address] [facility] [id number]  [phone]             Matthew Burt DO  12/11/23 4078

## 2023-12-11 NOTE — PROGRESS NOTES
"[facility] - Emergency Dept  Cardiology  Consult Note    Patient Name: Wesley Bernal  MRN: [id number]  Admission Date: 12/11/2023  Hospital Length of Stay: 0 days  Code Status: Prior   Attending Provider: Matthew Burt DO   Consulting Provider: Ginny Pino NP  Primary Care Physician: Lalo Sweet MD  Principal Problem:<principal problem not specified>    Patient information was obtained from patient, past medical records, and ER records.     Consults  Subjective:     Chief Complaint:  Chest and Back Pain     HPI: Wesley Bernal is a 73-year-old male with a medical history of type 2 diabetes, CAD - status post 4 vessel CABG 1991/one-vessel CABG 1st diagonal 2012-SVG (SVG circumflex and RCA occluded), tissue mitral valve replacement, ischemic cardiomyopathy, s/p ICD, PAF, squamous cell anal carcinoma, and PAD presented to the emergency room with complaints of left upper back pain and chest pain. He reports left upper "sharp" back pain with radiation to the front that began last night, and left sided "sharp" chest pain that began this am. Of note, s/p lung biopsy on 12/4/2023.     EKG shows NSR.  LABs significant for Leukocytosis, elevated D-dimer, CARLOS, and elevated BNP. Troponin 0.103.  CXR mild CHF  CTA chest - No evidence of pulmonary embolus and findings concerning for pulmonary malignancy.  Specifically there is a left upper lobe pulmonary mass lesion and impressive mediastinal lymphadenopathy thought to represent metastatic spread of disease.    CIS asked to evaluate       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    Cardiomyopathy     Carotid artery disease     Coronary artery disease     Diabetes mellitus type II     Encounter for blood transfusion     Heart disease     Hyperlipidemia     Hypertension     PAD (peripheral artery disease)        Past Surgical History:   Procedure Laterality Date    " ABDOMINAL AORTA STENT      BRACHIOCEPHALIC VEIN ANGIOPLASTY / STENTING      BRONCHOSCOPY N/A 11/10/2023    Procedure: BRONCHOSCOPY;  Surgeon: Prudencio Hidalgo MD;  Location: [facility] OR;  Service: Pulmonary;  Laterality: N/A;    CARDIAC DEFIBRILLATOR PLACEMENT      2011    CARDIAC PACEMAKER PLACEMENT          CARDIAC SURGERY      open heart    CARDIAC VALVE SURGERY      pig valve replacement -    CAROTID STENT      COLONOSCOPY N/A 2023    Procedure: COLONOSCOPY;  Surgeon: Javier Chang MD;  Location: [facility] ENDO;  Service: Endoscopy;  Laterality: N/A;    ESOPHAGOGASTRODUODENOSCOPY N/A 2023    Procedure: EGD (ESOPHAGOGASTRODUODENOSCOPY);  Surgeon: Javier Chang MD;  Location: [facility] ENDO;  Service: Endoscopy;  Laterality: N/A;    LASIK SX Bilateral     LYMPH NODE BIOPSY Left 2019    Procedure: BIOPSY, LYMPH NODE (GROIN);  Surgeon: Adelfo Yin MD;  Location: [facility] OR;  Service: General;  Laterality: Left;    REPLACEMENT OF IMPLANTABLE CARDIOVERTER-DEFIBRILLATOR (ICD) GENERATOR N/A 2020    Procedure: REPLACEMENT, ICD GENERATOR;  Surgeon: Marko Hartley MD;  Location: [facility] CATH;  Service: Cardiology;  Laterality: N/A;       Review of patient's allergies indicates:   Allergen Reactions    Ace inhibitors Other (See Comments)    Atorvastatin calcium Other (See Comments)    Statins-hmg-coa reductase inhibitors        No current facility-administered medications on file prior to encounter.     Current Outpatient Medications on File Prior to Encounter   Medication Sig    albuterol (PROVENTIL) 2.5 mg /3 mL (0.083 %) nebulizer solution SMARTSI Vial(s) Via Nebulizer 4 Times Daily PRN    ALPRAZolam (XANAX) 0.25 MG tablet TAKE ONE TABLET BY MOUTH TWICE DAILY AS NEEDED    amiodarone (PACERONE) 200 MG Tab Take 0.5 tablets (100 mg total) by mouth once daily. Pt takes 1 Tablet of 200 mg twice daily    apixaban (ELIQUIS) 5 mg Tab Take 1 tablet (5 mg total) by mouth 2 (two) times daily.  "Hold for biopsy    carvediloL (COREG) 6.25 MG tablet Take 6.25 mg by mouth 2 (two) times daily.    cyproheptadine (PERIACTIN) 4 mg tablet Take 1 tablet (4 mg total) by mouth 2 (two) times daily as needed (poor appetite).    docusate sodium (COLACE) 100 MG capsule Take 200 mg by mouth every morning.    ezetimibe (ZETIA) 10 mg tablet Take 1 tablet (10 mg total) by mouth once daily.    furosemide (LASIX) 20 MG tablet Take 1 tablet (20 mg total) by mouth once daily. Monday, Wednesday, Friday    gabapentin (NEURONTIN) 100 MG capsule Pt states he takes "2 in the morning and 4 at night" as it helps him sleep better.    HYDROcodone-acetaminophen (NORCO)  mg per tablet Take 1 tablet by mouth every 6 (six) hours as needed for Pain.    icosapent ethyL (VASCEPA) 1 gram Cap Take 2 capsules (2,000 mg total) by mouth 2 (two) times daily.    indomethacin (INDOCIN) 50 MG capsule Take 50 mg by mouth every 6 (six) hours as needed.    ipratropium (ATROVENT) 0.02 % nebulizer solution Take by nebulization 4 (four) times daily.    isosorbide mononitrate (IMDUR) 60 MG 24 hr tablet Take 60 mg by mouth once daily.     ondansetron (ZOFRAN) 4 MG tablet Take 4 mg by mouth every 12 (twelve) hours as needed.    pioglitazone (ACTOS) 15 MG tablet Take 1 tablet (15 mg total) by mouth once daily.    REPATHA SURECLICK 140 mg/mL PnIj INJECT ONE SYRINGE SUBCUTANEOUSLY ONCE EVERY 14 DAYS    SITagliptin phosphate (JANUVIA) 50 MG Tab Take 1 tablet (50 mg total) by mouth once daily.     Family History       Problem Relation (Age of Onset)    Cancer Father    Diabetes Mother    Heart disease Father, Paternal Grandfather          Tobacco Use    Smoking status: Former     Current packs/day: 0.00     Types: Cigarettes, Cigars     Quit date: 4/10/2015     Years since quittin.6    Smokeless tobacco: Never   Substance and Sexual Activity    Alcohol use: No     Alcohol/week: 0.0 standard drinks of alcohol    Drug use: Never    Sexual activity: Yes     " Partners: Female     Review of Systems   Constitutional: Negative.   HENT: Negative.     Cardiovascular:  Positive for chest pain.   Respiratory:  Positive for cough.    Musculoskeletal:  Positive for back pain.   Gastrointestinal: Negative.    Neurological: Negative.      Objective:     Vital Signs (Most Recent):  Temp: 98 °F (36.7 °C) (12/11/23 0925)  Pulse: 60 (12/11/23 1038)  Resp: 19 (12/11/23 1038)  BP: (!) 167/71 (12/11/23 1038)  SpO2: 100 % (12/11/23 1038) Vital Signs (24h Range):  Temp:  [98 °F (36.7 °C)] 98 °F (36.7 °C)  Pulse:  [60-63] 60  Resp:  [18-29] 19  SpO2:  [97 %-100 %] 100 %  BP: (123-167)/(59-71) 167/71     Weight: 75.8 kg (167 lb)  Body mass index is 22.03 kg/m².    SpO2: 100 %       No intake or output data in the 24 hours ending 12/11/23 1117    Lines/Drains/Airways       Peripheral Intravenous Line  Duration                  Peripheral IV - Single Lumen 12/11/23 0925 20 G Left Antecubital <1 day                    Physical Exam  HENT:      Head: Normocephalic and atraumatic.   Cardiovascular:      Rate and Rhythm: Normal rate.      Pulses: Normal pulses.   Pulmonary:      Effort: Pulmonary effort is normal.      Breath sounds: Normal breath sounds.   Musculoskeletal:      Cervical back: Normal range of motion and neck supple.   Skin:     General: Skin is warm and dry.      Capillary Refill: Capillary refill takes less than 2 seconds.   Neurological:      Mental Status: He is alert. Mental status is at baseline.         Significant Labs: CMP   Recent Labs   Lab 12/11/23  0940      K 4.7      CO2 23   *   BUN 21   CREATININE 1.8*   CALCIUM 9.4   PROT 7.9   ALBUMIN 3.4*   BILITOT 0.7   ALKPHOS 82   AST 12   ALT 11   ANIONGAP 13   , CBC   Recent Labs   Lab 12/11/23  0940   WBC 18.86*   HGB 10.6*   HCT 35.4*      , Troponin   Recent Labs   Lab 12/11/23  0940   TROPONINI 0.103*   , and All pertinent lab results from the last 24 hours have been reviewed.    Significant  Imaging:     Echocardiogram 2023:  Left ventricle mildly dilated with LVEF 45%.  Left atrium severely dilated  Mitral Valve: bioprosthetic valve well-seated; mean gradient 4mmHg. Very small mobile echodense shadow 0.2x0.4cm in submitral apparatus  Mild pulmonic regurg    Assessment and Plan:    Atypical Chest Pain over PM site and on the left back not suggestive of Cardiac origin, s/p Lung Bx last week for nodules suspicious of cancer. Stable  -Troponin 0.103  -Continue to trend CE    Back Pain    CAD  -status post four-vessel CABG , one-vessel CABG first diagonal .  SVG circumflex and RCA occluded.  LIMA patent to LAD.      Mitral Valve Replacement  -Tissue valve    PAF  -Eliquis  -Amiodarone  -Coreg    ICMO  -EF 45%  with Tissue MV prosthesis adequate  S/p ICD/PM with PAF detected    Rectal Cancer  -Remission    CKD worsening/severe emphysema, No PE on CT     Plan: establish Dx for lung nodule,   continue to treat CAD Medically, will avoid invasive management at this time.  Continue amiodarone,asa,coreg 6.25, eliquis, zetia, Imdur, Repatha, Vascepa  Switch rozuvastatin to atorvastatin  Hold Lasix   Repeat Troponin         There are no hospital problems to display for this patient.      VTE Risk Mitigation (From admission, onward)      None            No current facility-administered medications for this encounter.     Current Outpatient Medications   Medication Sig    albuterol (PROVENTIL) 2.5 mg /3 mL (0.083 %) nebulizer solution SMARTSI Vial(s) Via Nebulizer 4 Times Daily PRN    ALPRAZolam (XANAX) 0.25 MG tablet TAKE ONE TABLET BY MOUTH TWICE DAILY AS NEEDED    amiodarone (PACERONE) 200 MG Tab Take 0.5 tablets (100 mg total) by mouth once daily. Pt takes 1 Tablet of 200 mg twice daily    apixaban (ELIQUIS) 5 mg Tab Take 1 tablet (5 mg total) by mouth 2 (two) times daily. Hold for biopsy    carvediloL (COREG) 6.25 MG tablet Take 6.25 mg by mouth 2 (two) times daily.    cyproheptadine  "(PERIACTIN) 4 mg tablet Take 1 tablet (4 mg total) by mouth 2 (two) times daily as needed (poor appetite).    docusate sodium (COLACE) 100 MG capsule Take 200 mg by mouth every morning.    ezetimibe (ZETIA) 10 mg tablet Take 1 tablet (10 mg total) by mouth once daily.    furosemide (LASIX) 20 MG tablet Take 1 tablet (20 mg total) by mouth once daily. Monday, Wednesday, Friday    gabapentin (NEURONTIN) 100 MG capsule Pt states he takes "2 in the morning and 4 at night" as it helps him sleep better.    HYDROcodone-acetaminophen (NORCO)  mg per tablet Take 1 tablet by mouth every 6 (six) hours as needed for Pain.    icosapent ethyL (VASCEPA) 1 gram Cap Take 2 capsules (2,000 mg total) by mouth 2 (two) times daily.    indomethacin (INDOCIN) 50 MG capsule Take 50 mg by mouth every 6 (six) hours as needed.    ipratropium (ATROVENT) 0.02 % nebulizer solution Take by nebulization 4 (four) times daily.    isosorbide mononitrate (IMDUR) 60 MG 24 hr tablet Take 60 mg by mouth once daily.     ondansetron (ZOFRAN) 4 MG tablet Take 4 mg by mouth every 12 (twelve) hours as needed.    pioglitazone (ACTOS) 15 MG tablet Take 1 tablet (15 mg total) by mouth once daily.    REPATHA SURECLICK 140 mg/mL PnIj INJECT ONE SYRINGE SUBCUTANEOUSLY ONCE EVERY 14 DAYS    SITagliptin phosphate (JANUVIA) 50 MG Tab Take 1 tablet (50 mg total) by mouth once daily.         Thank you for your consult. I will follow-up with patient. Please contact us if you have any additional questions.    Ginny Pino NP scribed for Dr. Leigh  Cardiology    [facility] - Emergency Dept    I have personally interviewed and examined this patient face-to-face, and as the physician. Documented the above plan and rendered all medical decision making for this encounter. I have read and agree with the above documentation unless otherwise noted.           "

## 2023-12-12 NOTE — PROGRESS NOTES
Pt already has a PCP appointment scheduled with Lalo Sweet MD for 12/18/2023 at 4:15 p.m., and will be reminded on 12/15.    Tracy Benites  ED Navigator- [place]  [phone]

## 2023-12-15 NOTE — PROGRESS NOTES
Pt was reminded about and will be attending his appointment on Monday with Dr. May for 4:15 p.m.    Tracy Benites  ED Navigator  [phone]

## 2023-12-16 NOTE — ED PROVIDER NOTES
[facility] Emergency Room                                                  Chief Complaint  73 y.o. male with Pain (Patient to ER CC has a history of lung cancer, states he has been having worsening pain to his chest shoulders and back, with reports he has not been eating well and seems to be declining )    History of Present Illness  Wesley Bernal presents to the emergency room with complaints of acute exacerbation of chronic pain secondary to stage IV lung cancer.  Patient has known stage IV lung cancer of the left lung, is receiving care through Oncology.  Patient is on pain medication at home for a mass which is protruding from the lung into the pleura.  Patient is on Norco 10s which he took at 8:00 a.m. this morning is having persistent often pain.  Wife states that he is also not eating and drinking normally.    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    Cardiomyopathy     Carotid artery disease     Coronary artery disease     Diabetes mellitus type II     Encounter for blood transfusion     Heart disease     Hyperlipidemia     Hypertension     PAD (peripheral artery disease)      Past Surgical History:   Procedure Laterality Date    ABDOMINAL AORTA STENT      BRACHIOCEPHALIC VEIN ANGIOPLASTY / STENTING      BRONCHOSCOPY N/A 11/10/2023    Procedure: BRONCHOSCOPY;  Surgeon: Prudencio Hidalgo MD;  Location: [facility];  Service: Pulmonary;  Laterality: N/A;    CARDIAC DEFIBRILLATOR PLACEMENT      1/2011    CARDIAC PACEMAKER PLACEMENT      1-2011    CARDIAC SURGERY      open heart    CARDIAC VALVE SURGERY      pig valve replacement -    CAROTID STENT      COLONOSCOPY N/A 8/17/2023    Procedure: COLONOSCOPY;  Surgeon: Javier Chang MD;  Location: [facility];  Service: Endoscopy;  Laterality: N/A;    ESOPHAGOGASTRODUODENOSCOPY N/A 8/17/2023    Procedure: EGD (ESOPHAGOGASTRODUODENOSCOPY);  Surgeon:  Javier Chang MD;  Location: [facility] ENDO;  Service: Endoscopy;  Laterality: N/A;    LASIK SX Bilateral     LYMPH NODE BIOPSY Left 6/17/2019    Procedure: BIOPSY, LYMPH NODE (GROIN);  Surgeon: Adelfo Yin MD;  Location: [facility] OR;  Service: General;  Laterality: Left;    REPLACEMENT OF IMPLANTABLE CARDIOVERTER-DEFIBRILLATOR (ICD) GENERATOR N/A 7/29/2020    Procedure: REPLACEMENT, ICD GENERATOR;  Surgeon: Marko Hartley MD;  Location: [facility] CATH;  Service: Cardiology;  Laterality: N/A;      Review of patient's allergies indicates:   Allergen Reactions    Ace inhibitors Other (See Comments)    Atorvastatin calcium Other (See Comments)    Statins-hmg-coa reductase inhibitors         Review of Systems and Physical Exam     Review of Systems  -- Constitution - no fever, no weight loss, no loss of consciousness  -- Eyes - no changes in vision, no redness, no swelling  -- Ear, Nose - no  earache, denies congestion  -- Mouth,Throat - no sore throat, no toothache, normal voice, normal swallowing  -- Respiratory - denies cough and congestion, no shortness of breath, no wheezing  -- Cardiovascular - denies chest pain, no palpitations,   -- Gastrointestinal - denies abdominal pain, denies nausea, vomiting, and diarrhea  -- Genitourinary - no dysuria, no denies flank pain, no hematuria or frequency   -- Musculoskeletal - denies back pain, negative for myalgias and arthralgias reports severe pain and back and chest wall  -- Neurological - no headache, no neurologic changes, no loss of bladder or bowel function no seizure like activity, no changes in hearing or vision  -- Skin - denies skin changes, no rash, no hives, no suspected skin infection    Vital Signs   weight is 80.1 kg (176 lb 8 oz). His oral temperature is 97.7 °F (36.5 °C). His blood pressure is 128/98 (abnormal) and his pulse is 50 (abnormal). His respiration is 17 and oxygen saturation is 95%.      Physical Exam  -- Nursing note and vitals reviewed  --  Constitutional:  Awake alert and oriented, GCS 15,.  Appears in pain  -- Head: Atraumatic. Normocephalic. No obvious abnormality  -- Eyes: Pupils are equal and reactive to light. Extraocular movements intact. No nystagmus.  No periorbital swelling. Normal conjunctiva.  -- Nose: Nose grossly normal in appearance, nares grossly normal. No rhinorrhea.  -- Throat: Mucous membranes moist, pharynx normal, normal tonsils.  Airway patent.  -- Ears: External ears and TM normal bilaterally. Normal hearing.   -- Neck: Normal range of motion. Neck supple. No meningismus. No adenopathy  -- Cardiac: Normal rate, regular rhythm and normal heart sounds. No carotid bruit. No lower extremity edema.  -- Pulmonary: Normal respiratory effort, breath sounds equal bilaterally. Adequate flow.  No wheezing.  No crackles.  -- Abdominal: Soft, no tenderness, no guarding, no rebound. Normal bowel sounds.   -- Musculoskeletal: Normal range of motion, all 4 extremities 5/5 strength.  Neurovascularly intact. Atraumatic. No deformities.  -- Neurological:  Cranial nerves 2-12 grossly intact. No focal deficits.   -- Vascular: Posterior tibial, dorsalis pedis and radial pulses 2+ bilaterally    -- Lymphatics: No cervical or peripheral lymphadenopathy.   -- Skin: Warm and dry. No evidence of rash or cellulitis  -- Psychiatric: Normal mood and affect. Bedside behavior is appropriate.  Patient is cooperative.  Denies suicidal homicidal ideation.    Emergency Room Course     Treatment Course, Evaluation, and Medical Decision Making  1. Physical exam unremarkable.  Patient appears to be in pain  2. Chest x-ray without evidence of pneumothorax  3. CBC/CMP at patient's baseline  4. Dilaudid 1 mg IV, patient with initial mild desaturation, oxygen supplemented via nasal cannula  5. Zofran 1 IV   6. Normal saline 1 L  7. Percocet 10 p.o.  8. Discharge home.  Patient counseled on need to obtain a PET scan/imaging to evaluate extent of metastasis.      Abnormal  lab values  Labs Reviewed   CBC W/ AUTO DIFFERENTIAL   COMPREHENSIVE METABOLIC PANEL       Medications Given  Medications   sodium chloride 0.9% bolus 1,000 mL 1,000 mL (has no administration in time range)   HYDROmorphone injection 1 mg (has no administration in time range)   ondansetron injection 4 mg (has no administration in time range)         Diagnosis  --oncologic associated pain    Disposition and Plan  -- Disposition: home  -- Condition: stable  -- Follow-up: Patient to follow up with Lalo Sweet MD in 1-2 days, and any specialists noted on discharge paperwork  -- I advised the patient that we have found no life threatening condition today  -- At this time, I believe the patient is clinically stable for discharge.   -- The patient acknowledges that close follow up with a MD is required   -- Patient agrees to comply with all instruction and direction given in the ER  -- Patient counseled on strict return precautions as discussed       Samaria Bloom MD  12/16/23 [id number]

## 2023-12-20 NOTE — ED TRIAGE NOTES
Pt identified x2 pt identifiers. 74 YO male presents today via [facility] from home with c/o upper lobe lung pain. Patient had biopsy done on the Dec 4th and has an appt on 27th for results. Right mass causes pain. 20mg Norco at 3 AM.     Pt denies chest pain, dyspnea, chills, fever, n/v/d, or known COVID exposure.     Pt is AO x4, speaking in full clear sentences, respirations even and unlabored. Skin warm, dry, intact, appropriate for ethnicity.     Pt updated on plan of care. Patient verbalized understanding to inform RN of any changes in symptoms.

## 2023-12-20 NOTE — PROVIDER PROGRESS NOTES - EMERGENCY DEPT.
"Encounter Date: 12/20/2023    ED Physician Progress Notes            I am assuming care of patient Wesley Bernal from physician Dr. Carl at 6 AM.    Condition: stable  Pending: CT chest  Anticipated dispo: pending    73 year old male with left lung cancer followed by Dr. Heck at [facility]. Has been seen in the ED a few times (myself included) for cancer related pain.     Took 2 [place] 10 at 3am and was seen by Dr. Carl and given IV morphine. Pain is currently manageable.    CBC with chronic leukocytosis, no fever.  Stable anemia  CMP reassuring   (seems to normally range from 700-1000)  Trop 0.035 (cardiology consult note from 9 days ago - chest pain not suggestive of cardiac origin recommends medical treatment, avoid invasive management)  CT Chest:  "Large mass left upper lobe extending into the intercostal muscles. Bulky adenopathy and pulmonary parenchymal nodules most consistent metastatic disease."    No mention of pulm edema, pleural effusions on CT chest    I have spoken with the patient and the wife. States pain is currently manageable. Will try percocet 10 to see if helps pain more than norco 10 and reassess. I suspect his cancer, which seems to be spreading into the muscles, and his tolerance of narcotic is making the Norco 10 less effective.    7:30 AM  I reassessed the patient with the wife at the bedside.  He states that the Percocet 10 is easing his pain a lot more.  He has an appointment with oncology next week.  I will plan to control his cancer pain with Percocet and advised the patient and his wife to positive Norco until he gets to his oncology appointment.  Patient is in agreement and the wife is the one that manages his pain medications.  They are in agreement with discharge and the plan.      JEANNE HOBSON DO  EMERGENCY MEDICINE  [facility]  12/20/2023 7:33 AM    "

## 2023-12-20 NOTE — ED PROVIDER NOTES
Encounter Date: 12/20/2023       History     Chief Complaint   Patient presents with    Back Pain     Wesley Bernal is a 73 y.o. male that presents with intractable pain  Unfortunately, patient was recently diagnosed with left-sided lung cancer  Review of patient's chart shows left-sided lung cancer with local spread  Patient was seen & evaluated by our pulmonologist in November 2023  Was subsequently sent to [place] with a lung biopsy on December 4  He currently awaits evaluation in 7 days by [facility] Dr. Heck  Intractable left-sided back & chest wall pain, HX of several recent ER visits    The history is provided by the patient.     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    Cardiomyopathy     Carotid artery disease     Coronary artery disease     Diabetes mellitus type II     Encounter for blood transfusion     Heart disease     Hyperlipidemia     Hypertension     PAD (peripheral artery disease)      Past Surgical History:   Procedure Laterality Date    ABDOMINAL AORTA STENT      BRACHIOCEPHALIC VEIN ANGIOPLASTY / STENTING      BRONCHOSCOPY N/A 11/10/2023    Procedure: BRONCHOSCOPY;  Surgeon: Prudencio Hidalgo MD;  Location: [facility];  Service: Pulmonary;  Laterality: N/A;    CARDIAC DEFIBRILLATOR PLACEMENT      1/2011    CARDIAC PACEMAKER PLACEMENT      1-2011    CARDIAC SURGERY      open heart    CARDIAC VALVE SURGERY      pig valve replacement -    CAROTID STENT      COLONOSCOPY N/A 8/17/2023    Procedure: COLONOSCOPY;  Surgeon: Javier Chang MD;  Location: [facility];  Service: Endoscopy;  Laterality: N/A;    ESOPHAGOGASTRODUODENOSCOPY N/A 8/17/2023    Procedure: EGD (ESOPHAGOGASTRODUODENOSCOPY);   Surgeon: Javier Chang MD;  Location: [facility] ENDO;  Service: Endoscopy;  Laterality: N/A;    LASIK SX Bilateral     LYMPH NODE BIOPSY Left 2019    Procedure: BIOPSY, LYMPH NODE (GROIN);  Surgeon: Adelfo Yin MD;  Location: [facility] OR;  Service: General;  Laterality: Left;    REPLACEMENT OF IMPLANTABLE CARDIOVERTER-DEFIBRILLATOR (ICD) GENERATOR N/A 2020    Procedure: REPLACEMENT, ICD GENERATOR;  Surgeon: Marko Hartley MD;  Location: [facility] CATH;  Service: Cardiology;  Laterality: N/A;     Family History   Problem Relation Age of Onset    Diabetes Mother     Heart disease Father     Cancer Father         melanoma, prostate    Heart disease Paternal Grandfather      Social History     Tobacco Use    Smoking status: Former     Current packs/day: 0.00     Types: Cigarettes, Cigars     Quit date: 4/10/2015     Years since quittin.7    Smokeless tobacco: Never   Substance Use Topics    Alcohol use: No     Alcohol/week: 0.0 standard drinks of alcohol    Drug use: Never     Review of Systems   Constitutional:  Negative for activity change, appetite change, fatigue, fever and unexpected weight change.   HENT:  Negative for congestion, ear pain, mouth sores, nosebleeds, rhinorrhea, sinus pressure, sneezing and sore throat.    Eyes:  Negative for pain, discharge, redness and itching.   Respiratory:  Negative for apnea, cough, chest tightness and shortness of breath.    Cardiovascular:  Negative for chest pain, palpitations and leg swelling.   Gastrointestinal:  Negative for abdominal distention, abdominal pain, anal bleeding, constipation, diarrhea, nausea and vomiting.   Endocrine: Negative.    Genitourinary:  Negative for dysuria, enuresis, flank pain and frequency.   Musculoskeletal:  Positive for back pain. Negative for arthralgias, neck pain and neck stiffness.   Skin:  Negative for color change and wound.   Allergic/Immunologic: Negative.    Neurological:  Negative for dizziness, tremors,  syncope, facial asymmetry, speech difficulty, weakness, light-headedness, numbness and headaches.   Hematological:  Negative for adenopathy. Does not bruise/bleed easily.   Psychiatric/Behavioral:  Negative for agitation, behavioral problems, hallucinations, self-injury and suicidal ideas. The patient is not nervous/anxious.        Physical Exam     Initial Vitals [12/20/23 0422]   BP Pulse Resp Temp SpO2   (!) 139/97 (!) 54 20 97.5 °F (36.4 °C) 98 %      MAP       --         Physical Exam    Nursing note and vitals reviewed.  Constitutional: Vital signs are normal. He appears well-developed and well-nourished. He is cooperative.   HENT:   Head: Normocephalic and atraumatic.   Mouth/Throat: Uvula is midline and mucous membranes are normal.   Eyes: Conjunctivae, EOM and lids are normal. Pupils are equal, round, and reactive to light.   Neck: Neck supple.   Normal range of motion.   Full passive range of motion without pain.     Cardiovascular:  Normal rate, regular rhythm, S1 normal, S2 normal, normal heart sounds, intact distal pulses and normal pulses.           Pulmonary/Chest: Effort normal.   (+) normal breath sounds in the left long consistent with Ca   Abdominal: Abdomen is soft and flat. Bowel sounds are normal.   Musculoskeletal:         General: Normal range of motion.      Cervical back: Full passive range of motion without pain, normal range of motion and neck supple.     Neurological: He is alert and oriented to person, place, and time. He has normal strength.   Skin: Skin is warm, dry and intact. Capillary refill takes less than 2 seconds.         ED Course   Procedures  Labs Reviewed   COMPREHENSIVE METABOLIC PANEL - Abnormal; Notable for the following components:       Result Value    Glucose 180 (*)     BUN 25 (*)     Albumin 2.8 (*)     eGFR 53 (*)     All other components within normal limits   CBC W/ AUTO DIFFERENTIAL - Abnormal; Notable for the following components:    WBC 18.47 (*)     RBC 3.30  (*)     Hemoglobin 9.2 (*)     Hematocrit 30.6 (*)     MCHC 30.1 (*)     RDW 14.7 (*)     Immature Granulocytes 1.4 (*)     Gran # (ANC) 14.9 (*)     Immature Grans (Abs) 0.26 (*)     Lymph # 0.7 (*)     Mono # 1.1 (*)     Eos # 1.5 (*)     Gran % 80.5 (*)     Lymph % 3.9 (*)     All other components within normal limits   TROPONIN I   B-TYPE NATRIURETIC PEPTIDE     EKG Readings: (Independently Interpreted)   No STEMI  Normal sinus rhythm  No ectopy  Normal conduction  Normal ST segments  Normal T-wave  Normal axis  Heart rate in the 50s       Imaging Results              CT Chest Without Contrast (In process)                      Medications   sodium chloride 0.9% bolus 500 mL 500 mL (500 mLs Intravenous New Bag 12/20/23 8455)   morphine injection 4 mg (4 mg Intravenous Given 12/20/23 9804)   ondansetron injection 4 mg (4 mg Intravenous Given 12/20/23 0454)     Medical Decision Making  73-year-old male with known lung CA with chronic left chest wall pain  Patient with a lung biopsy at [place] December 4, 2023 on review  Patient has had intractable pain since his lung CA eval in November  Differential: metastases, pneumothorax, hemothorax, Ca related pain  Differential also includes STEMI, non-STEMI, chronic chest & back pain    Problems Addressed:  Back pain: complicated acute illness or injury    Amount and/or Complexity of Data Reviewed  External Data Reviewed: notes.  Labs: ordered. Decision-making details documented in ED Course.  Radiology: ordered and independent interpretation performed.  ECG/medicine tests: ordered.    ED Management & Risks of Complication, Morbidity, & Mortality:  EKG within normal limits with stable leukocytosis of 18,000  I currently await the results of a CT of the chest performed now  Patient has local metastases with left-sided lung cancer diagnosis  Seemingly poor prognosis based on chronic pain & debility issues  IV morphine & Zofran given, await all results this morning in the  ED  I will transition this patient the oncoming ER physician at 6:00 a.m.    Clinical Impression:  Final diagnoses:  [M54.9] Back pain                 Alexi Carl MD  12/20/23 0548

## 2023-12-22 NOTE — Clinical Note
Diagnosis: SOB (shortness of breath) [080649]   Future Attending Provider: KIM LINARES [[id number]]   Admitting Provider:: KIM LINARES [[id number]]

## 2023-12-22 NOTE — ED PROVIDER NOTES
Encounter Date: 12/22/2023       History     Chief Complaint   Patient presents with    Shortness of Breath     HPI    Patient is a 73y WM hx HTN, CAD, DM, multiple cancers including rectal and recently diagnosed lung cancer presenting with shortness of breath and left sided lung pain.  Patient states symptoms have been worsening for the past 2 days and is located in the biopsy site. Had a lung biopsy by Dr. De La Rosa at [facility] on 12/4/23 and scheduled to be seen by oncology Dr. Heck also at [facility]. He feels like he cannot take a deep enough breath and has sharp pain radiating down his left thorax.      Per the wife patient had a fall yesterday.  It was unwitnessed.  Today she noticed bruising on patient's abdomen and dried blood on left leg.  Patient does not give details to the fall.  He is anticoagulated with Eliquis.    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    Cardiomyopathy     Carotid artery disease     Coronary artery disease     Diabetes mellitus type II     Encounter for blood transfusion     Heart disease     Hyperlipidemia     Hypertension     PAD (peripheral artery disease)      Past Surgical History:   Procedure Laterality Date    ABDOMINAL AORTA STENT      BRACHIOCEPHALIC VEIN ANGIOPLASTY / STENTING      BRONCHOSCOPY N/A 11/10/2023    Procedure: BRONCHOSCOPY;  Surgeon: Prudencio Hidalgo MD;  Location: [facility];  Service: Pulmonary;  Laterality: N/A;    CARDIAC DEFIBRILLATOR PLACEMENT      1/2011    CARDIAC PACEMAKER PLACEMENT      1-2011    CARDIAC SURGERY      open heart    CARDIAC VALVE SURGERY      pig valve replacement -    CAROTID STENT      COLONOSCOPY N/A 8/17/2023    Procedure: COLONOSCOPY;  Surgeon: Bernardo  Javier GRANT MD;  Location: [facility] ENDO;  Service: Endoscopy;  Laterality: N/A;    ESOPHAGOGASTRODUODENOSCOPY N/A 2023    Procedure: EGD (ESOPHAGOGASTRODUODENOSCOPY);  Surgeon: Javier Chang MD;  Location: [facility] ENDO;  Service: Endoscopy;  Laterality: N/A;    LASIK SX Bilateral     LYMPH NODE BIOPSY Left 2019    Procedure: BIOPSY, LYMPH NODE (GROIN);  Surgeon: Adelfo Yin MD;  Location: [facility] OR;  Service: General;  Laterality: Left;    REPLACEMENT OF IMPLANTABLE CARDIOVERTER-DEFIBRILLATOR (ICD) GENERATOR N/A 2020    Procedure: REPLACEMENT, ICD GENERATOR;  Surgeon: Marko Hartley MD;  [facility];  Service: Cardiology;  Laterality: N/A;     Family History   Problem Relation Age of Onset    Diabetes Mother     Heart disease Father     Cancer Father         melanoma, prostate    Heart disease Paternal Grandfather      Social History     Tobacco Use    Smoking status: Former     Current packs/day: 0.00     Types: Cigarettes, Cigars     Quit date: 4/10/2015     Years since quittin.7    Smokeless tobacco: Never   Substance Use Topics    Alcohol use: No     Alcohol/week: 0.0 standard drinks of alcohol    Drug use: Never     Review of Systems   Constitutional:  Negative for chills and fever.   Respiratory:  Positive for shortness of breath.    Cardiovascular:  Positive for chest pain.   Gastrointestinal:  Negative for abdominal pain.   Neurological:  Positive for weakness.     Social Determinants of Health     Tobacco Use: Medium Risk (2023)    Patient History     Smoking Tobacco Use: Former     Smokeless Tobacco Use: Never     Passive Exposure: Not on file   Alcohol Use: Not At Risk (2023)    AUDIT-C     Frequency of Alcohol Consumption: Never     Average Number of Drinks: Patient does not drink     Frequency of Binge Drinking: Never   Financial Resource Strain: Low Risk  (2023)    Overall Financial Resource Strain (CARDIA)     Difficulty of Paying Living  Expenses: Not hard at all   Food Insecurity: No Food Insecurity (8/16/2023)    Hunger Vital Sign     Worried About Running Out of Food in the Last Year: Never true     Ran Out of Food in the Last Year: Never true   Transportation Needs: No Transportation Needs (11/3/2023)    PRAPARE - Transportation     Lack of Transportation (Medical): No     Lack of Transportation (Non-Medical): No   Physical Activity: Sufficiently Active (8/16/2023)    Exercise Vital Sign     Days of Exercise per Week: 7 days     Minutes of Exercise per Session: 30 min   Stress: No Stress Concern Present (8/16/2023)    [place] [employer] - Occupational Stress Questionnaire     Feeling of Stress : Not at all   Social Connections: Socially Integrated (8/16/2023)    Social Connection and Isolation Panel [NHANES]     Frequency of Communication with Friends and Family: More than three times a week     Frequency of Social Gatherings with Friends and Family: More than three times a week     Attends [employer] Services: More than 4 times per year     Active Member of Clubs or Organizations: Yes     Attends Club or Organization Meetings: More than 4 times per year     Marital Status: Living with partner   Housing Stability: Unknown (8/16/2023)    Housing Stability Vital Sign     Unable to Pay for Housing in the Last Year: No     Number of Places Lived in the Last Year: Not on file     Unstable Housing in the Last Year: No   Depression: Low Risk  (2/6/2023)    Depression     Last PHQ-4: Flowsheet Data: 0       Physical Exam     Initial Vitals   BP Pulse Resp Temp SpO2   12/22/23 1053 12/22/23 1029 12/22/23 1029 12/22/23 1029 12/22/23 1023   (!) 129/59 (!) 59 16 98.2 °F (36.8 °C) 96 %      MAP       --                Physical Exam    Nursing note and vitals reviewed.  Constitutional: He appears well-developed.   Unwell in appearance   HENT:   Head: Normocephalic and atraumatic.   Eyes: EOM are normal. Pupils are equal, round, and  reactive to light.   Neck:   Normal range of motion.  Cardiovascular:  Normal rate and intact distal pulses.           Pulmonary/Chest:   Coarse breath sounds   Abdominal: Abdomen is soft. There is no abdominal tenderness. There is no guarding.   Musculoskeletal:      Cervical back: Normal range of motion.     Neurological: He is alert and oriented to person, place, and time.   Gcs 14   Skin: Capillary refill takes less than 2 seconds.   Bruising to abdomen  Abrasions to left lower leg         ED Course   Critical Care    Date/Time: 12/22/2023 12:03 PM    Performed by: Gita Carroll MD  Authorized by: Gita Carroll MD  Direct patient critical care time: 85 minutes  Total critical care time (exclusive of procedural time) : 85 minutes  Critical care was necessary to treat or prevent imminent or life-threatening deterioration of the following conditions: cardiac failure.  Critical care was time spent personally by me on the following activities: ordering and review of laboratory studies, ordering and review of radiographic studies, pulse oximetry, re-evaluation of patient's condition and discussions with consultants.        Labs Reviewed   COMPREHENSIVE METABOLIC PANEL - Abnormal; Notable for the following components:       Result Value    Glucose 185 (*)     BUN 31 (*)     Creatinine 1.7 (*)     Albumin 3.0 (*)     ALT 9 (*)     eGFR 42 (*)     All other components within normal limits   CBC W/ AUTO DIFFERENTIAL - Abnormal; Notable for the following components:    WBC 23.51 (*)     RBC 3.33 (*)     Hemoglobin 9.4 (*)     Hematocrit 31.1 (*)     MCHC 30.2 (*)     RDW 14.8 (*)     Immature Granulocytes 1.4 (*)     Gran # (ANC) 19.4 (*)     Immature Grans (Abs) 0.32 (*)     Lymph # 0.8 (*)     Mono # 1.3 (*)     Eos # 1.6 (*)     Gran % 82.5 (*)     Lymph % 3.4 (*)     All other components within normal limits   B-TYPE NATRIURETIC PEPTIDE - Abnormal; Notable for the following components:     (*)     All  other components within normal limits   TROPONIN I - Abnormal; Notable for the following components:    Troponin I 0.197 (*)     All other components within normal limits   PROTIME-INR - Abnormal; Notable for the following components:    Prothrombin Time 13.0 (*)     All other components within normal limits    Narrative:     Draw baseline aPTT prior to starting the heparin bolus or  infusion  (if patient is on warfarin prior to heparin therapy)   TROPONIN I - Abnormal; Notable for the following components:    Troponin I 0.258 (*)     All other components within normal limits   TROPONIN I - Abnormal; Notable for the following components:    Troponin I 0.340 (*)     All other components within normal limits   INFLUENZA A & B BY MOLECULAR   CULTURE, BLOOD   CULTURE, BLOOD   SARS-COV-2 RNA AMPLIFICATION, QUAL   RSV ANTIGEN DETECTION   APTT    Narrative:     Draw baseline aPTT prior to starting the heparin bolus or  infusion  (if patient is on warfarin prior to heparin therapy)     EKG Readings: (Independently Interpreted)   Initial Reading: No STEMI. Rhythm: Sinus Bradycardia. Heart Rate: 54. Ectopy: No Ectopy. ST Segments: Non-Specific ST Segment Depression. T Waves Flipped: AVL, V5 and V6.   Other EKG Interpretations: Sinus maye, HR 56, normal axis, QTc 443, TWI aVl, I, no STEMI. Abnormal     ECG Results              EKG 12-lead (Final result)  Result time 12/22/23 15:03:12      Final result by Interface, Lab In [facility] (12/22/23 15:03:12)                   Narrative:    Test Reason : I49.9    Vent. Rate : 055 BPM     Atrial Rate : 055 BPM     P-R Int : 222 ms          QRS Dur : 120 ms      QT Int : 460 ms       P-R-T Axes : 101 029 123 degrees     QTc Int : 440 ms    Sinus bradycardia with 1st degree A-V block  Inferior infarct (cited on or before 16-AUG-2023)  ST and T wave abnormality, consider anterolateral ischemia  Abnormal ECG  When compared with ECG of 22-DEC-2023 12:13,  Criteria for Lateral infarct are no  longer Present  T wave inversion more evident in Anterior-lateral leads  Confirmed by Joel Clayton MD ([id number]) on 12/22/2023 3:03:02 PM    Referred By: [email or web address]   SELF           Confirmed By:Joel Clayton MD                                     EKG 12-lead (Final result)  Result time 12/22/23 13:07:37      Final result by Interface, Lab In [facility] (12/22/23 13:07:37)                   Narrative:    Test Reason : R06.02    Vent. Rate : 056 BPM     Atrial Rate : 056 BPM     P-R Int : 198 ms          QRS Dur : 122 ms      QT Int : 460 ms       P-R-T Axes : 035 -16 123 degrees     QTc Int : 443 ms    Sinus bradycardia  LVH with QRS widening and repolarization abnormality  Lateral infarct ,age undetermined  Inferior infarct (cited on or before 16-AUG-2023)  Abnormal ECG  When compared with ECG of 22-DEC-2023 10:43,  Lateral infarct is now Present  Confirmed by Joel Clayton MD ([id number]) on 12/22/2023 1:07:33 PM    Referred By: [email or web address]   SELF           Confirmed By:Joel Clayton MD                                     EKG 12-lead (Final result)  Result time 12/22/23 13:07:09      Final result by Interface, Lab In [facility] (12/22/23 13:07:09)                   Narrative:    Test Reason : R06.02    Vent. Rate : 054 BPM     Atrial Rate : 054 BPM     P-R Int : 202 ms          QRS Dur : 120 ms      QT Int : 504 ms       P-R-T Axes : 104 -10 109 degrees     QTc Int : 477 ms    Sinus bradycardia  Inferior infarct (cited on or before 16-AUG-2023)  T wave abnormality, consider lateral ischemia  Abnormal ECG  When compared with ECG of 20-DEC-2023 04:44,  No significant change was found  Confirmed by Joel Clayton MD ([id number]) on 12/22/2023 1:07:02 PM    Referred By: [email or web address]   SELF           Confirmed By:Joel Clayton MD                                  Imaging Results              CT Cervical Spine Without Contrast (Final result)  Result time 12/22/23 12:07:56      Final result by Mirian Holguin MD (12/22/23 12:07:56)                    Impression:      Age-indeterminate compression deformity of the superior endplate of C6, the age of which is indeterminate as no comparison imaging is available.    Extensive adenopathy in the supraclavicular regions bilaterally and upper mediastinum as well as a left lung mass, better characterized on prior chest CT of 12/11/2023.    Retropharyngeal edema, the etiology of which is uncertain.      Electronically signed by: Mirian Holguin MD  Date:    12/22/2023  Time:    12:07               Narrative:    EXAMINATION:  CT CERVICAL SPINE WITHOUT CONTRAST    CLINICAL HISTORY:  fall;    TECHNIQUE:  Low dose axial images, sagittal and coronal reformations were performed though the cervical spine.  Contrast was not administered.    COMPARISON:  None    FINDINGS:  The incidentally visualized soft tissue structures of the neck show calcifications of the extracranial carotid vasculature.  Prominent adenopathy seen in the lower neck and supraclavicular regions bilaterally.  This was seen to a better extent on prior chest CT.  There is also a mass in the left upper lobe that is partially imaged.  There is some fluid seen within the retropharyngeal space with stranding of the fat extending into the left carotid space and left supraclavicular region.    Evaluation of the skeletal structures, especially of C1 and C2 is limited by some motion artifact.  There are multilevel degenerative changes of the cervical spine consisting of disc space narrowing, endplate sclerosis, marginal osteophytosis and facet arthropathy.  There is mild compression deformity of the superior endplate of C6, the age of which is indeterminate as no comparison imaging is available.                                       CT Head Without Contrast (Final result)  Result time 12/22/23 11:43:03      Final result by Mirian Holguin MD (12/22/23 11:43:03)                   Impression:      No acute intracranial findings.    Age-appropriate  cerebral volume loss with mild moderate patchy decreased attenuation supratentorial white matter while nonspecific suggestive for chronic ischemic change.    No evidence for acute intracranial hemorrhage.  Clinical correlation and further evaluation as warranted.      Electronically signed by: Mirian Holguin MD  Date:    12/22/2023  Time:    11:43               Narrative:    EXAMINATION:  CT HEAD WITHOUT CONTRAST    CLINICAL HISTORY:  fall;    TECHNIQUE:  Multiple sequential 5 mm axial images of the head without contrast.  Coronal and sagittal reformatted imaging from the axial acquisition.    COMPARISON:  11/03/2023    FINDINGS:  There is mild age-appropriate generalized cerebral volume loss.  Compensatory enlargement of the ventricle sulci and cisterns without hydrocephalus.  There is no midline shift or mass effect.  There is mild moderate ill-defined decreased attenuation in the supratentorial white matter while nonspecific suggestive for chronic ischemic change.  There is no evidence for acute intracranial hemorrhage or sulcal effacement.  There is no midline shift or mass effect.  Prominent vascular calcifications.  Mucous retention cyst in the right maxillary antra.  The remaining visualized paranasal sinuses and mastoid air cells are clear..                                       X-Ray Chest AP Portable (Final result)  Result time 12/22/23 11:36:39      Final result by Mirian Holguin MD (12/22/23 11:36:39)                   Impression:      As above.      Electronically signed by: Mirian Holguin MD  Date:    12/22/2023  Time:    11:36               Narrative:    EXAMINATION:  XR CHEST AP PORTABLE    CLINICAL HISTORY:  shortness of breath;    TECHNIQUE:  Single frontal view of the chest was performed.    COMPARISON:  12/16/2023    FINDINGS:  The heart is enlarged.  Left-sided pacer device is in place.  Right-sided Port-A-Cath has its tip in the region of the distal SVC.  There is pulmonary vascular  congestion with pulmonary edema.  Bilateral pleural effusions are present.  There is more focal opacity in the right lung base which could reflect asymmetric edema, aspiration or pneumonia.  Constipation.                                       Medications   ondansetron injection 8 mg (0 mg Intravenous Hold 12/22/23 1106)   heparin 25,000 units in dextrose 5% 250 mL (100 units/mL) infusion LOW INTENSITY nomogram - [facility] (12 Units/kg/hr × 77.1 kg (Adjusted) Intravenous New Bag 12/22/23 1223)   heparin 25,000 units in dextrose 5% (100 units/ml) IV bolus from bag - ADDITIONAL PRN BOLUS - 60 units/kg (max bolus 4000 units) (has no administration in time range)   heparin 25,000 units in dextrose 5% (100 units/ml) IV bolus from bag - ADDITIONAL PRN BOLUS - 30 units/kg (max bolus 4000 units) (has no administration in time range)   albuterol-ipratropium 2.5 mg-0.5 mg/3 mL nebulizer solution 3 mL (3 mLs Nebulization Given 12/22/23 1200)   dexAMETHasone injection 8 mg (8 mg Intravenous Given 12/22/23 1043)   HYDROmorphone injection 0.5 mg (0.5 mg Intravenous Given 12/22/23 1048)   heparin 25,000 units in dextrose 5% (100 units/ml) IV bolus from bag INITIAL BOLUS (max bolus 4000 units) (4,000 Units Intravenous Bolus from Bag 12/22/23 1225)   aspirin tablet 325 mg (325 mg Oral Given 12/22/23 1212)   furosemide injection 80 mg (80 mg Intravenous Given 12/22/23 1212)   nitroGLYCERIN 2% TD oint ointment 0.5 inch (0.5 inches Transdermal Given 12/22/23 1145)   piperacillin-tazobactam (ZOSYN) 4.5 g in dextrose 5 % in water (D5W) 100 mL IVPB (MB+) (0 g Intravenous Stopped 12/22/23 1520)   metronidazole IVPB 500 mg (0 mg Intravenous Stopped 12/22/23 1445)   morphine injection 4 mg (4 mg Intravenous Given 12/22/23 1432)   furosemide injection 80 mg (80 mg Intravenous Given 12/22/23 1651)     Medical Decision Making  This is and emergent evaluation of a 73y WM hx multiple cancers with recently diagnosed left lung cancer presenting with left  sided chest pain and shortness of breath.  Exam he is hypoxic, ill in appearance with coarse breath sounds.  Labs remarkable for leukocytosis 25, H/H 9/31 and CT c spine with age indetermined compression fracture of C6 endplate and small fluid collection in the retropharyngeal space.      After aspirin, nitroglycerine and lasix left chest wall pain has decreased to 4/10.  Repeat troponin noted to have increased from 0.197 to 0.258.  He has received warfarin bolus and drip.      Discuss had with family and patient.  Angelia singned and he has been made DNR in epic.  Will admit to arrange hospice services.     Amount and/or Complexity of Data Reviewed  Independent Historian: spouse  Labs: ordered. Decision-making details documented in ED Course.  Radiology: ordered.  ECG/medicine tests: ordered and independent interpretation performed.    Risk  OTC drugs.  Prescription drug management.               ED Course as of 12/22/23 1739   Fri Dec 22, 2023   1111 WBC(!): 23.51 [AP]   1111 Hemoglobin(!): 9.4 [AP]   1111 Hematocrit(!): 31.1 [AP]   1140 BNP(!): 811 [AP]   1140 Troponin I(!): 0.197 [AP]      ED Course User Index  [AP] Gita Carroll MD                             Clinical Impression:  Final diagnoses:  [R06.02] SOB (shortness of breath)  [R06.02] Shortness of breath  [I49.9] Cardiac rhythm disorder or disturbance or change                 Gita Carroll MD  12/22/23 0644

## 2023-12-23 NOTE — PROVIDER PROGRESS NOTES - EMERGENCY DEPT.
Encounter Date: 12/22/2023    ED Physician Progress Notes        Physician Note:   Patient presenting to the ER with complaints of shortness of breath.  Patient has a lung mass that has been slowly developing over the last few months, also presenting with retropharyngeal edema, and elevated troponin suspicious for NSTEMI.  Patient was initially seen by Dr. Carroll who recommended admission.  Patient was placed on hospice, and was due to be evaluated by the hospice team over the weekend.  Patient reports that he understands that he is putting himself at significant risk by leaving the emergency department, however he states that he wishes to go home.  He understands that he could suffer permanent disability or death from the pathology that he is currently suffering from.  Patient's family members are here with him, and are witnesses to this decision.  Patient has capacity, is alert and oriented x3, and has been able to repeat and clearly state the risks associated with leaving against medical advice on multiple occasions during my conversation with him.  I will change patient's disposition two leaving against medical advice, and patient will return home.

## 2023-12-28 ENCOUNTER — OUTPATIENT CASE MANAGEMENT (OUTPATIENT)
Dept: ADMINISTRATIVE | Facility: OTHER | Age: 73
End: 2023-12-28
Payer: MEDICARE

## 2023-12-28 NOTE — PROGRESS NOTES
Outpatient Care Management  Patient Not Qualified    Patient: Wesley eBrnal  MRN:  [id number]  Date of Service:  2023  Completed by:  Alisa Cherry RN    Chief Complaint   Patient presents with    OPCM RN First Assessment Attempt    OPCM Chart Review    OPCM Enrollment Call    Case Closure       Patient Summary           Reason Not Qualified:

## 2023-12-30 LAB
ACID FAST MOD KINY STN SPEC: NORMAL
ACID FAST MOD KINY STN SPEC: NORMAL
MYCOBACTERIUM SPEC QL CULT: NORMAL
MYCOBACTERIUM SPEC QL CULT: NORMAL

## 2024-08-12 NOTE — PROGRESS NOTES
OUTPATIENT LABWORK OUTPATIENT LABWORK   Quality 226: Preventive Care And Screening: Tobacco Use: Screening And Cessation Intervention: Patient screened for tobacco use and is an ex/non-smoker Detail Level: Detailed Quality 130: Documentation Of Current Medications In The Medical Record: Current Medications Documented Quality 431: Preventive Care And Screening: Unhealthy Alcohol Use - Screening: Patient not identified as an unhealthy alcohol user when screened for unhealthy alcohol use using a systematic screening method
